# Patient Record
Sex: MALE | Race: WHITE | NOT HISPANIC OR LATINO | Employment: OTHER | URBAN - METROPOLITAN AREA
[De-identification: names, ages, dates, MRNs, and addresses within clinical notes are randomized per-mention and may not be internally consistent; named-entity substitution may affect disease eponyms.]

---

## 2017-07-07 ENCOUNTER — ANESTHESIA (OUTPATIENT)
Dept: SURGERY | Facility: CLINIC | Age: 82
DRG: 336 | End: 2017-07-07
Payer: MEDICARE

## 2017-07-07 ENCOUNTER — APPOINTMENT (OUTPATIENT)
Dept: CT IMAGING | Facility: CLINIC | Age: 82
DRG: 336 | End: 2017-07-07
Attending: EMERGENCY MEDICINE
Payer: MEDICARE

## 2017-07-07 ENCOUNTER — ANESTHESIA EVENT (OUTPATIENT)
Dept: SURGERY | Facility: CLINIC | Age: 82
DRG: 336 | End: 2017-07-07
Payer: MEDICARE

## 2017-07-07 ENCOUNTER — HOSPITAL ENCOUNTER (INPATIENT)
Facility: CLINIC | Age: 82
LOS: 4 days | Discharge: HOME | DRG: 336 | End: 2017-07-11
Attending: EMERGENCY MEDICINE | Admitting: SURGERY
Payer: MEDICARE

## 2017-07-07 ENCOUNTER — TELEPHONE (OUTPATIENT)
Dept: FAMILY MEDICINE | Facility: CLINIC | Age: 82
End: 2017-07-07

## 2017-07-07 DIAGNOSIS — K56.609 SBO (SMALL BOWEL OBSTRUCTION) (H): ICD-10-CM

## 2017-07-07 DIAGNOSIS — Z53.9 ERRONEOUS ENCOUNTER--DISREGARD: Primary | ICD-10-CM

## 2017-07-07 DIAGNOSIS — K45.8 INTERNAL HERNIA: ICD-10-CM

## 2017-07-07 DIAGNOSIS — R33.9 URINARY RETENTION: Primary | ICD-10-CM

## 2017-07-07 LAB
ALBUMIN SERPL-MCNC: 4.2 G/DL (ref 3.4–5)
ALBUMIN UR-MCNC: NEGATIVE MG/DL
ALP SERPL-CCNC: 111 U/L (ref 40–150)
ALT SERPL W P-5'-P-CCNC: 22 U/L (ref 0–70)
ANION GAP SERPL CALCULATED.3IONS-SCNC: 6 MMOL/L (ref 3–14)
APPEARANCE UR: CLEAR
AST SERPL W P-5'-P-CCNC: 20 U/L (ref 0–45)
BACTERIA #/AREA URNS HPF: ABNORMAL /HPF
BASOPHILS # BLD AUTO: 0 10E9/L (ref 0–0.2)
BASOPHILS NFR BLD AUTO: 0.3 %
BILIRUB SERPL-MCNC: 0.5 MG/DL (ref 0.2–1.3)
BILIRUB UR QL STRIP: NEGATIVE
BUN SERPL-MCNC: 16 MG/DL (ref 7–30)
CALCIUM SERPL-MCNC: 9.4 MG/DL (ref 8.5–10.1)
CHLORIDE SERPL-SCNC: 104 MMOL/L (ref 94–109)
CO2 SERPL-SCNC: 28 MMOL/L (ref 20–32)
COLOR UR AUTO: YELLOW
CREAT SERPL-MCNC: 0.73 MG/DL (ref 0.66–1.25)
CREAT SERPL-MCNC: 0.9 MG/DL (ref 0.66–1.25)
DIFFERENTIAL METHOD BLD: ABNORMAL
EOSINOPHIL # BLD AUTO: 0.1 10E9/L (ref 0–0.7)
EOSINOPHIL NFR BLD AUTO: 1.6 %
ERYTHROCYTE [DISTWIDTH] IN BLOOD BY AUTOMATED COUNT: 12.8 % (ref 10–15)
GFR SERPL CREATININE-BSD FRML MDRD: 81 ML/MIN/1.7M2
GFR SERPL CREATININE-BSD FRML MDRD: NORMAL ML/MIN/1.7M2
GLUCOSE BLDC GLUCOMTR-MCNC: 148 MG/DL (ref 70–99)
GLUCOSE BLDC GLUCOMTR-MCNC: 157 MG/DL (ref 70–99)
GLUCOSE BLDC GLUCOMTR-MCNC: 159 MG/DL (ref 70–99)
GLUCOSE SERPL-MCNC: 141 MG/DL (ref 70–99)
GLUCOSE UR STRIP-MCNC: NEGATIVE MG/DL
GRAM STN SPEC: NORMAL
HCT VFR BLD AUTO: 39 % (ref 40–53)
HGB BLD-MCNC: 13.4 G/DL (ref 13.3–17.7)
HGB UR QL STRIP: NEGATIVE
IMM GRANULOCYTES # BLD: 0 10E9/L (ref 0–0.4)
IMM GRANULOCYTES NFR BLD: 0.3 %
KETONES UR STRIP-MCNC: NEGATIVE MG/DL
LACTATE BLD-SCNC: 1.1 MMOL/L (ref 0.7–2.1)
LEUKOCYTE ESTERASE UR QL STRIP: NEGATIVE
LIPASE SERPL-CCNC: 77 U/L (ref 73–393)
LYMPHOCYTES # BLD AUTO: 1.2 10E9/L (ref 0.8–5.3)
LYMPHOCYTES NFR BLD AUTO: 17.7 %
Lab: NORMAL
MCH RBC QN AUTO: 31.3 PG (ref 26.5–33)
MCHC RBC AUTO-ENTMCNC: 34.4 G/DL (ref 31.5–36.5)
MCV RBC AUTO: 91 FL (ref 78–100)
MICRO REPORT STATUS: NORMAL
MONOCYTES # BLD AUTO: 0.4 10E9/L (ref 0–1.3)
MONOCYTES NFR BLD AUTO: 5.4 %
MUCOUS THREADS #/AREA URNS LPF: PRESENT /LPF
NEUTROPHILS # BLD AUTO: 5.1 10E9/L (ref 1.6–8.3)
NEUTROPHILS NFR BLD AUTO: 74.7 %
NITRATE UR QL: NEGATIVE
NRBC # BLD AUTO: 0 10*3/UL
NRBC BLD AUTO-RTO: 0 /100
PH UR STRIP: 6 PH (ref 5–7)
PLATELET # BLD AUTO: 134 10E9/L (ref 150–450)
PLATELET # BLD AUTO: 156 10E9/L (ref 150–450)
POTASSIUM SERPL-SCNC: 4.1 MMOL/L (ref 3.4–5.3)
PROT SERPL-MCNC: 7.7 G/DL (ref 6.8–8.8)
RBC # BLD AUTO: 4.28 10E12/L (ref 4.4–5.9)
RBC #/AREA URNS AUTO: 1 /HPF (ref 0–2)
SODIUM SERPL-SCNC: 138 MMOL/L (ref 133–144)
SP GR UR STRIP: 1.01 (ref 1–1.03)
SPECIMEN SOURCE: NORMAL
URN SPEC COLLECT METH UR: ABNORMAL
UROBILINOGEN UR STRIP-MCNC: 0 MG/DL (ref 0–2)
WBC # BLD AUTO: 6.9 10E9/L (ref 4–11)
WBC #/AREA URNS AUTO: 1 /HPF (ref 0–2)

## 2017-07-07 PROCEDURE — 99221 1ST HOSP IP/OBS SF/LOW 40: CPT | Mod: 57 | Performed by: SURGERY

## 2017-07-07 PROCEDURE — 40000306 ZZH STATISTIC PRE PROC ASSESS II: Performed by: SURGERY

## 2017-07-07 PROCEDURE — 87075 CULTR BACTERIA EXCEPT BLOOD: CPT | Performed by: SURGERY

## 2017-07-07 PROCEDURE — 99207 ZZC CDG-CODE CATEGORY CHANGED: CPT | Performed by: HOSPITALIST

## 2017-07-07 PROCEDURE — 25000128 H RX IP 250 OP 636: Performed by: ANESTHESIOLOGY

## 2017-07-07 PROCEDURE — 96375 TX/PRO/DX INJ NEW DRUG ADDON: CPT

## 2017-07-07 PROCEDURE — 96361 HYDRATE IV INFUSION ADD-ON: CPT

## 2017-07-07 PROCEDURE — 93010 ELECTROCARDIOGRAM REPORT: CPT | Performed by: INTERNAL MEDICINE

## 2017-07-07 PROCEDURE — 25000125 ZZHC RX 250: Performed by: NURSE ANESTHETIST, CERTIFIED REGISTERED

## 2017-07-07 PROCEDURE — 37000009 ZZH ANESTHESIA TECHNICAL FEE, EACH ADDTL 15 MIN: Performed by: SURGERY

## 2017-07-07 PROCEDURE — 00000146 ZZHCL STATISTIC GLUCOSE BY METER IP

## 2017-07-07 PROCEDURE — 25000128 H RX IP 250 OP 636: Performed by: NURSE ANESTHETIST, CERTIFIED REGISTERED

## 2017-07-07 PROCEDURE — 27210995 ZZH RX 272: Performed by: SURGERY

## 2017-07-07 PROCEDURE — 37000008 ZZH ANESTHESIA TECHNICAL FEE, 1ST 30 MIN: Performed by: SURGERY

## 2017-07-07 PROCEDURE — 87205 SMEAR GRAM STAIN: CPT | Performed by: SURGERY

## 2017-07-07 PROCEDURE — 25000128 H RX IP 250 OP 636: Performed by: EMERGENCY MEDICINE

## 2017-07-07 PROCEDURE — 85025 COMPLETE CBC W/AUTO DIFF WBC: CPT | Performed by: EMERGENCY MEDICINE

## 2017-07-07 PROCEDURE — 27210794 ZZH OR GENERAL SUPPLY STERILE: Performed by: SURGERY

## 2017-07-07 PROCEDURE — 81001 URINALYSIS AUTO W/SCOPE: CPT | Performed by: EMERGENCY MEDICINE

## 2017-07-07 PROCEDURE — 36000056 ZZH SURGERY LEVEL 3 1ST 30 MIN: Performed by: SURGERY

## 2017-07-07 PROCEDURE — 87070 CULTURE OTHR SPECIMN AEROBIC: CPT | Performed by: SURGERY

## 2017-07-07 PROCEDURE — 71000012 ZZH RECOVERY PHASE 1 LEVEL 1 FIRST HR: Performed by: SURGERY

## 2017-07-07 PROCEDURE — 71000013 ZZH RECOVERY PHASE 1 LEVEL 1 EA ADDTL HR: Performed by: SURGERY

## 2017-07-07 PROCEDURE — 99285 EMERGENCY DEPT VISIT HI MDM: CPT | Mod: 25

## 2017-07-07 PROCEDURE — 96376 TX/PRO/DX INJ SAME DRUG ADON: CPT

## 2017-07-07 PROCEDURE — 44180 LAP ENTEROLYSIS: CPT | Performed by: SURGERY

## 2017-07-07 PROCEDURE — 25000125 ZZHC RX 250: Performed by: SURGERY

## 2017-07-07 PROCEDURE — 0DN84ZZ RELEASE SMALL INTESTINE, PERCUTANEOUS ENDOSCOPIC APPROACH: ICD-10-PCS | Performed by: SURGERY

## 2017-07-07 PROCEDURE — 12000007 ZZH R&B INTERMEDIATE

## 2017-07-07 PROCEDURE — 80053 COMPREHEN METABOLIC PANEL: CPT | Performed by: EMERGENCY MEDICINE

## 2017-07-07 PROCEDURE — 82565 ASSAY OF CREATININE: CPT | Performed by: SURGERY

## 2017-07-07 PROCEDURE — 44180 LAP ENTEROLYSIS: CPT | Mod: AS | Performed by: PHYSICIAN ASSISTANT

## 2017-07-07 PROCEDURE — 83605 ASSAY OF LACTIC ACID: CPT | Performed by: EMERGENCY MEDICINE

## 2017-07-07 PROCEDURE — 25000128 H RX IP 250 OP 636: Performed by: SURGERY

## 2017-07-07 PROCEDURE — 96374 THER/PROPH/DIAG INJ IV PUSH: CPT

## 2017-07-07 PROCEDURE — 85049 AUTOMATED PLATELET COUNT: CPT | Performed by: SURGERY

## 2017-07-07 PROCEDURE — 83690 ASSAY OF LIPASE: CPT | Performed by: EMERGENCY MEDICINE

## 2017-07-07 PROCEDURE — 25000566 ZZH SEVOFLURANE, EA 15 MIN: Performed by: SURGERY

## 2017-07-07 PROCEDURE — 36000058 ZZH SURGERY LEVEL 3 EA 15 ADDTL MIN: Performed by: SURGERY

## 2017-07-07 PROCEDURE — 74177 CT ABD & PELVIS W/CONTRAST: CPT

## 2017-07-07 PROCEDURE — 99222 1ST HOSP IP/OBS MODERATE 55: CPT | Performed by: HOSPITALIST

## 2017-07-07 PROCEDURE — 36415 COLL VENOUS BLD VENIPUNCTURE: CPT | Performed by: SURGERY

## 2017-07-07 RX ORDER — NALOXONE HYDROCHLORIDE 0.4 MG/ML
.1-.4 INJECTION, SOLUTION INTRAMUSCULAR; INTRAVENOUS; SUBCUTANEOUS
Status: DISCONTINUED | OUTPATIENT
Start: 2017-07-07 | End: 2017-07-11 | Stop reason: HOSPADM

## 2017-07-07 RX ORDER — NICOTINE POLACRILEX 4 MG
15-30 LOZENGE BUCCAL
Status: DISCONTINUED | OUTPATIENT
Start: 2017-07-07 | End: 2017-07-07

## 2017-07-07 RX ORDER — FENTANYL CITRATE 50 UG/ML
25-50 INJECTION, SOLUTION INTRAMUSCULAR; INTRAVENOUS
Status: DISCONTINUED | OUTPATIENT
Start: 2017-07-07 | End: 2017-07-07 | Stop reason: HOSPADM

## 2017-07-07 RX ORDER — HYDROMORPHONE HYDROCHLORIDE 1 MG/ML
.3-.5 INJECTION, SOLUTION INTRAMUSCULAR; INTRAVENOUS; SUBCUTANEOUS
Status: DISCONTINUED | OUTPATIENT
Start: 2017-07-07 | End: 2017-07-11 | Stop reason: HOSPADM

## 2017-07-07 RX ORDER — ONDANSETRON 2 MG/ML
4 INJECTION INTRAMUSCULAR; INTRAVENOUS EVERY 6 HOURS PRN
Status: DISCONTINUED | OUTPATIENT
Start: 2017-07-07 | End: 2017-07-11 | Stop reason: HOSPADM

## 2017-07-07 RX ORDER — GLYCOPYRROLATE 0.2 MG/ML
INJECTION, SOLUTION INTRAMUSCULAR; INTRAVENOUS PRN
Status: DISCONTINUED | OUTPATIENT
Start: 2017-07-07 | End: 2017-07-07

## 2017-07-07 RX ORDER — LABETALOL HYDROCHLORIDE 5 MG/ML
10 INJECTION, SOLUTION INTRAVENOUS EVERY 5 MIN PRN
Status: DISCONTINUED | OUTPATIENT
Start: 2017-07-07 | End: 2017-07-07 | Stop reason: HOSPADM

## 2017-07-07 RX ORDER — MAGNESIUM HYDROXIDE 1200 MG/15ML
LIQUID ORAL PRN
Status: DISCONTINUED | OUTPATIENT
Start: 2017-07-07 | End: 2017-07-07 | Stop reason: HOSPADM

## 2017-07-07 RX ORDER — SODIUM CHLORIDE, SODIUM LACTATE, POTASSIUM CHLORIDE, CALCIUM CHLORIDE 600; 310; 30; 20 MG/100ML; MG/100ML; MG/100ML; MG/100ML
INJECTION, SOLUTION INTRAVENOUS CONTINUOUS
Status: DISCONTINUED | OUTPATIENT
Start: 2017-07-07 | End: 2017-07-07 | Stop reason: HOSPADM

## 2017-07-07 RX ORDER — GABAPENTIN 600 MG/1
600 TABLET ORAL 2 TIMES DAILY
Status: DISCONTINUED | OUTPATIENT
Start: 2017-07-07 | End: 2017-07-11 | Stop reason: HOSPADM

## 2017-07-07 RX ORDER — TAMSULOSIN HYDROCHLORIDE 0.4 MG/1
0.4 CAPSULE ORAL DAILY
Status: DISCONTINUED | OUTPATIENT
Start: 2017-07-07 | End: 2017-07-09

## 2017-07-07 RX ORDER — ONDANSETRON 4 MG/1
4 TABLET, ORALLY DISINTEGRATING ORAL EVERY 30 MIN PRN
Status: DISCONTINUED | OUTPATIENT
Start: 2017-07-07 | End: 2017-07-07 | Stop reason: HOSPADM

## 2017-07-07 RX ORDER — LIDOCAINE 40 MG/G
CREAM TOPICAL
Status: DISCONTINUED | OUTPATIENT
Start: 2017-07-07 | End: 2017-07-11 | Stop reason: HOSPADM

## 2017-07-07 RX ORDER — ONDANSETRON 4 MG/1
4 TABLET, ORALLY DISINTEGRATING ORAL EVERY 6 HOURS PRN
Status: DISCONTINUED | OUTPATIENT
Start: 2017-07-07 | End: 2017-07-11 | Stop reason: HOSPADM

## 2017-07-07 RX ORDER — ONDANSETRON 2 MG/ML
4 INJECTION INTRAMUSCULAR; INTRAVENOUS EVERY 30 MIN PRN
Status: DISCONTINUED | OUTPATIENT
Start: 2017-07-07 | End: 2017-07-07 | Stop reason: HOSPADM

## 2017-07-07 RX ORDER — LIDOCAINE HYDROCHLORIDE 10 MG/ML
INJECTION, SOLUTION INFILTRATION; PERINEURAL PRN
Status: DISCONTINUED | OUTPATIENT
Start: 2017-07-07 | End: 2017-07-07

## 2017-07-07 RX ORDER — SODIUM CHLORIDE, SODIUM LACTATE, POTASSIUM CHLORIDE, CALCIUM CHLORIDE 600; 310; 30; 20 MG/100ML; MG/100ML; MG/100ML; MG/100ML
INJECTION, SOLUTION INTRAVENOUS CONTINUOUS
Status: DISCONTINUED | OUTPATIENT
Start: 2017-07-07 | End: 2017-07-08

## 2017-07-07 RX ORDER — DEXAMETHASONE SODIUM PHOSPHATE 4 MG/ML
INJECTION, SOLUTION INTRA-ARTICULAR; INTRALESIONAL; INTRAMUSCULAR; INTRAVENOUS; SOFT TISSUE PRN
Status: DISCONTINUED | OUTPATIENT
Start: 2017-07-07 | End: 2017-07-07

## 2017-07-07 RX ORDER — HYDRALAZINE HYDROCHLORIDE 20 MG/ML
5 INJECTION INTRAMUSCULAR; INTRAVENOUS ONCE
Status: COMPLETED | OUTPATIENT
Start: 2017-07-07 | End: 2017-07-07

## 2017-07-07 RX ORDER — KETOROLAC TROMETHAMINE 15 MG/ML
15 INJECTION, SOLUTION INTRAMUSCULAR; INTRAVENOUS EVERY 6 HOURS PRN
Status: DISPENSED | OUTPATIENT
Start: 2017-07-07 | End: 2017-07-09

## 2017-07-07 RX ORDER — ATORVASTATIN CALCIUM 40 MG/1
40 TABLET, FILM COATED ORAL DAILY
Status: DISCONTINUED | OUTPATIENT
Start: 2017-07-07 | End: 2017-07-11 | Stop reason: HOSPADM

## 2017-07-07 RX ORDER — DEXTROSE MONOHYDRATE 25 G/50ML
25-50 INJECTION, SOLUTION INTRAVENOUS
Status: DISCONTINUED | OUTPATIENT
Start: 2017-07-07 | End: 2017-07-07

## 2017-07-07 RX ORDER — ACETAMINOPHEN 325 MG/1
325-650 TABLET ORAL EVERY EVENING
Status: ON HOLD | COMMUNITY
End: 2021-06-29

## 2017-07-07 RX ORDER — IOPAMIDOL 755 MG/ML
500 INJECTION, SOLUTION INTRAVASCULAR ONCE
Status: COMPLETED | OUTPATIENT
Start: 2017-07-07 | End: 2017-07-07

## 2017-07-07 RX ORDER — SODIUM CHLORIDE 9 MG/ML
1000 INJECTION, SOLUTION INTRAVENOUS CONTINUOUS
Status: DISCONTINUED | OUTPATIENT
Start: 2017-07-07 | End: 2017-07-07

## 2017-07-07 RX ORDER — HYDROMORPHONE HYDROCHLORIDE 1 MG/ML
.3-.5 INJECTION, SOLUTION INTRAMUSCULAR; INTRAVENOUS; SUBCUTANEOUS EVERY 5 MIN PRN
Status: DISCONTINUED | OUTPATIENT
Start: 2017-07-07 | End: 2017-07-07 | Stop reason: HOSPADM

## 2017-07-07 RX ORDER — NICOTINE POLACRILEX 4 MG
15-30 LOZENGE BUCCAL
Status: DISCONTINUED | OUTPATIENT
Start: 2017-07-07 | End: 2017-07-11 | Stop reason: HOSPADM

## 2017-07-07 RX ORDER — CARBOXYMETHYLCELLULOSE SODIUM 5 MG/ML
1 SOLUTION/ DROPS OPHTHALMIC DAILY PRN
Status: DISCONTINUED | OUTPATIENT
Start: 2017-07-07 | End: 2017-07-11 | Stop reason: HOSPADM

## 2017-07-07 RX ORDER — HYDRALAZINE HYDROCHLORIDE 20 MG/ML
10-20 INJECTION INTRAMUSCULAR; INTRAVENOUS EVERY 6 HOURS PRN
Status: DISCONTINUED | OUTPATIENT
Start: 2017-07-07 | End: 2017-07-08

## 2017-07-07 RX ORDER — DEXTROSE MONOHYDRATE 25 G/50ML
25-50 INJECTION, SOLUTION INTRAVENOUS
Status: DISCONTINUED | OUTPATIENT
Start: 2017-07-07 | End: 2017-07-11 | Stop reason: HOSPADM

## 2017-07-07 RX ORDER — ONDANSETRON 2 MG/ML
INJECTION INTRAMUSCULAR; INTRAVENOUS PRN
Status: DISCONTINUED | OUTPATIENT
Start: 2017-07-07 | End: 2017-07-07

## 2017-07-07 RX ORDER — NEOSTIGMINE METHYLSULFATE 1 MG/ML
VIAL (ML) INJECTION PRN
Status: DISCONTINUED | OUTPATIENT
Start: 2017-07-07 | End: 2017-07-07

## 2017-07-07 RX ORDER — LIDOCAINE 40 MG/G
CREAM TOPICAL
Status: DISCONTINUED | OUTPATIENT
Start: 2017-07-07 | End: 2017-07-07 | Stop reason: HOSPADM

## 2017-07-07 RX ORDER — PROCHLORPERAZINE MALEATE 5 MG
5 TABLET ORAL EVERY 6 HOURS PRN
Status: DISCONTINUED | OUTPATIENT
Start: 2017-07-07 | End: 2017-07-11 | Stop reason: HOSPADM

## 2017-07-07 RX ORDER — PROPOFOL 10 MG/ML
INJECTION, EMULSION INTRAVENOUS PRN
Status: DISCONTINUED | OUTPATIENT
Start: 2017-07-07 | End: 2017-07-07

## 2017-07-07 RX ORDER — FENTANYL CITRATE 50 UG/ML
INJECTION, SOLUTION INTRAMUSCULAR; INTRAVENOUS PRN
Status: DISCONTINUED | OUTPATIENT
Start: 2017-07-07 | End: 2017-07-07

## 2017-07-07 RX ORDER — BUPIVACAINE HYDROCHLORIDE AND EPINEPHRINE 5; 5 MG/ML; UG/ML
INJECTION, SOLUTION PERINEURAL PRN
Status: DISCONTINUED | OUTPATIENT
Start: 2017-07-07 | End: 2017-07-07 | Stop reason: HOSPADM

## 2017-07-07 RX ORDER — HYDROMORPHONE HYDROCHLORIDE 1 MG/ML
0.5 INJECTION, SOLUTION INTRAMUSCULAR; INTRAVENOUS; SUBCUTANEOUS
Status: COMPLETED | OUTPATIENT
Start: 2017-07-07 | End: 2017-07-07

## 2017-07-07 RX ADMIN — LABETALOL HYDROCHLORIDE 10 MG: 5 INJECTION, SOLUTION INTRAVENOUS at 11:41

## 2017-07-07 RX ADMIN — FENTANYL CITRATE 100 MCG: 50 INJECTION, SOLUTION INTRAMUSCULAR; INTRAVENOUS at 09:47

## 2017-07-07 RX ADMIN — DEXAMETHASONE SODIUM PHOSPHATE 4 MG: 4 INJECTION, SOLUTION INTRA-ARTICULAR; INTRALESIONAL; INTRAMUSCULAR; INTRAVENOUS; SOFT TISSUE at 09:51

## 2017-07-07 RX ADMIN — SODIUM CHLORIDE, POTASSIUM CHLORIDE, SODIUM LACTATE AND CALCIUM CHLORIDE: 600; 310; 30; 20 INJECTION, SOLUTION INTRAVENOUS at 09:32

## 2017-07-07 RX ADMIN — PROPOFOL 130 MG: 10 INJECTION, EMULSION INTRAVENOUS at 09:49

## 2017-07-07 RX ADMIN — ONDANSETRON 4 MG: 2 INJECTION INTRAMUSCULAR; INTRAVENOUS at 10:57

## 2017-07-07 RX ADMIN — FENTANYL CITRATE 50 MCG: 50 INJECTION, SOLUTION INTRAMUSCULAR; INTRAVENOUS at 10:08

## 2017-07-07 RX ADMIN — HYDROMORPHONE HYDROCHLORIDE 0.5 MG: 1 INJECTION, SOLUTION INTRAMUSCULAR; INTRAVENOUS; SUBCUTANEOUS at 06:18

## 2017-07-07 RX ADMIN — SODIUM CHLORIDE 1000 ML: 9 INJECTION, SOLUTION INTRAVENOUS at 05:05

## 2017-07-07 RX ADMIN — LIDOCAINE HYDROCHLORIDE 50 MG: 10 INJECTION, SOLUTION INFILTRATION; PERINEURAL at 09:49

## 2017-07-07 RX ADMIN — Medication 1.5 MG: at 11:05

## 2017-07-07 RX ADMIN — HYDROMORPHONE HYDROCHLORIDE 0.3 MG: 1 INJECTION, SOLUTION INTRAMUSCULAR; INTRAVENOUS; SUBCUTANEOUS at 20:14

## 2017-07-07 RX ADMIN — GLYCOPYRROLATE 0.2 MG: 0.2 INJECTION, SOLUTION INTRAMUSCULAR; INTRAVENOUS at 11:05

## 2017-07-07 RX ADMIN — FENTANYL CITRATE 50 MCG: 50 INJECTION INTRAMUSCULAR; INTRAVENOUS at 12:11

## 2017-07-07 RX ADMIN — HYDROMORPHONE HYDROCHLORIDE 0.5 MG: 1 INJECTION, SOLUTION INTRAMUSCULAR; INTRAVENOUS; SUBCUTANEOUS at 05:53

## 2017-07-07 RX ADMIN — IOPAMIDOL 91 ML: 755 INJECTION, SOLUTION INTRAVENOUS at 05:59

## 2017-07-07 RX ADMIN — HYDROMORPHONE HYDROCHLORIDE 1 MG: 1 INJECTION, SOLUTION INTRAMUSCULAR; INTRAVENOUS; SUBCUTANEOUS at 07:48

## 2017-07-07 RX ADMIN — FENTANYL CITRATE 100 MCG: 50 INJECTION, SOLUTION INTRAMUSCULAR; INTRAVENOUS at 10:16

## 2017-07-07 RX ADMIN — TAZOBACTAM SODIUM AND PIPERACILLIN SODIUM 3.38 G: 375; 3 INJECTION, SOLUTION INTRAVENOUS at 09:40

## 2017-07-07 RX ADMIN — ROCURONIUM BROMIDE 50 MG: 10 INJECTION INTRAVENOUS at 09:51

## 2017-07-07 RX ADMIN — HYDRALAZINE HYDROCHLORIDE 10 MG: 20 INJECTION INTRAMUSCULAR; INTRAVENOUS at 17:47

## 2017-07-07 RX ADMIN — LABETALOL HYDROCHLORIDE 10 MG: 5 INJECTION, SOLUTION INTRAVENOUS at 12:05

## 2017-07-07 RX ADMIN — HYDROMORPHONE HYDROCHLORIDE 0.5 MG: 1 INJECTION, SOLUTION INTRAMUSCULAR; INTRAVENOUS; SUBCUTANEOUS at 05:05

## 2017-07-07 RX ADMIN — LABETALOL HYDROCHLORIDE 10 MG: 5 INJECTION, SOLUTION INTRAVENOUS at 13:11

## 2017-07-07 RX ADMIN — SODIUM CHLORIDE 1000 ML: 9 INJECTION, SOLUTION INTRAVENOUS at 06:37

## 2017-07-07 RX ADMIN — SODIUM CHLORIDE, POTASSIUM CHLORIDE, SODIUM LACTATE AND CALCIUM CHLORIDE: 600; 310; 30; 20 INJECTION, SOLUTION INTRAVENOUS at 10:30

## 2017-07-07 RX ADMIN — HYDRALAZINE HYDROCHLORIDE 5 MG: 20 INJECTION INTRAMUSCULAR; INTRAVENOUS at 06:26

## 2017-07-07 RX ADMIN — SODIUM CHLORIDE 63 ML: 9 INJECTION, SOLUTION INTRAVENOUS at 05:59

## 2017-07-07 RX ADMIN — Medication 1.5 MG: at 10:57

## 2017-07-07 RX ADMIN — LABETALOL HYDROCHLORIDE 10 MG: 5 INJECTION, SOLUTION INTRAVENOUS at 11:32

## 2017-07-07 RX ADMIN — SODIUM CHLORIDE, POTASSIUM CHLORIDE, SODIUM LACTATE AND CALCIUM CHLORIDE: 600; 310; 30; 20 INJECTION, SOLUTION INTRAVENOUS at 14:26

## 2017-07-07 RX ADMIN — GLYCOPYRROLATE 0.2 MG: 0.2 INJECTION, SOLUTION INTRAMUSCULAR; INTRAVENOUS at 10:57

## 2017-07-07 ASSESSMENT — ENCOUNTER SYMPTOMS
FEVER: 0
ABDOMINAL PAIN: 1
DIARRHEA: 0
BLOOD IN STOOL: 0
CONSTIPATION: 0
APPETITE CHANGE: 0

## 2017-07-07 NOTE — ANESTHESIA POSTPROCEDURE EVALUATION
Patient: Cresencio Peguero    Procedure(s):  DIAGNOSTIC LAPAROSCOPY WITH LYSIS OF ADHESIONS  - Wound Class: I-Clean    Diagnosis:INCARCERATED HERNIA  Diagnosis Additional Information: Small bowel obstruction    Anesthesia Type:  General, ETT    Note:  Anesthesia Post Evaluation    Patient location during evaluation: PACU  Patient participation: Able to fully participate in evaluation  Level of consciousness: awake and alert  Pain management: adequate  Airway patency: patent  Cardiovascular status: acceptable  Respiratory status: acceptable  Hydration status: acceptable  PONV: none     Anesthetic complications: None          Last vitals:  Vitals:    07/07/17 1135 07/07/17 1200 07/07/17 1215   BP: 176/83 186/86 178/78   Pulse:      Resp: 14 8 15   Temp:      SpO2: 100% 100% 94%         Electronically Signed By: Elvin Flowers MD  July 7, 2017  12:37 PM

## 2017-07-07 NOTE — BRIEF OP NOTE
[facility] Brief Operative Note    Pre-operative diagnosis: INCARCERATED HERNIA   Post-operative diagnosis Small bowel obstruction     Procedure: Procedure(s):  DIAGNOSTIC LAPAROSCOPY WITH LYSIS OF ADHESIONS  - Wound Class: I-Clean   Surgeon(s): Surgeon(s) and Role:     * Sumeet Joiner MD - Primary     * Hector Zamarripa PA-C   Estimated blood loss: 5    Specimens:   ID Type Source Tests Collected by Time Destination   1 : abdominal fluid Fluid Abdomen ANAEROBIC BACTERIAL CULTURE, FLUID CULTURE AEROBIC BACTERIAL, GRAM STAIN Sumeet Joiner MD 7/7/2017 10:38 AM       Findings: Omental band crossing and compressing the mid small bowel against the posterior abdominal cavity. Turbid green-brown fluid in abdomen; sampled for analysis. No gross evidence of injury or perforation of colon, anterior stomach, gallbladder. Small intestine run retrograde from ileocecal valve to ligament of Treitz without overt ischemic changes or necrosis.

## 2017-07-07 NOTE — CONSULTS
[facility]    Hospitalist Consult       Date of Admission:  7/7/2017  Date of Service (when I saw the patient): 07/07/17    Assessment & Plan   Cresencio Peguero is a 82 year old male with history CAD/bypass grafting (20 yrs ago, has been stable), DM, chronic back pain, cervical laminectomy, neuropathy who presents with abdominal pain due to incarcerated hernia s/p laparoscopy and lysis of adhesions    1. Neuro- pain control per surgery  2. Cardiovascular- HTN/CAD. Was hypertensive after surgery. Now improved. No formal diagnosis of HTN and not on BP meds. Monitor for now as it has improved. Can start oral med if BP continues to be high  3. Pulmonary- no issues  4. GI- SBO due to adhesions. S/p laparoscopy and lysis of adhesions. Has NG tube currently. I personally spoke with Dr. Joiner. Likey NGT will stay in until tomorrow. NPO until tomorrow, then may start clears. Surgery will re-assess in am  5. ID- perioperative antibiotics per surgery (on zosyn)  6. Renal- IVF overnight while NPO  7. Heme/Onc- no issues  8. Endo- diabetes. Controlled with diet and metformin. Holding metformin x 48 hours due to CT scan. Cover with sliding scale for now. Re-start metformin when eating  9. Musculoskeletal- ambulate  10. Prophylaxis- per surgery (currently on lovenox)      Obdulio Saleh MD, Hospitalist  Pager: [phone]    Obdulio Saleh MD    Primary Care Physician   Parrish Padgett    Chief Complaint   Abdominal pain. SBO due to adhesions    History is obtained from the patient. Directly spoke with Dr. Joiner    History of Present Illness   Cresencio Peguero is a 82 year old male with history CAD/bypass grafting (20 yrs ago, has been stable), DM, chronic back pain, cervical laminectomy, neuropathy who presents with abdominal pain due to incarcerated hernia s/p laparoscopy and lysis of adhesions. Patient is post-op. Back in his room. Family is here. He denies any pain. Still groggy from surgery. Has some  sore throat. No chest pain, sob, abdo pain, n/v/d. No complaints at this time      Past Medical History    I have reviewed this patient's medical history and updated it with pertinent information if needed.   DM, CAD, back pain, neuropathy    Past Surgical History   I have reviewed this patient's surgical history and updated it with pertinent information if needed.  Past Surgical History:   Procedure Laterality Date     HERNIORRHAPHY INGUINAL Right 8/15/2016    Procedure: HERNIORRHAPHY INGUINAL;  Surgeon: Wu Adam MD;  Location:  OR       Prior to Admission Medications   Prior to Admission Medications   Prescriptions Last Dose Informant Patient Reported? Taking?   ATORVASTATIN CALCIUM PO 7/6/2017 at a.m Self Yes Yes   Sig: Take 40 mg by mouth daily   GABAPENTIN PO 7/6/2017 at p m Self Yes Yes   Sig: Take 600 mg by mouth 2 times daily    METFORMIN HCL PO 7/6/2017 at a.m Self Yes Yes   Sig: Take 850 mg by mouth daily (with breakfast)   acetaminophen (TYLENOL) 325 MG tablet 7/6/2017 at a.m Self Yes Yes   Sig: Take 650 mg by mouth every 6 hours as needed for mild pain   carboxymethylcellulose (CELLUVISC/REFRESH LIQUIGEL) 1 % ophthalmic solution unknown Self Yes Yes   Sig: Place 1 drop into both eyes daily as needed Alternates with Refresh Tears.    carboxymethylcellulose (REFRESH PLUS) 0.5 % SOLN unknown Self Yes Yes   Sig: Place 1 drop into both eyes daily as needed Alternates with Refresh Liquigel.    polyethylene glycol (MIRALAX/GLYCOLAX) packet 7/6/2017 at a.m Self Yes Yes   Sig: Take 17 g by mouth daily   tamsulosin (FLOMAX) 0.4 MG 24 hr capsule 7/6/2017 at a.m Self Yes Yes   Sig: Take 0.4 mg by mouth daily      Facility-Administered Medications: None     Allergies   Allergies   Allergen Reactions     Percodan [Oxycodone-Aspirin] Rash       Social History   I have reviewed this patient's social history and updated it with pertinent information if needed. Cresencio Peguero  reports that he has never  smoked. He does not have any smokeless tobacco history on file.    Family History   I have reviewed this patient's family history and updated it with pertinent information if needed.   History reviewed. No pertinent family history.    Review of Systems   The 10 point Review of Systems is negative other than noted in the HPI or here.     Physical Exam   Temp: 96.7  F (35.9  C) Temp src: Axillary BP: 148/50 Pulse: 57 Heart Rate: 60 Resp: 14 SpO2: 96 % O2 Device: None (Room air) Oxygen Delivery: 10 LPM  Vital Signs with Ranges  Temp:  [96.7  F (35.9  C)-97.6  F (36.4  C)] 96.7  F (35.9  C)  Pulse:  [55-60] 57  Heart Rate:  [53-77] 60  Resp:  [7-40] 14  BP: (137-226)/() 148/50  FiO2 (%):  [100 %] 100 %  SpO2:  [94 %-100 %] 96 %  180 lbs 0 oz    Exam:  GEN:  Alert, oriented x 3, appears comfortable, NAD.  HEENT:  Normocephalic/atraumatic, no scleral icterus, no nasal discharge, mouth moist.  CV:  Regular rate and rhythm, no murmur or JVD.  S1 + S2 noted, no S3 or S4.  LUNGS:  Clear to auscultation bilaterally without rales/rhonchi/wheezing/retractions.  Symmetric chest rise on inhalation noted.  ABD:  Decreased BS. Surgical incision sites clean and dry. No tenderness.  EXT:  No edema or cyanosis.  Hands/feet warm to touch with good signs of peripheral perfusion.  No joint synovitis noted.  SKIN:  Dry to touch, no exanthems noted in the visualized areas.  NEURO:  Symmetric muscle strength, sensation to touch grossly intact.  No new focal deficits appreciated.    Data   Data reviewed today:  I personally reviewed all imaging and EKGs   Results for orders placed or performed during the hospital encounter of 07/07/17   CT Abdomen Pelvis w Contrast    Narrative    CT ABDOMEN PELVIS W CONTRAST  7/7/2017 6:06 AM     HISTORY: Left lower quadrant pain.    TECHNIQUE: Volumetric acquisition through abdomen and pelvis with IV  contrast. 91 mL Isovue-370. Radiation dose for this scan was reduced  using automated exposure  control, adjustment of the mA and/or kV  according to patient size, or iterative reconstruction technique.    COMPARISON: 8/14/2016.    FINDINGS: The liver, gallbladder, spleen, pancreas, adrenal glands and  kidneys demonstrate no worrisome findings. Small left renal cyst.  Atheromatous changes of the abdominal aorta without aneurysm.    There are a few borderline prominent fluid-filled small bowel loops in  the midabdomen. To the left of this there is a cluster of small bowel  loops which are nondilated but appear slightly thick-walled with  moderate adjacent edema and stranding in the mesentery. The  configuration of the loops suggests an internal hernia without  significant associated obstruction at this point.    Scattered colon diverticula without diverticulitis. No free air or  pneumatosis. Small amount of free fluid in the pelvis. Normal  appendix.      Impression    IMPRESSION:  1. Cluster of abnormal small bowel loops in the left abdomen  demonstrate ill-defined wall thickening with associated mesenteric  edema and stranding.  2. Findings suggest internal hernia without significant associated  bowel obstruction at this point. However, a few adjacent small bowel  loops are fluid-filled and mildly distended.  3. Recommend surgical consultation.  4. Small amount of free pelvic fluid.    THEE CORTEZ MD         Recent Labs  Lab 07/07/17  0456   WBC 6.9   HGB 13.4   HCT 39.0*   MCV 91             Lab Results   Component Value Date     07/07/2017     08/14/2016     05/11/2006    Lab Results   Component Value Date    CHLORIDE 104 07/07/2017    CHLORIDE 104 08/14/2016    CHLORIDE 106 05/11/2006    Lab Results   Component Value Date    BUN 16 07/07/2017    BUN 22 08/14/2016    BUN 13 05/11/2006      Lab Results   Component Value Date    POTASSIUM 4.1 07/07/2017    POTASSIUM 4.3 08/14/2016    POTASSIUM 4.9 05/11/2006    Lab Results   Component Value Date    CO2 28 07/07/2017    CO2 30  08/14/2016    CO2 29 05/11/2006    Lab Results   Component Value Date    CR 0.90 07/07/2017    CR 0.87 08/14/2016    CR 1.00 05/11/2006          Recent Results (from the past 24 hour(s))   CT Abdomen Pelvis w Contrast    Narrative    CT ABDOMEN PELVIS W CONTRAST  7/7/2017 6:06 AM     HISTORY: Left lower quadrant pain.    TECHNIQUE: Volumetric acquisition through abdomen and pelvis with IV  contrast. 91 mL Isovue-370. Radiation dose for this scan was reduced  using automated exposure control, adjustment of the mA and/or kV  according to patient size, or iterative reconstruction technique.    COMPARISON: 8/14/2016.    FINDINGS: The liver, gallbladder, spleen, pancreas, adrenal glands and  kidneys demonstrate no worrisome findings. Small left renal cyst.  Atheromatous changes of the abdominal aorta without aneurysm.    There are a few borderline prominent fluid-filled small bowel loops in  the midabdomen. To the left of this there is a cluster of small bowel  loops which are nondilated but appear slightly thick-walled with  moderate adjacent edema and stranding in the mesentery. The  configuration of the loops suggests an internal hernia without  significant associated obstruction at this point.    Scattered colon diverticula without diverticulitis. No free air or  pneumatosis. Small amount of free fluid in the pelvis. Normal  appendix.      Impression    IMPRESSION:  1. Cluster of abnormal small bowel loops in the left abdomen  demonstrate ill-defined wall thickening with associated mesenteric  edema and stranding.  2. Findings suggest internal hernia without significant associated  bowel obstruction at this point. However, a few adjacent small bowel  loops are fluid-filled and mildly distended.  3. Recommend surgical consultation.  4. Small amount of free pelvic fluid.    THEE CORTEZ MD

## 2017-07-07 NOTE — IP AVS SNAPSHOT
[facility]    [address]    [facility] [address]    Phone:  [phone]    Fax:  [phone]                                       After Visit Summary   7/7/2017    Cresencio Peguero    MRN: [id number]           After Visit Summary Signature Page     I have received my discharge instructions, and my questions have been answered. I have discussed any challenges I see with this plan with the nurse or doctor.    ..........................................................................................................................................  Patient/Patient Representative Signature      ..........................................................................................................................................  Patient Representative Print Name and Relationship to Patient    ..................................................               ................................................  Date                                            Time    ..........................................................................................................................................  Reviewed by Signature/Title    ...................................................              ..............................................  Date                                                            Time

## 2017-07-07 NOTE — ANESTHESIA PREPROCEDURE EVALUATION
Anesthesia Evaluation     . Pt has had prior anesthetic. Type: General    No history of anesthetic complications          ROS/MED HX    ENT/Pulmonary:  - neg pulmonary ROS     Neurologic:  - neg neurologic ROS     Cardiovascular:     (+) --CAD, -CABG-. : . . . :. .       METS/Exercise Tolerance:     Hematologic:  - neg hematologic  ROS       Musculoskeletal:  - neg musculoskeletal ROS       GI/Hepatic:  - neg GI/hepatic ROS       Renal/Genitourinary:  - ROS Renal section negative       Endo:     (+) type II DM .      Psychiatric:  - neg psychiatric ROS       Infectious Disease:  - neg infectious disease ROS       Malignancy:      - no malignancy   Other:    - neg other ROS                 Physical Exam  Normal systems: cardiovascular and pulmonary    Airway   Mallampati: II  TM distance: >3 FB  Neck ROM: full    Dental   (+) upper dentures and partials    Cardiovascular       Pulmonary                     Anesthesia Plan      History & Physical Review  History and physical reviewed and following examination; no interval change.    ASA Status:  3 .    NPO Status:  > 8 hours    Plan for General and ETT with Intravenous and Propofol induction. Maintenance will be Balanced.    PONV prophylaxis:  Ondansetron (or other 5HT-3) and Dexamethasone or Solumedrol       Postoperative Care  Postoperative pain management:  IV analgesics and Oral pain medications.      Consents  Anesthetic plan, risks, benefits and alternatives discussed with:  Patient or representative and Patient..                          .

## 2017-07-07 NOTE — ANESTHESIA CARE TRANSFER NOTE
Patient: Cresencio Peguero    Procedure(s):  DIAGNOSTIC LAPAROSCOPY WITH LYSIS OF ADHESIONS  - Wound Class: I-Clean    Diagnosis: INCARCERATED HERNIA  Diagnosis Additional Information: No value filed.    Anesthesia Type:   General, ETT     Note:  Airway :Face Mask  Patient transferred to:PACU  Comments: Spontaneous respsirations. O2 per mask.       Vitals: (Last set prior to Anesthesia Care Transfer)    CRNA VITALS  7/7/2017 1047 - 7/7/2017 1123      7/7/2017             Resp Rate (observed): (!)  7                Electronically Signed By: MAKAYLA Foreman CRNA  July 7, 2017  11:23 AM

## 2017-07-07 NOTE — H&P
[facility]     Hospital Consultation     Cresencio Peguero MRN# [id number]   YOB: 1935 Age: 82 year old     Primary care provider: Parrish Padgett    ASSESSMENT:   Cresencio Peguero is an 82 year old year old male with sudden abdominal pain and an abnormal CT scan.    CT consistent with internal hernia and bowel dilation.    RECOMMENDATIONS:   I discussed this with the patient and his wife in the emergency room. I have also seen the CT scan images. I agree with the radiologist that there is an abnormality there. It is possible that the radiologist is wrong but I explained to the patient that the downsides of missing obstructed strangulating bowel would be much more severe than a diagnostic laparoscopy. I recommended to him that he have an emergency diagnostic laparoscopy with possible laparotomy and bowel resection to evaluate and correct the situation if there is in fact an internal hernia. I explained the risks, benefits, and alternatives to them. They agree to proceed.    Sumeet Joiner MD  [phone] (m)  Click here to send text page.     This note was created using voice recognition software. Undetected word substitutions or other errors may have occurred.      HPI:    Cresencio Peguero is a 82 year old male with abdominal pain. The pain started at 11:00 last night. It was periumbilical. It was moderate. It did not radiate. He has not had pain like this before. He does have constipation. 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 This pain is distinct and different. Pushing made it worse. Pain medicine helps. He tried to have a bowel movement and this did not help. He came to the emergency room where the emergency room doctor obtained a CT scan which was called to my partner while he was on duty. This was described to me as showing a closed loop obstruction. The official report indicates there is findings consistent with internal hernia with early small bowel dilation as well as  thickening and edema in the mesentery.          PAST MEDICAL HISTORY:   History reviewed. No pertinent past medical history.     PAST SURGICAL HISTORY:     Past Surgical History:   Procedure Laterality Date     HERNIORRHAPHY INGUINAL Right 8/15/2016    Procedure: HERNIORRHAPHY INGUINAL;  Surgeon: Wu Adam MD;  Location:  OR       SOCIAL HISTORY:     Social History     Social History     Marital status:      Spouse name: N/A     Number of children: N/A     Years of education: N/A     Social History Main Topics     Smoking status: Never Smoker     Smokeless tobacco: None     Alcohol use None     Drug use: None     Sexual activity: Not Asked     Other Topics Concern     None     Social History Narrative        FAMILY HISTORY:   No family history on file.    MEDICATIONS:     Current Outpatient Prescriptions   Medication Sig Dispense Refill     acetaminophen (TYLENOL) 325 MG tablet Take 650 mg by mouth every 6 hours as needed for mild pain       polyethylene glycol (MIRALAX/GLYCOLAX) packet Take 17 g by mouth daily 30 packet 0     carboxymethylcellulose (REFRESH PLUS) 0.5 % SOLN Place 1 drop into both eyes daily as needed Alternates with Refresh Liquigel.        carboxymethylcellulose (CELLUVISC/REFRESH LIQUIGEL) 1 % ophthalmic solution Place 1 drop into both eyes daily as needed Alternates with Refresh Tears.        ATORVASTATIN CALCIUM PO Take 40 mg by mouth daily       GABAPENTIN PO Take 600 mg by mouth 2 times daily        tamsulosin (FLOMAX) 0.4 MG 24 hr capsule Take 0.4 mg by mouth daily       METFORMIN HCL PO Take 850 mg by mouth daily (with breakfast)          ALLERGIES:     Allergies   Allergen Reactions     Percodan [Oxycodone-Aspirin] Rash        REVIEW OF SYSTEMS:   10 point ROS neg other than the symptoms noted above in the HPI or here.      PHYSICAL EXAM:   /79  Pulse 57  Temp 97.6  F (36.4  C) (Temporal)  Resp 18  Wt 81.6 kg (180 lb)  SpO2 97%  BMI 25.1 kg/m2 Estimated  "body mass index is 25.1 kg/(m^2) as calculated from the following:    Height as of 8/14/16: 1.803 m (5' 11\").    Weight as of this encounter: 81.6 kg (180 lb).   Constitutional: No acute distress  Eyes: Sclerae anicteric, moist conjunctivae; no lid-lag; extraocular movements intact  ENT: Atraumatic; oropharynx clear with moist mucous membranes and no mucosal ulcerations; normal hard and soft palate  Neck: Supple neck without lymphadenopathy, no thyromegaly  Respiratory: Clear to auscultation bilaterally with normal respiratory effort  Cardiovascular: Regular rate and rhythm, no MRGs  GI: Soft, mild-moderate central abdominal tenderness, abdomen slightly distended; no masses or HSM  Lymph/Hematologic: No pretibial edema  Genitourinary: Deferred  Skin: Normal temperature, turgor and texture; no rash, ulcers or subcutaneous nodules  Musculoskeletal: Grossly symmetric and normal muscle bulk for age in all extremities; gait and station not examined; no clubbing or cyanosis  Neurologic: CN II-XII grossly intact without deficits; sensation intact to light touch over all extremities  Neuropsychiatric: Appropriate affect, alert and oriented to person, place and time     LABORATORY AND IMAGING:      Results for orders placed or performed during the hospital encounter of 07/07/17   CT Abdomen Pelvis w Contrast    Narrative    CT ABDOMEN PELVIS W CONTRAST  7/7/2017 6:06 AM     HISTORY: Left lower quadrant pain.    TECHNIQUE: Volumetric acquisition through abdomen and pelvis with IV  contrast. 91 mL Isovue-370. Radiation dose for this scan was reduced  using automated exposure control, adjustment of the mA and/or kV  according to patient size, or iterative reconstruction technique.    COMPARISON: 8/14/2016.    FINDINGS: The liver, gallbladder, spleen, pancreas, adrenal glands and  kidneys demonstrate no worrisome findings. Small left renal cyst.  Atheromatous changes of the abdominal aorta without aneurysm.    There are a few " borderline prominent fluid-filled small bowel loops in  the midabdomen. To the left of this there is a cluster of small bowel  loops which are nondilated but appear slightly thick-walled with  moderate adjacent edema and stranding in the mesentery. The  configuration of the loops suggests an internal hernia without  significant associated obstruction at this point.    Scattered colon diverticula without diverticulitis. No free air or  pneumatosis. Small amount of free fluid in the pelvis. Normal  appendix.      Impression    IMPRESSION:  1. Cluster of abnormal small bowel loops in the left abdomen  demonstrate ill-defined wall thickening with associated mesenteric  edema and stranding.  2. Findings suggest internal hernia without significant associated  bowel obstruction at this point. However, a few adjacent small bowel  loops are fluid-filled and mildly distended.  3. Recommend surgical consultation.  4. Small amount of free pelvic fluid.    THEE CORTEZ MD   CBC with platelets differential   Result Value Ref Range    WBC 6.9 4.0 - 11.0 10e9/L    RBC Count 4.28 (L) 4.4 - 5.9 10e12/L    Hemoglobin 13.4 13.3 - 17.7 g/dL    Hematocrit 39.0 (L) 40.0 - 53.0 %    MCV 91 78 - 100 fl    MCH 31.3 26.5 - 33.0 pg    MCHC 34.4 31.5 - 36.5 g/dL    RDW 12.8 10.0 - 15.0 %    Platelet Count 156 150 - 450 10e9/L    Diff Method Automated Method     % Neutrophils 74.7 %    % Lymphocytes 17.7 %    % Monocytes 5.4 %    % Eosinophils 1.6 %    % Basophils 0.3 %    % Immature Granulocytes 0.3 %    Nucleated RBCs 0 0 /100    Absolute Neutrophil 5.1 1.6 - 8.3 10e9/L    Absolute Lymphocytes 1.2 0.8 - 5.3 10e9/L    Absolute Monocytes 0.4 0.0 - 1.3 10e9/L    Absolute Eosinophils 0.1 0.0 - 0.7 10e9/L    Absolute Basophils 0.0 0.0 - 0.2 10e9/L    Abs Immature Granulocytes 0.0 0 - 0.4 10e9/L    Absolute Nucleated RBC 0.0    Comprehensive metabolic panel   Result Value Ref Range    Sodium 138 133 - 144 mmol/L    Potassium 4.1 3.4 - 5.3  mmol/L    Chloride 104 94 - 109 mmol/L    Carbon Dioxide 28 20 - 32 mmol/L    Anion Gap 6 3 - 14 mmol/L    Glucose 141 (H) 70 - 99 mg/dL    Urea Nitrogen 16 7 - 30 mg/dL    Creatinine 0.90 0.66 - 1.25 mg/dL    GFR Estimate 81 >60 mL/min/1.7m2    GFR Estimate If Black >90   GFR Calc   >60 mL/min/1.7m2    Calcium 9.4 8.5 - 10.1 mg/dL    Bilirubin Total 0.5 0.2 - 1.3 mg/dL    Albumin 4.2 3.4 - 5.0 g/dL    Protein Total 7.7 6.8 - 8.8 g/dL    Alkaline Phosphatase 111 40 - 150 U/L    ALT 22 0 - 70 U/L    AST 20 0 - 45 U/L   Lipase   Result Value Ref Range    Lipase 77 73 - 393 U/L   Lactic acid whole blood   Result Value Ref Range    Lactic Acid 1.1 0.7 - 2.1 mmol/L   UA reflex to Microscopic and Culture   Result Value Ref Range    Color Urine Yellow     Appearance Urine Clear     Glucose Urine Negative NEG mg/dL    Bilirubin Urine Negative NEG    Ketones Urine Negative NEG mg/dL    Specific Gravity Urine 1.015 1.003 - 1.035    Blood Urine Negative NEG    pH Urine 6.0 5.0 - 7.0 pH    Protein Albumin Urine Negative NEG mg/dL    Urobilinogen mg/dL 0.0 0.0 - 2.0 mg/dL    Nitrite Urine Negative NEG    Leukocyte Esterase Urine Negative NEG    Source Midstream Urine     RBC Urine 1 0 - 2 /HPF    WBC Urine 1 0 - 2 /HPF    Bacteria Urine Few (A) NEG /HPF    Mucous Urine Present (A) NEG /LPF     I personally viewed and interpreted the CT scan. Central abdominal small bowel dilation and thickening with mesenteric edema.    30 minutes were spent in this encounter, over 50% in counseling and coordination of care.

## 2017-07-07 NOTE — PHARMACY-ADMISSION MEDICATION HISTORY
Admission medication history interview status for this patient is complete. See [facility] admission navigator for allergy information, prior to admission medications and immunization status.     Medication history interview source(s):Patient and Family  Medication history resources (including written lists, pill bottles, clinic record):Epic list/Careeverywhere records.  Primary pharmacy:[facility]    Changes made to PTA medication list:  Added: tylenol prn  Deleted: benadryl po, ibuprofen and tramadol per patient info  Changed: gabapentin  To 6000 mg bid per patgient info    Actions taken by pharmacist (provider contacted, etc):As above     Additional medication history information:Care everywhere record    Medication reconciliation/reorder completed by provider prior to medication history? No    For patients on insulin therapy: No, but takes metformin   Lantus/levemir/NPH/Mix 70/30 dose: _____ in AM/PM or twice daily   Sliding scale Novolog Y/N   If Yes, do you have a baseline novolog pre-meal dose: ______units with meals   Patients eat three meals a day: Y/N   Any Barriers to therapy: cost of medications/comfortable with giving injections (if applicable)/ comfortable and confident with current diabetes regimen     Prior to Admission medications    Medication Sig Last Dose Taking? Auth Provider   acetaminophen (TYLENOL) 325 MG tablet Take 650 mg by mouth every 6 hours as needed for mild pain 7/6/2017 at a.m Yes Unknown, Entered By History   polyethylene glycol (MIRALAX/GLYCOLAX) packet Take 17 g by mouth daily 7/6/2017 at a.m Yes Clayton Miller MD   carboxymethylcellulose (REFRESH PLUS) 0.5 % SOLN Place 1 drop into both eyes daily as needed Alternates with Refresh Liquigel.  unknown Yes Unknown, Entered By History   carboxymethylcellulose (CELLUVISC/REFRESH LIQUIGEL) 1 % ophthalmic solution Place 1 drop into both eyes daily as needed Alternates with Refresh Tears.  unknown Yes Unknown, Entered By History    ATORVASTATIN CALCIUM PO Take 40 mg by mouth daily 7/6/2017 at a.m Yes Unknown, Entered By History   GABAPENTIN PO Take 600 mg by mouth 2 times daily  7/6/2017 at p m Yes Unknown, Entered By History   tamsulosin (FLOMAX) 0.4 MG 24 hr capsule Take 0.4 mg by mouth daily 7/6/2017 at a.m Yes Unknown, Entered By History   METFORMIN HCL PO Take 850 mg by mouth daily (with breakfast) 7/6/2017 at a.m Yes Unknown, Entered By History

## 2017-07-07 NOTE — ED NOTES
[facility]  ED Nurse Handoff Report    Cresencio Peguero is a 82 year old male   ED Chief complaint: Abdominal Pain  . ED Diagnosis:   Final diagnoses:   Internal hernia   SBO (small bowel obstruction) (H)     Allergies:   Allergies   Allergen Reactions     Percodan [Oxycodone-Aspirin] Rash       Code Status: Full Code  Activity level - Baseline/Home:  Independent. Activity Level - Current:   Independent . Lift room needed: No. Bariatric: No   Needed: No   Isolation: No. Infection: Not Applicable.     Vital Signs:   Vitals:    07/07/17 0545 07/07/17 0617 07/07/17 0628 07/07/17 0645   BP: 199/76 (!) 211/88 190/79 191/87   Pulse:       Resp:       Temp:       TempSrc:       SpO2: 95% 95% 95% 95%   Weight:           Cardiac Rhythm:  ,      Pain level: 0-10 Pain Scale: 7  Patient confused: No. Patient Falls Risk: No.   Elimination Status: Has voided   Patient Report - Initial Complaint: Abdominal pain. Focused Assessment: Pt here for left sided and mid lower abdominal pain started yesterday night. Pt stated pt took a suppository related to history of constipation, was able to have a normal bowel movement after suppository, but with no relief of abdominal pain. Pt also has history of abdominal hernia repair as well. Pt denies any other symptoms at this time.   Tests Performed: Labs and CT scan. Abnormal Results: CT scan shows several bowel loops suggestive of possible bowel obstruction, see CT results.   Treatments provided: Dilaudid for pain, NS bolus, Hydralazine for elevated blood pressure  Family Comments: Spouse at bedside  OBS brochure/video discussed/provided to patient:  NA  ED Medications:   Medications   0.9% sodium chloride BOLUS (1,000 mLs Intravenous New Bag 7/7/17 1370)     Followed by   0.9% sodium chloride infusion (1,000 mLs Intravenous New Bag 7/7/17 4039)   HYDROmorphone (DILAUDID) injection 1 mg (not administered)   HYDROmorphone (PF) (DILAUDID) injection 0.5 mg (0.5 mg  Intravenous Given 7/7/17 0618)   0.9% sodium chloride BOLUS (0 mLs Intravenous Stopped 7/7/17 0601)   iopamidol (ISOVUE-370) solution 500 mL (91 mLs Intravenous Given 7/7/17 0559)   hydrALAZINE (APRESOLINE) injection 5 mg (5 mg Intravenous Given 7/7/17 0626)     Drips infusing:  No         ED Nurse Name/Phone Number: Eduardo Pinzon,   7:08 AM

## 2017-07-07 NOTE — ED PROVIDER NOTES
"  History     Chief Complaint:  Abdominal Pain    HPI   Cresencio Peguero is a 82 year old male, with a history of right inguinal hernia surgery, who presents to the emergency department for evaluation of abdominal pain. The patient reports that the left lower quadrant abdominal pain began last night prior to going to bed. He reports that the pain is intermittent and feels \"jolting\". He denies experiencing any vomiting, bowel issues, fever, dysuria, hematuria or urinary urgency any illness within the last week or appetite change.  Tried a suppository tonight and had a normal bowel movement.  To note, the wife reports he has had an increase intake of TUMs later due to some slight stomach indigestion.     Allergies:  Percodan     Medications:    Miralax/Glycolax  Ibuprofen  Diphenhydramine  Refresh Plus  Celluvisc/Refresh Liquigel  Atorvastatin  Gabapentin  Flomax  Metaformin  Tramadol    Past Medical History:    The patient denies any relevant past medical history.     Past Surgical History:    Herniorrhaphy inguinal    Family History:    The patient denies any relevant family medical history.     Social History:  The patient was accompanied to the ED by wife.  Smoking Status: No  Smokeless Tobacco: No  Alcohol Use: Yes   Marital Status:   [2]     Review of Systems   Constitutional: Negative for appetite change and fever.   Gastrointestinal: Positive for abdominal pain. Negative for blood in stool, constipation and diarrhea.   All other systems reviewed and are negative.    Physical Exam   Vitals:  Patient Vitals for the past 24 hrs:   BP Temp Temp src Pulse Heart Rate Resp SpO2 Weight   07/07/17 0628 190/79 - - - - - 95 % -   07/07/17 0617 (!) 211/88 - - - - - 95 % -   07/07/17 0545 199/76 - - - - - 95 % -   07/07/17 0530 193/73 - - - - - 96 % -   07/07/17 0515 200/78 - - - - - 96 % -   07/07/17 0509 (!) 203/94 - - - 53 18 96 % -   07/07/17 0441 195/80 - - - - - - 81.6 kg (180 lb)   07/07/17 0440 - 97.6  F " (36.4  C) Temporal 57 57 16 98 % -      Physical Exam  VITAL SIGNS: /79  Pulse 57  Temp 97.6  F (36.4  C) (Temporal)  Resp 18  Wt 81.6 kg (180 lb)  SpO2 95%  BMI 25.1 kg/m2   Constitutional:  Well developed, Well nourished, Mildly uncomfortable appearing  HENT:  Bilateral external ears normal, Mucous membranes dry, Nose normal. Neck- Normal range of motion, Supple  Respiratory:  Normal breath sounds, No respiratory distress, No wheezing,  Cardiovascular:  Normal heart rate, Normal rhythm, No murmurs,    GI:  Bowel sounds normal, Soft, Nondistended, no rebound or guarding. Moderate left lower and upper quadrant tenderness to palpation.  Nontender regarding Well healed right inguinal incision.  Musculoskeletal:  Intact distal pulses, No edema, grossly unremarkable range of motion   Integument:  Warm, Dry   Neurologic:  Alert, attentive and appropriately oriented  Psychiatric:  Mood and affect normal.      Emergency Department Course     Imaging:  Radiology findings were communicated with the patient and family who voiced understanding of the findings.  CT Abdomen Pelvis w/ Contrast:  IMPRESSION:  1. Cluster of abnormal small bowel loops in the left abdomen  demonstrate ill-defined wall thickening with associated mesenteric  edema and stranding.  2. Findings suggest internal hernia without significant associated  bowel obstruction at this point. However, a few adjacent small bowel  loops are fluid-filled and mildly distended.  3. Recommend surgical consultation.  4. Small amount of free pelvic fluid.  Reading per radiology.     Laboratory:  Laboratory findings were communicated with the patient and family who voiced understanding of the findings.  CBC: AWNL (WBC 6.9, HGB 13.4, )   CMP: Glucose 141 (H) o/w WNL (Creatinine 0.90)   Lipase: 77  Lactic Acid Whole Blood: 1.1    UA Reflex to Microscopic and Culture: Bacteria Few (A), Mucous Urine Present (A) o/w WNL    Interventions:  0505 0.9% NaCl Bolus  1000 mL IV  0505 Dilaudid 0.5 mg IV  0553 Dilaudid 0.5 mg IV  0559 Isovue-370 91 mL IV  0559 0.9% NaCl Bolus 63 mL IV  0618 Dilaudid 0.5 mg IV  0626 Apresoline 5 mg IV    Emergency Department Course:  Nursing notes and vitals reviewed.  I performed an exam of the patient as documented above.     6:57 AM: I spoke with Dr. Suggs of the General Surgery service regarding patient's presentation, findings, and plan of care.  He is trying to find a partner to do this surgery and asked that we keep the patient here in the meantime.    I rechecked the patient.  He is having ongoing abdominal pain.  I noted that he's had elevated blood pressure, and he notes that this likely pain related, as his blood pressures are usually in the 120s.I discussed the treatment plan with the patient. They expressed understanding of this plan and consented to admission.    I personally reviewed the laboratory results with the Patient and spouse and answered all related questions prior to admission.    Impression & Plan      Medical Decision Making:  This patient presented to the Emergency Department with Abdominal Pain.  Vitals were concerning for hypertension, but this appears pain related.  Physical exam was concerning for left upper quadrant tenderness.  The differential diagnosis of abdominal pain is protean and includes: Appendicitis, Bowel Obstruction, Ulcer, Ischemia, Cholecystitis, Diverticulitis, Pancreatitis, UTI, Pyelonephritis, Enteritis/Colitis, AAA amongst many other etiologies .The laboratory testing has returned normal.  Imaging is noted to have revealed an internal hernia with possible associated small bowel obstruction. Because of this finding, surgery was contacted and they appear to be preparing to take the patient to the OR.  Pt will be admitted for further cares.  is assuming care.      Diagnosis:    ICD-10-CM    1. Internal hernia K45.8    2. SBO (small bowel obstruction) (H) K56.69         Disposition:    Admission.    Scribe Disclosure:  I, Kathie Meme, am serving as a scribe at 4:54 AM on 7/7/2017 to document services personally performed by Ameena Manzano MD, based on my observations and the provider's statements to me. 7/7/2017   [facility] EMERGENCY DEPARTMENT       Ameena Manzano MD  07/07/17 0809

## 2017-07-07 NOTE — IP AVS SNAPSHOT
MRN:[id number]                      After Visit Summary   7/7/2017    Cresencio Peguero    MRN: [id number]           Thank you!     Thank you for choosing [facility] for your care. Our goal is always to provide you with excellent care. Hearing back from our patients is one way we can continue to improve our services. Please take a few minutes to complete the written survey that you may receive in the mail after you visit. If you would like to speak to someone directly about your visit please contact Patient Relations at [phone]. Thank you!          Patient Information     Date Of Birth          1935        Designated Caregiver       Most Recent Value    Caregiver    Will someone help with your care after discharge? yes    Name of designated caregiver hermelinda    Phone number of caregiver [phone]    Caregiver address same as pt.      About your hospital stay     You were admitted on:  July 7, 2017 You last received care in the:  [facility]    You were discharged on:  July 11, 2017        Reason for your hospital stay       Bowel obstruction                  Who to Call     For medical emergencies, please call 911.  For non-urgent questions about your medical care, please call your primary care provider or clinic, [phone]  For questions related to your surgery, please call your surgery clinic        Attending Provider     Provider Specialty    Ameena Manzano MD Emergency Medicine    [place], Sumeet Greenberg MD Surgery       Primary Care Provider Office Phone # Fax #    Parrish Padgett [phone]      Follow-up Appointments     Follow-up and recommended labs and tests        Follow-up with surgery as they recommended elsewhere on these orders.  Also you should follow-up with urology in the near future regarding prostate/urinary retention.  (RN to please provide urology clinic numbers to the patient, he would prefer to f/u in [place]  area).  Follow-up with primary provider in the next 1-2 weeks given some occasional elevated blood pressures during your stay.                  Further instructions from your care team       HOME CARE FOLLOWING ABDOMINAL SURGERY  DEANNA Armendariz E. Gavin, N. Guttormson, D. Maurer, JAMA Monzon     Special instructions for Cresencio Peguero:  --RETURN APPOINTMENT:  Schedule a follow-up visit with surgeon 2-3 weeks after discharge from the hospital.  Office Phone:  [phone]     INCISIONAL CARE:  Replace the bandage over your incisions until all drainage stops, or if more comfortable to have in place.  If Dermabond (a type of skin glue) is present, leave in place until it wears/flakes off.     BATHING:  Showers and baths are okay.  You may wash your hair at any time.  Gently pat your incisions dry after bathing.  Do not apply creams or ointments near or on the incision sites as this may cause the skin glue to wear off prematurely.    ACTIVITY:  Light Activity -- you may immediately be up and about as tolerated.  Walking program is encouraged.  Driving -- you may drive when comfortable.  Light Work -- resume when comfortable off pain medications.    Strenuous Work/Activity -- limit lifting to 20 pounds for 2 weeks.  Then, progressively increase with time.  Active Sports (GOLF, etc.) -- cautiously resume after 4 weeks.    DISCOMFORT:  Use over-the-counter pain medications as needed.  Expect gradual improvement.    DIET:  Soft diet for 1-2 weeks, then OK to resume regular diet.  Drink plenty of fluids.       CONTACT US IF THE FOLLOWING DEVELOPS:   1. A fever that is above 101     2. If there is a large amount of drainage, bleeding, or swelling.   3. Severe pain that is not relieved by your prescription.   4. Drainage that is thick, cloudy, yellow, green or white.   5. Any other questions not answered by  Frequently Asked Questions  sheet.      FREQUENTLY ASKED QUESTIONS:    Q:  How should my  incision look?    A:  Normally your incision will appear slightly swollen with light redness directly along the incision itself as it heals.  It may feel like a bump or ridge as the healing/scarring happens, and over time (3-4 months) this bump or ridge feeling should slowly go away.  In general, clear or pink watery drainage can be normal at first as your incision heals, but should decrease over time.    Q:  How do I know if my incision is infected?  A:  Look at your incision for signs of infection, like redness around the incision spreading to surrounding skin, or drainage of cloudy or foul-smelling drainage.  If you feel warm, check your temperature to see if you are running a fever.    **If any of these things occur, please notify the nurse at our office.  We may need you to come into the office for an incision check.      Q:  How do I take care of my incision?  A:  If you have a dressing in place - Starting the day after surgery, replace the dressing 1-2 times a day until there is no further drainage from the incision.  At that time, a dressing is no longer needed.  Try to minimize tape on the skin if irritation is occurring at the tape sites.  If you have significant irritation from tape on the skin, please call the office to discuss other method of dressing your incision.    Small pieces of tape called  steri-strips  may be present directly overlying your incision; these may be removed 10 days after surgery unless otherwise specified by your surgeon.  If these tapes start to loosen at the ends, you may trim them back until they fall off or are removed.    A:  If you had  Dermabond  tissue glue used as a dressing (this causes your incision to look shiny with a clear covering over it) - This type of dressing wears off with time and does not require more dressings over the top unless it is draining around the glue as it wears off.  Do not apply ointments or lotions over the incisions until the glue has  completely worn off.    Q:  There is a piece of tape or a sticky  lead  still on my skin.  Can I remove this?  A:  Sometimes the sticky  leads  used for monitoring during surgery or for evaluation in the emergency department are not all removed while you are in the hospital.  These sometimes have a tab or metal dot on them.  You can easily remove these on your own, like taking off a band-aid.  If there is a gel substance under the  lead , simply wipe/clean it off with a washcloth or paper towel.      Q:  What can I do to minimize constipation (very hard stools, or lack of stools)?  A:  Stay well hydrated.  Increase your dietary fiber intake or take a fiber supplement -with plenty of water.  Walk around frequently.  You may consider an over-the-counter stool-softener.  Your Pharmacist can assist you with choosing one that is stocked at your pharmacy.  Constipation is also one of the most common side effects of pain medication.  If you are using pain medication, be pro-active and try to PREVENT problems with constipation by taking the steps above BEFORE constipation becomes a problem.    Q:  What do I do if I need more pain medications?  A:  Call the office to receive refills.  Be aware that certain pain meds cannot be called into a pharmacy and actually require a paper prescription.  A change may be made in your pain med as you progress thru your recovery period or if you have side effects to certain meds.    --Pain meds are NOT refilled after 5pm on weekdays, and NOT AT ALL on the weekends, so please look ahead to prevent problems.      Q:  Why am I having a hard time sleeping now that I am at home?  A:  Many medications you receive while you are in the hospital can impact your sleep for a number of days after your surgery/hospitalization.  Decreased level of activity and naps during the day may also make sleeping at night difficult.  Try to minimize day-time naps, and get up frequently during the day to walk around  your home during your recovery time.  Sleep aides may be of some help, but are not recommended for long-term use.      Q:  I am having some back discomfort.  What should I do?  A:  This may be related to certain positioning that was required for your surgery, extended periods of time in bed, or other changes in your overall activity level.  You may try ice, heat, acetaminophen, or ibuprofen to treat this temporarily.  Note that many pain medications have acetaminophen in them and would state this on the prescription bottle.  Be sure not to exceed the maximum of 4000mg per day of acetaminophen.     **If the pain you are having does not resolve, is severe, or is a flare of back pain you have had on other occasions prior to surgery, please contact your primary physician for further recommendations or for an appointment to be examined at their office.    Q:  Why am I having headaches?  A:  Headaches can be caused by many things:  caffeine withdrawal, use of pain meds, dehydration, high blood pressure, lack of sleep, over-activity/exhaustion, flare-up of usual migraine headaches.  If you feel this is related to muscle tension (a band-like feeling around the head, or a pressure at the low-back of the head) you may try ice or heat to this area.  You may need to drink more fluids (try electrolyte drink like Gatorade), rest, or take your usual migraine medications.   **If your headaches do not resolve, worsen, are accompanied by other symptoms, or if your blood pressure is high, please call your primary physician for recommendation and/or examination.    Q:  I am unable to urinate.  What do I do?  A:  A small percentage of people can have difficulty urinating initially after surgery.  This includes being able to urinate only a very small amount at a time and feeling discomfort or pressure in the very low abdomen.  This is called  urinary retention , and is actually an urgent situation.  Proceed to your nearest Emergency  "department for evaluation (not an Urgent Care Center).  Sometimes the bladder does not work correctly after certain medications you receive during surgery, or related to certain procedures.  You may need to have a catheter placed until your bladder recovers.  When planning to go to an Emergency department, it may help to call the ER to let them know you are coming in for this problem after a surgery.  This may help you get in quicker to be evaluated.  **If you have symptoms of a urinary tract infection, please contact your primary physician for the proper evaluation and treatment.          If you have other questions, please call the office Monday thru Friday between 8am and 5pm to discuss with the nurse or physician assistant.  [phone]    There is a surgeon ON CALL on weekday evenings and over the weekend in case of urgent need only, and may be contacted at the same number.    If you are having an emergency, call 911 or proceed to your nearest emergency department.        Pending Results     Date and Time Order Name Status Description    7/7/2017 1038 Fluid Culture Aerobic Bacterial Preliminary     7/7/2017 1038 Anaerobic bacterial culture Preliminary             Statement of Approval     Ordered          07/11/17 1051  I have reviewed and agree with all the recommendations and orders detailed in this document.  EFFECTIVE NOW     Approved and electronically signed by:  Himanshu Nava DO           07/11/17 1039  I have reviewed and agree with all the recommendations and orders detailed in this document.  EFFECTIVE NOW     Approved and electronically signed by:  Christelle Bear PA-C             Admission Information     Date & Time Provider Department Dept. Phone    7/7/2017 Sumeet Joiner MD [facility] [phone]      Your Vitals Were     Blood Pressure Pulse Temperature Respirations Height Weight    139/51 67 98.2  F (36.8  C) (Oral) 18 1.803 m (5' 11\") 81.6 kg (180 lb) " "   Pulse Oximetry BMI (Body Mass Index)                96% 25.1 kg/m2          [employer] Information     [employer] lets you send messages to your doctor, view your test results, renew your prescriptions, schedule appointments and more. To sign up, go to [email or web address] . Click on \"Log in\" on the left side of the screen, which will take you to the Welcome page. Then click on \"Sign up Now\" on the right side of the page.     You will be asked to enter the access code listed below, as well as some personal information. Please follow the directions to create your username and password.     Your access code is: [email or web address]  Expires: 10/5/2017  5:37 AM     Your access code will  in 90 days. If you need help or a new code, please call your [facility] clinic or [phone].        Care EveryWhere ID     This is your Care EveryWhere ID. This could be used by other organizations to access your [facility] medical records  [id number]        Equal Access to Services     ANDREW DIAS : Hadii erik amaroo Soeduardo, waaxda luqadaha, qaybta kaalmada adethalia, duong waldrop . So [facility] [phone].    ATENCIÓN: Si habla español, tiene a jrodan disposición servicios gratuitos de asistencia lingüística. Llame al [phone].    We comply with applicable federal civil rights laws and [place] laws. We do not discriminate on the basis of race, color, national origin, age, disability sex, sexual orientation or gender identity.               Review of your medicines      START taking        Dose / Directions    sennosides 8.6 MG tablet   Commonly known as:  SENOKOT   Used for:  Internal hernia        Dose:  1 tablet   Take 1 tablet by mouth 2 times daily May decrease dosing if loose stools.   Quantity:  120 each   Refills:  0         CONTINUE these medicines which may have CHANGED, or have new prescriptions. If we are uncertain of the size of tablets/capsules you have at home, strength may be listed as " something that might have changed.        Dose / Directions    * polyethylene glycol Packet   Commonly known as:  MIRALAX/GLYCOLAX   This may have changed:  Another medication with the same name was added. Make sure you understand how and when to take each.   Used for:  Constipation, unspecified constipation type        Dose:  17 g   Take 17 g by mouth daily   Quantity:  30 packet   Refills:  0       * polyethylene glycol powder   Commonly known as:  MIRALAX/GLYCOLAX   This may have changed:  You were already taking a medication with the same name, and this prescription was added. Make sure you understand how and when to take each.   Used for:  Internal hernia        Dose:  17 g   Take 17 g by mouth daily as needed for constipation (constipation)   Quantity:  119 g   Refills:  0       tamsulosin 0.4 MG capsule   Commonly known as:  FLOMAX   This may have changed:  how much to take   Used for:  Urinary retention        Dose:  0.8 mg   Take 2 capsules (0.8 mg) by mouth daily   Quantity:  60 capsule   Refills:  0       * Notice:  This list has 2 medication(s) that are the same as other medications prescribed for you. Read the directions carefully, and ask your doctor or other care provider to review them with you.      CONTINUE these medicines which have NOT CHANGED        Dose / Directions    ATORVASTATIN CALCIUM PO        Dose:  40 mg   Take 40 mg by mouth daily   Refills:  0       * carboxymethylcellulose 0.5 % Soln ophthalmic solution   Commonly known as:  REFRESH PLUS        Dose:  1 drop   Place 1 drop into both eyes daily as needed Alternates with Refresh Liquigel.   Refills:  0       * carboxymethylcellulose 1 % ophthalmic solution   Commonly known as:  CELLUVISC/REFRESH LIQUIGEL        Dose:  1 drop   Place 1 drop into both eyes daily as needed Alternates with Refresh Tears.   Refills:  0       GABAPENTIN PO        Dose:  600 mg   Take 600 mg by mouth 2 times daily   Refills:  0       METFORMIN HCL PO         Dose:  850 mg   Take 850 mg by mouth daily (with breakfast)   Refills:  0       TYLENOL 325 MG tablet   Generic drug:  acetaminophen        Dose:  650 mg   Take 650 mg by mouth every 6 hours as needed for mild pain   Refills:  0       * Notice:  This list has 2 medication(s) that are the same as other medications prescribed for you. Read the directions carefully, and ask your doctor or other care provider to review them with you.         Where to get your medicines      These medications were sent to [place], [place] - [address] [facility]  [address] [facility] [address]     Phone:  [phone]     polyethylene glycol powder    sennosides 8.6 MG tablet    tamsulosin 0.4 MG capsule                Protect others around you: Learn how to safely use, store and throw away your medicines at www.disposemymeds.org.             Medication List: This is a list of all your medications and when to take them. Check marks below indicate your daily home schedule. Keep this list as a reference.      Medications           Morning Afternoon Evening Bedtime As Needed    ATORVASTATIN CALCIUM PO   Take 40 mg by mouth daily   Last time this was given:  40 mg on 7/10/2017  8:15 PM                                   * carboxymethylcellulose 0.5 % Soln ophthalmic solution   Commonly known as:  REFRESH PLUS   Place 1 drop into both eyes daily as needed Alternates with Refresh Liquigel.                                   * carboxymethylcellulose 1 % ophthalmic solution   Commonly known as:  CELLUVISC/REFRESH LIQUIGEL   Place 1 drop into both eyes daily as needed Alternates with Refresh Tears.                                   GABAPENTIN PO   Take 600 mg by mouth 2 times daily   Last time this was given:  600 mg on 7/11/2017  9:13 AM                                      METFORMIN HCL PO   Take 850 mg by mouth daily (with breakfast)   Last time this was given:  None given while in hospital                                    * polyethylene glycol Packet   Commonly known as:  MIRALAX/GLYCOLAX   Take 17 g by mouth daily                                * polyethylene glycol powder   Commonly known as:  MIRALAX/GLYCOLAX   Take 17 g by mouth daily as needed for constipation (constipation)   Next Dose Due:  Start any time if needed                                sennosides 8.6 MG tablet   Commonly known as:  SENOKOT   Take 1 tablet by mouth 2 times daily May decrease dosing if loose stools.   Last time this was given:  11:30 am on 7-11-17                                      tamsulosin 0.4 MG capsule   Commonly known as:  FLOMAX   Take 2 capsules (0.8 mg) by mouth daily   Last time this was given:  0.8 mg on 7/10/2017 10:45 PM                                   TYLENOL 325 MG tablet   Take 650 mg by mouth every 6 hours as needed for mild pain   Generic drug:  acetaminophen   Last time this was given:  None given while in hospital                                   * Notice:  This list has 4 medication(s) that are the same as other medications prescribed for you. Read the directions carefully, and ask your doctor or other care provider to review them with you.              More Information        Small Bowel Obstruction     Small bowel obstruction can lead to tissue damage and even tissue death.   A small bowel obstruction occurs when part or all of the small intestine (bowel) is blocked. As a result, digestive contents can t move through the bowel properly and out of the body. Treatment is needed right away to remove the blockage. This can ease painful symptoms. It can also prevent serious problems, such as tissue death or bursting (rupture) of the small bowel. Without treatment, a small bowel obstruction can be fatal.  Causes of small bowel obstruction  A small bowel obstruction can be caused by:    Scar tissue (adhesions). These may form after belly (abdominal) surgery or an infection.    Hernia. A hernia is when an organ  pushes through a weak spot or tear in the abdomen wall. Part of the small bowel can push out and be seen as a bulge under the belly. Hernias can also occur internally.    Certain health problems. These include when part of the bowel slides inside another part (intussusception). Other causes include irritable bowel disease such as Crohn s disease, and inflammation and sores in the intestine (ulcerative colitis).    Abnormal tissue growths (tumors). These can form on the inside or outside of the small bowel. They are usually due to cancer.  Symptoms of small bowel obstruction  Common symptoms include:    Belly cramping and pain    Belly swelling and bloating    Upset stomach (nausea) and vomiting    Can't  pass gas    Can't pass stool (constipation)    Diarrhea  Diagnosing small bowel obstruction  Your provider will ask about your symptoms and health history. You ll also have a physical exam. Tests may also be done to confirm the problem. These can include:    Imaging tests. These provide pictures of the small bowel. Common tests include X-rays and a CT scan.    Blood tests. These check for infection and other problems, such as excess fluid loss (dehydration).    Upper GI (gastrointestinal) series with a small bowel follow-through. This test takes X-rays of the upper digestive tract from the mouth through the small bowel. An X-ray dye (contrast fluid) is used. The dye coats the inside of your upper digestive tract so it will show up clearly on X-rays.  Treating small bowel obstruction  Treatment takes place in a hospital. As part of your care, the following may be done:    No food or drink is given by mouth. This allows your bowels to rest.    An IV (intravenous) line is placed in a vein in your arm or hand. The IV line is used to give fluids. It may also be used to give medicines. These may be needed to ease pain, nausea, and other symptoms. They may also be needed to treat or prevent infections.    A soft, thin,  flexible tube (nasogastric tube) is inserted through your nose and into your stomach. The tube is used to remove extra gas and fluid in your stomach and bowels. This helps to ease symptoms such as pain and swelling.    In severe cases, surgery is done. This may be needed if the small bowel is almost or totally blocked, or there is a hole in the bowel (bowel perforation). During surgery, the blockage is removed. Parts of the bowel may also be removed if there is tissue death. Other repair may be done as well, depending on what caused the blockage. Your healthcare provider will give you more information about surgery, if needed.    You ll be watched closely in the hospital until your symptoms improve. Your provider will tell you when you can go home.  Long-term concerns   After treatment, most people recover with no lasting effects. If a long part of the bowel is removed, there is a greater chance for lifelong digestive problems. Bowel movements may become irregular. Work with your provider to learn the best ways to manage any symptoms you may have, and to protect your health.  When to call your healthcare provider  Call your provider right away if you have any of the following:    Severe pain (Call 911)    Belly swelling or cramping that won t go away    Can t pass stool or gas    Nausea or vomiting (especially if the vomit looks or smells like stool)   Date Last Reviewed: 7/1/2016 2000-2017 [employer]. [address] [place] [address], [place] [address]. All rights reserved. This information is not intended as a substitute for professional medical care. Always follow your healthcare professional's instructions.                After Bowel Surgery: Recovering in the Hospital and at Home    You may be in the hospital overnight or longer. Once you are out of the hospital, recovery may take up to several months. It depends on the type of surgery you had.  Right after surgery  After surgery, you ll be taken to the  recovery room. Here your blood pressure, pulse, and breathing will be checked. You ll also be given pain medicine as needed. When you re ready, you ll be moved to a regular hospital room.  Your recovery in the hospital    Soon after surgery you ll be urged to get up and take short walks. This helps you heal faster. Gentle movement can help your digestive function. Walking also helps your heart and lungs, and can keep clots from forming in your legs.    You may be able to have some liquids in the first day or two. If it seems unlikely that your bowel will recover quickly, you may get nutrition through an IV (intravenous) tube. You may first be given a tube that passes through your nose to your stomach (a nasogastric tube). This keeps your stomach empty. This can help your digestive tract heal.    If you have a colostomy or ileostomy, you may also meet with an ostomy nurse. He or she will teach you how to care for yourself as you heal.    You will be shown how to do breathing exercises using a special device (incentive spirometer). This can prevent complications such as pneumonia.    You may have a tube (catheter) in your bladder to drain urine. This may be in place for the first day after surgery. In some cases it may be in place longer.  Getting back to normal at home  Depending on your surgery, even mild activity can make you tired in the first few weeks or months. After a few months, you may be feeling back to normal.    Stay active. But avoid hard exercise and heavy lifting in the first few months.    You can walk, climb stairs, shower, and bathe soon after surgery. But don t drive until your provider says you can.    Follow all special diet instructions you are given.    Take care of your cut (incision) and any drains, as directed by your provider.  When to call your provider  Call your healthcare provider right away if you have any of the following:    Fever of 100.4 F (38 C)    Upset stomach (nausea) or  vomiting    Increased belly pain    Constipation, diarrhea, or bloating    Increased redness, swelling, drainage, or pain near the incision    Trouble controlling bowel movements     Bloody stool or black, tarry stool    Vomiting blood    Not able to urinate   Date Last Reviewed: 7/1/2016 2000-2017 [employer]. [address] [place] [id number]. All rights reserved. This information is not intended as a substitute for professional medical care. Always follow your healthcare professional's instructions.

## 2017-07-08 LAB
GLUCOSE BLDC GLUCOMTR-MCNC: 101 MG/DL (ref 70–99)
GLUCOSE BLDC GLUCOMTR-MCNC: 111 MG/DL (ref 70–99)
GLUCOSE BLDC GLUCOMTR-MCNC: 135 MG/DL (ref 70–99)
GLUCOSE BLDC GLUCOMTR-MCNC: 140 MG/DL (ref 70–99)
GLUCOSE BLDC GLUCOMTR-MCNC: 144 MG/DL (ref 70–99)
GLUCOSE BLDC GLUCOMTR-MCNC: 76 MG/DL (ref 70–99)
GLUCOSE BLDC GLUCOMTR-MCNC: 84 MG/DL (ref 70–99)
INTERPRETATION ECG - MUSE: NORMAL

## 2017-07-08 PROCEDURE — 99207 ZZC CDG-MDM COMPONENT: MEETS LOW - DOWN CODED: CPT | Performed by: INTERNAL MEDICINE

## 2017-07-08 PROCEDURE — 12000000 ZZH R&B MED SURG/OB

## 2017-07-08 PROCEDURE — 25000128 H RX IP 250 OP 636: Performed by: SURGERY

## 2017-07-08 PROCEDURE — 99232 SBSQ HOSP IP/OBS MODERATE 35: CPT | Performed by: INTERNAL MEDICINE

## 2017-07-08 PROCEDURE — 25000128 H RX IP 250 OP 636: Performed by: INTERNAL MEDICINE

## 2017-07-08 PROCEDURE — S5010 5% DEXTROSE AND 0.45% SALINE: HCPCS | Performed by: INTERNAL MEDICINE

## 2017-07-08 PROCEDURE — 00000146 ZZHCL STATISTIC GLUCOSE BY METER IP

## 2017-07-08 PROCEDURE — 25800025 ZZH RX 258: Performed by: INTERNAL MEDICINE

## 2017-07-08 RX ORDER — HYDRALAZINE HYDROCHLORIDE 20 MG/ML
5 INJECTION INTRAMUSCULAR; INTRAVENOUS EVERY 4 HOURS PRN
Status: DISCONTINUED | OUTPATIENT
Start: 2017-07-08 | End: 2017-07-11 | Stop reason: HOSPADM

## 2017-07-08 RX ADMIN — HYDROMORPHONE HYDROCHLORIDE 0.5 MG: 1 INJECTION, SOLUTION INTRAMUSCULAR; INTRAVENOUS; SUBCUTANEOUS at 02:46

## 2017-07-08 RX ADMIN — ENOXAPARIN SODIUM 40 MG: 40 INJECTION SUBCUTANEOUS at 08:52

## 2017-07-08 RX ADMIN — HYDRALAZINE HYDROCHLORIDE 5 MG: 20 INJECTION INTRAMUSCULAR; INTRAVENOUS at 16:19

## 2017-07-08 RX ADMIN — KETOROLAC TROMETHAMINE 15 MG: 15 INJECTION, SOLUTION INTRAMUSCULAR; INTRAVENOUS at 20:09

## 2017-07-08 RX ADMIN — SODIUM CHLORIDE, POTASSIUM CHLORIDE, SODIUM LACTATE AND CALCIUM CHLORIDE: 600; 310; 30; 20 INJECTION, SOLUTION INTRAVENOUS at 00:00

## 2017-07-08 RX ADMIN — HYDRALAZINE HYDROCHLORIDE 5 MG: 20 INJECTION INTRAMUSCULAR; INTRAVENOUS at 23:51

## 2017-07-08 RX ADMIN — HYDROMORPHONE HYDROCHLORIDE 0.5 MG: 1 INJECTION, SOLUTION INTRAMUSCULAR; INTRAVENOUS; SUBCUTANEOUS at 22:58

## 2017-07-08 RX ADMIN — SODIUM CHLORIDE, POTASSIUM CHLORIDE, SODIUM LACTATE AND CALCIUM CHLORIDE: 600; 310; 30; 20 INJECTION, SOLUTION INTRAVENOUS at 09:55

## 2017-07-08 RX ADMIN — HYDRALAZINE HYDROCHLORIDE 5 MG: 20 INJECTION INTRAMUSCULAR; INTRAVENOUS at 20:09

## 2017-07-08 RX ADMIN — HYDROMORPHONE HYDROCHLORIDE 0.5 MG: 1 INJECTION, SOLUTION INTRAMUSCULAR; INTRAVENOUS; SUBCUTANEOUS at 12:22

## 2017-07-08 RX ADMIN — SODIUM CHLORIDE, POTASSIUM CHLORIDE, SODIUM LACTATE AND CALCIUM CHLORIDE: 600; 310; 30; 20 INJECTION, SOLUTION INTRAVENOUS at 19:30

## 2017-07-08 RX ADMIN — HYDRALAZINE HYDROCHLORIDE 10 MG: 20 INJECTION INTRAMUSCULAR; INTRAVENOUS at 12:02

## 2017-07-08 RX ADMIN — DEXTROSE AND SODIUM CHLORIDE: 5; 450 INJECTION, SOLUTION INTRAVENOUS at 21:31

## 2017-07-08 ASSESSMENT — ACTIVITIES OF DAILY LIVING (ADL)
TOILETING: 0-->INDEPENDENT
AMBULATION: 0-->INDEPENDENT
FALL_HISTORY_WITHIN_LAST_SIX_MONTHS: NO
BATHING: 0-->INDEPENDENT
SWALLOWING: 0-->SWALLOWS FOODS/LIQUIDS WITHOUT DIFFICULTY
DRESS: 0-->INDEPENDENT
RETIRED_COMMUNICATION: 0-->UNDERSTANDS/COMMUNICATES WITHOUT DIFFICULTY
RETIRED_EATING: 0-->INDEPENDENT
TRANSFERRING: 0-->INDEPENDENT
COGNITION: 0 - NO COGNITION ISSUES REPORTED

## 2017-07-08 NOTE — PLAN OF CARE
Problem: Goal Outcome Summary  Goal: Goal Outcome Summary  Noc RN- Pt has minimal pain, medicated x1 with iv dilaudid, discomfort to rt nare from ng tube, tube retaped to inner nare, some relief, draining pink to brown drainage. Pt slow to void, voiding adequately at end of shift. Pt stood at bedside, VSS, blood sugars stable

## 2017-07-08 NOTE — PLAN OF CARE
Problem: Goal Outcome Summary  Goal: Goal Outcome Summary  Outcome: Improving  Pt A&O. VS: Pt had a BP of 183/60 this afternoon; given hydralazine and was brought down to 168/56. Still continuing to monitor BP's. Pain controlled with IV Dilaudid. Dressing CDI. Up w/ SBA and gait belt. Voiding adequately at bedside w/ urinal; absent bowel sounds. Pt was experiencing some discomfort with NG tube this shift; pulled this morning. No N/V this shift. Still NPO except ice chips. Continues on Lovenox. Continue with plan of care. Will continue to monitor.

## 2017-07-08 NOTE — PROGRESS NOTES
Feels Ok except for NG discomfort.    Afebrile.  Minimal NG output.    Incisions OK.  Few bowel sounds.    Await return of bowel function.  Will remove NG.  Patient aware it may need to be replaced.    Michael Suggs MD  [facility]

## 2017-07-08 NOTE — PROGRESS NOTES
[facility]  Hospitalist Progress Note    Name: Cresencio Peguero    MRN: [id number]  Provider:  Johnnie Cotto MD, [facility]    Date of Service: 07/08/2017     Reason for Stay (Diagnosis): Small bowel obstruction secondary to internal hernia/adhesion         Summary of hospital stay & Assessment/Plan:   Summary of Stay: Cresencio Peguero is a 82 year old male who was admitted on 7/7/2017  82 year old male with history CAD/bypass grafting (20 yrs ago, has been stable), DM, chronic back pain, cervical laminectomy, neuropathy who presents with abdominal pain due to incarcerated hernia s/p laparoscopy and lysis of adhesions    Problem List:   1. Small bowel obstruction secondary to adhesion  2. Hypertension/coronary artery disease  3. Diet-controlled diabetes mellitus also metformin, continue sliding scale    NG removed by surgery, monitor for increased nausea or distention  Waiting for bowel function  Hydralazine when necessary for elevated blood pressure      DVT Prophylaxis: Enoxaparin (Lovenox) SQ  Code Status:  Full Code    Disposition Plan   Expected discharge in 2-3 days to prior living arrangement once ileus resolves, patient has a bowel movement and tolerates oral intake.     Entered: Johnnie Cotto 07/08/2017, 2:41 PM               Interval History:         Doing much better, NG is out no nausea or abdominal pain,   Still no gas or bowel movement    Case discussed with patient's nurse               Physical Exam:   Physical Exam   Temp: 97.9  F (36.6  C) Temp src: Oral BP: 168/57 Pulse: 58 Heart Rate: 60 Resp: 20 SpO2: 100 % O2 Device: None (Room air)    Vitals:    07/07/17 0441   Weight: 81.6 kg (180 lb)     I/O last 3 completed shifts:  In: 3637 [I.V.:3557; NG/GT:80]  Out: 1395 [Urine:1275; Emesis/NG output:115; Blood:5]        GENERAL:  Comfortable.   PSYCH: pleasant, oriented, No acute distress.  EYES: PERRLA, Normal conjunctiva.  HEART:  Normal S1, S2 with no edema.  LUNGS:  Clear to  auscultation, normal Respiratory effort.  ABDOMEN:  Soft, no hepatosplenomegaly, normal bowel sounds.  SKIN:  Dry to touch, No rash.  Neuro: Non focal with normal motor power, sensation, CN's and Reflexes.    Medications     lactated ringers 100 mL/hr at 07/08/17 0955       lidocaine 2 %  10-20 mL Urethral Once     atorvastatin (LIPITOR) tablet 40 mg  40 mg Oral Daily     gabapentin (NEURONTIN) tablet 600 mg  600 mg Oral BID     tamsulosin  0.4 mg Oral Daily     sodium chloride (PF)  3 mL Intracatheter Q8H     enoxaparin  40 mg Subcutaneous Q24H     insulin aspart  1-6 Units Subcutaneous Q4H     Data     -Data reviewed today:  I personally reviewed  all new labs and imaging results over the last 24 hours.      Recent Labs  Lab 07/07/17  1610 07/07/17  0456   WBC  --  6.9   HGB  --  13.4   HCT  --  39.0*   MCV  --  91   * 156       Recent Labs  Lab 07/07/17  1610 07/07/17  0456   NA  --  138   POTASSIUM  --  4.1   CHLORIDE  --  104   CO2  --  28   ANIONGAP  --  6   GLC  --  141*   BUN  --  16   CR 0.73 0.90   GFRESTIMATED >90Non  GFR Calc 81   GFRESTBLACK >90African American GFR Calc >90African American GFR Calc   ISIS  --  9.4       No results found for this or any previous visit (from the past 24 hour(s)).    This document was produced using voice recognition software

## 2017-07-08 NOTE — PLAN OF CARE
Problem: Goal Outcome Summary  Goal: Goal Outcome Summary  Outcome: Improving  A/O. Up to stand at bedside A1. Uses cane at home.  Incision open to air. Pt denies pain. IV infusing. Unable to void, straight cathed @ 1930, cath was painful per pt report. NG set to low int. Suction. Minimal pink tinged output from NG. NPO. Bowel sounds present but hypo. Denies nausea. Blood sugars monitored with ISS.

## 2017-07-09 LAB
CREAT SERPL-MCNC: 0.8 MG/DL (ref 0.66–1.25)
GFR SERPL CREATININE-BSD FRML MDRD: NORMAL ML/MIN/1.7M2
GLUCOSE BLDC GLUCOMTR-MCNC: 104 MG/DL (ref 70–99)
GLUCOSE BLDC GLUCOMTR-MCNC: 110 MG/DL (ref 70–99)
GLUCOSE BLDC GLUCOMTR-MCNC: 129 MG/DL (ref 70–99)
GLUCOSE BLDC GLUCOMTR-MCNC: 139 MG/DL (ref 70–99)
GLUCOSE BLDC GLUCOMTR-MCNC: 153 MG/DL (ref 70–99)
HGB BLD-MCNC: 11 G/DL (ref 13.3–17.7)
POTASSIUM SERPL-SCNC: 3.4 MMOL/L (ref 3.4–5.3)

## 2017-07-09 PROCEDURE — S5010 5% DEXTROSE AND 0.45% SALINE: HCPCS | Performed by: INTERNAL MEDICINE

## 2017-07-09 PROCEDURE — A9270 NON-COVERED ITEM OR SERVICE: HCPCS | Mod: GY | Performed by: SURGERY

## 2017-07-09 PROCEDURE — 25000132 ZZH RX MED GY IP 250 OP 250 PS 637: Mod: GY | Performed by: SURGERY

## 2017-07-09 PROCEDURE — 12000000 ZZH R&B MED SURG/OB

## 2017-07-09 PROCEDURE — 84132 ASSAY OF SERUM POTASSIUM: CPT | Performed by: INTERNAL MEDICINE

## 2017-07-09 PROCEDURE — 27210995 ZZH RX 272: Performed by: INTERNAL MEDICINE

## 2017-07-09 PROCEDURE — 25000132 ZZH RX MED GY IP 250 OP 250 PS 637: Mod: GY | Performed by: INTERNAL MEDICINE

## 2017-07-09 PROCEDURE — 25800025 ZZH RX 258: Performed by: INTERNAL MEDICINE

## 2017-07-09 PROCEDURE — 99232 SBSQ HOSP IP/OBS MODERATE 35: CPT | Performed by: INTERNAL MEDICINE

## 2017-07-09 PROCEDURE — 25000128 H RX IP 250 OP 636: Performed by: INTERNAL MEDICINE

## 2017-07-09 PROCEDURE — 00000146 ZZHCL STATISTIC GLUCOSE BY METER IP

## 2017-07-09 PROCEDURE — 36415 COLL VENOUS BLD VENIPUNCTURE: CPT | Performed by: INTERNAL MEDICINE

## 2017-07-09 PROCEDURE — 85018 HEMOGLOBIN: CPT | Performed by: INTERNAL MEDICINE

## 2017-07-09 PROCEDURE — 25000128 H RX IP 250 OP 636: Performed by: SURGERY

## 2017-07-09 PROCEDURE — 82565 ASSAY OF CREATININE: CPT | Performed by: INTERNAL MEDICINE

## 2017-07-09 RX ORDER — TAMSULOSIN HYDROCHLORIDE 0.4 MG/1
0.4 CAPSULE ORAL AT BEDTIME
Status: COMPLETED | OUTPATIENT
Start: 2017-07-09 | End: 2017-07-09

## 2017-07-09 RX ORDER — POTASSIUM CL/LIDO/0.9 % NACL 10MEQ/0.1L
10 INTRAVENOUS SOLUTION, PIGGYBACK (ML) INTRAVENOUS
Status: DISCONTINUED | OUTPATIENT
Start: 2017-07-09 | End: 2017-07-11 | Stop reason: HOSPADM

## 2017-07-09 RX ORDER — POTASSIUM CHLORIDE 7.45 MG/ML
10 INJECTION INTRAVENOUS
Status: DISCONTINUED | OUTPATIENT
Start: 2017-07-09 | End: 2017-07-11 | Stop reason: HOSPADM

## 2017-07-09 RX ORDER — SODIUM CHLORIDE 450 MG/100ML
INJECTION, SOLUTION INTRAVENOUS CONTINUOUS
Status: DISCONTINUED | OUTPATIENT
Start: 2017-07-09 | End: 2017-07-11

## 2017-07-09 RX ORDER — POTASSIUM CHLORIDE 1500 MG/1
20-40 TABLET, EXTENDED RELEASE ORAL
Status: DISCONTINUED | OUTPATIENT
Start: 2017-07-09 | End: 2017-07-11 | Stop reason: HOSPADM

## 2017-07-09 RX ORDER — POTASSIUM CHLORIDE 29.8 MG/ML
20 INJECTION INTRAVENOUS
Status: DISCONTINUED | OUTPATIENT
Start: 2017-07-09 | End: 2017-07-11 | Stop reason: HOSPADM

## 2017-07-09 RX ORDER — TAMSULOSIN HYDROCHLORIDE 0.4 MG/1
0.8 CAPSULE ORAL AT BEDTIME
Status: DISCONTINUED | OUTPATIENT
Start: 2017-07-10 | End: 2017-07-11 | Stop reason: HOSPADM

## 2017-07-09 RX ORDER — POTASSIUM CHLORIDE 1.5 G/1.58G
20-40 POWDER, FOR SOLUTION ORAL
Status: DISCONTINUED | OUTPATIENT
Start: 2017-07-09 | End: 2017-07-11 | Stop reason: HOSPADM

## 2017-07-09 RX ADMIN — ENOXAPARIN SODIUM 40 MG: 40 INJECTION SUBCUTANEOUS at 08:57

## 2017-07-09 RX ADMIN — SODIUM CHLORIDE: 4.5 INJECTION, SOLUTION INTRAVENOUS at 22:08

## 2017-07-09 RX ADMIN — HYDRALAZINE HYDROCHLORIDE 5 MG: 20 INJECTION INTRAMUSCULAR; INTRAVENOUS at 14:53

## 2017-07-09 RX ADMIN — DEXTROSE AND SODIUM CHLORIDE: 5; 450 INJECTION, SOLUTION INTRAVENOUS at 16:44

## 2017-07-09 RX ADMIN — ATORVASTATIN CALCIUM 40 MG: 40 TABLET, FILM COATED ORAL at 08:57

## 2017-07-09 RX ADMIN — HYDRALAZINE HYDROCHLORIDE 5 MG: 20 INJECTION INTRAMUSCULAR; INTRAVENOUS at 22:09

## 2017-07-09 RX ADMIN — GABAPENTIN 600 MG: 600 TABLET, FILM COATED ORAL at 08:57

## 2017-07-09 RX ADMIN — TAMSULOSIN HYDROCHLORIDE 0.4 MG: 0.4 CAPSULE ORAL at 22:09

## 2017-07-09 RX ADMIN — GABAPENTIN 600 MG: 600 TABLET, FILM COATED ORAL at 19:58

## 2017-07-09 RX ADMIN — SODIUM PHOSPHATE 1 ENEMA: 7; 19 ENEMA RECTAL at 13:00

## 2017-07-09 RX ADMIN — HYDRALAZINE HYDROCHLORIDE 5 MG: 20 INJECTION INTRAMUSCULAR; INTRAVENOUS at 09:46

## 2017-07-09 RX ADMIN — TAMSULOSIN HYDROCHLORIDE 0.4 MG: 0.4 CAPSULE ORAL at 08:57

## 2017-07-09 ASSESSMENT — PAIN DESCRIPTION - DESCRIPTORS
DESCRIPTORS: DULL;ACHING
DESCRIPTORS: ACHING

## 2017-07-09 NOTE — PLAN OF CARE
Problem: Goal Outcome Summary  Goal: Goal Outcome Summary  Noc RN- Pt appears to sleep between cares. Denies nausea, declines ice chips. Minimal abdominal pain. B/P elevated > 160, hydralazine given x1. Blood sugars wnl.

## 2017-07-09 NOTE — PLAN OF CARE
Problem: Goal Outcome Summary  Goal: Goal Outcome Summary  Outcome: Improving  Pt A&O. VS: Experiencing high blood pressures this shift, Hydralazine 5 mg given, still continuing to monitor. Pt denies pain this shift. Dressing's CDI. Up with SBA and cane. Voiding adequately; had BM this afternoon after receiving fleet enema. Changed to clear liquid diet; tolerating well. Continue with plan of care. Will continue to monitor.

## 2017-07-09 NOTE — PLAN OF CARE
Problem: Goal Outcome Summary  Goal: Goal Outcome Summary  Outcome: Improving  A/O. Up to bathroom and [place] A1 and, belt and walker. Generalized weakness. Voiding. Incisions CDI open to air. Denies nausea and abdominal pain. Bowels sounds hypo pt states some flatus. C/O headache, used toradol. Hydralazine for HTN. Blood sugar  trending down IV fluids changed to Dextrose .     Web page admitting:   Pt NPO, blood sugars trending down in 70s now. Should we add Dextrose to primary fluids?      Web page Dr. Eid    PRN hydralazine dose unable to maintaine BPs below 160s. Changes? call with questions

## 2017-07-09 NOTE — PROGRESS NOTES
Feels he needs to have BM.  Passed a small amount of flatus.  Hungry.    Afebrile.    Incisions OK.  Active bowel sounds.    Will start clears.  Give fleets enema and prn tap water enema.    Michael Suggs MD  [facility]

## 2017-07-09 NOTE — PROGRESS NOTES
[facility]    Hospitalist Progress Note    Date of Service (when I saw the patient): 07/09/2017    Assessment & Plan   Cresencio Peguero is a 82 year old male who was admitted on 7/7/2017. He had a small bowel obstruction due to adhesions, previous herniorrhaphy    Summary:SBO, s/p surgery, doing well  High blood pressure readings here, no history of hypertension  Bladder outlet obstruction despite Tamsulosin 0.4mg, will increase to 0.8mg and give at HS. It is likely he will need the services of an Urologist in the next few months. Renal function fortunately normal  TIIDM, well controlled  CAD by history, S/P CABGing for chest pain, no further chest pain  Chronic low back pain and neuropathy  Hypokalemia, will replete.      What I did today:I reviewed the chart, did a history and exam, reviewed his labs. Ordered potassium supplementation and a BMP for the AM. Increased the Tamsulosin for 0.4 to 0.8mg qhs    -    -      DVT Prophylaxis: Enoxaparin (Lovenox) SQ  Code Status: Full Code    Disposition: Expected discharge in 1-2 days once ok with surgery.    Modesto King MD    Interval History   Mr. Peguero is feeling ok, less abdominal pains.  Breathing ok, no head or chest pain, no nausea or vomiting. Drinking broth.  Doesn't void well, slow stream, has tried medicine in the past and not helpful.  History of diabetes for years, well controlled with metformin and Glipizide, A1C 6 last time    -Data reviewed today: I reviewed all new labs and imaging results over the last 24 hours. I personally reviewed no images or EKG's today.    Physical Exam   Temp: 97.6  F (36.4  C) Temp src: Oral BP: 187/58 Pulse: 55 Heart Rate: 60 Resp: 16 SpO2: 96 % O2 Device: None (Room air)    Vitals:    07/07/17 0441   Weight: 81.6 kg (180 lb)     Vital Signs with Ranges  Temp:  [97.6  F (36.4  C)-99.6  F (37.6  C)] 97.6  F (36.4  C)  Pulse:  [55-65] 55  Heart Rate:  [60-66] 60  Resp:  [14-18] 16  BP: (146-187)/(47-67)  187/58  SpO2:  [93 %-98 %] 96 %  I/O last 3 completed shifts:  In: 2364 [I.V.:2364]  Out: 3160 [Urine:3160]    Constitutional: Alert, cooperative  Respiratory: Lungs clear to  A&P  Cardiovascular: Regular rhythm, normal S1 and S2, no S3, soft S4 and 1/6 midsystolic murmur at the LLSB.  No edema  GI: bowel sounds present, minimally tender  Skin/Integumen: no rashes  Other:      Medications     dextrose 5% and 0.45% NaCl 100 mL/hr at 07/09/17 0854       sodium phosphate  1 enema Rectal Once     lidocaine 2 %  10-20 mL Urethral Once     atorvastatin (LIPITOR) tablet 40 mg  40 mg Oral Daily     gabapentin (NEURONTIN) tablet 600 mg  600 mg Oral BID     tamsulosin  0.4 mg Oral Daily     sodium chloride (PF)  3 mL Intracatheter Q8H     enoxaparin  40 mg Subcutaneous Q24H     insulin aspart  1-6 Units Subcutaneous Q4H       Data     Recent Labs  Lab 07/09/17  0633 07/07/17  1610 07/07/17  0456   WBC  --   --  6.9   HGB 11.0*  --  13.4   MCV  --   --  91   PLT  --  134* 156   NA  --   --  138   POTASSIUM 3.4  --  4.1   CHLORIDE  --   --  104   CO2  --   --  28   BUN  --   --  16   CR 0.80 0.73 0.90   ANIONGAP  --   --  6   ISIS  --   --  9.4   GLC  --   --  141*   ALBUMIN  --   --  4.2   PROTTOTAL  --   --  7.7   BILITOTAL  --   --  0.5   ALKPHOS  --   --  111   ALT  --   --  22   AST  --   --  20   LIPASE  --   --  77       Imaging:  No results found for this or any previous visit (from the past 24 hour(s)).

## 2017-07-10 LAB
ANION GAP SERPL CALCULATED.3IONS-SCNC: 6 MMOL/L (ref 3–14)
BUN SERPL-MCNC: 9 MG/DL (ref 7–30)
CALCIUM SERPL-MCNC: 8.2 MG/DL (ref 8.5–10.1)
CHLORIDE SERPL-SCNC: 113 MMOL/L (ref 94–109)
CO2 SERPL-SCNC: 23 MMOL/L (ref 20–32)
CREAT SERPL-MCNC: 0.75 MG/DL (ref 0.66–1.25)
GFR SERPL CREATININE-BSD FRML MDRD: ABNORMAL ML/MIN/1.7M2
GLUCOSE BLDC GLUCOMTR-MCNC: 115 MG/DL (ref 70–99)
GLUCOSE BLDC GLUCOMTR-MCNC: 118 MG/DL (ref 70–99)
GLUCOSE BLDC GLUCOMTR-MCNC: 152 MG/DL (ref 70–99)
GLUCOSE BLDC GLUCOMTR-MCNC: 92 MG/DL (ref 70–99)
GLUCOSE SERPL-MCNC: 100 MG/DL (ref 70–99)
PLATELET # BLD AUTO: 120 10E9/L (ref 150–450)
POTASSIUM SERPL-SCNC: 3.2 MMOL/L (ref 3.4–5.3)
POTASSIUM SERPL-SCNC: 4.3 MMOL/L (ref 3.4–5.3)
SODIUM SERPL-SCNC: 142 MMOL/L (ref 133–144)

## 2017-07-10 PROCEDURE — 25000128 H RX IP 250 OP 636: Performed by: INTERNAL MEDICINE

## 2017-07-10 PROCEDURE — A9270 NON-COVERED ITEM OR SERVICE: HCPCS | Mod: GY | Performed by: INTERNAL MEDICINE

## 2017-07-10 PROCEDURE — 80048 BASIC METABOLIC PNL TOTAL CA: CPT | Performed by: SURGERY

## 2017-07-10 PROCEDURE — 25000132 ZZH RX MED GY IP 250 OP 250 PS 637: Mod: GY | Performed by: INTERNAL MEDICINE

## 2017-07-10 PROCEDURE — 00000146 ZZHCL STATISTIC GLUCOSE BY METER IP

## 2017-07-10 PROCEDURE — 36415 COLL VENOUS BLD VENIPUNCTURE: CPT | Performed by: SURGERY

## 2017-07-10 PROCEDURE — 84132 ASSAY OF SERUM POTASSIUM: CPT | Performed by: INTERNAL MEDICINE

## 2017-07-10 PROCEDURE — 12000000 ZZH R&B MED SURG/OB

## 2017-07-10 PROCEDURE — 25000128 H RX IP 250 OP 636: Performed by: SURGERY

## 2017-07-10 PROCEDURE — A9270 NON-COVERED ITEM OR SERVICE: HCPCS | Mod: GY | Performed by: SURGERY

## 2017-07-10 PROCEDURE — 99232 SBSQ HOSP IP/OBS MODERATE 35: CPT | Performed by: INTERNAL MEDICINE

## 2017-07-10 PROCEDURE — 25000132 ZZH RX MED GY IP 250 OP 250 PS 637: Mod: GY | Performed by: SURGERY

## 2017-07-10 PROCEDURE — 36415 COLL VENOUS BLD VENIPUNCTURE: CPT | Performed by: INTERNAL MEDICINE

## 2017-07-10 PROCEDURE — 85049 AUTOMATED PLATELET COUNT: CPT | Performed by: SURGERY

## 2017-07-10 RX ADMIN — POTASSIUM CHLORIDE 20 MEQ: 1.5 POWDER, FOR SOLUTION ORAL at 11:18

## 2017-07-10 RX ADMIN — TAMSULOSIN HYDROCHLORIDE 0.8 MG: 0.4 CAPSULE ORAL at 22:45

## 2017-07-10 RX ADMIN — ENOXAPARIN SODIUM 40 MG: 40 INJECTION SUBCUTANEOUS at 08:55

## 2017-07-10 RX ADMIN — GABAPENTIN 600 MG: 600 TABLET, FILM COATED ORAL at 20:15

## 2017-07-10 RX ADMIN — ATORVASTATIN CALCIUM 40 MG: 40 TABLET, FILM COATED ORAL at 20:15

## 2017-07-10 RX ADMIN — GABAPENTIN 600 MG: 600 TABLET, FILM COATED ORAL at 08:55

## 2017-07-10 RX ADMIN — HYDRALAZINE HYDROCHLORIDE 20 MG: 20 INJECTION INTRAMUSCULAR; INTRAVENOUS at 04:03

## 2017-07-10 RX ADMIN — POTASSIUM CHLORIDE 40 MEQ: 1.5 POWDER, FOR SOLUTION ORAL at 09:05

## 2017-07-10 RX ADMIN — HYDRALAZINE HYDROCHLORIDE 5 MG: 20 INJECTION INTRAMUSCULAR; INTRAVENOUS at 09:06

## 2017-07-10 NOTE — PROGRESS NOTES
[facility]    Hospitalist Progress Note    Date of Service (when I saw the patient): 07/10/2017    Assessment & Plan   Cresencio Peguero is a 82 year old male who was admitted on 7/7/2017. Admitted with a SBO, had surgery    Summary: Small bowel Obstruction, better post surgery  Elevated blood pressures, no diagnosis of hypertension, may need outpatient follow up.  Bladder outlet obstruction due to BPH, will likely need urology attention in a month.  Tamsulosin dose increased from 0.4 to 0.8mg  CAD by history, stable  Chronic low back pain and neuropathy  TIIDM, diet controlled here      What I did today: History and exam  -    -    -      DVT Prophylaxis: Enoxaparin (Lovenox) SQ  Code Status: Full Code    Disposition: Expected discharge in 1 days once ok with the surgical team.    Modesto King MD    Interval History   Mr. Peguero is feeling well. He had a headache, but that went away as he is able to eat more. No nausea, vomiting. Passing flatus and has moved his bowels.  No chest pain or dyspnea, or light headedness.  Voiding is the same    -Data reviewed today: I reviewed all new labs and imaging results over the last 24 hours. I personally reviewed no images or EKG's today.    Physical Exam   Temp: 97.7  F (36.5  C) Temp src: Oral BP: 124/56 Pulse: 61 Heart Rate: 69 Resp: 16 SpO2: 96 % O2 Device: None (Room air)    Vitals:    07/07/17 0441   Weight: 81.6 kg (180 lb)     Vital Signs with Ranges  Temp:  [97.7  F (36.5  C)-99.7  F (37.6  C)] 97.7  F (36.5  C)  Pulse:  [61-75] 61  Heart Rate:  [61-76] 69  Resp:  [16-18] 16  BP: (124-210)/(49-72) 124/56  SpO2:  [95 %-96 %] 96 %  I/O last 3 completed shifts:  In: 3819 [P.O.:1680; I.V.:2139]  Out: 2150 [Urine:2150]    Constitutional: Alert, cooperative, witty  Respiratory: Lungs are clear to A&P  Cardiovascular: regular rhythm,  Normal S1 and  S2, no  S3 or murmurs, no edema  GI: bowel sounds present, not tender  Skin/Integumen: no rashes  Other:       Medications     NaCl 75 mL/hr at 07/09/17 2208       tamsulosin  0.8 mg Oral At Bedtime     lidocaine 2 %  10-20 mL Urethral Once     atorvastatin (LIPITOR) tablet 40 mg  40 mg Oral Daily     gabapentin (NEURONTIN) tablet 600 mg  600 mg Oral BID     sodium chloride (PF)  3 mL Intracatheter Q8H     enoxaparin  40 mg Subcutaneous Q24H     insulin aspart  1-6 Units Subcutaneous Q4H       Data     Recent Labs  Lab 07/10/17  0557 07/09/17  0633 07/07/17  1610 07/07/17  0456   WBC  --   --   --  6.9   HGB  --  11.0*  --  13.4   MCV  --   --   --  91   *  --  134* 156     --   --  138   POTASSIUM 3.2* 3.4  --  4.1   CHLORIDE 113*  --   --  104   CO2 23  --   --  28   BUN 9  --   --  16   CR 0.75 0.80 0.73 0.90   ANIONGAP 6  --   --  6   ISIS 8.2*  --   --  9.4   *  --   --  141*   ALBUMIN  --   --   --  4.2   PROTTOTAL  --   --   --  7.7   BILITOTAL  --   --   --  0.5   ALKPHOS  --   --   --  111   ALT  --   --   --  22   AST  --   --   --  20   LIPASE  --   --   --  77       Imaging:  No results found for this or any previous visit (from the past 24 hour(s)).

## 2017-07-10 NOTE — PLAN OF CARE
Problem: Bowel Obstruction (Adult)  Goal: Signs and Symptoms of Listed Potential Problems Will be Absent or Manageable (Bowel Obstruction)  Signs and symptoms of listed potential problems will be absent or manageable by discharge/transition of care (reference Bowel Obstruction (Adult) CPG).   Outcome: Improving  PM SHIFT  Pt alert and oriented.     Pt tolerating diet and days had stopped pts IVFS. So i paged Dr. King at [phone] with this text can we get pts changed to blood sugar checks with meals and stop every 4 hours since diet advanced now. also can pt have IVFS dc since tolerating fluids. No call back or order change. Asked pharmacy to change blood sugar checks to meal times.blood sugar checks still every 4 hours. Pt saline locked all shift. Pt drinking lots of fluids and voiding with out problems.

## 2017-07-10 NOTE — PLAN OF CARE
Problem: Goal Outcome Summary  Goal: Goal Outcome Summary  Outcome: Improving  Areas of concern: BP's in 160's systolic requiring IV Apresoline.      Areas of improvement: BP improved. Tolerating full liquids. Up in [place] walking well.      Plan: Advanced diet as tolerated. Asked MD to clarify blood sugar monitoring. Anticipate home tomorrow.

## 2017-07-10 NOTE — PLAN OF CARE
Problem: Goal Outcome Summary  Goal: Goal Outcome Summary  Noc RN- Pt denies abdominal pain and nausea. Abdomen rounded with hypoactive bowel sounds, B/P still elevated & hydralazine given x1. Blood sugars 118 & 92. Tolerating clear liquids

## 2017-07-10 NOTE — PROGRESS NOTES
"[facility]   General Surgery Progress Note          Assessment and Plan:   Assessment:   POD#3 s/p diagnostic laparoscopy, lysis of adhesions for incarcerated hernia  Ileus as expected - resolved      Plan:   Full liquids this morning, possible diet advancement tonight  Increase activity as tolerated  VTE PPX: Lovenox, PCDs  Disposition: Plan for likely DC tomorrow morning if continues to do well         Interval History:   Resting in bed, doing well. States he had two BMs yesterday (first one with an enema). He denies pain and hasn't needed any pain medication. He is hungry. Tolerating clears just fine. Voiding independently.         Physical Exam:   Blood pressure 168/54, pulse 61, temperature 97.7  F (36.5  C), temperature source Oral, resp. rate 16, height 1.803 m (5' 11\"), weight 81.6 kg (180 lb), SpO2 96 %.    I/O last 3 completed shifts:  In: 3819 [P.O.:1680; I.V.:2139]  Out: 2150 [Urine:2150]    Abdomen: soft, ND, NT, +BS  Inc(s) - clean, dry, intact with dermabond glue, no erythema            Data:     Recent Labs   Lab Test  07/09/17   0633  07/07/17   0456  08/14/16   1640   HGB  11.0*  13.4  13.1*   WBC   --   6.9  5.6          Hecotr Zamarripa PA-C    Pt seen and examined, agree with above.  "

## 2017-07-10 NOTE — PLAN OF CARE
Problem: Goal Outcome Summary  Goal: Goal Outcome Summary  Outcome: Improving  A/O. Up SBA w/ belt and walker to bathroom. Pt is unsteady at times! Stay close. Voiding. BM this shift. Denies pain. Incisions intact. Tolerating clear liquids w/o issue. IV fluid running. Monitoring blood glucoses. HTN.     2200: web page admitting    D5 in fluids and blood sugars are trending up. Tolerating clears, voiding. Can we saline lock or change fluids? (See mar for changes)

## 2017-07-11 VITALS
HEART RATE: 67 BPM | BODY MASS INDEX: 25.2 KG/M2 | RESPIRATION RATE: 18 BRPM | DIASTOLIC BLOOD PRESSURE: 51 MMHG | SYSTOLIC BLOOD PRESSURE: 139 MMHG | WEIGHT: 180 LBS | OXYGEN SATURATION: 96 % | HEIGHT: 71 IN | TEMPERATURE: 98.2 F

## 2017-07-11 LAB
GLUCOSE BLDC GLUCOMTR-MCNC: 104 MG/DL (ref 70–99)
GLUCOSE BLDC GLUCOMTR-MCNC: 143 MG/DL (ref 70–99)
POTASSIUM SERPL-SCNC: 3.9 MMOL/L (ref 3.4–5.3)

## 2017-07-11 PROCEDURE — 00000146 ZZHCL STATISTIC GLUCOSE BY METER IP

## 2017-07-11 PROCEDURE — 25000132 ZZH RX MED GY IP 250 OP 250 PS 637: Mod: GY | Performed by: SURGERY

## 2017-07-11 PROCEDURE — 99239 HOSP IP/OBS DSCHRG MGMT >30: CPT | Performed by: INTERNAL MEDICINE

## 2017-07-11 PROCEDURE — 36415 COLL VENOUS BLD VENIPUNCTURE: CPT | Performed by: SURGERY

## 2017-07-11 PROCEDURE — 84132 ASSAY OF SERUM POTASSIUM: CPT | Performed by: SURGERY

## 2017-07-11 PROCEDURE — 25000128 H RX IP 250 OP 636: Performed by: SURGERY

## 2017-07-11 PROCEDURE — A9270 NON-COVERED ITEM OR SERVICE: HCPCS | Mod: GY | Performed by: SURGERY

## 2017-07-11 RX ORDER — SENNOSIDES 8.6 MG
1 TABLET ORAL 2 TIMES DAILY
Status: DISCONTINUED | OUTPATIENT
Start: 2017-07-11 | End: 2017-07-11 | Stop reason: HOSPADM

## 2017-07-11 RX ORDER — POLYETHYLENE GLYCOL 3350 17 G/17G
17 POWDER, FOR SOLUTION ORAL DAILY PRN
Status: DISCONTINUED | OUTPATIENT
Start: 2017-07-11 | End: 2017-07-11 | Stop reason: HOSPADM

## 2017-07-11 RX ORDER — SENNOSIDES 8.6 MG
1 TABLET ORAL 2 TIMES DAILY
Qty: 120 EACH | Refills: 0 | Status: SHIPPED | OUTPATIENT
Start: 2017-07-11 | End: 2020-08-11

## 2017-07-11 RX ORDER — POLYETHYLENE GLYCOL 3350 17 G/17G
17 POWDER, FOR SOLUTION ORAL DAILY PRN
Qty: 119 G | Refills: 0 | Status: ON HOLD | OUTPATIENT
Start: 2017-07-11 | End: 2021-06-30

## 2017-07-11 RX ORDER — TAMSULOSIN HYDROCHLORIDE 0.4 MG/1
0.8 CAPSULE ORAL DAILY
Qty: 60 CAPSULE | Refills: 0 | Status: SHIPPED | OUTPATIENT
Start: 2017-07-11 | End: 2020-08-11

## 2017-07-11 RX ADMIN — GABAPENTIN 600 MG: 600 TABLET, FILM COATED ORAL at 09:13

## 2017-07-11 RX ADMIN — ENOXAPARIN SODIUM 40 MG: 40 INJECTION SUBCUTANEOUS at 09:13

## 2017-07-11 NOTE — DISCHARGE INSTRUCTIONS
HOME CARE FOLLOWING ABDOMINAL SURGERY  DEANNA Armendariz E. Gavin, N. Guttormson, D. Maurer, JAMA Monzon     Special instructions for Cresencio Peguero:  --RETURN APPOINTMENT:  Schedule a follow-up visit with surgeon 2-3 weeks after discharge from the hospital.  Office Phone:  [phone]     INCISIONAL CARE:  Replace the bandage over your incisions until all drainage stops, or if more comfortable to have in place.  If Dermabond (a type of skin glue) is present, leave in place until it wears/flakes off.     BATHING:  Showers and baths are okay.  You may wash your hair at any time.  Gently pat your incisions dry after bathing.  Do not apply creams or ointments near or on the incision sites as this may cause the skin glue to wear off prematurely.    ACTIVITY:  Light Activity -- you may immediately be up and about as tolerated.  Walking program is encouraged.  Driving -- you may drive when comfortable.  Light Work -- resume when comfortable off pain medications.    Strenuous Work/Activity -- limit lifting to 20 pounds for 2 weeks.  Then, progressively increase with time.  Active Sports (GOLF, etc.) -- cautiously resume after 4 weeks.    DISCOMFORT:  Use over-the-counter pain medications as needed.  Expect gradual improvement.    DIET:  Soft diet for 1-2 weeks, then OK to resume regular diet.  Drink plenty of fluids.       CONTACT US IF THE FOLLOWING DEVELOPS:   1. A fever that is above 101     2. If there is a large amount of drainage, bleeding, or swelling.   3. Severe pain that is not relieved by your prescription.   4. Drainage that is thick, cloudy, yellow, green or white.   5. Any other questions not answered by  Frequently Asked Questions  sheet.      FREQUENTLY ASKED QUESTIONS:    Q:  How should my incision look?    A:  Normally your incision will appear slightly swollen with light redness directly along the incision itself as it heals.  It may feel like a bump or ridge as the  healing/scarring happens, and over time (3-4 months) this bump or ridge feeling should slowly go away.  In general, clear or pink watery drainage can be normal at first as your incision heals, but should decrease over time.    Q:  How do I know if my incision is infected?  A:  Look at your incision for signs of infection, like redness around the incision spreading to surrounding skin, or drainage of cloudy or foul-smelling drainage.  If you feel warm, check your temperature to see if you are running a fever.    **If any of these things occur, please notify the nurse at our office.  We may need you to come into the office for an incision check.      Q:  How do I take care of my incision?  A:  If you have a dressing in place - Starting the day after surgery, replace the dressing 1-2 times a day until there is no further drainage from the incision.  At that time, a dressing is no longer needed.  Try to minimize tape on the skin if irritation is occurring at the tape sites.  If you have significant irritation from tape on the skin, please call the office to discuss other method of dressing your incision.    Small pieces of tape called  steri-strips  may be present directly overlying your incision; these may be removed 10 days after surgery unless otherwise specified by your surgeon.  If these tapes start to loosen at the ends, you may trim them back until they fall off or are removed.    A:  If you had  Dermabond  tissue glue used as a dressing (this causes your incision to look shiny with a clear covering over it) - This type of dressing wears off with time and does not require more dressings over the top unless it is draining around the glue as it wears off.  Do not apply ointments or lotions over the incisions until the glue has completely worn off.    Q:  There is a piece of tape or a sticky  lead  still on my skin.  Can I remove this?  A:  Sometimes the sticky  leads  used for monitoring during surgery or for  evaluation in the emergency department are not all removed while you are in the hospital.  These sometimes have a tab or metal dot on them.  You can easily remove these on your own, like taking off a band-aid.  If there is a gel substance under the  lead , simply wipe/clean it off with a washcloth or paper towel.      Q:  What can I do to minimize constipation (very hard stools, or lack of stools)?  A:  Stay well hydrated.  Increase your dietary fiber intake or take a fiber supplement -with plenty of water.  Walk around frequently.  You may consider an over-the-counter stool-softener.  Your Pharmacist can assist you with choosing one that is stocked at your pharmacy.  Constipation is also one of the most common side effects of pain medication.  If you are using pain medication, be pro-active and try to PREVENT problems with constipation by taking the steps above BEFORE constipation becomes a problem.    Q:  What do I do if I need more pain medications?  A:  Call the office to receive refills.  Be aware that certain pain meds cannot be called into a pharmacy and actually require a paper prescription.  A change may be made in your pain med as you progress thru your recovery period or if you have side effects to certain meds.    --Pain meds are NOT refilled after 5pm on weekdays, and NOT AT ALL on the weekends, so please look ahead to prevent problems.      Q:  Why am I having a hard time sleeping now that I am at home?  A:  Many medications you receive while you are in the hospital can impact your sleep for a number of days after your surgery/hospitalization.  Decreased level of activity and naps during the day may also make sleeping at night difficult.  Try to minimize day-time naps, and get up frequently during the day to walk around your home during your recovery time.  Sleep aides may be of some help, but are not recommended for long-term use.      Q:  I am having some back discomfort.  What should I do?  A:  This  may be related to certain positioning that was required for your surgery, extended periods of time in bed, or other changes in your overall activity level.  You may try ice, heat, acetaminophen, or ibuprofen to treat this temporarily.  Note that many pain medications have acetaminophen in them and would state this on the prescription bottle.  Be sure not to exceed the maximum of 4000mg per day of acetaminophen.     **If the pain you are having does not resolve, is severe, or is a flare of back pain you have had on other occasions prior to surgery, please contact your primary physician for further recommendations or for an appointment to be examined at their office.    Q:  Why am I having headaches?  A:  Headaches can be caused by many things:  caffeine withdrawal, use of pain meds, dehydration, high blood pressure, lack of sleep, over-activity/exhaustion, flare-up of usual migraine headaches.  If you feel this is related to muscle tension (a band-like feeling around the head, or a pressure at the low-back of the head) you may try ice or heat to this area.  You may need to drink more fluids (try electrolyte drink like Gatorade), rest, or take your usual migraine medications.   **If your headaches do not resolve, worsen, are accompanied by other symptoms, or if your blood pressure is high, please call your primary physician for recommendation and/or examination.    Q:  I am unable to urinate.  What do I do?  A:  A small percentage of people can have difficulty urinating initially after surgery.  This includes being able to urinate only a very small amount at a time and feeling discomfort or pressure in the very low abdomen.  This is called  urinary retention , and is actually an urgent situation.  Proceed to your nearest Emergency department for evaluation (not an Urgent Care Center).  Sometimes the bladder does not work correctly after certain medications you receive during surgery, or related to certain procedures.   You may need to have a catheter placed until your bladder recovers.  When planning to go to an Emergency department, it may help to call the ER to let them know you are coming in for this problem after a surgery.  This may help you get in quicker to be evaluated.  **If you have symptoms of a urinary tract infection, please contact your primary physician for the proper evaluation and treatment.          If you have other questions, please call the office Monday thru Friday between 8am and 5pm to discuss with the nurse or physician assistant.  #[phone]    There is a surgeon ON CALL on weekday evenings and over the weekend in case of urgent need only, and may be contacted at the same number.    If you are having an emergency, call 911 or proceed to your nearest emergency department.

## 2017-07-11 NOTE — PLAN OF CARE
Problem: Bowel Obstruction (Adult)  Goal: Signs and Symptoms of Listed Potential Problems Will be Absent or Manageable (Bowel Obstruction)  Signs and symptoms of listed potential problems will be absent or manageable by discharge/transition of care (reference Bowel Obstruction (Adult) CPG).   Outcome: Improving  PM SHIFT  Pt alert and oriented. Pt sat up in chair a lot this shift and ambulated in the [place] several times with SBA and his cane. Pt steady. LS clear. BS positive. bm on days shift. Tolerating diet. No pain. Plan is home tomorrow.

## 2017-07-11 NOTE — DISCHARGE SUMMARY
[facility]    Discharge Summary  Hospitalist    Date of Admission:  7/7/2017  Date of Discharge:  7/11/2017 12:38 PM  Provider:  Himanshu Nava DO, FABIO    Discharge Diagnoses   1.  Small Bowel Obstruction, incarcerated hernia s/p surgery  2.  Urinary retention, improved with Flomax  3.  Elevated blood pressure, trending back toward normal by discharge  4.  Mild acute blood loss anemia associated with surgery    Other medical issues:  Past Medical History:   Diagnosis Date     Diabetes (H)        History of Present Illness   Cresencio Peguero is an 82 year old male who presented with GI complaints.  Please see the admission history and physical for full details.    Hospital Course   Cresencio Peguero was admitted on 7/7/2017.  The following problems were addressed during his hospitalization:    Mr. Peguero was found to have a small bowel obstruction.  There was concern of an incarcerated hernia.  He was kept NPO and seen by general surgery.  He had a diagnostic laparoscopy with lysis of adhesions.  He had gradual return of bowel function and his diet was slowly advanced.  He tolerated his diet advance and felt well by the day of discharge.  During his stay he had problems with urinary retention.  He has a history of some BPH though hadn't had a lot of problems recently.  Flomax was started then titrated up by my colleague before I assumed care.  He was voiding well by discharge and post void on the AM of discharge was < 100 cc.  I left him on flomax as it appeared to be helping and asked him to follow-up with a urologist of his choice in the near future.  Incidentally during his stay he had some occasional high blood pressures.  On the day of discharge as he improved from his surgery it was trending down.  I held off adding additional blood pressure medications as it was trending down and baseline he didn't have definite hypertension.  I asked him to see his PCP in the near future for a blood pressure  "recheck.  His diabetes was reasonable and metformin was resumed.  He was not having any major pain and not requiring narcotics by discharge.      Significant Results and Procedures   Diagnostic lap with lysis of adhesions    Pending Results     Unresulted Labs Ordered in the Past 30 Days of this Admission     Date and Time Order Name Status Description    7/7/2017 1038 Fluid Culture Aerobic Bacterial Preliminary     7/7/2017 1038 Anaerobic bacterial culture Preliminary           Code Status   Full Code       Primary Care Physician   Parrish Padgett    Blood pressure 139/51, pulse 67, temperature 98.2  F (36.8  C), temperature source Oral, resp. rate 18, height 1.803 m (5' 11\"), weight 81.6 kg (180 lb), SpO2 96 %.    Alert, oriented, heart regular, lungs clear, active bowel sounds and soft without guarding/rigidity.    Discharge Disposition   Discharged to home    Consultations This Hospital Stay   HOSPITALIST IP CONSULT    Time Spent on this Encounter   I, Himanshu Nava, personally saw the patient today and spent greater than 30 minutes discharging this patient.    Discharge Orders     Reason for your hospital stay   Bowel obstruction     Follow-up and recommended labs and tests    Follow-up with surgery as they recommended elsewhere on these orders.  Also you should follow-up with urology in the near future regarding prostate/urinary retention.  (RN to please provide urology clinic numbers to the patient, he would prefer to f/u in [facility]).  Follow-up with primary provider in the next 1-2 weeks given some occasional elevated blood pressures during your stay.     Full Code       Discharge Medications   Current Discharge Medication List      START taking these medications    Details   sennosides (SENOKOT) 8.6 MG tablet Take 1 tablet by mouth 2 times daily May decrease dosing if loose stools.  Qty: 120 each, Refills: 0    Associated Diagnoses: Internal hernia; SBO (small bowel obstruction) (H)    "   polyethylene glycol (MIRALAX/GLYCOLAX) powder Take 17 g by mouth daily as needed for constipation (constipation)  Qty: 119 g, Refills: 0    Associated Diagnoses: Internal hernia; SBO (small bowel obstruction) (H)         CONTINUE these medications which have CHANGED    Details   tamsulosin (FLOMAX) 0.4 MG capsule Take 2 capsules (0.8 mg) by mouth daily  Qty: 60 capsule, Refills: 0    Associated Diagnoses: Urinary retention         CONTINUE these medications which have NOT CHANGED    Details   acetaminophen (TYLENOL) 325 MG tablet Take 650 mg by mouth every 6 hours as needed for mild pain      polyethylene glycol (MIRALAX/GLYCOLAX) packet Take 17 g by mouth daily  Qty: 30 packet, Refills: 0    Associated Diagnoses: Constipation, unspecified constipation type      carboxymethylcellulose (REFRESH PLUS) 0.5 % SOLN Place 1 drop into both eyes daily as needed Alternates with Refresh Liquigel.       carboxymethylcellulose (CELLUVISC/REFRESH LIQUIGEL) 1 % ophthalmic solution Place 1 drop into both eyes daily as needed Alternates with Refresh Tears.       ATORVASTATIN CALCIUM PO Take 40 mg by mouth daily      GABAPENTIN PO Take 600 mg by mouth 2 times daily       METFORMIN HCL PO Take 850 mg by mouth daily (with breakfast)           Allergies   Allergies   Allergen Reactions     Percodan [Oxycodone-Aspirin] Rash     Data     Recent Labs  Lab 07/10/17  0557 07/09/17  0633 07/07/17  1610 07/07/17  0456   WBC  --   --   --  6.9   HGB  --  11.0*  --  13.4   HCT  --   --   --  39.0*   MCV  --   --   --  91   *  --  134* 156       Recent Labs  Lab 07/07/17  1038   CULT Culture negative monitoring continues  Culture negative monitoring continues       Recent Labs  Lab 07/11/17  0555 07/10/17  1437 07/10/17  0557 07/09/17  0633 07/07/17  1610 07/07/17  0456   NA  --   --  142  --   --  138   POTASSIUM 3.9 4.3 3.2* 3.4  --  4.1   CHLORIDE  --   --  113*  --   --  104   CO2  --   --  23  --   --  28   ANIONGAP  --   --  6   --   --  6   GLC  --   --  100*  --   --  141*   BUN  --   --  9  --   --  16   CR  --   --  0.75 0.80 0.73 0.90   GFRESTIMATED  --   --  >90Non  GFR Calc >90Non  GFR Calc >90Non  GFR Calc 81   GFRESTBLACK  --   --  >90African American GFR Calc >90African American GFR Calc >90African American GFR Calc >90African American GFR Calc   ISIS  --   --  8.2*  --   --  9.4   PROTTOTAL  --   --   --   --   --  7.7   ALBUMIN  --   --   --   --   --  4.2   BILITOTAL  --   --   --   --   --  0.5   ALKPHOS  --   --   --   --   --  111   AST  --   --   --   --   --  20   ALT  --   --   --   --   --  22     Results for orders placed or performed during the hospital encounter of 07/07/17   CT Abdomen Pelvis w Contrast    Narrative    CT ABDOMEN PELVIS W CONTRAST  7/7/2017 6:06 AM     HISTORY: Left lower quadrant pain.    TECHNIQUE: Volumetric acquisition through abdomen and pelvis with IV  contrast. 91 mL Isovue-370. Radiation dose for this scan was reduced  using automated exposure control, adjustment of the mA and/or kV  according to patient size, or iterative reconstruction technique.    COMPARISON: 8/14/2016.    FINDINGS: The liver, gallbladder, spleen, pancreas, adrenal glands and  kidneys demonstrate no worrisome findings. Small left renal cyst.  Atheromatous changes of the abdominal aorta without aneurysm.    There are a few borderline prominent fluid-filled small bowel loops in  the midabdomen. To the left of this there is a cluster of small bowel  loops which are nondilated but appear slightly thick-walled with  moderate adjacent edema and stranding in the mesentery. The  configuration of the loops suggests an internal hernia without  significant associated obstruction at this point.    Scattered colon diverticula without diverticulitis. No free air or  pneumatosis. Small amount of free fluid in the pelvis. Normal  appendix.      Impression    IMPRESSION:  1. Cluster of  abnormal small bowel loops in the left abdomen  demonstrate ill-defined wall thickening with associated mesenteric  edema and stranding.  2. Findings suggest internal hernia without significant associated  bowel obstruction at this point. However, a few adjacent small bowel  loops are fluid-filled and mildly distended.  3. Recommend surgical consultation.  4. Small amount of free pelvic fluid.    THEE CORTEZ MD

## 2017-07-11 NOTE — PROGRESS NOTES
"[facility]   General Surgery Progress Note          Assessment and Plan:   Assessment:   -POD#4 s/p Diagnostic laparoscopy, lysis of adhesions for internal hernia mechanism; no bowel resection needed  -Ileus as expected - resolved      Plan:   -Diet: soft diet for 1-2 weeks, then increase as tolerated  -PCP and urology f/u per Hospitalist  -OK to DC today. DC Rx: no narcotics needed.  OTC bowel program reviewed with patient.    -RTC 2-3 weeks for postop appt with surgeon.  Discharge instructions were reviewed with the patient in detail.  All his questions/concerns were addressed.  He is aware that a printed copy of instructions will be given to him upon discharge.         Interval History:   Resting in bed, doing well.  No BM in about 24 hours; notes he was on daily miralax pre-op will occasional Milk of Mag as needed due to chronic constipation.  I think continued use of miralax is OK but also reviewed that he may have a new \"baseline\" of bowel function after surgery compared to pre-op; if he had been having any chronic issues related to partial bowel obstruction or issues related to adhesions.  Not needing pain meds.  Voiding well.  +flatus.  Eating well without bloating or nausea.         Physical Exam:   Blood pressure 139/51, pulse 67, temperature 98.2  F (36.8  C), temperature source Oral, resp. rate 18, height 1.803 m (5' 11\"), weight 81.6 kg (180 lb), SpO2 96 %.    I/O last 3 completed shifts:  In: 500 [P.O.:500]  Out: 450 [Urine:450]    Abdomen: soft, ND, NT, +BS  Laparoscopic incs - clean, dry, intact with dermabond glue, no erythema          Christelle Bear, CECILIO    "

## 2017-07-11 NOTE — PLAN OF CARE
Problem: Goal Outcome Summary  Goal: Goal Outcome Summary  Outcome: Improving  Orientation: Alert and oriented x4.   VSS. 96% on RA. Afebrile.   LS: clear and equal bilaterally   GI: Passing gas. No BM. Denies N/V.   : Adequate urine output. Clear yellow.   Skin: Incisions to abdomen. CODY. C/D/I. Mild ecchymosis surrounding area.   Activity: SBA w/ walker. Up to bathroom x1. Pt slept comfortably throughout shift.   Pain: 0/10. Denies pain throughout shift. No interventions utilized.   Plan: Continue with current cares. D/C home today.

## 2017-07-12 LAB
BACTERIA SPEC CULT: NO GROWTH
Lab: NORMAL
MICRO REPORT STATUS: NORMAL
SPECIMEN SOURCE: NORMAL

## 2017-07-14 LAB
BACTERIA SPEC CULT: NORMAL
Lab: NORMAL
MICRO REPORT STATUS: NORMAL
SPECIMEN SOURCE: NORMAL

## 2017-07-25 ENCOUNTER — OFFICE VISIT (OUTPATIENT)
Dept: SURGERY | Facility: CLINIC | Age: 82
End: 2017-07-25
Payer: COMMERCIAL

## 2017-07-25 VITALS — HEIGHT: 71 IN | BODY MASS INDEX: 25.2 KG/M2 | WEIGHT: 180 LBS

## 2017-07-25 DIAGNOSIS — Z09 SURGICAL FOLLOWUP VISIT: Primary | ICD-10-CM

## 2017-07-25 PROCEDURE — 99024 POSTOP FOLLOW-UP VISIT: CPT | Performed by: SURGERY

## 2017-07-25 NOTE — MR AVS SNAPSHOT
"              After Visit Summary   2017    Cresencio Peguero    MRN: [id number]           Patient Information     Date Of Birth          1935        Visit Information        Provider Department      2017 4:15 PM Sumeet Joiner MD [facility] General Surgery      Today's Diagnoses     Surgical followup visit    -  1       Follow-ups after your visit        Who to contact     If you have questions or need follow up information about today's clinic visit or your schedule please contact [facility] directly at [phone].  Normal or non-critical lab and imaging results will be communicated to you by [employer], letter or phone within 4 business days after the clinic has received the results. If you do not hear from us within 7 days, please contact the clinic through [employer] or phone. If you have a critical or abnormal lab result, we will notify you by phone as soon as possible.  Submit refill requests through "[employer] or call your pharmacy and they will forward the refill request to us. Please allow 3 business days for your refill to be completed.          Additional Information About Your Visit        MyChart Information     "[employer] lets you send messages to your doctor, view your test results, renew your prescriptions, schedule appointments and more. To sign up, go to [email or web address] . Click on \"Log in\" on the left side of the screen, which will take you to the Welcome page. Then click on \"Sign up Now\" on the right side of the page.     You will be asked to enter the access code listed below, as well as some personal information. Please follow the directions to create your username and password.     Your access code is: [email or web address]  Expires: 10/5/2017  5:37 AM     Your access code will  in 90 days. If you need help or a new code, please call your [place] clinic or [phone].        Care EveryWhere ID     This " "is your Care EveryWhere ID. This could be used by other organizations to access your [facility] medical records  [id number]        Your Vitals Were     Height BMI (Body Mass Index)                5' 11\" (1.803 m) 25.1 kg/m2           Blood Pressure from Last 3 Encounters:   07/11/17 139/51   08/16/16 118/51   10/07/12 150/74    Weight from Last 3 Encounters:   07/25/17 180 lb (81.6 kg)   07/07/17 180 lb (81.6 kg)   08/14/16 172 lb 4.8 oz (78.2 kg)              Today, you had the following     No orders found for display       Primary Care Provider Office Phone # Fax #    Parrish Padgett [phone]       [facility] [address] [place] [address]        Equal Access to Services     [facility]: Hadii aad junior hadasho Soomaali, waaxda luqadaha, qaybta kaalmada adeegyada, duong jimenezin hayrachel waldrop . So [facility] [phone].    ATENCIÓN: Si habla español, tiene a jordan disposición servicios gratuitos de asistencia lingüística. Llame al [phone].    We comply with applicable federal civil rights laws and [place] laws. We do not discriminate on the basis of race, color, national origin, age, disability sex, sexual orientation or gender identity.            Thank you!     Thank you for choosing [facility]  for your care. Our goal is always to provide you with excellent care. Hearing back from our patients is one way we can continue to improve our services. Please take a few minutes to complete the written survey that you may receive in the mail after your visit with us. Thank you!             Your Updated Medication List - Protect others around you: Learn how to safely use, store and throw away your medicines at www.disposemymeds.org.          This list is accurate as of: 7/25/17 11:59 PM.  Always use your most recent med list.                   Brand Name Dispense Instructions for use Diagnosis    ATORVASTATIN CALCIUM PO      Take 40 mg by mouth " daily        * carboxymethylcellulose 0.5 % Soln ophthalmic solution    REFRESH PLUS     Place 1 drop into both eyes daily as needed Alternates with Refresh Liquigel.        * carboxymethylcellulose 1 % ophthalmic solution    CELLUVISC/REFRESH LIQUIGEL     Place 1 drop into both eyes daily as needed Alternates with Refresh Tears.        GABAPENTIN PO      Take 600 mg by mouth 2 times daily        METFORMIN HCL PO      Take 850 mg by mouth daily (with breakfast)        * polyethylene glycol Packet    MIRALAX/GLYCOLAX    30 packet    Take 17 g by mouth daily    Constipation, unspecified constipation type       * polyethylene glycol powder    MIRALAX/GLYCOLAX    119 g    Take 17 g by mouth daily as needed for constipation (constipation)    Internal hernia, SBO (small bowel obstruction) (H)       sennosides 8.6 MG tablet    SENOKOT    120 each    Take 1 tablet by mouth 2 times daily May decrease dosing if loose stools.    Internal hernia, SBO (small bowel obstruction) (H)       tamsulosin 0.4 MG capsule    FLOMAX    60 capsule    Take 2 capsules (0.8 mg) by mouth daily    Urinary retention       TYLENOL 325 MG tablet   Generic drug:  acetaminophen      Take 650 mg by mouth every 6 hours as needed for mild pain        * Notice:  This list has 4 medication(s) that are the same as other medications prescribed for you. Read the directions carefully, and ask your doctor or other care provider to review them with you.

## 2017-07-25 NOTE — LETTER
2017    RE:  Cresencio Peguero-:  [birth year]    Cresencio Peguero is following up after laparoscopic lysis of adhesions for small bowel obstruction. He reports that his bowels are moving well. He denies any nausea, vomiting, or bloating. He denies any problems with the incisions. On exam, the incisions are healing well. There is no evidence of infection or hernia. We again discussed the lifting restrictions after surgery. He may follow-up as needed.     Sumeet Joiner MD  [phone] (c)  [phone] (p)      This note was created using voice recognition software. Undetected word substitutions or other errors may have occurred.

## 2017-08-01 NOTE — PROGRESS NOTES
Cresencio Peguero is following up after laparoscopic lysis of adhesions for small bowel obstruction. He reports that his bowels are moving well. He denies any nausea, vomiting, or bloating. He denies any problems with the incisions. On exam, the incisions are healing well. There is no evidence of infection or hernia. We again discussed the lifting restrictions after surgery. He may follow-up as needed.    Sumeet Joiner MD  [phone] (c)  [phone] (p)     This note was created using voice recognition software. Undetected word substitutions or other errors may have occurred.

## 2017-08-01 NOTE — OP NOTE
-   DATE OF SERVICE: 7/7/2017     SURGEON  JIMMY KATZ MD     ASSISTANT   Hector Zamarripa PA-C and A physician assistant was medically necessary for his/her expertise in retraction/exposure, suctioning, and suturing.    PREPROCEDURE DIAGNOSIS   Small bowel obstruction.    POSTPROCEDURE DIAGNOSIS   Same.    PROCEDURE   Diagnostic laparoscopy.  Laparoscopic lysis of adhesions.     ESTIMATED BLOOD LOSS   5.     COMPLICATIONS   None.     SPECIMENS   Abdominal fluid.    FINDINGS  Omental band crossing and compressing the mid small bowel against the posterior abdominal cavity. Turbid green-brown fluid in abdomen; sampled for analysis. No gross evidence of injury or perforation of colon, anterior stomach, gallbladder. Small intestine run retrograde from ileocecal valve to ligament of Treitz without overt ischemic changes or necrosis.    INDICATIONS AND DESCRIPTION OF THE PROCEDURE   This is a 82-year-old male who presented with a bowel obstruction and a CT showing internal hernia. Emergency surgery was recommended after a full discussion of the risks, benefits, and alternatives. The patient elected to proceed and was taken to the operating room where he is placed in the supine position. Gen. anesthesia was induced. The patient's operative site was prepped and draped in the usual sterile fashion. Appropriate perioperative antibiotics were administered. A supraumbilical Dorantes port was placed after anesthetizing the skin and the fascia and placing 0 Vicryl stay sutures at each end of the incision. We then placed 2 5 mm ports in the left abdomen. The abdomen was explored laparoscopic repair. There was turbid green-brown fluid in the abdomen which was sampled for analysis. We began by inspecting the abdominal cavity. The anterior stomach and the gallbladder appeared healthy and not perforated. The right colon appeared healthy without any evidence of perforation or injury. We then ran the bowel in a retrograde fashion  from the terminal ileum and encountered a thick omental band which was crossing compressing the mid small bowel against the posterior abdominal cavity. We lysed this sharply with cautery. This area was the transition point and proximal this we then encountered dilated bowel. This was run to the ligament of Treitz and had no injury or ischemic changes or necrosis. We examined the remainder of the colon and it also appeared healthy without perforation or injury. We then copiously lavaged the abdomen until returns were clear followed by desufflation with removal of all ports. The fascia of the Dorantes port site was repaired using figure-of-eight sutures of 0 Vicryl. The skin was closed using 4-0 Vicryl subcuticular sutures. Dressings were applied. This terminated the procedure. The patient appeared to tolerate this well without complications. All sponge and instrument counts were correct times two at the end of the procedure.    JIMMY KATZ MD    This note was created using voice recognition software. Undetected word substitutions or other errors may have occurred.

## 2017-09-21 ENCOUNTER — THERAPY VISIT (OUTPATIENT)
Dept: PHYSICAL THERAPY | Facility: CLINIC | Age: 82
End: 2017-09-21
Payer: MEDICARE

## 2017-09-21 DIAGNOSIS — M48.062 SPINAL STENOSIS OF LUMBAR REGION WITH NEUROGENIC CLAUDICATION: Primary | ICD-10-CM

## 2017-09-21 PROCEDURE — 97110 THERAPEUTIC EXERCISES: CPT | Mod: GP | Performed by: PHYSICAL THERAPIST

## 2017-09-21 PROCEDURE — 97112 NEUROMUSCULAR REEDUCATION: CPT | Mod: GP | Performed by: PHYSICAL THERAPIST

## 2017-09-21 PROCEDURE — 97161 PT EVAL LOW COMPLEX 20 MIN: CPT | Mod: GP | Performed by: PHYSICAL THERAPIST

## 2017-09-21 PROCEDURE — G8981 BODY POS CURRENT STATUS: HCPCS | Mod: GP | Performed by: PHYSICAL THERAPIST

## 2017-09-21 PROCEDURE — G8982 BODY POS GOAL STATUS: HCPCS | Mod: GP | Performed by: PHYSICAL THERAPIST

## 2017-09-21 NOTE — LETTER
DEPARTMENT OF HEALTH AND HUMAN SERVICES  [facility] MEDICA[facility]    PLAN/UPDATED PLAN OF PROGRESS FOR OUTPATIENT REHABILITATION    PATIENTS NAME:  Cresencio Peguero   : 1935    PROVIDER NUMBER:    [id number]    [id number]     PROVIDER NAME: [facility] [place]    MEDICAL RECORD NUMBER: [id number]     START OF CARE DATE:  SOC Date: 17   TYPE:  PT    PRIMARY/TREATMENT DIAGNOSIS: (Pertinent Medical Diagnosis)  Spinal stenosis of lumbar region with neurogenic claudication    VISITS FROM START OF CARE:  Rxs Used: 1     [place] [facility] Initial Evaluation    Subjective:  Patient is a 82 year old male presenting with rehab back hpi.   Cresencio Peguero is a 82 year old male with a lumbar (bilateral low back and posterior leg pain) condition.  This is a chronic condition which the patient was seen for at [facility] on 2017. Patient has chief complaint of bilateral low back and lower leg pain that started over 40 years ago after a barn fell on him and symptoms have progressively worsened over the years. He now has cramps in posterior legs and severe neuropathy especially at night. He comes to PT today with referral from [facility] to do physical therapy as a prerequisite to qualifying for a sacral neurostimulator. He has had physical therapy off and on in the past years, with little benefit and many times increased back pain from exercises.He has intermittent anterior left hip pain at night. 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 He had a left hip injection 3 months ago and lumbar injection one year ago, with no relief. He was taken off Lipitor and put on an antidepressant medication last week. He likes to golf, hunt and fish and belongs to [facility] [employer]. All of these activities are very limited due to back and leg pain.    Patient reports pain:  Lumbar spine left and lumbar spine  right.  Radiates to:  Thigh left, thigh right, lower leg right, lower leg left, foot right and foot left.  Pain is described as aching, cramping and shooting and is constant and reported as 4/10 and 6/10.  Associated symptoms:  Loss of motion/stiffness and loss of strength.   Symptoms are exacerbated by sitting, standing, lifting, certain positions and stress and relieved by analgesics.  Since onset symptoms are gradually worsening.    Previous treatment includes physical therapy and surgery.  There was no improvement following previous treatment.  General health as reported by patient is fair.                Patient is a 82 year old male presenting with rehab left ankle/foot hpi.   Pertinent medical history includes:  Diabetes and smoking.    Other surgeries include:  Orthopedic surgery and heart surgery (Bi Pass, Back, Neck, Shoulder).  Current medications:  Pain medication and anti-depressants.  Current occupation is retired.      Red flags:  Numbness in perianal region, pain at rest/night and significant weakness      PATIENTS NAME:  Cresencio Peguero   : 1935      Objective:  Flexibility/Screens:   Lower Extremity:  Decreased left lower extremity flexibility:Adductors; Hip Flexors; Quadriceps; Hamstrings and Gastroc  Decreased right lower extremity flexibility:  Adductors; Hip Flexors; Quadriceps; Hamstrings and Gastroc      Lumbar/SI Evaluation  ROM:    AROM Lumbar:   Flexion:            Mid calf  Ext:                    10%   Side Bend:        Left:  40%    Right:  40%  Rotation:           Left:  50%    Right:  50%  Side Glide:        Left:     Right:     Lumbar Myotomes:    T12-L3 (Hip Flex):  Left: 5-    Right: 5  L2-4 (Quads):  Left:  4    Right:  5  L4 (Ankle DF):  Left:  2    Right:  5  S1 (Toe Raise):  Left: 2+    Right: 5    Neural Tension/Mobility:  Neural tension wnl lumbar: left sciatic nerve tightness.    Left side:SLR or SLR w/DF  negative.   Right side:   SLR w/DF or SLR  negative.      Lumbar Palpation:  not assessed    Lumbar Provocation:    Left negative with:  PROM hip  Right negative with:  PROM hip      Assessment/Plan:    Patient is a 82 year old male with lumbar complaints.    Patient has the following significant findings with corresponding treatment plan.                Diagnosis 1:  Lumbar stenosis  Pain -  education and home program  Decreased ROM/flexibility - manual therapy, therapeutic exercise and home program  Decreased strength - therapeutic exercise, therapeutic activities and home program  Decreased function - therapeutic activities and home program                    PATIENTS NAME:  Cresencio Peguero   : 1935        Therapy Evaluation Codes:   1) History comprised of:   Personal factors that impact the plan of care:      None.    Comorbidity factors that impact the plan of care are:      None.     Medications impacting care: None.  2) Examination of Body Systems comprised of:   Body structures and functions that impact the plan of care:      Lumbar spine.   Activity limitations that impact the plan of care are:      Sitting, Standing and Walking.  3) Clinical presentation characteristics are:   Stable/Uncomplicated.  4) Decision-Making    Low complexity using standardized patient assessment instrument and/or measureable assessment of functional outcome.  Cumulative Therapy Evaluation is: Low complexity.    Previous and current functional limitations:  (See Goal Flow Sheet for this information)    Short term and Long term goals: (See Goal Flow Sheet for this information)     Communication ability:  Patient appears to be able to clearly communicate and understand verbal and written communication and follow directions correctly.  Treatment Explanation - The following has been discussed with the patient:   RX ordered/plan of care  Anticipated outcomes  Possible risks and side effects  This patient would benefit from PT intervention to resume normal activities.   Rehab  "potential is questionable.    Frequency:  2 X week, once daily  Duration:  for 4 weeks  Discharge Plan:  Achieve all LTG.  Independent in home treatment program.  Reach maximal therapeutic benefit.                            PATIENTS NAME:  Cresencio Peguero   : 1935      Caregiver Signature/Credentials _____________________________ Date ________       Treating Provider: Génesis Cordova,PT   I have reviewed and certified the need for these services and plan of treatment while under my care.        PHYSICIAN'S SIGNATURE:   _________________________________________  Date___________   Kaylan Sanchez    Certification period:  Beginning of Cert date period: 17 to  End of Cert period date: 17     Functional Level Progress Report: Please see attached \"Goal Flow sheet for Functional level.\"    ____X____ Continue Services or       ________ DC Services                Service dates: From  SOC Date: 17 date to present                         "

## 2017-09-21 NOTE — PROGRESS NOTES
[facility] [facility] Initial Evaluation    Subjective:    Patient is a 82 year old male presenting with rehab back hpi.   Cresencio Peguero is a 82 year old male with a lumbar (bilateral low back and posterior leg pain) condition.      This is a chronic condition  Patient has chief complaint of bilateral low back and lower leg pain that started over 40 years ago after a barn fell on him and symptoms have progressively worsened over the years. He now has cramps in posterior legs and severe neuropathy especially at night. He comes to PT today with referral from [facility] to do physical therapy as a prerequisite to qualifying for a sacral neurostimulator. He has had physical therapy off and on in the past years, with little benefit and many times increased back pain from exercises.He has intermittent anterior left hip pain at night. 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 He had a left hip injection 3 months ago and lumbar injection one year ago, with no relief. He was taken off Lipitor and put on an antidepressant medication last week. He likes to golf, hunt and fish and belongs to [employer]. All of these activities are very limited due to back and leg pain.    Patient reports pain:  Lumbar spine left and lumbar spine right.  Radiates to:  Thigh left, thigh right, lower leg right, lower leg left, foot right and foot left.  Pain is described as aching, cramping and shooting and is constant and reported as 4/10 and 6/10.  Associated symptoms:  Loss of motion/stiffness and loss of strength.   Symptoms are exacerbated by sitting, standing, lifting, certain positions and stress and relieved by analgesics.  Since onset symptoms are gradually worsening.    Previous treatment includes physical therapy and surgery.  There was no improvement following previous treatment.  General health as reported by patient is fair.                                               Objective:          Flexibility/Screens:       Lower Extremity:  Decreased left lower extremity flexibility:Adductors; Hip Flexors; Quadriceps; Hamstrings and Gastroc    Decreased right lower extremity flexibility:  Adductors; Hip Flexors; Quadriceps; Hamstrings and Gastroc               Lumbar/SI Evaluation  ROM:    AROM Lumbar:   Flexion:            Mid calf  Ext:                    10%   Side Bend:        Left:  40%    Right:  40%  Rotation:           Left:  50%    Right:  50%  Side Glide:        Left:     Right:           Lumbar Myotomes:    T12-L3 (Hip Flex):  Left: 5-    Right: 5  L2-4 (Quads):  Left:  4    Right:  5  L4 (Ankle DF):  Left:  2    Right:  5    S1 (Toe Raise):  Left: 2+    Right: 5        Neural Tension/Mobility:  Neural tension wnl lumbar: left sciatic nerve tightness.      Left side:SLR or SLR w/DF  negative.     Right side:   SLR w/DF or SLR  negative.   Lumbar Palpation:  not assessed        Lumbar Provocation:      Left negative with:  PROM hip    Right negative with:  PROM hip                                                 General     ROS    Assessment/Plan:      Patient is a 82 year old male with lumbar complaints.    Patient has the following significant findings with corresponding treatment plan.                Diagnosis 1:  Lumbar stenosis  Pain -  education and home program  Decreased ROM/flexibility - manual therapy, therapeutic exercise and home program  Decreased strength - therapeutic exercise, therapeutic activities and home program  Decreased function - therapeutic activities and home program    Therapy Evaluation Codes:   1) History comprised of:   Personal factors that impact the plan of care:      None.    Comorbidity factors that impact the plan of care are:      None.     Medications impacting care: None.  2) Examination of Body Systems comprised of:   Body structures and functions that impact the plan of care:      Lumbar spine.   Activity limitations that impact the  plan of care are:      Sitting, Standing and Walking.  3) Clinical presentation characteristics are:   Stable/Uncomplicated.  4) Decision-Making    Low complexity using standardized patient assessment instrument and/or measureable assessment of functional outcome.  Cumulative Therapy Evaluation is: Low complexity.    Previous and current functional limitations:  (See Goal Flow Sheet for this information)    Short term and Long term goals: (See Goal Flow Sheet for this information)     Communication ability:  Patient appears to be able to clearly communicate and understand verbal and written communication and follow directions correctly.  Treatment Explanation - The following has been discussed with the patient:   RX ordered/plan of care  Anticipated outcomes  Possible risks and side effects  This patient would benefit from PT intervention to resume normal activities.   Rehab potential is questionable.    Frequency:  2 X week, once daily  Duration:  for 4 weeks  Discharge Plan:  Achieve all LTG.  Independent in home treatment program.  Reach maximal therapeutic benefit.    Please refer to the daily flowsheet for treatment today, total treatment time and time spent performing 1:1 timed codes.

## 2017-12-13 PROBLEM — M48.062 SPINAL STENOSIS OF LUMBAR REGION WITH NEUROGENIC CLAUDICATION: Status: RESOLVED | Noted: 2017-09-21 | Resolved: 2017-12-13

## 2020-07-21 DIAGNOSIS — Z11.59 ENCOUNTER FOR SCREENING FOR OTHER VIRAL DISEASES: Primary | ICD-10-CM

## 2020-08-10 ENCOUNTER — HOSPITAL ENCOUNTER (OUTPATIENT)
Dept: LAB | Facility: CLINIC | Age: 85
Discharge: HOME | DRG: 483 | End: 2020-08-10
Attending: ORTHOPAEDIC SURGERY | Admitting: ORTHOPAEDIC SURGERY
Payer: MEDICARE

## 2020-08-10 DIAGNOSIS — Z11.59 ENCOUNTER FOR SCREENING FOR OTHER VIRAL DISEASES: ICD-10-CM

## 2020-08-10 PROCEDURE — U0003 INFECTIOUS AGENT DETECTION BY NUCLEIC ACID (DNA OR RNA); SEVERE ACUTE RESPIRATORY SYNDROME CORONAVIRUS 2 (SARS-COV-2) (CORONAVIRUS DISEASE [COVID-19]), AMPLIFIED PROBE TECHNIQUE, MAKING USE OF HIGH THROUGHPUT TECHNOLOGIES AS DESCRIBED BY CMS-2020-01-R: HCPCS | Performed by: ORTHOPAEDIC SURGERY

## 2020-08-11 LAB
SARS-COV-2 RNA SPEC QL NAA+PROBE: NOT DETECTED
SPECIMEN SOURCE: NORMAL

## 2020-08-11 RX ORDER — IBUPROFEN 200 MG
200-400 TABLET ORAL DAILY PRN
Status: ON HOLD | COMMUNITY
End: 2020-08-13

## 2020-08-11 RX ORDER — SERTRALINE HYDROCHLORIDE 100 MG/1
100 TABLET, FILM COATED ORAL EVERY MORNING
COMMUNITY

## 2020-08-11 NOTE — PROGRESS NOTES
PTA medications updated by Medication Scribe prior to surgery via phone call with patient      -LAST DOSES ENTERED BY NURSE-    Comments:    Medication history sources: Patient, Surescripts, H&P and Patient's home med list  Medication history source reliability: Good  Adherence assessment: N/A Not Observed    Significant changes made to the medication list:  None      Additional medication history information:   None        Prior to Admission medications    Medication Sig Last Dose Taking? Auth Provider   acetaminophen (TYLENOL) 325 MG tablet Take 325-650 mg by mouth every evening   at PM Yes Unknown, Entered By History   atorvastatin (LIPITOR) 10 MG tablet Take 10 mg by mouth every morning   at AM Yes Unknown, Entered By History   gabapentin (NEURONTIN) 100 MG capsule Take 100 mg by mouth 3 times daily   Yes Unknown, Entered By History   ibuprofen (ADVIL/MOTRIN) 200 MG tablet Take 200-400 mg by mouth daily as needed for mild pain more than 1 week AGO at PRN Yes Reported, Patient   metFORMIN (GLUCOPHAGE) 850 MG tablet Take 850 mg by mouth daily (with breakfast)   at AM Yes Unknown, Entered By History   polyethylene glycol (MIRALAX/GLYCOLAX) powder Take 17 g by mouth daily as needed for constipation (constipation)  at PRN Yes Christelle Bear PA-C   sertraline (ZOLOFT) 100 MG tablet Take 100 mg by mouth every morning  at AM Yes Reported, Patient

## 2020-08-12 ENCOUNTER — ANESTHESIA (OUTPATIENT)
Dept: SURGERY | Facility: CLINIC | Age: 85
DRG: 483 | End: 2020-08-12
Payer: MEDICARE

## 2020-08-12 ENCOUNTER — APPOINTMENT (OUTPATIENT)
Dept: GENERAL RADIOLOGY | Facility: CLINIC | Age: 85
DRG: 483 | End: 2020-08-12
Attending: ORTHOPAEDIC SURGERY
Payer: MEDICARE

## 2020-08-12 ENCOUNTER — HOSPITAL ENCOUNTER (INPATIENT)
Facility: CLINIC | Age: 85
LOS: 2 days | Discharge: HOME | DRG: 483 | End: 2020-08-14
Attending: ORTHOPAEDIC SURGERY | Admitting: ORTHOPAEDIC SURGERY
Payer: MEDICARE

## 2020-08-12 ENCOUNTER — ANESTHESIA EVENT (OUTPATIENT)
Dept: SURGERY | Facility: CLINIC | Age: 85
DRG: 483 | End: 2020-08-12
Payer: MEDICARE

## 2020-08-12 DIAGNOSIS — M75.101 ROTATOR CUFF TEAR ARTHROPATHY, RIGHT: Primary | ICD-10-CM

## 2020-08-12 DIAGNOSIS — M12.811 ROTATOR CUFF TEAR ARTHROPATHY, RIGHT: Primary | ICD-10-CM

## 2020-08-12 LAB
GLUCOSE BLDC GLUCOMTR-MCNC: 139 MG/DL (ref 70–99)
GLUCOSE BLDC GLUCOMTR-MCNC: 144 MG/DL (ref 70–99)
GLUCOSE BLDC GLUCOMTR-MCNC: 164 MG/DL (ref 70–99)
GLUCOSE SERPL-MCNC: 114 MG/DL (ref 70–99)
POTASSIUM SERPL-SCNC: 4.1 MMOL/L (ref 3.4–5.3)

## 2020-08-12 PROCEDURE — 25800030 ZZH RX IP 258 OP 636: Performed by: ANESTHESIOLOGY

## 2020-08-12 PROCEDURE — 40000170 ZZH STATISTIC PRE-PROCEDURE ASSESSMENT II: Performed by: ORTHOPAEDIC SURGERY

## 2020-08-12 PROCEDURE — 37000009 ZZH ANESTHESIA TECHNICAL FEE, EACH ADDTL 15 MIN: Performed by: ORTHOPAEDIC SURGERY

## 2020-08-12 PROCEDURE — 40000985 XR SHOULDER RT PORT G/E 2 VW: Mod: RT

## 2020-08-12 PROCEDURE — 71000013 ZZH RECOVERY PHASE 1 LEVEL 1 EA ADDTL HR: Performed by: ORTHOPAEDIC SURGERY

## 2020-08-12 PROCEDURE — 84132 ASSAY OF SERUM POTASSIUM: CPT | Performed by: ANESTHESIOLOGY

## 2020-08-12 PROCEDURE — 25800025 ZZH RX 258: Performed by: ORTHOPAEDIC SURGERY

## 2020-08-12 PROCEDURE — 25000125 ZZHC RX 250: Performed by: NURSE ANESTHETIST, CERTIFIED REGISTERED

## 2020-08-12 PROCEDURE — 25000125 ZZHC RX 250: Performed by: ORTHOPAEDIC SURGERY

## 2020-08-12 PROCEDURE — 37000008 ZZH ANESTHESIA TECHNICAL FEE, 1ST 30 MIN: Performed by: ORTHOPAEDIC SURGERY

## 2020-08-12 PROCEDURE — 25000566 ZZH SEVOFLURANE, EA 15 MIN: Performed by: ORTHOPAEDIC SURGERY

## 2020-08-12 PROCEDURE — 0RRJ00Z REPLACEMENT OF RIGHT SHOULDER JOINT WITH REVERSE BALL AND SOCKET SYNTHETIC SUBSTITUTE, OPEN APPROACH: ICD-10-PCS | Performed by: ORTHOPAEDIC SURGERY

## 2020-08-12 PROCEDURE — 99222 1ST HOSP IP/OBS MODERATE 55: CPT | Performed by: PHYSICIAN ASSISTANT

## 2020-08-12 PROCEDURE — 00000146 ZZHCL STATISTIC GLUCOSE BY METER IP

## 2020-08-12 PROCEDURE — 25000132 ZZH RX MED GY IP 250 OP 250 PS 637: Mod: GY | Performed by: ORTHOPAEDIC SURGERY

## 2020-08-12 PROCEDURE — 25000131 ZZH RX MED GY IP 250 OP 636 PS 637: Mod: GY | Performed by: PHYSICIAN ASSISTANT

## 2020-08-12 PROCEDURE — 82947 ASSAY GLUCOSE BLOOD QUANT: CPT | Performed by: ANESTHESIOLOGY

## 2020-08-12 PROCEDURE — C1776 JOINT DEVICE (IMPLANTABLE): HCPCS | Performed by: ORTHOPAEDIC SURGERY

## 2020-08-12 PROCEDURE — 27110028 ZZH OR GENERAL SUPPLY NON-STERILE: Performed by: ORTHOPAEDIC SURGERY

## 2020-08-12 PROCEDURE — C1713 ANCHOR/SCREW BN/BN,TIS/BN: HCPCS | Performed by: ORTHOPAEDIC SURGERY

## 2020-08-12 PROCEDURE — 12000000 ZZH R&B MED SURG/OB

## 2020-08-12 PROCEDURE — 71000012 ZZH RECOVERY PHASE 1 LEVEL 1 FIRST HR: Performed by: ORTHOPAEDIC SURGERY

## 2020-08-12 PROCEDURE — 99207 ZZC CONSULT E&M CHANGED TO INITIAL LEVEL: CPT | Performed by: PHYSICIAN ASSISTANT

## 2020-08-12 PROCEDURE — 27210794 ZZH OR GENERAL SUPPLY STERILE: Performed by: ORTHOPAEDIC SURGERY

## 2020-08-12 PROCEDURE — 25000128 H RX IP 250 OP 636: Performed by: ANESTHESIOLOGY

## 2020-08-12 PROCEDURE — 36000063 ZZH SURGERY LEVEL 4 EA 15 ADDTL MIN: Performed by: ORTHOPAEDIC SURGERY

## 2020-08-12 PROCEDURE — 25000128 H RX IP 250 OP 636: Performed by: ORTHOPAEDIC SURGERY

## 2020-08-12 PROCEDURE — 36000093 ZZH SURGERY LEVEL 4 1ST 30 MIN: Performed by: ORTHOPAEDIC SURGERY

## 2020-08-12 PROCEDURE — 25800030 ZZH RX IP 258 OP 636: Performed by: NURSE ANESTHETIST, CERTIFIED REGISTERED

## 2020-08-12 PROCEDURE — L3670 SO ACRO/CLAV CAN WEB PRE OTS: HCPCS

## 2020-08-12 PROCEDURE — 25000128 H RX IP 250 OP 636: Performed by: NURSE ANESTHETIST, CERTIFIED REGISTERED

## 2020-08-12 PROCEDURE — 36415 COLL VENOUS BLD VENIPUNCTURE: CPT | Performed by: ANESTHESIOLOGY

## 2020-08-12 PROCEDURE — 25000125 ZZHC RX 250: Performed by: ANESTHESIOLOGY

## 2020-08-12 PROCEDURE — 25800030 ZZH RX IP 258 OP 636: Performed by: ORTHOPAEDIC SURGERY

## 2020-08-12 PROCEDURE — 25000128 H RX IP 250 OP 636: Performed by: PHYSICIAN ASSISTANT

## 2020-08-12 DEVICE — IMPLANTABLE DEVICE
Type: IMPLANTABLE DEVICE | Site: SHOULDER | Status: FUNCTIONAL
Brand: AEQUALIS REVERSED

## 2020-08-12 DEVICE — IMPLANTABLE DEVICE
Type: IMPLANTABLE DEVICE | Site: SHOULDER | Status: FUNCTIONAL
Brand: FLEX SHOULDER SYSTEM

## 2020-08-12 DEVICE — IMP SCR FIXATION 4.5X35MM AQLS VDV235: Type: IMPLANTABLE DEVICE | Site: SHOULDER | Status: FUNCTIONAL

## 2020-08-12 DEVICE — IMP SCR LOCKING 4.5X20MM AQLS DWD020: Type: IMPLANTABLE DEVICE | Site: SHOULDER | Status: FUNCTIONAL

## 2020-08-12 DEVICE — IMP SCR LOCKING 4.5X35MM AQLS DWD035: Type: IMPLANTABLE DEVICE | Site: SHOULDER | Status: FUNCTIONAL

## 2020-08-12 DEVICE — IMPLANTABLE DEVICE
Type: IMPLANTABLE DEVICE | Site: SHOULDER | Status: FUNCTIONAL
Brand: AEQUALIS™ ASCEND™ FLEX

## 2020-08-12 DEVICE — IMPLANTABLE DEVICE
Type: IMPLANTABLE DEVICE | Site: SHOULDER | Status: FUNCTIONAL
Brand: AEQUALIS REVERSED II

## 2020-08-12 RX ORDER — LIDOCAINE HYDROCHLORIDE 20 MG/ML
INJECTION, SOLUTION INFILTRATION; PERINEURAL PRN
Status: DISCONTINUED | OUTPATIENT
Start: 2020-08-12 | End: 2020-08-12

## 2020-08-12 RX ORDER — HYDROMORPHONE HYDROCHLORIDE 1 MG/ML
.3-.5 INJECTION, SOLUTION INTRAMUSCULAR; INTRAVENOUS; SUBCUTANEOUS
Status: DISCONTINUED | OUTPATIENT
Start: 2020-08-12 | End: 2020-08-14 | Stop reason: HOSPADM

## 2020-08-12 RX ORDER — HYDROXYZINE HYDROCHLORIDE 10 MG/1
10 TABLET, FILM COATED ORAL EVERY 6 HOURS PRN
Status: DISCONTINUED | OUTPATIENT
Start: 2020-08-12 | End: 2020-08-14 | Stop reason: HOSPADM

## 2020-08-12 RX ORDER — MAGNESIUM HYDROXIDE 1200 MG/15ML
LIQUID ORAL PRN
Status: DISCONTINUED | OUTPATIENT
Start: 2020-08-12 | End: 2020-08-12 | Stop reason: HOSPADM

## 2020-08-12 RX ORDER — GLYCOPYRROLATE 0.2 MG/ML
INJECTION, SOLUTION INTRAMUSCULAR; INTRAVENOUS PRN
Status: DISCONTINUED | OUTPATIENT
Start: 2020-08-12 | End: 2020-08-12

## 2020-08-12 RX ORDER — ONDANSETRON 2 MG/ML
4 INJECTION INTRAMUSCULAR; INTRAVENOUS EVERY 30 MIN PRN
Status: DISCONTINUED | OUTPATIENT
Start: 2020-08-12 | End: 2020-08-12 | Stop reason: HOSPADM

## 2020-08-12 RX ORDER — AMOXICILLIN 250 MG
2 CAPSULE ORAL 2 TIMES DAILY
Status: DISCONTINUED | OUTPATIENT
Start: 2020-08-12 | End: 2020-08-14 | Stop reason: HOSPADM

## 2020-08-12 RX ORDER — SODIUM CHLORIDE, SODIUM LACTATE, POTASSIUM CHLORIDE, CALCIUM CHLORIDE 600; 310; 30; 20 MG/100ML; MG/100ML; MG/100ML; MG/100ML
INJECTION, SOLUTION INTRAVENOUS CONTINUOUS
Status: DISCONTINUED | OUTPATIENT
Start: 2020-08-12 | End: 2020-08-14 | Stop reason: HOSPADM

## 2020-08-12 RX ORDER — FENTANYL CITRATE 50 UG/ML
25-50 INJECTION, SOLUTION INTRAMUSCULAR; INTRAVENOUS
Status: DISCONTINUED | OUTPATIENT
Start: 2020-08-12 | End: 2020-08-12 | Stop reason: HOSPADM

## 2020-08-12 RX ORDER — ACETAMINOPHEN 325 MG/1
975 TABLET ORAL EVERY 8 HOURS
Status: DISCONTINUED | OUTPATIENT
Start: 2020-08-12 | End: 2020-08-14 | Stop reason: HOSPADM

## 2020-08-12 RX ORDER — NALOXONE HYDROCHLORIDE 0.4 MG/ML
.1-.4 INJECTION, SOLUTION INTRAMUSCULAR; INTRAVENOUS; SUBCUTANEOUS
Status: DISCONTINUED | OUTPATIENT
Start: 2020-08-12 | End: 2020-08-14 | Stop reason: HOSPADM

## 2020-08-12 RX ORDER — ONDANSETRON 2 MG/ML
INJECTION INTRAMUSCULAR; INTRAVENOUS PRN
Status: DISCONTINUED | OUTPATIENT
Start: 2020-08-12 | End: 2020-08-12

## 2020-08-12 RX ORDER — SERTRALINE HYDROCHLORIDE 100 MG/1
100 TABLET, FILM COATED ORAL EVERY MORNING
Status: DISCONTINUED | OUTPATIENT
Start: 2020-08-13 | End: 2020-08-14 | Stop reason: HOSPADM

## 2020-08-12 RX ORDER — SODIUM CHLORIDE, SODIUM LACTATE, POTASSIUM CHLORIDE, CALCIUM CHLORIDE 600; 310; 30; 20 MG/100ML; MG/100ML; MG/100ML; MG/100ML
INJECTION, SOLUTION INTRAVENOUS CONTINUOUS
Status: DISCONTINUED | OUTPATIENT
Start: 2020-08-12 | End: 2020-08-12 | Stop reason: HOSPADM

## 2020-08-12 RX ORDER — ACETAMINOPHEN 325 MG/1
325-650 TABLET ORAL EVERY EVENING
Status: DISCONTINUED | OUTPATIENT
Start: 2020-08-15 | End: 2020-08-14 | Stop reason: HOSPADM

## 2020-08-12 RX ORDER — HYDROMORPHONE HYDROCHLORIDE 1 MG/ML
.3-.5 INJECTION, SOLUTION INTRAMUSCULAR; INTRAVENOUS; SUBCUTANEOUS EVERY 5 MIN PRN
Status: DISCONTINUED | OUTPATIENT
Start: 2020-08-12 | End: 2020-08-12 | Stop reason: HOSPADM

## 2020-08-12 RX ORDER — FENTANYL CITRATE 50 UG/ML
INJECTION, SOLUTION INTRAMUSCULAR; INTRAVENOUS PRN
Status: DISCONTINUED | OUTPATIENT
Start: 2020-08-12 | End: 2020-08-12

## 2020-08-12 RX ORDER — CEFAZOLIN SODIUM 2 G/100ML
2 INJECTION, SOLUTION INTRAVENOUS
Status: COMPLETED | OUTPATIENT
Start: 2020-08-12 | End: 2020-08-12

## 2020-08-12 RX ORDER — AMOXICILLIN 250 MG
1 CAPSULE ORAL 2 TIMES DAILY
Status: DISCONTINUED | OUTPATIENT
Start: 2020-08-12 | End: 2020-08-14 | Stop reason: HOSPADM

## 2020-08-12 RX ORDER — GABAPENTIN 100 MG/1
100 CAPSULE ORAL 3 TIMES DAILY
Status: DISCONTINUED | OUTPATIENT
Start: 2020-08-12 | End: 2020-08-14 | Stop reason: HOSPADM

## 2020-08-12 RX ORDER — DEXTROSE MONOHYDRATE 25 G/50ML
25-50 INJECTION, SOLUTION INTRAVENOUS
Status: DISCONTINUED | OUTPATIENT
Start: 2020-08-12 | End: 2020-08-14 | Stop reason: HOSPADM

## 2020-08-12 RX ORDER — HYDROMORPHONE HYDROCHLORIDE 2 MG/1
2 TABLET ORAL EVERY 4 HOURS PRN
Status: DISCONTINUED | OUTPATIENT
Start: 2020-08-12 | End: 2020-08-14 | Stop reason: HOSPADM

## 2020-08-12 RX ORDER — PROPOFOL 10 MG/ML
INJECTION, EMULSION INTRAVENOUS PRN
Status: DISCONTINUED | OUTPATIENT
Start: 2020-08-12 | End: 2020-08-12

## 2020-08-12 RX ORDER — CEFAZOLIN SODIUM 1 G/3ML
1 INJECTION, POWDER, FOR SOLUTION INTRAMUSCULAR; INTRAVENOUS SEE ADMIN INSTRUCTIONS
Status: DISCONTINUED | OUTPATIENT
Start: 2020-08-12 | End: 2020-08-12 | Stop reason: HOSPADM

## 2020-08-12 RX ORDER — POLYETHYLENE GLYCOL 3350 17 G/17G
17 POWDER, FOR SOLUTION ORAL DAILY PRN
Status: DISCONTINUED | OUTPATIENT
Start: 2020-08-12 | End: 2020-08-14 | Stop reason: HOSPADM

## 2020-08-12 RX ORDER — EPHEDRINE SULFATE 50 MG/ML
INJECTION, SOLUTION INTRAMUSCULAR; INTRAVENOUS; SUBCUTANEOUS PRN
Status: DISCONTINUED | OUTPATIENT
Start: 2020-08-12 | End: 2020-08-12

## 2020-08-12 RX ORDER — ONDANSETRON 4 MG/1
4 TABLET, ORALLY DISINTEGRATING ORAL EVERY 6 HOURS PRN
Status: DISCONTINUED | OUTPATIENT
Start: 2020-08-12 | End: 2020-08-14 | Stop reason: HOSPADM

## 2020-08-12 RX ORDER — CEFAZOLIN SODIUM 2 G/100ML
2 INJECTION, SOLUTION INTRAVENOUS EVERY 8 HOURS
Status: COMPLETED | OUTPATIENT
Start: 2020-08-12 | End: 2020-08-13

## 2020-08-12 RX ORDER — ONDANSETRON 4 MG/1
4 TABLET, ORALLY DISINTEGRATING ORAL EVERY 30 MIN PRN
Status: DISCONTINUED | OUTPATIENT
Start: 2020-08-12 | End: 2020-08-12 | Stop reason: HOSPADM

## 2020-08-12 RX ORDER — ATORVASTATIN CALCIUM 10 MG/1
10 TABLET, FILM COATED ORAL EVERY MORNING
Status: DISCONTINUED | OUTPATIENT
Start: 2020-08-13 | End: 2020-08-14 | Stop reason: HOSPADM

## 2020-08-12 RX ORDER — ONDANSETRON 2 MG/ML
4 INJECTION INTRAMUSCULAR; INTRAVENOUS EVERY 6 HOURS PRN
Status: DISCONTINUED | OUTPATIENT
Start: 2020-08-12 | End: 2020-08-14 | Stop reason: HOSPADM

## 2020-08-12 RX ORDER — NALOXONE HYDROCHLORIDE 0.4 MG/ML
.1-.4 INJECTION, SOLUTION INTRAMUSCULAR; INTRAVENOUS; SUBCUTANEOUS
Status: CANCELLED | OUTPATIENT
Start: 2020-08-12 | End: 2020-08-13

## 2020-08-12 RX ORDER — LIDOCAINE 40 MG/G
CREAM TOPICAL
Status: DISCONTINUED | OUTPATIENT
Start: 2020-08-12 | End: 2020-08-14 | Stop reason: HOSPADM

## 2020-08-12 RX ORDER — NICOTINE POLACRILEX 4 MG
15-30 LOZENGE BUCCAL
Status: DISCONTINUED | OUTPATIENT
Start: 2020-08-12 | End: 2020-08-14 | Stop reason: HOSPADM

## 2020-08-12 RX ORDER — ACETAMINOPHEN 325 MG/1
650 TABLET ORAL EVERY 4 HOURS PRN
Status: DISCONTINUED | OUTPATIENT
Start: 2020-08-15 | End: 2020-08-14 | Stop reason: HOSPADM

## 2020-08-12 RX ADMIN — GABAPENTIN 100 MG: 100 CAPSULE ORAL at 21:29

## 2020-08-12 RX ADMIN — FENTANYL CITRATE 100 MCG: 50 INJECTION, SOLUTION INTRAMUSCULAR; INTRAVENOUS at 12:46

## 2020-08-12 RX ADMIN — FENTANYL CITRATE 50 MCG: 50 INJECTION, SOLUTION INTRAMUSCULAR; INTRAVENOUS at 15:47

## 2020-08-12 RX ADMIN — Medication 10 MG: at 13:28

## 2020-08-12 RX ADMIN — HYDROMORPHONE HYDROCHLORIDE 0.5 MG: 1 INJECTION, SOLUTION INTRAMUSCULAR; INTRAVENOUS; SUBCUTANEOUS at 13:10

## 2020-08-12 RX ADMIN — SODIUM CHLORIDE, POTASSIUM CHLORIDE, SODIUM LACTATE AND CALCIUM CHLORIDE 75 ML/HR: 600; 310; 30; 20 INJECTION, SOLUTION INTRAVENOUS at 18:42

## 2020-08-12 RX ADMIN — FENTANYL CITRATE 50 MCG: 50 INJECTION, SOLUTION INTRAMUSCULAR; INTRAVENOUS at 15:13

## 2020-08-12 RX ADMIN — ROCURONIUM BROMIDE 50 MG: 10 INJECTION INTRAVENOUS at 12:46

## 2020-08-12 RX ADMIN — SODIUM CHLORIDE, POTASSIUM CHLORIDE, SODIUM LACTATE AND CALCIUM CHLORIDE: 600; 310; 30; 20 INJECTION, SOLUTION INTRAVENOUS at 11:50

## 2020-08-12 RX ADMIN — ACETAMINOPHEN 975 MG: 325 TABLET ORAL at 21:28

## 2020-08-12 RX ADMIN — LIDOCAINE HYDROCHLORIDE 3 ML: 10 INJECTION, SOLUTION EPIDURAL; INFILTRATION; INTRACAUDAL; PERINEURAL at 11:49

## 2020-08-12 RX ADMIN — GLYCOPYRROLATE 0.2 MG: 0.2 INJECTION, SOLUTION INTRAMUSCULAR; INTRAVENOUS at 13:30

## 2020-08-12 RX ADMIN — SODIUM CHLORIDE, POTASSIUM CHLORIDE, SODIUM LACTATE AND CALCIUM CHLORIDE: 600; 310; 30; 20 INJECTION, SOLUTION INTRAVENOUS at 13:31

## 2020-08-12 RX ADMIN — GLYCOPYRROLATE 0.2 MG: 0.2 INJECTION, SOLUTION INTRAMUSCULAR; INTRAVENOUS at 13:25

## 2020-08-12 RX ADMIN — PROPOFOL 110 MG: 10 INJECTION, EMULSION INTRAVENOUS at 12:46

## 2020-08-12 RX ADMIN — INSULIN ASPART 1 UNITS: 100 INJECTION, SOLUTION INTRAVENOUS; SUBCUTANEOUS at 19:29

## 2020-08-12 RX ADMIN — HYDROMORPHONE HYDROCHLORIDE 0.5 MG: 1 INJECTION, SOLUTION INTRAMUSCULAR; INTRAVENOUS; SUBCUTANEOUS at 23:24

## 2020-08-12 RX ADMIN — LIDOCAINE HYDROCHLORIDE 100 MG: 20 INJECTION, SOLUTION INFILTRATION; PERINEURAL at 12:46

## 2020-08-12 RX ADMIN — DOCUSATE SODIUM 50 MG AND SENNOSIDES 8.6 MG 1 TABLET: 8.6; 5 TABLET, FILM COATED ORAL at 21:29

## 2020-08-12 RX ADMIN — CEFAZOLIN SODIUM 2 G: 2 INJECTION, SOLUTION INTRAVENOUS at 21:28

## 2020-08-12 RX ADMIN — HYDROMORPHONE HYDROCHLORIDE 0.5 MG: 1 INJECTION, SOLUTION INTRAMUSCULAR; INTRAVENOUS; SUBCUTANEOUS at 14:55

## 2020-08-12 RX ADMIN — DEXMEDETOMIDINE HYDROCHLORIDE 12 MCG: 100 INJECTION, SOLUTION INTRAVENOUS at 14:53

## 2020-08-12 RX ADMIN — ONDANSETRON 4 MG: 2 INJECTION INTRAMUSCULAR; INTRAVENOUS at 14:34

## 2020-08-12 RX ADMIN — SODIUM CHLORIDE, POTASSIUM CHLORIDE, SODIUM LACTATE AND CALCIUM CHLORIDE: 600; 310; 30; 20 INJECTION, SOLUTION INTRAVENOUS at 12:41

## 2020-08-12 RX ADMIN — CEFAZOLIN SODIUM 2 G: 2 INJECTION, SOLUTION INTRAVENOUS at 13:00

## 2020-08-12 RX ADMIN — ROCURONIUM BROMIDE 10 MG: 10 INJECTION INTRAVENOUS at 13:44

## 2020-08-12 RX ADMIN — HYDROMORPHONE HYDROCHLORIDE 2 MG: 2 TABLET ORAL at 21:48

## 2020-08-12 RX ADMIN — HYDROMORPHONE HYDROCHLORIDE 0.5 MG: 1 INJECTION, SOLUTION INTRAMUSCULAR; INTRAVENOUS; SUBCUTANEOUS at 18:43

## 2020-08-12 ASSESSMENT — ACTIVITIES OF DAILY LIVING (ADL)
RETIRED_COMMUNICATION: 0-->UNDERSTANDS/COMMUNICATES WITHOUT DIFFICULTY
ADLS_ACUITY_SCORE: 19
FALL_HISTORY_WITHIN_LAST_SIX_MONTHS: YES
TOILETING: 1-->ASSISTIVE EQUIPMENT
WHICH_OF_THE_ABOVE_FUNCTIONAL_RISKS_HAD_A_RECENT_ONSET_OR_CHANGE?: AMBULATION;TRANSFERRING
COGNITION: 0 - NO COGNITION ISSUES REPORTED
BATHING: 1-->ASSISTIVE EQUIPMENT
DRESS: 1-->ASSISTIVE EQUIPMENT
NUMBER_OF_TIMES_PATIENT_HAS_FALLEN_WITHIN_LAST_SIX_MONTHS: 1
RETIRED_EATING: 1-->ASSISTIVE EQUIPMENT
SWALLOWING: 0-->SWALLOWS FOODS/LIQUIDS WITHOUT DIFFICULTY
AMBULATION: 1-->ASSISTIVE EQUIPMENT
TRANSFERRING: 1-->ASSISTIVE EQUIPMENT

## 2020-08-12 ASSESSMENT — MIFFLIN-ST. JEOR: SCORE: 1566.25

## 2020-08-12 ASSESSMENT — LIFESTYLE VARIABLES: TOBACCO_USE: 1

## 2020-08-12 NOTE — OP NOTE
PREOPERATIVE DIAGNOSIS:  Right rotator cuff tear arthropathy, advanced, s/p open rotator cuff repair in remote past.     POSTOPERATIVE DIAGNOSIS:  Right rotator cuff tear arthropathy, advanced, s/p open rotator cuff repair in remote past.     PROCEDURE:   1.  Right reverse total shoulder arthroplasty  2.  Right long head biceps tenodesis     SURGEON:  Adarsh Mello MD      ASSISTANT: Raul Franco PA-C      ANESTHESIA:  General endotracheal.      ESTIMATED BLOOD LOSS:  150 mL.      FLUID REPLACEMENT:  1500 mL crystalloid.      COMPLICATIONS:  No immediate complications.      INDICATIONS:  Please see preoperative clinical notes.     EXAMINATION OF RIGHT SHOULDER UNDER ANESTHESIA FINDINGS:  Passive range of motion 125/70/60/60/40.      COMPONENTS UTILIZED:   1.  Tornier Ascend Flex humeral component, size 6B, secured in anatomic retroversion with cementless fixation.   2.  A +0 Standard tray, with low offset with maximal offset directed posterior and inferior.  3.  A 9 mm all polyethylene humeral insert.   4.  Standard 29 mm baseplate.   5.  A 42 mm standard glenosphere component.      SCREW CONFIGURATION:   1.  Superior locking screw, 20 mm.   2.  Inferior locking screw, 35 mm.   3.  Anterior compression screw, 35 mm, directed slightly posterior and superior with excellent purchase.  4.  Posterior compression screw, 35 mm, directed medial with excellent purchase.     DESCRIPTION OF PROCEDURE:  The patient was identified in the preoperative holding area and taken to room #31 of the main OR.  Monitors were placed per the Anesthesia Service.  After smooth IV induction, general anesthesia was introduced and his endotracheal tube secured.  He was given 2 gm Ancef IV.  He was then repositioned in the modified beach chair position with the head and neck secured in neutral position and all peripheral extremities thoroughly padded with foam.  The right upper extremity was then widely prepped and draped in the usual  sterile fashion.  SCOUT hose and pneumo boots were in place and operational during the procedure.      The surgical team took timeout to confirm the correct patient, correct site marking, correct equipment and implants, correct images were available, and that he had been administered IV antibiotic therapy.      An oblique skin incision was centered over the anterior right shoulder and utilizing his prior incision proximally, skin flaps elevated, cephalic vein identified, retracted laterally with the deltoid, the pectoralis and conjoined tendons swept medially in successive layers without excessive tension on the musculocutaneous nerve.  Subscapularis was then tenotomized en bloc with the anterior capsule and elevated medially, while protecting the medial neurovascular bundle and the axillary nerve.      A chronic posterosuperior rotator cuff tear was noted.  The shoulder was then gently dislocated, marginal osteophytes removed with osteotomes and rongeurs.  There was diffuse grade III and IV chondromalacia on the humeral head, and diffuse grade IV changes on the glenoid.  The long head biceps was tenotomized and the intra-articular portion excised.     Anatomic humeral neck cut was performed.  All of the prior suture material was removed.  There was no reactivity around them.  Reaming and broaching was undertaken up to a size 6B trial, which had excellent fit with a standard tray.  Humeral trials were then removed and the glenoid circumferentially exposed.  A complete labrectomy/capsulectomy was performed to allow full visualization and exposure.  A central starting point for reaming was undertaken to flatten the glenoid to a cortical cancellous interface to accept the baseplate.  Peripheral bone was removed with a high speed bur, as well as thorough irrigation and removal of all bony debris.  An opening drill then utilized, the baseplate impacted with excellent press-fit, backed up with superior and inferior locking  screws, as well as anterior and posterior compression screws, obtaining excellent fixation of the construct.  The trunnion was pulse-lavaged and suctioned dry, and the actual glenosphere implant then impacted and found to be very stable and was accepted and backed up with a central locking screw.  Excellent fixation noted.      We then repeated trialing on the humeral side, and a 9 mm insert initially had appropriate soft tissue tension without rocking or instability was accepted.  The humeral trial was removed.  Copious lavage was then repeated.  After thorough lavage, we then impacted the actual implant to appropriate press-fit with excellent security.  The trunnion was pulse-lavaged and suctioned dry.  The actual tray was then impacted and found to be very stable, and the polyethylene insert then impacted appropriately and found to be very stable.  The shoulder was then reduced with excellent soft tissue tension without rocking or instability and was accepted throughout range of motion.  Copious lavage was then repeated.  Sharps and sponge counts were correct.  The arm was placed in a 30/30 position.  The anterior capsule and subscapularis were repaired back to the lateral soft tissues with several figure-of-eight #2 Ethibond sutures, incorporating the long head biceps for soft tissue tenodesis.  The proximal portion of the subscapularis was oversewn to the leading edge of the coracoacromial ligament to completely cover the implant.  The axillary nerve was palpated and found to be intact.  Copious lavage was then repeated.  Sharps and sponge counts were correct.  The deltopectoral interval was closed with #2 Ethibond suture over a medium Hemovac drain, brought out through clean anterolateral skin.  The deltoid and pectoralis musculature were infiltrated with a total of 30 mL 0.5% ropivacaine plain and 30 mg toradol.  The skin was closed in layers with 3-0 Monocryl and 3-0 Prolene running subcuticular.   Steri-Strips and a bulky sterile dressing were applied.  He was placed in an EZ wrap device and UltraSling in neutral position, was then reversed, extubated and taken back to the recovery room in stable condition.  There were no immediate complications and he appeared to tolerate the procedure well.      A skilled first assistant was necessary for this procedure for assistance with patient positioning, prepping, draping, surgical visualization, implantation of prosthesis, wound closure, dressing and sling application.      PQRI MEASURES:   1.  The patient was given 2 grams of Ancef IV within 1 hour prior to skin incision.  IV antibiotic therapy was discontinued 24 hours postoperatively.   2.  Deep venous thrombosis prophylaxis with Xarelto x 7 days due to his aspirin intolerance.  3.  UltraSling x 6 weeks.   4.  Standard phase 1 reverse total shoulder arthroplasty protocol for physical therapy.   5.  He should have a true AP and outlet radiograph of the right shoulder and return to office in 10-14 days for suture removal.

## 2020-08-12 NOTE — CONSULTS
[facility]  Consult Note - Hospitalist Service     Date of Admission:  8/12/2020  Consult Requested by: Dr. Mello  Reason for Consult: medical management    Assessment & Plan   Cresencio Peguero is a 85 year old male with a past medical history of CAD s/p CABG, hyperlipidemia, diabetes mellitus type II, and right rotator cuff tear arthropathy with remote hx repair who underwent previously scheduled right reverse total shoulder arthroplasty on 8/12/2020. Hospitalist service was consulted for medical management.    Right rotator cuff arthropathy s/p reverse total arthroplasty on 8/12/2020  Surgery was performed using general anesthesia, . No immediate complications.  - Postoperative management per primary service  - Pt has been started on rivaroxaban postop for DVT prophylaxis    Diabetes Mellitus Type II, non-insulin dependent  Hgb A1c 5.9% 7/22/20. PTA regimen consists of metformin 850mg daily.   - Resume PTA metformin  - Medium ssi  - Accuchecks per protocol    CAD s/p CABGx2 (2003)  Hyperlipidemia  Chronic, stable. Not on ASA due to intolerance.  - Continue PTA atorvastatin  - Would recommend re-trial of ASA or starting alternative antiplatelet agent in ambulatory setting    Depression  - PTA Zoloft    Tobacco abuse, ongoing  Smokes 2-3 pipes per day.    The patient's care was discussed with the Attending Physician, Dr. Berumen, Bedside Nurse and Patient.    Jimy Chamberlain PA-C  [facility]    ______________________________________________________________________    History of Present Illness   Cresencio Peguero is a 85 year old male with a past medical history of CAD s/p CABG, hyperlipidemia, diabetes mellitus type II, and right rotator cuff tear arthropathy with remote hx repair who underwent previously scheduled right reverse total shoulder arthroplasty on 8/12/2020. Surgery was performed using general anesthesia, . No immeidate postoperative complications.  Hospitalist service was consulted for medical management.    Patient is presently evaluated in hospital room. Reports pain is tolerable presently. Denies numbness or tingling to hand. No cp, sob. No acute concerns.    Review of Systems   The 10 point Review of Systems is negative other than noted in the HPI or here. No cp, sob, no numbness/tingling to right hand.    Past Medical History    I have reviewed this patient's medical history and updated it with pertinent information if needed.   Past Medical History:   Diagnosis Date     Anxiety      Arthritis      CAD (coronary artery disease)     HX of stent,  CABG x2     Diabetes (H)      Hearing loss      Hyperlipidemia      Hypertension      Neuropathy      Sensory ataxia      Stented coronary artery        Past Surgical History   I have reviewed this patient's surgical history and updated it with pertinent information if needed.  Past Surgical History:   Procedure Laterality Date     BACK SURGERY      cervical laminectomy     BACK SURGERY      lumbar laminectomy     CARDIAC SURGERY      Stent,  CABGx2     EYE SURGERY      cataract     EYE SURGERY      corneal surgery     HERNIORRHAPHY INGUINAL Right 8/15/2016    Procedure: HERNIORRHAPHY INGUINAL;  Surgeon: Wu Adam MD;  Location: [facility] OR     LAPAROSCOPIC LYSIS ADHESIONS N/A 7/7/2017    Procedure: LAPAROSCOPIC LYSIS ADHESIONS;;  Surgeon: Sumeet Joiner MD;  Location:  OR     LAPAROSCOPY DIAGNOSTIC (GENERAL) N/A 7/7/2017    Procedure: LAPAROSCOPY DIAGNOSTIC (GENERAL);  DIAGNOSTIC LAPAROSCOPY WITH LYSIS OF ADHESIONS ;  Surgeon: Sumeet Joiner MD;  Location:  OR     ORTHOPEDIC SURGERY      rotator cuff repair,shoulder arthroplasty     TONSILLECTOMY         Social History   I have reviewed this patient's social history and updated it with pertinent information if needed.  Social History     Tobacco Use     Smoking status: Current Every Day Smoker     Types: Pipe   Substance Use Topics      Alcohol use: Yes     Alcohol/week: 0.0 standard drinks     Comment: 7 glasses wine per week     Drug use: None       Family History   Father with ETOH/drug abuse, CA (unknown primary), Mother with heart disease, hypertension.     Medications   Medications Prior to Admission   Medication Sig Dispense Refill Last Dose     acetaminophen (TYLENOL) 325 MG tablet Take 325-650 mg by mouth every evening    8/10/2020 at PM     atorvastatin (LIPITOR) 10 MG tablet Take 10 mg by mouth every morning    8/12/2020 at 0900     gabapentin (NEURONTIN) 100 MG capsule Take 100 mg by mouth 3 times daily    8/12/2020 at 0900     ibuprofen (ADVIL/MOTRIN) 200 MG tablet Take 200-400 mg by mouth daily as needed for mild pain   more than 1 week AGO at PRN     metFORMIN (GLUCOPHAGE) 850 MG tablet Take 850 mg by mouth daily (with breakfast)    8/10/2020 at AM     polyethylene glycol (MIRALAX/GLYCOLAX) powder Take 17 g by mouth daily as needed for constipation (constipation) 119 g 0  at PRN     sertraline (ZOLOFT) 100 MG tablet Take 100 mg by mouth every morning   8/12/2020 at 0900       Allergies   Allergies   Allergen Reactions     Percodan [Oxycodone-Aspirin] Rash       Physical Exam   Vital Signs: Temp: 98.1  F (36.7  C) Temp src: Oral BP: 120/56 Pulse: 64 Heart Rate: 63 Resp: 22 SpO2: 94 % O2 Device: None (Room air) Oxygen Delivery: 2 LPM  Weight: 188 lbs 4.8 oz    GEN: well-developed, well-nourished, appears comfortable  HEENT: NCAT, EOM intact bilaterally, sclera clear, conjunctiva normal, nose & mouth patent, mucous membranes moist  CHEST: lungs CTA bilaterally, no increased work of breathing, no wheeze, crackles, rhonchi  HEART: RRR, S1 & S2, no murmur  ABD: soft, nontender, nondistended, no guarding or rigidity, +BS in all 4 quadrants  MSK: right shoulder in immobilizer, spontaneous ROM of hand; AROM of LUE and BLE  SKIN: warm & dry without rash, no pedal edema    Data   Results for orders placed or performed during the hospital  encounter of 08/12/20 (from the past 24 hour(s))   Glucose   Result Value Ref Range    Glucose 114 (H) 70 - 99 mg/dL   Potassium   Result Value Ref Range    Potassium 4.1 3.4 - 5.3 mmol/L   Glucose by meter   Result Value Ref Range    Glucose 139 (H) 70 - 99 mg/dL   XR Shoulder Right Port G/E 2 Views    Narrative    XR SHOULDER RT PORT G/E 2 VW 8/12/2020 4:27 PM     HISTORY: EVAL ALIGNMENT    COMPARISON: None.      Impression    IMPRESSION: Reversed total shoulder arthroplasty. Postoperative drain  is present. Components are well seated. Thoracic stimulator device.     DWAYNE HENNING MD

## 2020-08-12 NOTE — OR NURSING
PNDS Criteria met.  Dr Bonilla here to assess Mr. Peguero; order received to transfer to [facility] for additional recovery.  Post-op X-ray completed.  Hand-off report given to RN.  To 5 15-2 per cart with all belongings.  Will transport with Capnography monitoring.  Wife, Lamar notified of transfer at [phone].

## 2020-08-12 NOTE — ANESTHESIA POSTPROCEDURE EVALUATION
Patient: Cresencio Peguero    Procedure(s):  RIGHT REVERSE TOTAL SHOULDER ARTHROPLASTY    Diagnosis:Arthritis of right shoulder region [M19.011]  Diagnosis Additional Information: No value filed.    Anesthesia Type:  General    Note:  Anesthesia Post Evaluation    Patient location during evaluation: PACU  Patient participation: Able to fully participate in evaluation  Level of consciousness: awake and alert  Pain management: adequate  Airway patency: patent  Cardiovascular status: acceptable  Respiratory status: acceptable  Hydration status: acceptable  PONV: none     Anesthetic complications: None          Last vitals:  Vitals:    08/12/20 1119   BP: (!) 179/82   Pulse: 62   Resp: 14   Temp: 36.8  C (98.2  F)   SpO2: 99%         Electronically Signed By: Jose Naik MD  August 12, 2020  3:13 PM

## 2020-08-12 NOTE — PROGRESS NOTES
S. We received an order for a right shoulder Ultrasling III at the main OR.    O/goal. To reduce motion and help with post-op support    A. At this time, I have provided the main OR with a size large Pratik Duke Ultrasling III.    P. Staffing have been instructed to contact our facility with any future questions and/or concerns.     Hipolito AMBRIZ

## 2020-08-12 NOTE — ANESTHESIA CARE TRANSFER NOTE
Patient: Cresencio Peguero    Procedure(s):  RIGHT REVERSE TOTAL SHOULDER ARTHROPLASTY    Diagnosis: Arthritis of right shoulder region [M19.011]  Diagnosis Additional Information: No value filed.    Anesthesia Type:   General     Note:  Airway :Face Mask  Patient transferred to:PACU  Comments: Neuromuscular blockade reversed after TOF 4/4, spontaneous respirations, adequate tidal volumes, followed commands to voice.  Oxygen via facemask at 4 liters per minute to PACU. After extubation, patient remained in OR for 14 minutes until transport to PACU due to standard hospital COVID-19 precautions, Oxygen tubing connected to wall O2 in PACU, SpO2, NiBP, and EKG monitors and alarms on and functioning, Anival Hugger warmer connected to patient gown, report on patient's clinical status given to PACU RN.   Handoff Report: Identifed the Patient, Identified the Reponsible Provider, Reviewed the pertinent medical history, Discussed the surgical course, Reviewed Intra-OP anesthesia mangement and issues during anesthesia, Set expectations for post-procedure period and Allowed opportunity for questions and acknowledgement of understanding      Vitals: (Last set prior to Anesthesia Care Transfer)    CRNA VITALS  8/12/2020 1422 - 8/12/2020 1522      8/12/2020             Pulse:  75    SpO2:  98 %    Resp Rate (observed):  10                Electronically Signed By: MAKAYLA Hough CRNA  August 12, 2020  3:37 PM

## 2020-08-12 NOTE — PLAN OF CARE
VSS on RA, UE bilat CMS intact, LE bilat baseline neuropathy, ice chips/sips of water, bowel sounds returning, due to void.

## 2020-08-13 ENCOUNTER — APPOINTMENT (OUTPATIENT)
Dept: PHYSICAL THERAPY | Facility: CLINIC | Age: 85
DRG: 483 | End: 2020-08-13
Attending: ORTHOPAEDIC SURGERY
Payer: MEDICARE

## 2020-08-13 ENCOUNTER — APPOINTMENT (OUTPATIENT)
Dept: OCCUPATIONAL THERAPY | Facility: CLINIC | Age: 85
DRG: 483 | End: 2020-08-13
Attending: ORTHOPAEDIC SURGERY
Payer: MEDICARE

## 2020-08-13 LAB
CREAT SERPL-MCNC: 1.07 MG/DL (ref 0.66–1.25)
GFR SERPL CREATININE-BSD FRML MDRD: 62 ML/MIN/{1.73_M2}
GLUCOSE BLDC GLUCOMTR-MCNC: 121 MG/DL (ref 70–99)
GLUCOSE BLDC GLUCOMTR-MCNC: 125 MG/DL (ref 70–99)
GLUCOSE BLDC GLUCOMTR-MCNC: 133 MG/DL (ref 70–99)
GLUCOSE BLDC GLUCOMTR-MCNC: 149 MG/DL (ref 70–99)
GLUCOSE BLDC GLUCOMTR-MCNC: 152 MG/DL (ref 70–99)
GLUCOSE SERPL-MCNC: 139 MG/DL (ref 70–99)
HGB BLD-MCNC: 10.3 G/DL (ref 13.3–17.7)

## 2020-08-13 PROCEDURE — 82947 ASSAY GLUCOSE BLOOD QUANT: CPT | Performed by: ORTHOPAEDIC SURGERY

## 2020-08-13 PROCEDURE — 97535 SELF CARE MNGMENT TRAINING: CPT | Mod: GO | Performed by: OCCUPATIONAL THERAPIST

## 2020-08-13 PROCEDURE — 99232 SBSQ HOSP IP/OBS MODERATE 35: CPT | Performed by: INTERNAL MEDICINE

## 2020-08-13 PROCEDURE — 36415 COLL VENOUS BLD VENIPUNCTURE: CPT | Performed by: ORTHOPAEDIC SURGERY

## 2020-08-13 PROCEDURE — 97165 OT EVAL LOW COMPLEX 30 MIN: CPT | Mod: GO | Performed by: OCCUPATIONAL THERAPIST

## 2020-08-13 PROCEDURE — 25000132 ZZH RX MED GY IP 250 OP 250 PS 637: Mod: GY | Performed by: PHYSICIAN ASSISTANT

## 2020-08-13 PROCEDURE — 82565 ASSAY OF CREATININE: CPT | Performed by: ORTHOPAEDIC SURGERY

## 2020-08-13 PROCEDURE — 97530 THERAPEUTIC ACTIVITIES: CPT | Mod: GO | Performed by: OCCUPATIONAL THERAPIST

## 2020-08-13 PROCEDURE — 25000132 ZZH RX MED GY IP 250 OP 250 PS 637: Mod: GY | Performed by: ORTHOPAEDIC SURGERY

## 2020-08-13 PROCEDURE — 25000128 H RX IP 250 OP 636: Performed by: ORTHOPAEDIC SURGERY

## 2020-08-13 PROCEDURE — 97530 THERAPEUTIC ACTIVITIES: CPT | Mod: GP

## 2020-08-13 PROCEDURE — 97161 PT EVAL LOW COMPLEX 20 MIN: CPT | Mod: GP

## 2020-08-13 PROCEDURE — 00000146 ZZHCL STATISTIC GLUCOSE BY METER IP

## 2020-08-13 PROCEDURE — 25800030 ZZH RX IP 258 OP 636: Performed by: INTERNAL MEDICINE

## 2020-08-13 PROCEDURE — 85018 HEMOGLOBIN: CPT | Performed by: ORTHOPAEDIC SURGERY

## 2020-08-13 PROCEDURE — 12000000 ZZH R&B MED SURG/OB

## 2020-08-13 PROCEDURE — 97116 GAIT TRAINING THERAPY: CPT | Mod: GP

## 2020-08-13 RX ORDER — HYDROMORPHONE HYDROCHLORIDE 2 MG/1
2-4 TABLET ORAL EVERY 4 HOURS PRN
Qty: 40 TABLET | Refills: 0 | Status: SHIPPED | OUTPATIENT
Start: 2020-08-13 | End: 2020-08-14

## 2020-08-13 RX ORDER — TRAMADOL HYDROCHLORIDE 50 MG/1
50-100 TABLET ORAL EVERY 6 HOURS PRN
Status: DISCONTINUED | OUTPATIENT
Start: 2020-08-13 | End: 2020-08-14 | Stop reason: HOSPADM

## 2020-08-13 RX ORDER — HYDROXYZINE HYDROCHLORIDE 10 MG/1
10-20 TABLET, FILM COATED ORAL EVERY 6 HOURS PRN
Qty: 30 TABLET | Refills: 0 | Status: ON HOLD | OUTPATIENT
Start: 2020-08-13 | End: 2021-06-29

## 2020-08-13 RX ORDER — AMOXICILLIN 250 MG
1 CAPSULE ORAL 2 TIMES DAILY
Qty: 30 TABLET | Refills: 0 | Status: ON HOLD | OUTPATIENT
Start: 2020-08-13 | End: 2021-06-29

## 2020-08-13 RX ADMIN — GABAPENTIN 100 MG: 100 CAPSULE ORAL at 21:22

## 2020-08-13 RX ADMIN — DOCUSATE SODIUM 50 MG AND SENNOSIDES 8.6 MG 1 TABLET: 8.6; 5 TABLET, FILM COATED ORAL at 21:22

## 2020-08-13 RX ADMIN — HYDROMORPHONE HYDROCHLORIDE 2 MG: 2 TABLET ORAL at 13:15

## 2020-08-13 RX ADMIN — ATORVASTATIN CALCIUM 10 MG: 10 TABLET, FILM COATED ORAL at 09:27

## 2020-08-13 RX ADMIN — INSULIN ASPART 1 UNITS: 100 INJECTION, SOLUTION INTRAVENOUS; SUBCUTANEOUS at 17:32

## 2020-08-13 RX ADMIN — ACETAMINOPHEN 975 MG: 325 TABLET ORAL at 14:43

## 2020-08-13 RX ADMIN — DOCUSATE SODIUM 50 MG AND SENNOSIDES 8.6 MG 1 TABLET: 8.6; 5 TABLET, FILM COATED ORAL at 09:25

## 2020-08-13 RX ADMIN — SERTRALINE HYDROCHLORIDE 100 MG: 100 TABLET ORAL at 09:25

## 2020-08-13 RX ADMIN — ACETAMINOPHEN 975 MG: 325 TABLET ORAL at 21:22

## 2020-08-13 RX ADMIN — RIVAROXABAN 10 MG: 10 TABLET, FILM COATED ORAL at 09:25

## 2020-08-13 RX ADMIN — CEFAZOLIN SODIUM 2 G: 2 INJECTION, SOLUTION INTRAVENOUS at 05:42

## 2020-08-13 RX ADMIN — HYDROMORPHONE HYDROCHLORIDE 2 MG: 2 TABLET ORAL at 04:30

## 2020-08-13 RX ADMIN — METFORMIN HYDROCHLORIDE 850 MG: 850 TABLET, FILM COATED ORAL at 09:24

## 2020-08-13 RX ADMIN — GABAPENTIN 100 MG: 100 CAPSULE ORAL at 09:24

## 2020-08-13 RX ADMIN — SODIUM CHLORIDE 500 ML: 9 INJECTION, SOLUTION INTRAVENOUS at 06:44

## 2020-08-13 RX ADMIN — ACETAMINOPHEN 975 MG: 325 TABLET ORAL at 05:42

## 2020-08-13 RX ADMIN — GABAPENTIN 100 MG: 100 CAPSULE ORAL at 17:32

## 2020-08-13 RX ADMIN — TRAMADOL HYDROCHLORIDE 50 MG: 50 TABLET, FILM COATED ORAL at 17:37

## 2020-08-13 RX ADMIN — HYDROXYZINE HYDROCHLORIDE 10 MG: 10 TABLET, FILM COATED ORAL at 05:42

## 2020-08-13 ASSESSMENT — ACTIVITIES OF DAILY LIVING (ADL)
ADLS_ACUITY_SCORE: 18
ADLS_ACUITY_SCORE: 21
ADLS_ACUITY_SCORE: 21
ADLS_ACUITY_SCORE: 19
ADLS_ACUITY_SCORE: 18
ADLS_ACUITY_SCORE: 21

## 2020-08-13 NOTE — PLAN OF CARE
Patient plan: Home when medically stable   Current status:     PT eval complete, treatment indicated. Edu PT role, POC. Pt forgetful during session, OT following as well. Pt maintained NWB to RUE during session. Supine > sit with SBA. STS x 3 with no assistive device and CGA progressing to SBA. Pt continues to demonstrate fatigue this PM. Pt ambulated 150' with SEC in LUE, cues for proper use as pt tends to drag cane. Unsteady with gait and turns. Stairs up/down 6 steps with unilateral HR, close CGA, educated on step to pattern. No overt LOB.       Anticipated status at discharge:     IND bed mobility  Mod I with SEC community distances, IND household distances    SBA stairs        Patient states this was addressed.

## 2020-08-13 NOTE — PLAN OF CARE
Pt A&Ox4, VSS on RA ex. hypertension. Pt stood at the bedside this shift, up w/1 and gait belt. DTV, bladder scanned for 143 mL at 0400. CMS intact except for baseline neuropathy in BLE, R shoulder dressing CDI. Hemovac removed at 0600, 400 mL serosanguinous drainage. Pain managed with PRN oral Dilaudid x2 and PRN IV Dilaudid x1.  at 2200 and 133 at 0200. Will continue to follow plan of care.

## 2020-08-13 NOTE — PROGRESS NOTES
08/13/20 1511   Quick Adds   Type of Visit Initial PT Evaluation   Living Environment   Lives With spouse   Living Arrangements house   Transportation Anticipated car, drives self;family or friend will provide   Living Environment Comment Lives in multi level home    Self-Care   Usual Activity Tolerance moderate   Current Activity Tolerance fair   Activity/Exercise/Self-Care Comment Patient is IND with household distances, mod I with SEC community    Functional Level Prior   Ambulation 1-->assistive equipment   Transferring 1-->assistive equipment   Toileting 1-->assistive equipment   Fall history within last six months yes   Number of times patient has fallen within last six months 1   Prior Functional Level Comment IND household SEC community distancers   General Information   Onset of Illness/Injury or Date of Surgery - Date 08/12/20   Referring Physician Adarsh Mello MD    Pertinent History of Current Problem (include personal factors and/or comorbidities that impact the POC) Cresencio Peguero is a 85 year old male who was admitted on 8/12/2020.  Hospitalist service was consulted for medical management   Precautions/Limitations fall precautions   Weight-Bearing Status - RUE nonweight-bearing   Cognitive Status Examination   Cognitive Comment Forgetful, see OT notes. Meds have made pt tired today    Posture    Posture Forward head position   Range of Motion (ROM)   ROM Comment BLE WFL    Strength   Strength Comments BLE > 3/5 strength based on functional mobility    Bed Mobility   Bed Mobility Comments Supine > sit with SBA   Transfer Skills   Transfer Comments STS with no AD CGA    Gait   Gait Comments Pt ambulates with SEC and CGA, tends to drag cane, unsteady    Balance   Balance Comments Fair - poor dynamic balance    General Therapy Interventions   Planned Therapy Interventions balance training;bed mobility training;gait training;strengthening;transfer training   Clinical Impression   Criteria for  "Skilled Therapeutic Intervention yes, treatment indicated   PT Diagnosis impaired high level balance and activity tolerance, impaired functional strength    Influenced by the following impairments medical condition, balance, activity tolerance   Functional limitations due to impairments see above   Clinical Presentation Stable/Uncomplicated   Clinical Presentation Rationale clinical judgement   Clinical Decision Making (Complexity) Low complexity   Therapy Frequency Daily   Predicted Duration of Therapy Intervention (days/wks) 3 days   Anticipated Discharge Disposition Home with Assist   Risk & Benefits of therapy have been explained Yes   Patient, Family & other staff in agreement with plan of care Yes   [facility] AM-PAC  \"6 Clicks\" V.2 Basic Mobility Inpatient Short Form   1. Turning from your back to your side while in a flat bed without using bedrails? 4 - None   2. Moving from lying on your back to sitting on the side of a flat bed without using bedrails? 4 - None   3. Moving to and from a bed to a chair (including a wheelchair)? 4 - None   4. Standing up from a chair using your arms (e.g., wheelchair, or bedside chair)? 3 - A Little   5. To walk in hospital room? 3 - A Little   6. Climbing 3-5 steps with a railing? 3 - A Little   Basic Mobility Raw Score (Score out of 24.Lower scores equate to lower levels of function) 21   Total Evaluation Time   Total Evaluation Time (Minutes) 15     "

## 2020-08-13 NOTE — PLAN OF CARE
Pt. A&o, forgetful at times, vss, up with one assist, voiding small amount per urinal, pt. Only voided 125 cc and scanned for 230 ml at 1600,  HUONG Keith notified, PA stated to not straight cat. Pt. Unless pt. Is uncomfortable, pt. Stated he has no discomfort,  dressing CDI, taking tramadol for pain, Plan: discharge to home tomorrow. Will continue to monitor.

## 2020-08-13 NOTE — PROGRESS NOTES
[facility]    Hospitalist Progress Note    Assessment & Plan   Cresencio Peguero is a 85 year old male who was admitted on 8/12/2020.  Hospitalist service was consulted for medical management.    Right rotator cuff arthropathy s/p reverse total arthroplasty on 8/12/2020  Surgery was performed using general anesthesia, . No immediate complications.  - Postoperative management per primary service  - Pt has been started on rivaroxaban postop for DVT prophylaxis     Diabetes Mellitus Type II, non-insulin dependent  Hgb A1c 5.9% 7/22/20. PTA regimen consists of metformin 850mg daily.   - Continue metformin and sliding scale, blood sugar seems to be controlled.     CAD s/p CABGx2 (2003)  Hyperlipidemia  Chronic, stable. Not on ASA due to intolerance.  - Continue PTA atorvastatin  - Would recommend re-trial of ASA or starting alternative antiplatelet agent in ambulatory setting     Depression  - PTA Zoloft     Tobacco abuse, ongoing  Smokes 2-3 pipes per day.  Will need further education regarding tobacco use disorder    DVT Prophylaxis: Xarelto  Code Status: Full Code     Disposition: Expected discharge as per orthopedic surgery.    Jennifer Alcocer MD  Text Page   (7am to 6pm)    Interval History   When I visited with patient he was sleeping, he wakes up and mentions that he is not feeling well, he does not feel that he would be safe to go home today, and he wants to go home later today if possible.  Denies any nausea, his right shoulder.    -Data reviewed today: I reviewed all new labs and imaging results over the last 24 hours.   Physical Exam   Temp: 98.7  F (37.1  C) Temp src: Oral BP: (!) 149/68 Pulse: 64 Heart Rate: 98 Resp: 16 SpO2: 94 % O2 Device: None (Room air) Oxygen Delivery: 2 LPM  Vitals:    08/12/20 1119   Weight: 85.4 kg (188 lb 4.8 oz)     Vital Signs with Ranges  Temp:  [97.1  F (36.2  C)-98.7  F (37.1  C)] 98.7  F (37.1  C)  Pulse:  [62-74] 64  Heart Rate:  [62-98] 98  Resp:   [8-27] 16  BP: (104-162)/(48-85) 149/68  SpO2:  [91 %-99 %] 94 %  I/O last 3 completed shifts:  In: 1800 [I.V.:1800]  Out: 405 [Drains:405]    Constitutional: Awake, alert, cooperative, no apparent distress  Respiratory: Clear to auscultation bilaterally, no crackles or wheezing  Cardiovascular: Regular rate and rhythm, normal S1 and S2, and no murmur noted  GI: Normal bowel sounds, soft, non-distended, non-tender  Skin/Integumen: Right shoulder status post surgery, on sling  Neuro : moving all 4 extremities, no focal deficit noted     Medications     lactated ringers 75 mL/hr (08/12/20 1842)       [START ON 8/15/2020] acetaminophen  325-650 mg Oral QPM     acetaminophen  975 mg Oral Q8H     atorvastatin  10 mg Oral QAM     gabapentin  100 mg Oral TID     insulin aspart  1-7 Units Subcutaneous TID AC     insulin aspart  1-5 Units Subcutaneous At Bedtime     metFORMIN  850 mg Oral Daily with breakfast     rivaroxaban ANTICOAGULANT  10 mg Oral Daily     Dr. Mello Joint Cocktail   INTRA-ARTICULAR Once     senna-docusate  1 tablet Oral BID    Or     senna-docusate  2 tablet Oral BID     sertraline  100 mg Oral QAM     sodium chloride (PF)  3 mL Intracatheter Q8H       Data   Recent Labs   Lab 08/13/20  0634 08/12/20  1053   HGB 10.3*  --    POTASSIUM  --  4.1   CR 1.07  --    * 114*     Recent Labs   Lab 08/13/20  1242 08/13/20  0758 08/13/20  0634 08/13/20  0203 08/12/20  2208 08/12/20  1912  08/12/20  1053   GLC  --   --  139*  --   --   --   --  114*   * 121*  --  133* 164* 144*   < >  --     < > = values in this interval not displayed.       Imaging:   Recent Results (from the past 24 hour(s))   XR Shoulder Right Port G/E 2 Views    Narrative    XR SHOULDER RT PORT G/E 2 VW 8/12/2020 4:27 PM     HISTORY: EVAL ALIGNMENT    COMPARISON: None.      Impression    IMPRESSION: Reversed total shoulder arthroplasty. Postoperative drain  is present. Components are well seated. Thoracic stimulator device.      DWAYNE HENNING MD

## 2020-08-13 NOTE — PROGRESS NOTES
Pt was groggy this am and not given anything for pain till afternoon and given 2mg diladid and became confused with it. Voided 75 ml. PA stated he should not be cathed until he was hurting and he stated he was fine. Pt recommended he stay.

## 2020-08-13 NOTE — PROGRESS NOTES
ORTHO  VITALS STABLE  RADIOGRAPHS GOOD ALIGNMENT  IV ABX  PLAN XARELTO X 7 DAYS DUE TO ASA ALLERGY  DISCHARGE PLANNING

## 2020-08-13 NOTE — PROGRESS NOTES
08/13/20 0826   Quick Adds   Type of Visit Initial Occupational Therapy Evaluation   Living Environment   Lives With spouse   Living Arrangements house   Transportation Anticipated car, drives self;family or friend will provide   Living Environment Comment Lives in multi-level home; has both walk-in shower and tub   Self-Care   Usual Activity Tolerance moderate   Current Activity Tolerance fair   Equipment Currently Used at Home cane, straight   Activity/Exercise/Self-Care Comment Patient independent in ADLs/mobility with SEC, independent in driving.   Functional Level   Ambulation 1-->assistive equipment   Transferring 1-->assistive equipment   Toileting 1-->assistive equipment   Bathing 0-->independent   Dressing 0-->independent   Eating 0-->independent   Communication 0-->understands/communicates without difficulty   Swallowing 0-->swallows foods/liquids without difficulty   Cognition 0 - no cognition issues reported   Fall history within last six months yes   Number of times patient has fallen within last six months 1   Prior Functional Level Comment Patient independent in ADLs/mobility with SEC, independent in driving.   General Information   Onset of Illness/Injury or Date of Surgery - Date 08/12/20   Referring Physician Dr. Mello   Patient/Family Goals Statement return home to baseline   Additional Occupational Profile Info/Pertinent History of Current Problem POD #1 Cresencio Peguero is a 85 year old male with a past medical history of CAD s/p CABG, hyperlipidemia, diabetes mellitus type II, and right rotator cuff tear arthropathy with remote hx repair who underwent previously scheduled right reverse total shoulder arthroplasty on 8/12/2020   Precautions/Limitations other (see comments)  (reverse TSA protocol)   Weight-Bearing Status - RUE nonweight-bearing   General Observations [facility]   Cognitive Status Examination   Orientation person;place   Level of Consciousness lethargic/somnolent   Sensory  Examination   Sensory Comments No N/T reported   Pain Assessment   Patient Currently in Pain Yes, see Vital Sign flowsheet   Range of Motion (ROM)   ROM Comment No ROM of R shoulder due to surgery   Mobility   Bed Mobility Bed mobility skill: Sit to supine;Bed mobility skill: Supine to sit   Bed Mobility Skill: Sit to Supine   Level of [place]: Sit/Supine stand-by assist   Bed Mobility Skill: Supine to Sit   Level of [place]: Supine/Sit minimum assist (75% patients effort)   Transfer Skill: Bed to Chair/Chair to Bed   Level of [place]: Bed to Chair unable to perform   Transfer Skill: Sit to Stand   Level of [place]: Sit/Stand unable to perform   Transfer Skill: Toilet Transfer   Level of [place]: Toilet unable to perform   Upper Body Dressing   Level of [place]: Dress Upper Body maximum assist (25% patients effort)   Lower Body Dressing   Level of [place]: Dress Lower Body maximum assist (25% patients effort)   Toileting   Level of [place]: Toilet maximum assist (25% patients effort)   Grooming   Level of [place]: Grooming minimum assist (75% patients effort)   Eating/Self Feeding   Level of [place]: Eating stand-by assist   Activities of Daily Living Analysis   Impairments Contributing to Impaired Activities of Daily Living cognition impaired;ROM decreased;strength decreased   General Therapy Interventions   Planned Therapy Interventions ADL retraining   Clinical Impression   Criteria for Skilled Therapeutic Interventions Met yes, treatment indicated   OT Diagnosis impaired self-cares/mobility   Influenced by the following impairments cognition; decreased ROM   Assessment of Occupational Performance 1-3 Performance Deficits   Identified Performance Deficits dressing, bathing, toileting   Clinical Decision Making (Complexity) Low complexity   Therapy Frequency 2x/day   Predicted Duration of Therapy Intervention (days/wks) 1-2 days   Anticipated Discharge Disposition  "Home with Assist   Risks and Benefits of Treatment have been explained. Yes   Patient, Family & other staff in agreement with plan of care Yes   [facility] TM \"6 Clicks\"   2016, Trustees of [facility], under license to [employer].  All rights reserved.   6 Clicks Short Forms Daily Activity Inpatient Short Form   [facility]  \"6 Clicks\" Daily Activity Inpatient Short Form   1. Putting on and taking off regular lower body clothing? 2 - A Lot   2. Bathing (including washing, rinsing, drying)? 2 - A Lot   3. Toileting, which includes using toilet, bedpan or urinal? 2 - A Lot   4. Putting on and taking off regular upper body clothing? 2 - A Lot   5. Taking care of personal grooming such as brushing teeth? 3 - A Little   6. Eating meals? 3 - A Little   Daily Activity Raw Score (Score out of 24.Lower scores equate to lower levels of function) 14   Total Evaluation Time   Total Evaluation Time (Minutes) 8     "

## 2020-08-13 NOTE — PROVIDER NOTIFICATION
Brief update    Paged re: low urine output    500 ml NS bolus, reassess urine output    Guero Beruemn MD  6:11 AM

## 2020-08-13 NOTE — PLAN OF CARE
Patient plan: Home with assist.  At baseline lives with spouse in multi-level home, independent in ADLs/mobility with SEC.  Drives at baseline.  Patient reports did have fall leading to R shoulder injury.  Current status: Patient feeling very overmedicated; difficulty following directions and unable to state correct year.  Patient needs Min A for bed mobility, did not feel safe to attempt standing.  Max A to don/doff shoulder sling.  Unable to follow directions for R UE HEP- PROM provided for supination/pronation, elbow flex/ext and wrist flex/ext.  Anticipated status at discharge: At this time; would need significant assist with all ADLs, caregiver education for HEP and shoulder precautions.  Patient would need assist for any mobility.      PM Session:  More alert, unsteady with mobility with SEC.  CGA/Min A for ambulation to/from bathroom.  Overall needing Mod-max A for dressing tasks and sling management.

## 2020-08-13 NOTE — PROGRESS NOTES
POD #2  S: S/P right reverse total shoulder arthroplasty. Mild-moderate pain, controlled with dilaudid and vistaril. Tolerating medications well. Hemovac removed. No nausea/vomiting/diarrhea. No fever/chills reported. Patient has not been able to void. Compliant with sling.     O: B/P: 142/63, T: 98.7, P: 62, R: 16     Alert, seated. NAD.  Right UE: Dressing clean/dry/intact. Deltoid/elbow & wrist motor intact. Distally motor and sensory intact.   B/L LE: Oliverio's negative B/L.      X-ray: Postop xrays of right shoulder from 8/12/2020 demonstrate good position of the reverse total shoulder implants with no loosening, lysis or interval complications evident.       S/P Right Reverse Total Shoulder Arthroplasty    Plan: Continue PT/OT per standard reverse TSA protocol.  Wear sling. Change dressing. Keep new dressing clean/dry/intact. Sponge bathe only. Continue pain mgmt with dilaudid. Plan discharge home today if patient is able to void. DVT prophylaxis with Xarelto X 7 days due to ASA allergy. Follow up with Dr. Mello in clinic in 10-14 days.

## 2020-08-14 ENCOUNTER — APPOINTMENT (OUTPATIENT)
Dept: PHYSICAL THERAPY | Facility: CLINIC | Age: 85
DRG: 483 | End: 2020-08-14
Attending: ORTHOPAEDIC SURGERY
Payer: MEDICARE

## 2020-08-14 ENCOUNTER — APPOINTMENT (OUTPATIENT)
Dept: OCCUPATIONAL THERAPY | Facility: CLINIC | Age: 85
DRG: 483 | End: 2020-08-14
Attending: ORTHOPAEDIC SURGERY
Payer: MEDICARE

## 2020-08-14 VITALS
HEART RATE: 68 BPM | OXYGEN SATURATION: 94 % | HEIGHT: 71 IN | BODY MASS INDEX: 26.36 KG/M2 | TEMPERATURE: 98.5 F | RESPIRATION RATE: 16 BRPM | WEIGHT: 188.3 LBS | SYSTOLIC BLOOD PRESSURE: 148 MMHG | DIASTOLIC BLOOD PRESSURE: 60 MMHG

## 2020-08-14 LAB
ANION GAP SERPL CALCULATED.3IONS-SCNC: 6 MMOL/L (ref 3–14)
BUN SERPL-MCNC: 17 MG/DL (ref 7–30)
CALCIUM SERPL-MCNC: 8 MG/DL (ref 8.5–10.1)
CHLORIDE SERPL-SCNC: 103 MMOL/L (ref 94–109)
CO2 SERPL-SCNC: 25 MMOL/L (ref 20–32)
CREAT SERPL-MCNC: 0.86 MG/DL (ref 0.66–1.25)
ERYTHROCYTE [DISTWIDTH] IN BLOOD BY AUTOMATED COUNT: 13.1 % (ref 10–15)
GFR SERPL CREATININE-BSD FRML MDRD: 79 ML/MIN/{1.73_M2}
GLUCOSE BLDC GLUCOMTR-MCNC: 140 MG/DL (ref 70–99)
GLUCOSE SERPL-MCNC: 129 MG/DL (ref 70–99)
HCT VFR BLD AUTO: 29.7 % (ref 40–53)
HGB BLD-MCNC: 10.2 G/DL (ref 13.3–17.7)
MCH RBC QN AUTO: 31.4 PG (ref 26.5–33)
MCHC RBC AUTO-ENTMCNC: 34.3 G/DL (ref 31.5–36.5)
MCV RBC AUTO: 91 FL (ref 78–100)
PLATELET # BLD AUTO: 112 10E9/L (ref 150–450)
POTASSIUM SERPL-SCNC: 3.9 MMOL/L (ref 3.4–5.3)
RBC # BLD AUTO: 3.25 10E12/L (ref 4.4–5.9)
SODIUM SERPL-SCNC: 134 MMOL/L (ref 133–144)
WBC # BLD AUTO: 5.4 10E9/L (ref 4–11)

## 2020-08-14 PROCEDURE — 85027 COMPLETE CBC AUTOMATED: CPT | Performed by: INTERNAL MEDICINE

## 2020-08-14 PROCEDURE — 97535 SELF CARE MNGMENT TRAINING: CPT | Mod: GO | Performed by: OCCUPATIONAL THERAPIST

## 2020-08-14 PROCEDURE — 25000132 ZZH RX MED GY IP 250 OP 250 PS 637: Mod: GY | Performed by: PHYSICIAN ASSISTANT

## 2020-08-14 PROCEDURE — 25000132 ZZH RX MED GY IP 250 OP 250 PS 637: Mod: GY | Performed by: ORTHOPAEDIC SURGERY

## 2020-08-14 PROCEDURE — 97116 GAIT TRAINING THERAPY: CPT | Mod: GP | Performed by: PHYSICAL THERAPY ASSISTANT

## 2020-08-14 PROCEDURE — 00000146 ZZHCL STATISTIC GLUCOSE BY METER IP

## 2020-08-14 PROCEDURE — 85018 HEMOGLOBIN: CPT | Performed by: INTERNAL MEDICINE

## 2020-08-14 PROCEDURE — 97110 THERAPEUTIC EXERCISES: CPT | Mod: GO | Performed by: OCCUPATIONAL THERAPIST

## 2020-08-14 PROCEDURE — 36415 COLL VENOUS BLD VENIPUNCTURE: CPT | Performed by: INTERNAL MEDICINE

## 2020-08-14 PROCEDURE — 80048 BASIC METABOLIC PNL TOTAL CA: CPT | Performed by: INTERNAL MEDICINE

## 2020-08-14 RX ORDER — TRAMADOL HYDROCHLORIDE 50 MG/1
50 TABLET ORAL EVERY 6 HOURS PRN
Qty: 30 TABLET | Refills: 0 | Status: ON HOLD | OUTPATIENT
Start: 2020-08-14 | End: 2021-06-29

## 2020-08-14 RX ADMIN — DOCUSATE SODIUM 50 MG AND SENNOSIDES 8.6 MG 1 TABLET: 8.6; 5 TABLET, FILM COATED ORAL at 08:16

## 2020-08-14 RX ADMIN — TRAMADOL HYDROCHLORIDE 50 MG: 50 TABLET, FILM COATED ORAL at 08:16

## 2020-08-14 RX ADMIN — ACETAMINOPHEN 975 MG: 325 TABLET ORAL at 06:35

## 2020-08-14 RX ADMIN — ATORVASTATIN CALCIUM 10 MG: 10 TABLET, FILM COATED ORAL at 08:16

## 2020-08-14 RX ADMIN — RIVAROXABAN 10 MG: 10 TABLET, FILM COATED ORAL at 08:16

## 2020-08-14 RX ADMIN — METFORMIN HYDROCHLORIDE 850 MG: 850 TABLET, FILM COATED ORAL at 08:16

## 2020-08-14 RX ADMIN — SERTRALINE HYDROCHLORIDE 100 MG: 100 TABLET ORAL at 08:16

## 2020-08-14 RX ADMIN — GABAPENTIN 100 MG: 100 CAPSULE ORAL at 08:16

## 2020-08-14 ASSESSMENT — ACTIVITIES OF DAILY LIVING (ADL)
ADLS_ACUITY_SCORE: 18
ADLS_ACUITY_SCORE: 19

## 2020-08-14 NOTE — PLAN OF CARE
Patient plan: home today with wife  Current status: pt more alert today. [place] doff/don sling and UB/LB dressing.  Reviewed HEP with handout, pt completed with VC. SBA sit > supine, reviewed use of pillows for supporting RUE. NWB maintained throughout session.  Anticipated status at discharge: [place] dressing/sling, CGA toilet transfer, assist for home chores

## 2020-08-14 NOTE — DISCHARGE SUMMARY
Admit Date:     2020   Discharge Date:           ADMITTING DIAGNOSIS:  Right rotator cuff tear arthropathy.      PROCEDURE PERFORMED:  Right reverse total shoulder arthroplasty.      HOSPITAL COURSE:  Mr. Guthrie was admitted to the inpatient [facility] on [facility] following an uneventful surgery.  His postoperative course was relatively smooth.  On postoperative day #1, he was tolerating a general diet and oral pain medication.  He underwent standard physical therapy for deltoid isometrics, elbow and wrist range of motion.  He was discharged to home in stable condition on postoperative day #2.      DISCHARGE MEDICATIONS:  Tramadol, Vistaril, Senokot, Xarelto 10 mg daily x 7 days for DVT prophylaxis.      DISCHARGE INSTRUCTIONS:  He is to keep the bandage clean and dry and sponge bathe.  UltraSling x 6 weeks for protection.  Standard phase 1 reverse total shoulder arthroplasty protocol for physical therapy.  I will see him back in the office in 10-14 days for suture removal, at which time he should have a true AP and outlet radiograph of the right shoulder.         ADARSH CASTILLO MD             D: 2020   T: 2020   MT: OZIEL      Name:     MARIAA GUTHRIE   MRN:      [id number]        Account:        [id number]   :      1935           Admit Date:     2020                                  Discharge Date:       Document: [id number]       cc: Adarsh Padgett MD

## 2020-08-14 NOTE — PLAN OF CARE
Patient plan: Home when medically stable   Current status: Pt performed bed mobility independently.  Sit to/from stand transfers with supervision only.  Performed gait training x 260 ft total using a cane and close SBA.  Mild unsteadiness noted.  No significant LOB.  Pt performed 3 stairs x 1 and 4 stairs x 1 using 1 rail with SBA.    Anticipated status at discharge:   IND bed mobility; Yolie with SEC community distances; IND household distances; SBA stairs       Pt is discharging home today.  PT goals partially met.

## 2020-08-14 NOTE — PLAN OF CARE
Pt A&Ox4, VSS on RA ex. hypertension. Pt up w/1, GB, and cane, voiding in BR. Pt voiding adequately this shift. R shoulder dressing CDI, CMS intact. RUE in sling. Pain managed without any PRN pain medications.  at 2200 and 140 at 0200, no sliding scale insulin given. Pt potentially discharging home today. Will continue to follow plan of care.

## 2020-08-14 NOTE — PROGRESS NOTES
Pt A&O, VSS on RA. CMS intact. Sling in place, dressing changed to medipore. Tolerating diet, adequate output. Pain controlled with ultram. Ambulating in room SBA. Discharge education/instructions given, all questions answered at this time. Iv removed. Belongings and medications sent with pt, discharged @1200.

## 2020-08-26 ENCOUNTER — THERAPY VISIT (OUTPATIENT)
Dept: PHYSICAL THERAPY | Facility: CLINIC | Age: 85
End: 2020-08-26
Payer: MEDICARE

## 2020-08-26 DIAGNOSIS — M25.511 ACUTE PAIN OF RIGHT SHOULDER: Primary | ICD-10-CM

## 2020-08-26 DIAGNOSIS — Z96.611 S/P REVERSE TOTAL SHOULDER ARTHROPLASTY, RIGHT: ICD-10-CM

## 2020-08-26 PROCEDURE — 97110 THERAPEUTIC EXERCISES: CPT | Mod: GP | Performed by: PHYSICAL THERAPIST

## 2020-08-26 PROCEDURE — 97161 PT EVAL LOW COMPLEX 20 MIN: CPT | Mod: GP | Performed by: PHYSICAL THERAPIST

## 2020-08-26 NOTE — LETTER
DEPARTMENT OF HEALTH AND HUMAN SERVICES  [facility] FOR MEDICARE & MEDICAID SERVICES    PLAN/UPDATED PLAN OF PROGRESS FOR OUTPATIENT REHABILITATION@       PATIENTS NAME:  Cresencio Peguero   : 1935  PROVIDER NUMBER:    [id number]  HICN:   [id number]  PROVIDER NAME: [facility]  MEDICAL RECORD NUMBER: [id number]   START OF CARE DATE:  SOC Date: 20   TYPE:  PT  PRIMARY/TREATMENT DIAGNOSIS:    Acute pain of right shoulder  S/P reverse total shoulder arthroplasty, right  VISITS FROM START OF CARE:  Rxs Used: 1     [facility] Initial Evaluation  Subjective:  The history is provided by the patient. No  was used.   Patient Health History  Cresencio Peguero being seen for R shoulder, R-TSA.     Problem began: 2020 (2020 - Fall and injured R shoulder).   Problem occurred: from a fall   Pain is reported as 6/10 on pain scale.  General health as reported by patient is fair.  Health conditions: see EPIC.   Red flags:  None as reported by patient.   Surgeries include:  Orthopedic surgery and heart surgery.    Current medications: see EPIC.    Current occupation is Retired.       Therapist Generated HPI Evaluation         Type of problem:  Right shoulder.  This is a new condition.  Condition occurred with:  A fall.  Patient reports pain:  Anterior.  Associated symptoms:  Loss of motion/stiffness and loss of strength. Symptoms are exacerbated by certain positions and lying on extremity (pt's wife reports he may be using his arm too much and she reminds him to not use/move his arm.)  and relieved by ice, rest and bracing/immobilizing.  Objective:  Standing Alignment:    Shoulder/UE:  Rounded shoulders  Shoulder Evaluation:  ROM:  AROM:  not assessed (no ROM assessed, will assess at s/p week 7)  PROM:  not assessed  Strength:    Abduction:  Right: 4-/5      Pain:-/+    PATIENTS NAME:  Cresencio Peguero   : 1935    Assessment/Plan:   "  Patient is a 85 year old male with right side shoulder complaints.    Patient has the following significant findings with corresponding treatment plan.                Diagnosis 1:  R UE r-TSA  Pain -  hot/cold therapy, self management, education and home program  Decreased ROM/flexibility - manual therapy and therapeutic exercise  Decreased strength - therapeutic exercise and therapeutic activities  Inflammation - cold therapy  Decreased function - therapeutic activities  Impaired posture - neuro re-education  Previous and current functional limitations:  (See Goal Flow Sheet for this information)    Short term and Long term goals: (See Goal Flow Sheet for this information)   Communication ability:  Patient appears to be able to clearly communicate and understand verbal and written communication and follow directions correctly.  Treatment Explanation - The following has been discussed with the patient:   RX ordered/plan of care  Anticipated outcomes  Possible risks and side effects  This patient would benefit from PT intervention to resume normal activities.   Rehab potential is good.  Frequency:  1 X week, once daily  Duration:  for 12 weeks (pt will follow up in 3 weeks to start phase 2, and then start phase 3 with 1x/week Rx's)  Discharge Plan:  Achieve all LTG.  Independent in home treatment program.  Reach maximal therapeutic benefit.      Caregiver Signature/Credentials _____________________________ Date ________       Treating Provider: [facility]     I have reviewed and certified the need for these services and plan of treatment while under my care.      PHYSICIAN'S SIGNATURE:   _____________________________________  Date___________               Raul Franco PAC    Certification period:  Beginning of Cert date period: 08/26/20 to  End of Cert period date: 11/23/20     Functional Level Progress Report: Please see attached \"Goal Flow sheet for Functional level.\"    ____X____ Continue Services " or       ________ DC Services                Service dates: From  SOC Date: 08/26/20 date to present

## 2020-08-26 NOTE — PROGRESS NOTES
[facility] Initial Evaluation  Subjective:  The history is provided by the patient. No  was used.   Patient Health History  Cresencio Peguero being seen for R shoulder, R-TSA.     Problem began: 8/12/2020 (April 2020 - Fall and injured R shoulder).   Problem occurred: from a fall   Pain is reported as 6/10 on pain scale.  General health as reported by patient is fair.  Health conditions: see EPIC.   Red flags:  None as reported by patient.     Surgeries include:  Orthopedic surgery and heart surgery.    Current medications: see EPIC.    Current occupation is Retired.                     Therapist Generated HPI Evaluation         Type of problem:  Right shoulder.    This is a new condition.  Condition occurred with:  A fall.    Patient reports pain:  Anterior.        Associated symptoms:  Loss of motion/stiffness and loss of strength. Symptoms are exacerbated by certain positions and lying on extremity (pt's wife reports he may be using his arm too much and she reminds him to not use/move his arm.)  and relieved by ice, rest and bracing/immobilizing.                              Objective:  Standing Alignment:      Shoulder/UE:  Rounded shoulders                                       Shoulder Evaluation:  ROM:  AROM:  not assessed (no ROM assessed, will assess at s/p week 7)                            PROM:  not assessed                                Strength:        Abduction:  Right: 4-/5      Pain:-/+                                                         General     ROS    Assessment/Plan:    Patient is a 85 year old male with right side shoulder complaints.    Patient has the following significant findings with corresponding treatment plan.                Diagnosis 1:  R UE r-TSA  Pain -  hot/cold therapy, self management, education and home program  Decreased ROM/flexibility - manual therapy and therapeutic exercise  Decreased strength - therapeutic exercise and  therapeutic activities  Inflammation - cold therapy  Decreased function - therapeutic activities  Impaired posture - neuro re-education    Previous and current functional limitations:  (See Goal Flow Sheet for this information)    Short term and Long term goals: (See Goal Flow Sheet for this information)     Communication ability:  Patient appears to be able to clearly communicate and understand verbal and written communication and follow directions correctly.  Treatment Explanation - The following has been discussed with the patient:   RX ordered/plan of care  Anticipated outcomes  Possible risks and side effects  This patient would benefit from PT intervention to resume normal activities.   Rehab potential is good.    Frequency:  1 X week, once daily  Duration:  for 12 weeks (pt will follow up in 3 weeks to start phase 2, and then start phase 3 with 1x/week Rx's)  Discharge Plan:  Achieve all LTG.  Independent in home treatment program.  Reach maximal therapeutic benefit.    Please refer to the daily flowsheet for treatment today, total treatment time and time spent performing 1:1 timed codes.

## 2020-09-16 ENCOUNTER — THERAPY VISIT (OUTPATIENT)
Dept: PHYSICAL THERAPY | Facility: CLINIC | Age: 85
End: 2020-09-16
Payer: MEDICARE

## 2020-09-16 DIAGNOSIS — Z96.611 S/P REVERSE TOTAL SHOULDER ARTHROPLASTY, RIGHT: ICD-10-CM

## 2020-09-16 DIAGNOSIS — M25.511 ACUTE PAIN OF RIGHT SHOULDER: ICD-10-CM

## 2020-09-16 PROCEDURE — 97110 THERAPEUTIC EXERCISES: CPT | Mod: GP | Performed by: PHYSICAL THERAPIST

## 2020-09-16 NOTE — LETTER
[place] [facility]  [address]  [facility] [address]  [phone]    2020    Re: Cresencio Peguero   :   1935  MRN:  [id number]   REFERRING PHYSICIAN:   Raul Franco  [place] [facility]  Date of Initial Evaluation:  2020  Visits:  Rxs Used: 2  Reason for Referral:   Acute pain of right shoulder  S/P reverse total shoulder arthroplasty, right    PROGRESS  REPORT  Progress reporting period is from initial to 2020.       SUBJECTIVE  Subjective changes noted by patient:  Cresencio returns for his 2nd PT appointment today to progress with his phase 2 (Instruction with Codmans, and further isometric exercises). Cresencio reports his R hand has been hurting, especially his 2-3 fingers.  Patient's wife said he is using his R arm a little to feed himself keeping his elbow at his side and bringing his hand to his mouth.  Changes in function:  None  Adverse reaction to treatment or activity: None    OBJECTIVE  Changes noted in objective findings:  Pt with fair understanding for shoulder isometrics (stress importance not to perform internal rotation isometrics).  Codmans tolerated well.    PT will start with gentle AAROM/PROM exercise next week as patient starts Phase 3.        ASSESSMENT/PLAN  Updated problem list and treatment plan: Diagnosis 1:  S/p R  r-TSA Pain -  self management, education and home program  Decreased ROM/flexibility - manual therapy and therapeutic exercise  Decreased strength - therapeutic exercise and therapeutic activities  Inflammation - cold therapy  Decreased function - therapeutic activities  Impaired posture - neuro re-education  STG/LTGs have been met or progress has been made towards goals:  None  Assessment of Progress: The patient's condition has potential to improve.  Self Management Plans:  Patient has been instructed in a home treatment program.  I have re-evaluated this patient and find that the nature, scope,  duration and intensity of the therapy is appropriate for the medical condition of the patient.  Cresencio continues to require the following intervention to meet STG and LTG's:  PT          Re: Cresencio Peguero   :   1935    Recommendations:  This patient would benefit from continued therapy progressing with Phase 3 - starting with supine AAROM/PROM.  Frequency:  2 X week, once daily  Duration:  for 4 weeks          Thank you for your referral.    INQUIRIES  Therapist: Orlando Lynch PT   [place] [facility] [place]  [address]  [place] [address]  Phone: [phone]  Fax: [phone]

## 2020-09-16 NOTE — PROGRESS NOTES
Subjective:  HPI  Physical Exam                    Objective:  System    Physical Exam    General     ROS    Assessment/Plan:    PROGRESS  REPORT    Progress reporting period is from initial to 9/16/2020.       SUBJECTIVE  Subjective changes noted by patient:  Cresencio returns for his 2nd PT appointment today to progress with his phase 2 (Instruction with Codmans, and further isometric exercises). Cresencio reports his R hand has been hurting, especially his 2-3 fingers.  Patient's wife said he is using his R arm a little to feed himself keeping his elbow at his side and bringing his hand to his mouth.  Changes in function:  None  Adverse reaction to treatment or activity: None    OBJECTIVE  Changes noted in objective findings:  Pt with fair understanding for shoulder isometrics (stress importance not to perform internal rotation isometrics).  Codmans tolerated well.    PT will start with gentle AAROM/PROM exercise next week as patient starts Phase 3.        ASSESSMENT/PLAN  Updated problem list and treatment plan: Diagnosis 1:  S/p R  r-TSA Pain -  self management, education and home program  Decreased ROM/flexibility - manual therapy and therapeutic exercise  Decreased strength - therapeutic exercise and therapeutic activities  Inflammation - cold therapy  Decreased function - therapeutic activities  Impaired posture - neuro re-education  STG/LTGs have been met or progress has been made towards goals:  None  Assessment of Progress: The patient's condition has potential to improve.  Self Management Plans:  Patient has been instructed in a home treatment program.  I have re-evaluated this patient and find that the nature, scope, duration and intensity of the therapy is appropriate for the medical condition of the patient.  Cresencio continues to require the following intervention to meet STG and LTG's:  PT    Recommendations:  This patient would benefit from continued therapy progressing with Phase 3 - starting with supine  AAROM/PROM.  Frequency:  2 X week, once daily  Duration:  for 4 weeks    Please refer to the daily flowsheet for treatment today, total treatment time and time spent performing 1:1 timed codes.

## 2020-09-24 ENCOUNTER — THERAPY VISIT (OUTPATIENT)
Dept: PHYSICAL THERAPY | Facility: CLINIC | Age: 85
End: 2020-09-24
Payer: MEDICARE

## 2020-09-24 DIAGNOSIS — M25.511 ACUTE PAIN OF RIGHT SHOULDER: ICD-10-CM

## 2020-09-24 DIAGNOSIS — Z96.611 S/P REVERSE TOTAL SHOULDER ARTHROPLASTY, RIGHT: ICD-10-CM

## 2020-09-24 PROCEDURE — 97110 THERAPEUTIC EXERCISES: CPT | Mod: GP | Performed by: PHYSICAL THERAPIST

## 2020-09-29 ENCOUNTER — THERAPY VISIT (OUTPATIENT)
Dept: PHYSICAL THERAPY | Facility: CLINIC | Age: 85
End: 2020-09-29
Payer: MEDICARE

## 2020-09-29 DIAGNOSIS — Z96.611 S/P REVERSE TOTAL SHOULDER ARTHROPLASTY, RIGHT: ICD-10-CM

## 2020-09-29 DIAGNOSIS — M25.511 ACUTE PAIN OF RIGHT SHOULDER: ICD-10-CM

## 2020-09-29 PROCEDURE — 97110 THERAPEUTIC EXERCISES: CPT | Mod: GP | Performed by: PHYSICAL THERAPIST

## 2020-09-29 PROCEDURE — 97112 NEUROMUSCULAR REEDUCATION: CPT | Mod: GP | Performed by: PHYSICAL THERAPIST

## 2020-10-01 ENCOUNTER — THERAPY VISIT (OUTPATIENT)
Dept: PHYSICAL THERAPY | Facility: CLINIC | Age: 85
End: 2020-10-01
Payer: MEDICARE

## 2020-10-01 DIAGNOSIS — M25.511 ACUTE PAIN OF RIGHT SHOULDER: ICD-10-CM

## 2020-10-01 DIAGNOSIS — Z96.611 S/P REVERSE TOTAL SHOULDER ARTHROPLASTY, RIGHT: ICD-10-CM

## 2020-10-01 PROCEDURE — 97112 NEUROMUSCULAR REEDUCATION: CPT | Mod: GP | Performed by: PHYSICAL THERAPIST

## 2020-10-01 PROCEDURE — 97110 THERAPEUTIC EXERCISES: CPT | Mod: GP | Performed by: PHYSICAL THERAPIST

## 2021-01-11 PROBLEM — M25.511 ACUTE PAIN OF RIGHT SHOULDER: Status: RESOLVED | Noted: 2020-08-26 | Resolved: 2021-01-11

## 2021-01-11 PROBLEM — Z96.611 S/P REVERSE TOTAL SHOULDER ARTHROPLASTY, RIGHT: Status: RESOLVED | Noted: 2020-08-26 | Resolved: 2021-01-11

## 2021-01-11 NOTE — PROGRESS NOTES
Patient did not return for further treatment and no additional progress was noted.  Please refer to the progress note and goal flowsheet completed on 09/16/20 for discharge information.

## 2021-06-29 ENCOUNTER — APPOINTMENT (OUTPATIENT)
Dept: CT IMAGING | Facility: CLINIC | Age: 86
End: 2021-06-29
Attending: EMERGENCY MEDICINE
Payer: MEDICARE

## 2021-06-29 ENCOUNTER — HOSPITAL ENCOUNTER (OUTPATIENT)
Facility: CLINIC | Age: 86
Setting detail: OBSERVATION
Discharge: HOME | End: 2021-06-30
Attending: EMERGENCY MEDICINE | Admitting: INTERNAL MEDICINE
Payer: MEDICARE

## 2021-06-29 DIAGNOSIS — M54.50 ACUTE BILATERAL LOW BACK PAIN WITHOUT SCIATICA: ICD-10-CM

## 2021-06-29 DIAGNOSIS — K45.8 INTERNAL HERNIA: ICD-10-CM

## 2021-06-29 DIAGNOSIS — K56.609 SBO (SMALL BOWEL OBSTRUCTION) (H): ICD-10-CM

## 2021-06-29 DIAGNOSIS — R03.0 ELEVATED BLOOD PRESSURE READING WITHOUT DIAGNOSIS OF HYPERTENSION: ICD-10-CM

## 2021-06-29 DIAGNOSIS — K59.00 CONSTIPATION, UNSPECIFIED CONSTIPATION TYPE: Primary | ICD-10-CM

## 2021-06-29 LAB
ANION GAP SERPL CALCULATED.3IONS-SCNC: 2 MMOL/L (ref 3–14)
BASOPHILS # BLD AUTO: 0 10E9/L (ref 0–0.2)
BASOPHILS NFR BLD AUTO: 0.4 %
BUN SERPL-MCNC: 23 MG/DL (ref 7–30)
CALCIUM SERPL-MCNC: 9.2 MG/DL (ref 8.5–10.1)
CHLORIDE SERPL-SCNC: 106 MMOL/L (ref 94–109)
CO2 SERPL-SCNC: 28 MMOL/L (ref 20–32)
CREAT SERPL-MCNC: 0.96 MG/DL (ref 0.66–1.25)
DIFFERENTIAL METHOD BLD: ABNORMAL
EOSINOPHIL # BLD AUTO: 0.1 10E9/L (ref 0–0.7)
EOSINOPHIL NFR BLD AUTO: 1.8 %
ERYTHROCYTE [DISTWIDTH] IN BLOOD BY AUTOMATED COUNT: 12.5 % (ref 10–15)
GFR SERPL CREATININE-BSD FRML MDRD: 71 ML/MIN/{1.73_M2}
GLUCOSE BLDC GLUCOMTR-MCNC: 99 MG/DL (ref 70–99)
GLUCOSE SERPL-MCNC: 88 MG/DL (ref 70–99)
HBA1C MFR BLD: 6 % (ref 0–5.6)
HCT VFR BLD AUTO: 40.8 % (ref 40–53)
HGB BLD-MCNC: 13.8 G/DL (ref 13.3–17.7)
IMM GRANULOCYTES # BLD: 0 10E9/L (ref 0–0.4)
IMM GRANULOCYTES NFR BLD: 0.2 %
LABORATORY COMMENT REPORT: NORMAL
LYMPHOCYTES # BLD AUTO: 1.6 10E9/L (ref 0.8–5.3)
LYMPHOCYTES NFR BLD AUTO: 30.3 %
MCH RBC QN AUTO: 31.9 PG (ref 26.5–33)
MCHC RBC AUTO-ENTMCNC: 33.8 G/DL (ref 31.5–36.5)
MCV RBC AUTO: 94 FL (ref 78–100)
MONOCYTES # BLD AUTO: 0.4 10E9/L (ref 0–1.3)
MONOCYTES NFR BLD AUTO: 7.2 %
NEUTROPHILS # BLD AUTO: 3.3 10E9/L (ref 1.6–8.3)
NEUTROPHILS NFR BLD AUTO: 60.1 %
NRBC # BLD AUTO: 0 10*3/UL
NRBC BLD AUTO-RTO: 0 /100
PLATELET # BLD AUTO: 150 10E9/L (ref 150–450)
POTASSIUM SERPL-SCNC: 4.2 MMOL/L (ref 3.4–5.3)
RBC # BLD AUTO: 4.32 10E12/L (ref 4.4–5.9)
SARS-COV-2 RNA RESP QL NAA+PROBE: NEGATIVE
SODIUM SERPL-SCNC: 136 MMOL/L (ref 133–144)
SPECIMEN SOURCE: NORMAL
WBC # BLD AUTO: 5.4 10E9/L (ref 4–11)

## 2021-06-29 PROCEDURE — 99285 EMERGENCY DEPT VISIT HI MDM: CPT | Mod: 25

## 2021-06-29 PROCEDURE — G0378 HOSPITAL OBSERVATION PER HR: HCPCS

## 2021-06-29 PROCEDURE — 87635 SARS-COV-2 COVID-19 AMP PRB: CPT | Performed by: EMERGENCY MEDICINE

## 2021-06-29 PROCEDURE — 72131 CT LUMBAR SPINE W/O DYE: CPT | Mod: MG

## 2021-06-29 PROCEDURE — 36415 COLL VENOUS BLD VENIPUNCTURE: CPT | Performed by: EMERGENCY MEDICINE

## 2021-06-29 PROCEDURE — 250N000011 HC RX IP 250 OP 636: Performed by: EMERGENCY MEDICINE

## 2021-06-29 PROCEDURE — C9803 HOPD COVID-19 SPEC COLLECT: HCPCS

## 2021-06-29 PROCEDURE — 93005 ELECTROCARDIOGRAM TRACING: CPT

## 2021-06-29 PROCEDURE — 96376 TX/PRO/DX INJ SAME DRUG ADON: CPT

## 2021-06-29 PROCEDURE — 83036 HEMOGLOBIN GLYCOSYLATED A1C: CPT | Performed by: EMERGENCY MEDICINE

## 2021-06-29 PROCEDURE — 250N000013 HC RX MED GY IP 250 OP 250 PS 637: Performed by: INTERNAL MEDICINE

## 2021-06-29 PROCEDURE — 80048 BASIC METABOLIC PNL TOTAL CA: CPT | Performed by: EMERGENCY MEDICINE

## 2021-06-29 PROCEDURE — 999N001017 HC STATISTIC GLUCOSE BY METER IP

## 2021-06-29 PROCEDURE — 99220 PR INITIAL OBSERVATION CARE,LEVEL III: CPT | Performed by: INTERNAL MEDICINE

## 2021-06-29 PROCEDURE — 96374 THER/PROPH/DIAG INJ IV PUSH: CPT

## 2021-06-29 PROCEDURE — 85025 COMPLETE CBC W/AUTO DIFF WBC: CPT | Performed by: EMERGENCY MEDICINE

## 2021-06-29 PROCEDURE — 250N000013 HC RX MED GY IP 250 OP 250 PS 637: Performed by: EMERGENCY MEDICINE

## 2021-06-29 RX ORDER — KETOROLAC TROMETHAMINE 15 MG/ML
15 INJECTION, SOLUTION INTRAMUSCULAR; INTRAVENOUS EVERY 6 HOURS PRN
Status: DISCONTINUED | OUTPATIENT
Start: 2021-06-29 | End: 2021-06-30 | Stop reason: HOSPADM

## 2021-06-29 RX ORDER — ACETAMINOPHEN 650 MG/1
650 SUPPOSITORY RECTAL EVERY 4 HOURS PRN
Status: DISCONTINUED | OUTPATIENT
Start: 2021-06-29 | End: 2021-06-30 | Stop reason: HOSPADM

## 2021-06-29 RX ORDER — HYDROCODONE BITARTRATE AND ACETAMINOPHEN 5; 325 MG/1; MG/1
1 TABLET ORAL ONCE
Status: COMPLETED | OUTPATIENT
Start: 2021-06-29 | End: 2021-06-29

## 2021-06-29 RX ORDER — GABAPENTIN 300 MG/1
300 CAPSULE ORAL 2 TIMES DAILY
Status: ON HOLD | COMMUNITY
Start: 2016-04-22 | End: 2021-06-29

## 2021-06-29 RX ORDER — AMOXICILLIN 250 MG
1 CAPSULE ORAL 2 TIMES DAILY PRN
Status: ON HOLD | COMMUNITY
End: 2021-06-30

## 2021-06-29 RX ORDER — ACETAMINOPHEN 325 MG/1
650 TABLET ORAL EVERY 4 HOURS PRN
Status: DISCONTINUED | OUTPATIENT
Start: 2021-06-29 | End: 2021-06-30 | Stop reason: HOSPADM

## 2021-06-29 RX ORDER — AMOXICILLIN 250 MG
2 CAPSULE ORAL 2 TIMES DAILY PRN
Status: DISCONTINUED | OUTPATIENT
Start: 2021-06-29 | End: 2021-06-30 | Stop reason: HOSPADM

## 2021-06-29 RX ORDER — TRAMADOL HYDROCHLORIDE 50 MG/1
50 TABLET ORAL EVERY 6 HOURS PRN
Status: DISCONTINUED | OUTPATIENT
Start: 2021-06-29 | End: 2021-06-30 | Stop reason: HOSPADM

## 2021-06-29 RX ORDER — HYDRALAZINE HYDROCHLORIDE 20 MG/ML
10 INJECTION INTRAMUSCULAR; INTRAVENOUS EVERY 4 HOURS PRN
Status: DISCONTINUED | OUTPATIENT
Start: 2021-06-29 | End: 2021-06-30 | Stop reason: HOSPADM

## 2021-06-29 RX ORDER — ACETAMINOPHEN 500 MG
1000 TABLET ORAL 2 TIMES DAILY PRN
Status: ON HOLD | COMMUNITY
End: 2021-06-30

## 2021-06-29 RX ORDER — PREGABALIN 25 MG/1
25 CAPSULE ORAL 3 TIMES DAILY
Status: DISCONTINUED | OUTPATIENT
Start: 2021-06-29 | End: 2021-06-30 | Stop reason: HOSPADM

## 2021-06-29 RX ORDER — NICOTINE POLACRILEX 4 MG
15-30 LOZENGE BUCCAL
Status: DISCONTINUED | OUTPATIENT
Start: 2021-06-29 | End: 2021-06-30 | Stop reason: HOSPADM

## 2021-06-29 RX ORDER — AMOXICILLIN 250 MG
1 CAPSULE ORAL 2 TIMES DAILY PRN
Status: DISCONTINUED | OUTPATIENT
Start: 2021-06-29 | End: 2021-06-30 | Stop reason: HOSPADM

## 2021-06-29 RX ORDER — NALOXONE HYDROCHLORIDE 0.4 MG/ML
0.2 INJECTION, SOLUTION INTRAMUSCULAR; INTRAVENOUS; SUBCUTANEOUS
Status: DISCONTINUED | OUTPATIENT
Start: 2021-06-29 | End: 2021-06-30 | Stop reason: HOSPADM

## 2021-06-29 RX ORDER — GABAPENTIN 300 MG/1
600 CAPSULE ORAL AT BEDTIME
Status: ON HOLD | COMMUNITY
End: 2024-05-24

## 2021-06-29 RX ORDER — GABAPENTIN 100 MG/1
200 CAPSULE ORAL 3 TIMES DAILY PRN
Status: DISCONTINUED | OUTPATIENT
Start: 2021-06-29 | End: 2021-06-30 | Stop reason: HOSPADM

## 2021-06-29 RX ORDER — HYDROMORPHONE HYDROCHLORIDE 1 MG/ML
0.3 INJECTION, SOLUTION INTRAMUSCULAR; INTRAVENOUS; SUBCUTANEOUS
Status: DISCONTINUED | OUTPATIENT
Start: 2021-06-29 | End: 2021-06-30 | Stop reason: HOSPADM

## 2021-06-29 RX ORDER — ONDANSETRON 2 MG/ML
4 INJECTION INTRAMUSCULAR; INTRAVENOUS EVERY 6 HOURS PRN
Status: DISCONTINUED | OUTPATIENT
Start: 2021-06-29 | End: 2021-06-30 | Stop reason: HOSPADM

## 2021-06-29 RX ORDER — NALOXONE HYDROCHLORIDE 0.4 MG/ML
0.4 INJECTION, SOLUTION INTRAMUSCULAR; INTRAVENOUS; SUBCUTANEOUS
Status: DISCONTINUED | OUTPATIENT
Start: 2021-06-29 | End: 2021-06-30 | Stop reason: HOSPADM

## 2021-06-29 RX ORDER — HYDROMORPHONE HYDROCHLORIDE 1 MG/ML
0.5 INJECTION, SOLUTION INTRAMUSCULAR; INTRAVENOUS; SUBCUTANEOUS ONCE
Status: COMPLETED | OUTPATIENT
Start: 2021-06-29 | End: 2021-06-29

## 2021-06-29 RX ORDER — HYDROMORPHONE HYDROCHLORIDE 1 MG/ML
0.5 INJECTION, SOLUTION INTRAMUSCULAR; INTRAVENOUS; SUBCUTANEOUS
Status: DISCONTINUED | OUTPATIENT
Start: 2021-06-29 | End: 2021-06-29

## 2021-06-29 RX ORDER — DEXTROSE MONOHYDRATE 25 G/50ML
25-50 INJECTION, SOLUTION INTRAVENOUS
Status: DISCONTINUED | OUTPATIENT
Start: 2021-06-29 | End: 2021-06-30 | Stop reason: HOSPADM

## 2021-06-29 RX ORDER — AMLODIPINE BESYLATE 5 MG/1
5 TABLET ORAL ONCE
Status: COMPLETED | OUTPATIENT
Start: 2021-06-29 | End: 2021-06-29

## 2021-06-29 RX ORDER — ONDANSETRON 4 MG/1
4 TABLET, ORALLY DISINTEGRATING ORAL EVERY 6 HOURS PRN
Status: DISCONTINUED | OUTPATIENT
Start: 2021-06-29 | End: 2021-06-30 | Stop reason: HOSPADM

## 2021-06-29 RX ADMIN — HYDROMORPHONE HYDROCHLORIDE 0.5 MG: 1 INJECTION, SOLUTION INTRAMUSCULAR; INTRAVENOUS; SUBCUTANEOUS at 17:57

## 2021-06-29 RX ADMIN — PREGABALIN 25 MG: 25 CAPSULE ORAL at 21:56

## 2021-06-29 RX ADMIN — AMLODIPINE BESYLATE 5 MG: 5 TABLET ORAL at 18:41

## 2021-06-29 RX ADMIN — HYDROCODONE BITARTRATE AND ACETAMINOPHEN 1 TABLET: 5; 325 TABLET ORAL at 15:51

## 2021-06-29 RX ADMIN — TRAMADOL HYDROCHLORIDE 50 MG: 50 TABLET, FILM COATED ORAL at 21:56

## 2021-06-29 RX ADMIN — DOCUSATE SODIUM AND SENNOSIDES 2 TABLET: 8.6; 5 TABLET, FILM COATED ORAL at 22:06

## 2021-06-29 RX ADMIN — HYDROMORPHONE HYDROCHLORIDE 0.5 MG: 1 INJECTION, SOLUTION INTRAMUSCULAR; INTRAVENOUS; SUBCUTANEOUS at 18:37

## 2021-06-29 ASSESSMENT — ENCOUNTER SYMPTOMS
BACK PAIN: 1
FEVER: 0

## 2021-06-29 ASSESSMENT — MIFFLIN-ST. JEOR: SCORE: 1602.98

## 2021-06-29 NOTE — ED TRIAGE NOTES
Pt here with c/o lower back pain since yesterday after lifting boxes. No loss of bowel/bladder. CMS intact. ABC intact.

## 2021-06-29 NOTE — ED PROVIDER NOTES
History   Chief Complaint:  No chief complaint on file.       ONEIDA Peguero is a 86 year old male with history of diabetes who presents with lower back pain that started yesterday. The patient has been having constipation and shortly after had a gradually increasing in pain. He has had 3 back surgeries and the last one being 15-20 years ago. He also has a back stimulator that he says does not help with pain. The pain does not radiate anywhere. He has been taking tylenol for the pain but does not help. He was able to have a bowel movements today. He denies fever and urinary/stool incontinence. He has been moving to a different house so he has been lifting boxes. He is not on blood thinners and has no history of malignancy or IV drug use.      Review of Systems   Constitutional: Negative for fever.   Genitourinary: Negative.    Musculoskeletal: Positive for back pain.   All other systems reviewed and are negative.      Allergies:  Apirin  Hydrocodone  Lisinopril  Morphine    Medications:  Lipitor  Neurontin  Atarax  Glucophage  Miralax  Xarelto  Senna-docusate  Zoloft  Ultram    Past Medical History:    Anxiety  Arthritis  CAD  Diabetes  Hyperlipidemia  Hypertension  Neuropathy  Ataxia  Stent  Arthropathy  SBO  Inguinal hernia    Past Surgical History:    Back surgery  Cardiac surgery  Eye surgery  Herniorrhaphy inguinal  Laparoscopy  Orthopedic surgery  Arthroplasty  Tonsillectomy    Family History:    Arthritis  Cancer  Heart disease    Social History:  The patient presents with wife.    Physical Exam     Patient Vitals for the past 24 hrs:   BP Temp Temp src Pulse Resp SpO2 Weight   06/29/21 1841 (!) 226/110 -- -- -- -- -- --   06/29/21 1815 (!) 219/125 -- -- 59 -- 96 % --   06/29/21 1800 (!) 206/90 -- -- 59 -- 95 % --   06/29/21 1528 (!) 166/79 97.6  F (36.4  C) Temporal 66 18 99 % 86.2 kg (190 lb)       Physical Exam    HEENT:   Conjunctiva are normal and without erythema.    NECK:   Supple, no  meningismus.     CV:    Regular rate and rhythm     No murmurs, rubs or gallops.      2+ dorsalis pedis pulses bilateral.  PULM:   Clear to auscultation bilateral.      No respiratory distress.  No stridor.  ABD:   Soft, non-tender, non-distendend.      No rebound or guarding.  MSK:   Patient is non-tender over the lumbar spine.      Non tender at the lumbar paraspinal musculature.      No step-off to the bony spine or overlying lesions.   LYMPH:  No cervical lymphadenopathy.  NEURO:  Strength is 5/5 and sensation is intact to the lower extremities bilateral.    2+ patellar tendon reflexes bilateral.      No clonus and down-going Babinski bilateral.    Negative straight leg raise bilateral  SKIN:   Warm, dry and intact.    PYSCH:   Mood is good and affect is appropriate.      Emergency Department Course   ECG  ECG taken at 1845, ECG read at 1849  Sinus rhythm with 1st degree AV block  Minimal voltage criteria for LVH, may be normal variant  Septal infarct, age undetermined   Rate 60 bpm. GA interval 214 ms. QRS duration 108 ms. QT/QTc 428/428 ms. P-R-T axes 21 -14 11.     Imaging:  Lumbar spine CT w/o contrast:  1.  Postoperative changes of previous posterior decompression at L3   and L5.   2.  Stimulator device present within the dorsal spinal canal extends   off the cephalad aspect of the exam.   3.  No fracture.   4.  At L3-L4 suspect that moderate facet arthropathy and dorsal   ligamentous hypertrophy contributes to moderate central spinal canal   stenosis, although dilatation is somewhat limited by CT. Moderate   neural foraminal narrowing at this level.   5.  At L1-L2 there is mild spinal canal and foraminal narrowing.   6.  At L2-L3 there is mild spinal canal stenosis and moderate   foraminal narrowing.   7.  There is moderate to severe neural foraminal narrowing at L4-L5   and on the left at L5-S1.     As per radiology    Laboratory:  Asymptomatic Covid-19 Virus by PCR: negative    Emergency Department  Course:    Reviewed:  I reviewed nursing notes, vitals and past medical history    Assessments:   I obtained history and examined the patient as noted above.      I rechecked the patient and explained findings.     Consults:    I spoke with Dr. Sepulveda, Hospitalist, who accepted admission of the patient    Interventions:  1551 Norco 1 tablet PO    1757 Dilaudid 0.5 mg IV    1837 Dilaudid 0.5 mg IV    1841 Norvasc 5 mg PO    Disposition:  The patient was admitted to the hospital under the care of Dr. Sepulveda.       Impression & Plan     Medical Decision Makin-year-old male with history of chronic back pain presented to the ED with gradually worsening back pain.  No findings to suggest cauda equina syndrome, epidural abscess, epidural hematoma or discitis.  CT scan undertaken revealing no acute pathology but noted to have spinal stenosis.  Patient may have a degree of acute muscular strain in the face of spinal stenosis.  He was given oral and IV analgesics but continues to have significant pain and is unfit to be discharged home.  He will be transferred to an observation bed for ongoing pain control.    Initial blood pressure was mildly elevated but then significantly increased throughout ED course.  He has no history of hypertension.  Hypertension in part related to acute pain.  Patient given amlodipine.  Basic laboratory studies sent to rule out acute endorgan damage.  Patient safe for transfer to observation bed.      Covid-19  Cresencio Peguero was evaluated during a global COVID-19 pandemic, which necessitated consideration that the patient might be at risk for infection with the SARS-CoV-2 virus that causes COVID-19.   Applicable protocols for evaluation were followed during the patient's care.   COVID-19 was considered as part of the patient's evaluation. The plan for testing is:  a test was obtained during this visit.    Diagnosis:    ICD-10-CM    1. Acute bilateral low back pain without sciatica   M54.5 Asymptomatic SARS-CoV-2 COVID-19 Virus (Coronavirus) by PCR   2. Elevated blood pressure reading without diagnosis of hypertension  R03.0        Scribe Disclosure:  I, Naimasonal Cortes, am serving as a scribe at 3:39 PM on 6/29/2021 to document services personally performed by Bryce Alcantara MD based on my observations and the provider's statements to me.            Bryce Alcantara MD  06/29/21 214

## 2021-06-29 NOTE — ED NOTES
[facility]  ED Nurse Handoff Report    Cresencio Peguero is a 86 year old male   ED Chief complaint: No chief complaint on file.  . ED Diagnosis:   Final diagnoses:   None     Allergies:   Allergies   Allergen Reactions     Percodan [Oxycodone-Aspirin] Rash       Code Status: Full Code  Activity level - Baseline/Home:  Independent. Activity Level - Current:   Assist X 2. Lift room needed: No. Bariatric: No   Needed: No   Isolation: No. Infection: Not Applicable.     Vital Signs:   Vitals:    06/29/21 1528 06/29/21 1800 06/29/21 1815   BP: (!) 166/79 (!) 206/90 (!) 219/125   Pulse: 66 59 59   Resp: 18     Temp: 97.6  F (36.4  C)     TempSrc: Temporal     SpO2: 99% 95% 96%   Weight: 86.2 kg (190 lb)         Cardiac Rhythm:  ,      Pain level:    Patient confused: Yes. Patient Falls Risk: Yes.   Elimination Status: Has voided   Patient Report - Initial Complaint: back pain. Focused Assessment:   Pain requiring IV meds for pain control. Pt continues to have difficulty walking due to pain - would not be able to go home and complete ADLs. Pt BP is high - treating with oral meds.    Tests Performed:   Lumbar spine CT w/o contrast   Final Result   IMPRESSION:   1.  Postoperative changes of previous posterior decompression at L3   and L5.   2.  Stimulator device present within the dorsal spinal canal extends   off the cephalad aspect of the exam.   3.  No fracture.   4.  At L3-L4 suspect that moderate facet arthropathy and dorsal   ligamentous hypertrophy contributes to moderate central spinal canal   stenosis, although dilatation is somewhat limited by CT. Moderate   neural foraminal narrowing at this level.   5.  At L1-L2 there is mild spinal canal and foraminal narrowing.   6.  At L2-L3 there is mild spinal canal stenosis and moderate   foraminal narrowing.   7.  There is moderate to severe neural foraminal narrowing at L4-L5   and on the left at L5-S1.      ADAM BUNCH MD        Labs  Ordered and Resulted from Time of ED Arrival Up to the Time of Departure from the ED - No data to display    Treatments provided: pain meds  Family Comments: wife present  OBS brochure/video discussed/provided to patient:  Yes  ED Medications:   Medications   HYDROmorphone (PF) (DILAUDID) injection 0.5 mg (has no administration in time range)   HYDROcodone-acetaminophen (NORCO) 5-325 MG per tablet 1 tablet (1 tablet Oral Given 6/29/21 1551)   HYDROmorphone (PF) (DILAUDID) injection 0.5 mg (0.5 mg Intravenous Given 6/29/21 3597)     Drips infusing:  No  For the majority of the shift, the patient's behavior Green. Interventions performed were rounding.    Sepsis treatment initiated: No     Patient tested for COVID 19 prior to admission: YES    ED Nurse Name/Phone Number: Lawanda Norman RN,   6:33 PM    RECEIVING UNIT ED HANDOFF REVIEW    Above ED Nurse Handoff Report was reviewed: Yes  Reviewed by: Danielle Escobar RN on June 29, 2021 at 8:08 PM

## 2021-06-30 VITALS
OXYGEN SATURATION: 93 % | WEIGHT: 197.4 LBS | TEMPERATURE: 96.4 F | HEIGHT: 71 IN | SYSTOLIC BLOOD PRESSURE: 147 MMHG | RESPIRATION RATE: 16 BRPM | HEART RATE: 62 BPM | BODY MASS INDEX: 27.64 KG/M2 | DIASTOLIC BLOOD PRESSURE: 52 MMHG

## 2021-06-30 LAB
ANION GAP SERPL CALCULATED.3IONS-SCNC: 2 MMOL/L (ref 3–14)
BUN SERPL-MCNC: 20 MG/DL (ref 7–30)
CALCIUM SERPL-MCNC: 8.4 MG/DL (ref 8.5–10.1)
CHLORIDE SERPL-SCNC: 108 MMOL/L (ref 94–109)
CO2 SERPL-SCNC: 29 MMOL/L (ref 20–32)
CREAT SERPL-MCNC: 0.88 MG/DL (ref 0.66–1.25)
ERYTHROCYTE [DISTWIDTH] IN BLOOD BY AUTOMATED COUNT: 12.5 % (ref 10–15)
GFR SERPL CREATININE-BSD FRML MDRD: 78 ML/MIN/{1.73_M2}
GLUCOSE BLDC GLUCOMTR-MCNC: 92 MG/DL (ref 70–99)
GLUCOSE BLDC GLUCOMTR-MCNC: 94 MG/DL (ref 70–99)
GLUCOSE SERPL-MCNC: 86 MG/DL (ref 70–99)
HCT VFR BLD AUTO: 37 % (ref 40–53)
HGB BLD-MCNC: 12.6 G/DL (ref 13.3–17.7)
INTERPRETATION ECG - MUSE: NORMAL
MCH RBC QN AUTO: 31.2 PG (ref 26.5–33)
MCHC RBC AUTO-ENTMCNC: 34.1 G/DL (ref 31.5–36.5)
MCV RBC AUTO: 92 FL (ref 78–100)
PLATELET # BLD AUTO: 138 10E9/L (ref 150–450)
POTASSIUM SERPL-SCNC: 3.9 MMOL/L (ref 3.4–5.3)
RBC # BLD AUTO: 4.04 10E12/L (ref 4.4–5.9)
SODIUM SERPL-SCNC: 139 MMOL/L (ref 133–144)
WBC # BLD AUTO: 4.8 10E9/L (ref 4–11)

## 2021-06-30 PROCEDURE — 36415 COLL VENOUS BLD VENIPUNCTURE: CPT | Performed by: INTERNAL MEDICINE

## 2021-06-30 PROCEDURE — 80048 BASIC METABOLIC PNL TOTAL CA: CPT | Performed by: INTERNAL MEDICINE

## 2021-06-30 PROCEDURE — 250N000013 HC RX MED GY IP 250 OP 250 PS 637: Performed by: PHYSICIAN ASSISTANT

## 2021-06-30 PROCEDURE — G0463 HOSPITAL OUTPT CLINIC VISIT: HCPCS | Performed by: PHYSICIAN ASSISTANT

## 2021-06-30 PROCEDURE — 99217 PR OBSERVATION CARE DISCHARGE: CPT | Performed by: PHYSICIAN ASSISTANT

## 2021-06-30 PROCEDURE — 999N001017 HC STATISTIC GLUCOSE BY METER IP

## 2021-06-30 PROCEDURE — 250N000013 HC RX MED GY IP 250 OP 250 PS 637: Performed by: INTERNAL MEDICINE

## 2021-06-30 PROCEDURE — 85027 COMPLETE CBC AUTOMATED: CPT | Performed by: INTERNAL MEDICINE

## 2021-06-30 PROCEDURE — G0378 HOSPITAL OBSERVATION PER HR: HCPCS

## 2021-06-30 RX ORDER — POLYETHYLENE GLYCOL 3350 17 G/17G
17 POWDER, FOR SOLUTION ORAL DAILY
Qty: 119 G | Refills: 0 | Status: SHIPPED | OUTPATIENT
Start: 2021-06-30 | End: 2024-05-19

## 2021-06-30 RX ORDER — AMLODIPINE BESYLATE 2.5 MG/1
2.5 TABLET ORAL DAILY
Qty: 20 TABLET | Refills: 0 | Status: ON HOLD | OUTPATIENT
Start: 2021-07-01 | End: 2024-05-24

## 2021-06-30 RX ORDER — SERTRALINE HYDROCHLORIDE 100 MG/1
100 TABLET, FILM COATED ORAL EVERY MORNING
Status: DISCONTINUED | OUTPATIENT
Start: 2021-06-30 | End: 2021-06-30 | Stop reason: HOSPADM

## 2021-06-30 RX ORDER — AMOXICILLIN 250 MG
1 CAPSULE ORAL 2 TIMES DAILY
COMMUNITY
Start: 2021-06-30

## 2021-06-30 RX ORDER — TRAMADOL HYDROCHLORIDE 50 MG/1
50 TABLET ORAL EVERY 6 HOURS PRN
Qty: 20 TABLET | Refills: 0 | Status: SHIPPED | OUTPATIENT
Start: 2021-06-30 | End: 2024-05-19

## 2021-06-30 RX ORDER — GABAPENTIN 300 MG/1
300 CAPSULE ORAL 2 TIMES DAILY
Status: DISCONTINUED | OUTPATIENT
Start: 2021-06-30 | End: 2021-06-30 | Stop reason: HOSPADM

## 2021-06-30 RX ORDER — ATORVASTATIN CALCIUM 10 MG/1
10 TABLET, FILM COATED ORAL EVERY MORNING
Status: DISCONTINUED | OUTPATIENT
Start: 2021-06-30 | End: 2021-06-30 | Stop reason: HOSPADM

## 2021-06-30 RX ORDER — CYCLOBENZAPRINE HCL 5 MG
5 TABLET ORAL 3 TIMES DAILY PRN
Qty: 30 TABLET | Refills: 0 | Status: SHIPPED | OUTPATIENT
Start: 2021-06-30 | End: 2024-05-19

## 2021-06-30 RX ORDER — AMLODIPINE BESYLATE 2.5 MG/1
2.5 TABLET ORAL DAILY
Status: DISCONTINUED | OUTPATIENT
Start: 2021-06-30 | End: 2021-06-30 | Stop reason: HOSPADM

## 2021-06-30 RX ORDER — ACETAMINOPHEN 500 MG
1000 TABLET ORAL 3 TIMES DAILY
COMMUNITY
Start: 2021-06-30 | End: 2024-05-19

## 2021-06-30 RX ADMIN — METFORMIN HYDROCHLORIDE 850 MG: 850 TABLET, FILM COATED ORAL at 09:04

## 2021-06-30 RX ADMIN — AMLODIPINE BESYLATE 2.5 MG: 2.5 TABLET ORAL at 09:04

## 2021-06-30 RX ADMIN — SERTRALINE HYDROCHLORIDE 100 MG: 100 TABLET ORAL at 09:04

## 2021-06-30 RX ADMIN — ATORVASTATIN CALCIUM 10 MG: 10 TABLET, FILM COATED ORAL at 09:04

## 2021-06-30 RX ADMIN — PREGABALIN 25 MG: 25 CAPSULE ORAL at 09:04

## 2021-06-30 RX ADMIN — TRAMADOL HYDROCHLORIDE 50 MG: 50 TABLET, FILM COATED ORAL at 10:12

## 2021-06-30 RX ADMIN — DOCUSATE SODIUM AND SENNOSIDES 2 TABLET: 8.6; 5 TABLET, FILM COATED ORAL at 10:12

## 2021-06-30 RX ADMIN — GABAPENTIN 300 MG: 300 CAPSULE ORAL at 09:04

## 2021-06-30 ASSESSMENT — MIFFLIN-ST. JEOR: SCORE: 1602.53

## 2021-06-30 NOTE — PLAN OF CARE
PRIMARY DIAGNOSIS: Low back pain   OUTPATIENT/OBSERVATION GOALS TO BE MET BEFORE DISCHARGE:  1. Pain Status: Improved-controlled with oral pain medications.    2. Return to near baseline physical activity: No    3. Cleared for discharge by consultants (if involved): No    Discharge Planner Nurse   Safe discharge environment identified: Yes  Barriers to discharge: Yes       Entered by: Danielle Escobar 06/29/2021 11:02 PM     Please review provider order for any additional goals.   Nurse to notify provider when observation goals have been met and patient is ready for discharge.    Temp: 97.7  F (36.5  C) Temp src: Oral BP: (!) 172/74 Pulse: 61   Resp: 17 SpO2: 96 % O2 Device: None (Room air)       A&O. Up Ax1 with gait belt and cane. Rates lower back pain 4/10, scheduled lyrica and prn tramadol given. Pt requested 2 senna as well as he has trouble with constipation. Blood sugar checked pt 99, regular diet- fair appetite. Plan- pain management, PT/OT consults, pain consult, neuro consult, SW consult.

## 2021-06-30 NOTE — PLAN OF CARE
ROOM # 212-2    Living Situation (if not independent, order SW consult):  Facility name:  : [facility], pt lives with girlfriend at [facility] in [facility]    Activity level at baseline: Ind w/ cane  Activity level on admit: Ax1 w/ cane       Patient registered to observation; given Patient Bill of Rights; given the opportunity to ask questions about observation status and their plan of care.  Patient has been oriented to the observation room, bathroom and call light is in place.    Discussed discharge goals and expectations with patient/family.

## 2021-06-30 NOTE — PLAN OF CARE
PRIMARY DIAGNOSIS: ACUTE PAIN  OUTPATIENT/OBSERVATION GOALS TO BE MET BEFORE DISCHARGE:  1. Pain Status: Improved but still requiring IV narcotics.    2. Return to near baseline physical activity: No    3. Cleared for discharge by consultants (if involved): No    Vitals are Temp: 98  F (36.7  C) Temp src: Oral BP: (!) 163/62 Pulse: 61   Resp: 16 SpO2: 94 %.  Patient is Alert and Oriented x4. They are 1 Assist with Gait Belt and Cane. Pt is a Regular diet.  They are complaining of 5/10 pain in their lower back.  Declines need for intervention.  Patient is [place] locked. Social work, PT/OT, pain, and neuro have been consulted. Hydralazine PRN if SBP >180.       Discharge Planner Nurse   Safe discharge environment identified: Yes Probable return to [facility]  Barriers to discharge: Yes       Entered by: Noy Santacruz 06/30/2021 12:19 AM     Please review provider order for any additional goals.   Nurse to notify provider when observation goals have been met and patient is ready for discharge.

## 2021-06-30 NOTE — CONSULTS
Care Management Discharge Note    Discharge Date: 06/30/21(/[facility])     Discharge Disposition:  Return to [facility]     Discharge Services:  None    Discharge Transportation:  Family    Private pay costs discussed: Not applicable    Handoff Referral Completed: No    Additional Information:  SW consulted for discharge planning. SW spoke with RN Esmer at [facility], who reports that pt resides at [facility] without any services. Discharge orders to return home today. No PT or SW needs.     KWADWO Subramanian

## 2021-06-30 NOTE — PLAN OF CARE
PRIMARY DIAGNOSIS: ACUTE PAIN  OUTPATIENT/OBSERVATION GOALS TO BE MET BEFORE DISCHARGE:  1. Pain Status: C/o pain 5/10 but declines need for pain medication.    2. Return to near baseline physical activity: No    3. Cleared for discharge by consultants (if involved): No    Vitals are Temp: 96.6  F (35.9  C) Temp src: Oral BP: (!) 141/55 Pulse: 60   Resp: 16 SpO2: 93 %.  Patient is Alert and Oriented x4. They are 1 Assist with Gait Belt and Cane.  Pt is a Regular diet.  They are complaining of 5/10 pain in their back.  Declines need for intervention.  Patient is [place] locked. Social work, PT/OT, pain, and neuro have been consulted. Hydralazine PRN if SBP >180.      Discharge Planner Nurse   Safe discharge environment identified: Yes, Probable return to [facility]  Barriers to discharge: Yes       Entered by: Noy Santacruz 06/30/2021 4:04 AM     Please review provider order for any additional goals.   Nurse to notify provider when observation goals have been met and patient is ready for discharge.

## 2021-06-30 NOTE — CONSULTS
NEUROSURGERY CONSULT    Neurosurgery was asked by Dr. Sepulveda to consult for lumbar spine pain.      PLAN:  Mr. Peguero's most recent imaging exhibits moderate facet arthropathy and dorsal ligamentous hypertrophy contributing to moderate central spinal canal stenosis at the L3-4 level.  Mr. Peguero states that his pain has significantly improved over the past 24 hours and really is not interested in any type of surgical intervention or any type of intervention other than pain medications.    We feel that it would be in his best interest to proceed with a conservative approach by pain management set forth by the Medical team here at [facility]. We do suggest that he work with physical therapy while here as an inpatient.     Should he not obtain appropriate relief with medical management, a referral to Interventional Radiology may be appropriate for consideration of an epidural steroid injection, although he is on Xarelto and is diabetic.     It has been a pleasure working with Mr. Peguero. For now, he no longer requires additional Neurosurgical follow-up, but if concerns arise we would gladly assist with this. We did discuss signs of a worsening problem that he should seek being evaluated.     We will sign off at this point. Please page our on-call service for any questions or concerns.     ______________________________________________________________________    HPI:    Cresencio Peguero is a pleasant 86 year old male who presented to the [facility] Emergency Department yesterday for consultation on lumbar spine pain. Pertinent medical history consist of lumbar surgery performed at [facility].   He describes the symptoms as a constant, sharp, but improving pain that initiates in the midline low lumbar region but does not radiate distally in what-so-ever. This pain is not accompanied with paresthesia, numbness or weakness. The symptoms have been exacerbating for 48 hours approximately. Neither a traumatic  or provocative mechanism can be appreciated. There are no bowel or bladder changes. No other concerns are voiced.    Past Medical History:   Diagnosis Date     Anxiety      Arthritis      CAD (coronary artery disease)     HX of stent,  CABG x2     Diabetes (H)      Hearing loss      Hyperlipidemia      Hypertension      Neuropathy      Sensory ataxia      Stented coronary artery        Past Surgical History:   Procedure Laterality Date     BACK SURGERY      cervical laminectomy     BACK SURGERY      lumbar laminectomy     CARDIAC SURGERY      Stent,  CABGx2     EYE SURGERY      cataract     EYE SURGERY      corneal surgery     HERNIORRHAPHY INGUINAL Right 8/15/2016    Procedure: HERNIORRHAPHY INGUINAL;  Surgeon: Wu Adam MD;  Location: [facility] OR     LAPAROSCOPIC LYSIS ADHESIONS N/A 7/7/2017    Procedure: LAPAROSCOPIC LYSIS ADHESIONS;;  Surgeon: Sumeet Joiner MD;  Location: [facility] OR     LAPAROSCOPY DIAGNOSTIC (GENERAL) N/A 7/7/2017    Procedure: LAPAROSCOPY DIAGNOSTIC (GENERAL);  DIAGNOSTIC LAPAROSCOPY WITH LYSIS OF ADHESIONS ;  Surgeon: Sumeet Joiner MD;  Location: [facility] OR     ORTHOPEDIC SURGERY      rotator cuff repair,shoulder arthroplasty     REVERSE ARTHROPLASTY SHOULDER Right 8/12/2020    Procedure: RIGHT REVERSE TOTAL SHOULDER ARTHROPLASTY;  Surgeon: Adarsh Mello MD;  Location:  OR     TONSILLECTOMY         Allergies   Allergen Reactions     Percodan [Oxycodone-Aspirin] Rash       Social History     Tobacco Use     Smoking status: Current Every Day Smoker     Types: Pipe   Substance Use Topics     Alcohol use: Yes     Alcohol/week: 0.0 standard drinks     Comment: 7 glasses wine per week       No family history on file.    Scheduled Medications      amLODIPine  2.5 mg Oral Daily     atorvastatin  10 mg Oral QAM     gabapentin  300 mg Oral BID     insulin aspart  1-7 Units Subcutaneous TID AC     insulin aspart  1-5 Units Subcutaneous At Bedtime     metFORMIN  850 mg Oral  "Daily with breakfast     pregabalin  25 mg Oral TID     sertraline  100 mg Oral QAM       Home Medications  Lipitor  Neurontin  Atarax  Glucophage  Miralax  Xarelto  Senna-docusate  Zoloft  Ultram    PRN Medications    acetaminophen, acetaminophen, cyclobenzaprine, glucose **OR** dextrose **OR** glucagon, gabapentin, hydrALAZINE, HYDROmorphone, ketorolac, magnesium hydroxide, melatonin, naloxone **OR** naloxone **OR** naloxone **OR** naloxone, ondansetron **OR** ondansetron, senna-docusate **OR** senna-docusate, traMADol    ROS: 10 point ROS neg other than the symptoms noted above in the HPI.    Vitals:    BP (!) 147/52 (BP Location: Left arm)   Pulse 62   Temp 96.4  F (35.8  C) (Oral)   Resp 16   Ht 5' 11\" (1.803 m)   Wt 197 lb 6.4 oz (89.5 kg)   SpO2 93%   BMI 27.53 kg/m    Body mass index is 27.53 kg/m .  No intake/output data recorded.    Exam:    Patient appears comfortable, conversational, and in no apparent distress.   Head: Normocephalic, without obvious abnormality, atraumatic, no facial asymmetry.   Eyes: conjunctivae/corneas clear. PERRL, EOM's intact.   Throat: lips, mucosa, and tongue normal; teeth and gums normal.   Neck: supple, symmetrical, trachea midline, no adenopathy and thyroid: not enlarged, symmetric, no tenderness/mass/nodules.   Lungs: clear to auscultation bilaterally.   Heart: regular rate and rhythm.   Abdomen: soft, non-tender; bowel sounds normal; no masses, no organomegaly.   Pulses: 2+ and symmetric.   Skin: Skin color, texture, turgor normal. No rashes or lesions.     CN II-XII grossly intact, alert and appropriate with conversation and following commands.   Cervical spine is non tender to palpation. Appropriate range of motion of neck, not concerning for lhermitte's phenomenon.   Bilateral bicep 2/4 and tricep reflexes 1/4. Sensation intact throughout upper extremities.     UE muscle strength  Right  Left    Deltoid  5/5  5/5    Biceps  5/5  5/5    Triceps  5/5  5/5    Hand " intrinsics  5/5  5/5    Hand grasp  5/5  5/5    Pacheco signs  neg  neg      Lumbar spine is non tender to palpation   Intact sensation throughout lower extremities.   Bilateral patellar 2/4 and achilles reflex 1/4. Negative for pain with straight leg raise.     LE muscle strength  Right  Left    Iliopsoas (hip flexion)  5/5  5/5    Quad (knee extension)  5/5  5/5    Hamstring (knee flexion)  5/5  5/5    Gastrocnemius (PF)  5/5  5/5    Tibialis Ant. (DF)  5/5  5/5    EHL  5/5  5/5      Negative Babinski bilaterally. Negative for clonus.   Calves are soft and non-tender bilaterally.     ECG/Telemetry:  ECG taken at 1845, ECG read at 1849  Sinus rhythm with 1st degree AV block  Minimal voltage criteria for LVH, may be normal variant  Septal infarct, age undetermined   Rate 60 bpm. AZ interval 214 ms. QRS duration 108 ms. QT/QTc 428/428 ms. P-R-T axes 21 -14 11.     Imaging: CT LUMBAR SPINE W/O CONTRAST 6/29/2021 4:19 PM  Impression per radiology read - I have personally reviewed the images with the patient.    1.  Postoperative changes of previous posterior decompression at L3  and L5.  2.  Stimulator device present within the dorsal spinal canal extends  off the cephalad aspect of the exam.  3.  No fracture.  4.  At L3-L4 suspect that moderate facet arthropathy and dorsal  ligamentous hypertrophy contributes to moderate central spinal canal  stenosis, although dilatation is somewhat limited by CT. Moderate  neural foraminal narrowing at this level.  5.  At L1-L2 there is mild spinal canal and foraminal narrowing.  6.  At L2-L3 there is mild spinal canal stenosis and moderate  foraminal narrowing.  7.  There is moderate to severe neural foraminal narrowing at L4-L5  and on the left at L5-S1.    Available labs at time of consult:       Recent Labs   Lab 06/30/21  0530 06/29/21 1953   WBC 4.8 5.4   HGB 12.6* 13.8   HCT 37.0* 40.8   MCV 92 94   * 150     Recent Labs   Lab 06/30/21  0530 06/29/21 1953   WBC 4.8  5.4   HGB 12.6* 13.8   HCT 37.0* 40.8   MCV 92 94   * 150     No results for input(s): SED, CRP in the last 168 hours.  Recent Labs   Lab 06/30/21  0530 06/29/21 1953   HGB 12.6* 13.8     No results for input(s): INR in the last 168 hours.  No results for input(s): LIPASE in the last 168 hours.  Recent Labs   Lab 06/30/21  0530 06/29/21 1953   * 150     Recent Labs   Lab 06/30/21 0530 06/29/21 1953   WBC 4.8 5.4         Respectfully,    SAMMY Bishop, PA-C  [facility]  [facility]     Tel: [phone]      All imaging, physical findings, and the above plan have been reviewed with Dr. Virgen.

## 2021-06-30 NOTE — PHARMACY-ADMISSION MEDICATION HISTORY
Admission medication history interview status for this patient is complete. See [facility] admission navigator for allergy information, prior to admission medications and immunization status.     Medication history interview done, indicate source(s): Patient  Medication history resources (including written lists, pill bottles, clinic record):EPIC    Changes made to PTA medication list:  Added: none  Deleted: xarelto -  rx ( 7 days) on 20  Changed: Gabapentin, Senna to prn, Tylenol prn dose.    Medication reconciliation/reorder completed by provider prior to medication history?  N      Prior to Admission medications    Medication Sig Last Dose Taking? Auth Provider   acetaminophen (TYLENOL) 500 MG tablet Take 1,000 mg by mouth 2 times daily as needed for mild pain 2021 at Unknown time Yes Unknown, Entered By History   atorvastatin (LIPITOR) 10 MG tablet Take 10 mg by mouth every morning  2021 at Unknown time Yes Unknown, Entered By History   gabapentin (NEURONTIN) 300 MG capsule Take 300 mg by mouth 2 times daily 2021 at am Yes Unknown, Entered By History   metFORMIN (GLUCOPHAGE) 850 MG tablet Take 850 mg by mouth daily (with breakfast)  2021 at Unknown time Yes Unknown, Entered By History   polyethylene glycol (MIRALAX/GLYCOLAX) powder Take 17 g by mouth daily as needed for constipation (constipation) 2021 Yes Christelle Bear PA-C   senna-docusate (SENOKOT-S/PERICOLACE) 8.6-50 MG tablet Take 1 tablet by mouth 2 times daily as needed for constipation  Yes Unknown, Entered By History   sertraline (ZOLOFT) 100 MG tablet Take 100 mg by mouth every morning 2021 at Unknown time Yes Reported, Patient

## 2021-06-30 NOTE — PLAN OF CARE
Patient's After Visit Summary was reviewed with patient and/or spouse.   Patient verbalized understanding of After Visit Summary, recommended follow up and was given an opportunity to ask questions.   Discharge medications sent home with patient/family: YES   Discharged with spouse.    OBSERVATION patient END time: 2:48 PM

## 2021-06-30 NOTE — DISCHARGE SUMMARY
[facility]  Discharge Summary  Hospitalist    Date of Admission:  6/29/2021  Date of Discharge:  6/30/2021    Discharging Provider: Jojo West PAC    Discharge Diagnoses   1. Acute on chronic low back pain, atraumatic, improving  2. Constipation, severe  3. Hypertensive urgency, starting low-dose amlodipine  4. DM2, well controlled on oral medications    Discharge Disposition     Discharged to home    Condition at Discharge:  Stable    History of Present Illness   Cresencio Peguero is a 86 year old male with chronic low back pain s/p DCS 2016, ASCVD s/p CABG 2003, NIDDM2 with neuropathy, DLD, and OA who presented to [facility] ED on 6/29/2021 with acute on chronic low back pain and constipation.  LBM 3 days ago.  No loss of bladder or bowel control.  No abdominal pain, lightheadedness or dizziness, chest pain, shortness of breath.  In the ED, noted to have /110.  No formal diagnosis of hypertension.  Lives with chronic back pain for which he takes Tylenol.      Unable to do MRI 2/2 DCS.  CT Lumbar Spine performed and showed multilevel degenerative changes contributing to mild-to-moderate central stenosis and moderate-to-severe L4-5 foraminal stenosis.  Patient received hydromorphone in ED without improvement in symptoms.  Received 5 mg oral amlodipine with normalization of BP.  Brought in for evaluation of back pain.    Patient seen this morning where he reports he is feeling better than he did yesterday.  Blood pressures are better controlled, currently 147/52.  He now believes that his back pain is likely related to constipation.  He is prone to constipation and uses as needed MiraLAX and milk of magnesia, usually with good results.  After discussion, he is open to start of low-dose amlodipine for blood pressure control.  He was offered a chance to take bowel medications in house, but would prefer to try to have a BM at home.  Recommended scheduling MiraLAX and using his normal milk of  magnesia at home.  He can also trial senna if needed.  For severe symptoms, he written prescription for lactulose has been provided.      Additionally, patient believes his back pain will improve once he has a BM.  His wife requests several tablets of pain medication to go home with in case pain becomes severe.  Patient and wife are agreeable to following up with his primary care provider regarding blood pressures, pain, and constipation.     HUONG Harmon    Code Status   Full Code       Primary Care Physician   Parrish Padgett    Consultations This Hospital Stay   CARE MANAGEMENT / SOCIAL WORK IP CONSULT  PHYSICAL THERAPY ADULT IP CONSULT  OCCUPATIONAL THERAPY ADULT IP CONSULT  PAIN MANAGEMENT ADULT IP CONSULT  NEUROSURGERY IP CONSULT    Time Spent on this Encounter   I, HUONG Harmon, personally saw the patient today and spent greater than 30 minutes discharging this patient.    Allergies   Allergies   Allergen Reactions     Percodan [Oxycodone-Aspirin] Rash       Physical Exam   Temp: 96.4  F (35.8  C) Temp src: Oral BP: (!) 147/52 Pulse: 62   Resp: 16 SpO2: 93 % O2 Device: None (Room air)    Vitals:    06/29/21 1528 06/29/21 2030 06/30/21 0351   Weight: 86.2 kg (190 lb) 89.6 kg (197 lb 8 oz) 89.5 kg (197 lb 6.4 oz)     Vital Signs with Ranges  Temp:  [96.4  F (35.8  C)-98  F (36.7  C)] 96.4  F (35.8  C)  Pulse:  [59-66] 62  Resp:  [16-18] 16  BP: (141-226)/() 147/52  SpO2:  [93 %-99 %] 93 %  No intake/output data recorded.      GENERAL:  Pleasant, cooperative, alert. Well developed, well nourished.  Resting comfortably in bed.    HEENT: Normocephalic, atraumatic.  Extra occular mm intact.  Sclera clear. PERRL.  Mucous membranes moist.     PULMONOLOGY: Clear to auscultation bilaterally   CARDIAC: Regular   ABDOMEN: Soft, nondistended  MUSCULOSKELETAL:  Moving x 4 spontaneously with CMS intact x4.  Normal bulk and tone.   NEURO: Alert and oriented x3.  CN II-XII grossly intact and symmetric.   No focal neuro deficits.    Discharge Medications   Current Discharge Medication List      START taking these medications    Details   amLODIPine (NORVASC) 2.5 MG tablet Take 1 tablet (2.5 mg) by mouth daily  Qty: 20 tablet, Refills: 0    Associated Diagnoses: Elevated blood pressure reading without diagnosis of hypertension      cyclobenzaprine (FLEXERIL) 5 MG tablet Take 1 tablet (5 mg) by mouth 3 times daily as needed for muscle spasms  Qty: 30 tablet, Refills: 0    Associated Diagnoses: Acute bilateral low back pain without sciatica      lactulose (CEPHULAC) 20 GM packet Take 1 packet (20 g) by mouth 3 times daily as needed for constipation (is no results with Miralax and milk of magnesia)  Qty: 15 each, Refills: 1    Associated Diagnoses: Constipation, unspecified constipation type      traMADol (ULTRAM) 50 MG tablet Take 1 tablet (50 mg) by mouth every 6 hours as needed (severe pain) . May cause constipation.  Qty: 20 tablet, Refills: 0    Associated Diagnoses: Acute bilateral low back pain without sciatica         CONTINUE these medications which have CHANGED    Details   acetaminophen (TYLENOL) 500 MG tablet Take 2 tablets (1,000 mg) by mouth 3 times daily      polyethylene glycol (MIRALAX) 17 GM/Dose powder Take 17 g by mouth daily (constipation)  Qty: 119 g, Refills: 0    Associated Diagnoses: Internal hernia; SBO (small bowel obstruction) (H)      senna-docusate (SENOKOT-S/PERICOLACE) 8.6-50 MG tablet Take 1-2 tablets by mouth 2 times daily as needed for constipation         CONTINUE these medications which have NOT CHANGED    Details   atorvastatin (LIPITOR) 10 MG tablet Take 10 mg by mouth every morning       gabapentin (NEURONTIN) 300 MG capsule Take 300 mg by mouth 2 times daily      magnesium hydroxide (MILK OF MAGNESIA) 400 MG/5ML suspension Take 30 mLs by mouth daily as needed for constipation or heartburn      metFORMIN (GLUCOPHAGE) 850 MG tablet Take 850 mg by mouth daily (with breakfast)        sertraline (ZOLOFT) 100 MG tablet Take 100 mg by mouth every morning             Data   Most Recent 3 CBC's:  Recent Labs   Lab Test 06/30/21  0530 06/29/21 1953 08/14/20  0701   WBC 4.8 5.4 5.4   HGB 12.6* 13.8 10.2*   MCV 92 94 91   * 150 112*      Most Recent 3 BMP's:  Recent Labs   Lab Test 06/30/21  0530 06/29/21 1953 08/14/20  0701    136 134   POTASSIUM 3.9 4.2 3.9   CHLORIDE 108 106 103   CO2 29 28 25   BUN 20 23 17   CR 0.88 0.96 0.86   ANIONGAP 2* 2* 6   ISIS 8.4* 9.2 8.0*   GLC 86 88 129*     Most Recent TSH, T4 and A1c Labs:  Recent Labs   Lab Test 06/29/21 1953   A1C 6.0*     Results for orders placed or performed during the hospital encounter of 06/29/21   Lumbar spine CT w/o contrast    Narrative    CT LUMBAR SPINE W/O CONTRAST 6/29/2021 4:19 PM    INDICATION: atraumatic low back pain  TECHNIQUE: CT scan of the lumbar spine without contrast. Dose  reduction techniques were used.  COMPARISON: Abdomen pelvis CT 7/17/2017    FINDINGS:   Examination assumes 5 lumbar bodies. Normal vertebral body heights.  Low-grade retrolisthesis of L2 on L3. Prior L3 and L5 decompressive  laminectomies. Stimulator device is present within the dorsal spinal  canal, entering at the T12-L1 interlaminar level and extending off the  cephalad aspect of the acquired cross-sectional images. No fracture.  Unremarkable paraspinal soft tissues.    T12-L1: Preserved interspace. No herniation. Unremarkable facets. No  stenosis.    L1-L2: Mild interspace narrowing. Shallow disc bulge. Mild facet  arthropathy. Mild spinal canal and caudal neural foraminal narrowing.    L2-L3: Advanced degenerative interspace narrowing. Disc osteophyte  complex. Posterior decompression. Mild spinal canal stenosis. Moderate  foraminal narrowing.    L3-L4: Preserved interspace height. Minor annular bulging. Moderate  facet arthropathy. Suspect that dorsal ligamentous degeneration  contributes to moderate central spinal canal stenosis  although this is  not particularly well visualized on CT. Moderate neural foraminal  narrowing.    L4-L5: Advanced degenerative interspace narrowing. Shallow  superimposed disc osteophyte complex. No central stenosis. Moderate to  severe foraminal narrowing.    L5-S1: Mild posterior disc space narrowing. No significant disc  herniation. Mild-to-moderate facet arthropathy. Adequate central  canal. Moderate to severe left and moderate right foraminal narrowing.      Impression    IMPRESSION:  1.  Postoperative changes of previous posterior decompression at L3  and L5.  2.  Stimulator device present within the dorsal spinal canal extends  off the cephalad aspect of the exam.  3.  No fracture.  4.  At L3-L4 suspect that moderate facet arthropathy and dorsal  ligamentous hypertrophy contributes to moderate central spinal canal  stenosis, although dilatation is somewhat limited by CT. Moderate  neural foraminal narrowing at this level.  5.  At L1-L2 there is mild spinal canal and foraminal narrowing.  6.  At L2-L3 there is mild spinal canal stenosis and moderate  foraminal narrowing.  7.  There is moderate to severe neural foraminal narrowing at L4-L5  and on the left at L5-S1.    ADAM BUNCH MD       Discharge Orders      Reason for your hospital stay    You were brought in for back pain, weakness, and found to have constipation and very high blood pressure.  Back pain is now better controlled but you voiced concerns it may be caused by constipation.  Please take Miralax EVERY DAY.  You can even take it twice per day if you are noticing more frequent constipation.  Miralax will help prevent constipation but isn't always the best plan to treat it.  For acute episodes of constipation, please use Senna-S (senna-docusate) 1-2 tablets twice daily or Milk of Magnesia.  For back pain, please take Tylenol on a schedule, 1000 mg (2 tablets) three times per day.  You can take a low-dose muscle relaxer for spasms or try  Tramadol for severe pain.  Tramadol can be constipating so please use sparingly.  You have been started on a low dose blood pressure medication called amlodipine to help with elevated BP.  If your BP remains elevated, this dose may need to be increased.  Please follow closely with your Primary Care Provider for BP, constipation, and back pain.     Follow-up and recommended labs and tests     Follow-up with PMD in 2-3 days.     Monitor and record    blood pressure daily and report levels above 160 to your primary doctor.     When to contact your care team    Call your primary doctor if you have any of the following: temperature greater than 101, worsening shortness of breath, increased swelling, worsening pain, new or unrelenting diarrhea, dizziness/passing out, falls, bleeding that doesn't stop, uncontrolled pain, loss of taste, loss of smell, or any other new or concerning symptoms.  Call 911 or go to the Emergency Room if you need immediate assistance.

## 2021-06-30 NOTE — H&P
[facility]  History and Physical  Hospitalist - Ajay Sepulveda DO       Date of Admission:  6/29/2021    Chief Complaint   Low back pain    History is obtained from the patient, the emergency department physician, as well as the electronic medical record.    History of Present Illness   Cresencio Peguero is a 86 year old male with past medical history of chronic cervical and low back pain status post lumbar laminectomy in 2002 and cervical laminectomy in 1989, status post lumbar spinal stimulator in 2016, coronary artery disease status post CABG in 2003, diabetes mellitus type 2 with neuropathy, hyperlipidemia, osteoarthritis who presented on 6/29/2021 with chief complaint of low back pain.  The patient states that he deals with issues of constipation.  He had not had a bowel movement for 3 days.  Yesterday he had a large bowel movement and developed low back pain after that.  He describes it as sharp and does not radiate.  He denies any loss of bladder or bowel control.  He states nothing seems to make it better.  He denies any abdominal pain, lightheadedness or dizziness, chest pain, shortness of breath.  He denies ever being diagnosed with high blood pressure.  He does report that his wife and him are selling their house with closing date tomorrow.  They are moving to a senior living center just down the street.    In the emergency department the patient was noted to have a blood pressure of 226/110 at its peak.  The patient states that he checks his blood pressure every couple of days and it usually runs around 120/80.  Secondary to the patient having a spinal stimulator no MRI was performed.  The patient did have a CT lumbar spine that showed L3-L4 moderate facet arthropathy and dorsal ligamentous hypertrophy contributing to moderate central spinal canal stenosis, moderate neural foraminal narrowing at that level, L1-L2 mild spinal canal and foraminal narrowing, L2-L3 mild spinal canal  stenosis and moderate foraminal narrowing, moderate to severe neural foraminal narrowing at L4-L5 and on the left at L5-S1.  The patient was provided with hydromorphone which made the patient groggy but did not eliminate his back pain.  Pain management was consulted to see the patient as was neurosurgery.  Physical therapy was also asked to evaluate the patient.    ASSESSMENT/PLAN    1.  Acute on Chronic Low Back Pain  2.  Lumbar Spinal Stenosis  - Pain control - added Flexeril, gabapentin prn, lyrica, dilaudid - prefer trial of toradol  - Pain improved with hip flexion  - Consult Pain Management  - Consult Neurosurgery  - Consult PT/OT    3.  Hypertensive Urgency  - Possibly pain related  - Pt given amlodipine 5 mg in ED  - Add Hydralazine prn  - Monitor BP to see where it stabalizes out to once pain is more controlled    4.  Coronary Artery Disease s/p CABG in 2003  - Continue atorvastatin    5.  Type 2 Diabetes Mellitus with Peripheral Neuropathy  - Check A1C  - Resume PTA metformin  - Add insulin sliding scale    PLAN: Resume home medications as appropriate once confirmed by pharmacy.     DVT Prophylaxis: Pneumatic Compression Devices  Code Status: Full Code  Discharge Plan: Expected discharge: Tomorrow, recommended to prior living arrangement once adequate pain management/ tolerating PO medications.      Ajay Sepulveda DO    Primary Care Physician   Parrish Padgett    -----------------------------------------------------------------------------------------------------------------------------------------------------------------------------------------------------    Past Medical History    I have reviewed this patient's medical history and updated it with pertinent information if needed.   Past Medical History:   Diagnosis Date     Anxiety      Arthritis      CAD (coronary artery disease)     HX of stent,  CABG x2     Diabetes (H)      Hearing loss      Hyperlipidemia      Hypertension      Neuropathy       Sensory ataxia      Stented coronary artery        Past Surgical History   I have reviewed this patient's surgical history and updated it with pertinent information if needed.  Past Surgical History:   Procedure Laterality Date     BACK SURGERY      cervical laminectomy     BACK SURGERY      lumbar laminectomy     CARDIAC SURGERY      Stent,  CABGx2     EYE SURGERY      cataract     EYE SURGERY      corneal surgery     HERNIORRHAPHY INGUINAL Right 8/15/2016    Procedure: HERNIORRHAPHY INGUINAL;  Surgeon: Wu Adam MD;  Location: [facility] OR     LAPAROSCOPIC LYSIS ADHESIONS N/A 7/7/2017    Procedure: LAPAROSCOPIC LYSIS ADHESIONS;;  Surgeon: Sumeet Joiner MD;  Location: [facility] OR     LAPAROSCOPY DIAGNOSTIC (GENERAL) N/A 7/7/2017    Procedure: LAPAROSCOPY DIAGNOSTIC (GENERAL);  DIAGNOSTIC LAPAROSCOPY WITH LYSIS OF ADHESIONS ;  Surgeon: Sumeet Joiner MD;  Location: [facility] OR     ORTHOPEDIC SURGERY      rotator cuff repair,shoulder arthroplasty     REVERSE ARTHROPLASTY SHOULDER Right 8/12/2020    Procedure: RIGHT REVERSE TOTAL SHOULDER ARTHROPLASTY;  Surgeon: Adarsh Mello MD;  Location: [facility] OR     TONSILLECTOMY         Prior to Admission Medications   Prior to Admission Medications   Prescriptions Last Dose Informant Patient Reported? Taking?   acetaminophen (TYLENOL) 325 MG tablet  Self Yes No   Sig: Take 325-650 mg by mouth every evening    atorvastatin (LIPITOR) 10 MG tablet  Self Yes No   Sig: Take 10 mg by mouth every morning    gabapentin (NEURONTIN) 100 MG capsule  Self Yes No   Sig: Take 100 mg by mouth 3 times daily    hydrOXYzine (ATARAX) 10 MG tablet   No No   Sig: Take 1-2 tablets (10-20 mg) by mouth every 6 hours as needed for itching   metFORMIN (GLUCOPHAGE) 850 MG tablet  Self Yes No   Sig: Take 850 mg by mouth daily (with breakfast)    polyethylene glycol (MIRALAX/GLYCOLAX) powder  Self No No   Sig: Take 17 g by mouth daily as needed for constipation (constipation)    rivaroxaban ANTICOAGULANT (XARELTO) 10 MG TABS tablet   No No   Sig: Take 1 tablet (10 mg) by mouth daily for 7 days   senna-docusate (SENOKOT-S/PERICOLACE) 8.6-50 MG tablet   No No   Sig: Take 1 tablet by mouth 2 times daily   sertraline (ZOLOFT) 100 MG tablet  Self Yes No   Sig: Take 100 mg by mouth every morning   traMADol (ULTRAM) 50 MG tablet   No No   Sig: Take 1 tablet (50 mg) by mouth every 6 hours as needed for moderate pain      Facility-Administered Medications: None     Allergies   Allergies   Allergen Reactions     Percodan [Oxycodone-Aspirin] Rash       Social History   I have reviewed this patient's social history and updated it with pertinent information if needed. Cresencio Peguero  reports that he has been smoking pipe. He does not have any smokeless tobacco history on file. He reports current alcohol use.    Family History   I have reviewed this patient's family history and updated it with pertinent information if needed.   Noncontributory  -----------------------------------------------------------------------------------------------------------------------------------------------------------------------------------------------------    Review of Systems   The 10 point Review of Systems is negative other than noted in the HPI or here.     Physical Exam   Temp: 97.6  F (36.4  C) Temp src: Temporal BP: (!) 199/114 Pulse: 63   Resp: 18 SpO2: 98 % O2 Device: None (Room air)    Vital Signs with Ranges  Temp:  [97.6  F (36.4  C)] 97.6  F (36.4  C)  Pulse:  [59-66] 63  Resp:  [18] 18  BP: (166-226)/() 199/114  SpO2:  [95 %-99 %] 98 %  190 lbs 0 oz    Constitutional: Awake, alert, cooperative, no apparent distress.  Eyes: Conjunctiva and pupils examined and normal.  HEENT: Moist mucous membranes, normal dentition.  Respiratory: Clear to auscultation bilaterally, no crackles or wheezing.  Cardiovascular: Regular rate and rhythm, normal S1 and S2, and no murmur noted.  GI: Soft, non-distended,  non-tender, normal bowel sounds.  Lymph/Hematologic: No anterior cervical or supraclavicular adenopathy.  Skin: No rashes, no cyanosis, no edema.  Musculoskeletal: No joint swelling, erythema or tenderness.  Neurologic: Cranial nerves 2-12 intact, normal strength and sensation, pain improved with hip flexion.  Psychiatric: Alert, oriented to person, place and time, no obvious anxiety or depression.     Data     No lab results found in last 7 days.    Recent Results (from the past 24 hour(s))   Lumbar spine CT w/o contrast    Narrative    CT LUMBAR SPINE W/O CONTRAST 6/29/2021 4:19 PM    INDICATION: atraumatic low back pain  TECHNIQUE: CT scan of the lumbar spine without contrast. Dose  reduction techniques were used.  COMPARISON: Abdomen pelvis CT 7/17/2017    FINDINGS:   Examination assumes 5 lumbar bodies. Normal vertebral body heights.  Low-grade retrolisthesis of L2 on L3. Prior L3 and L5 decompressive  laminectomies. Stimulator device is present within the dorsal spinal  canal, entering at the T12-L1 interlaminar level and extending off the  cephalad aspect of the acquired cross-sectional images. No fracture.  Unremarkable paraspinal soft tissues.    T12-L1: Preserved interspace. No herniation. Unremarkable facets. No  stenosis.    L1-L2: Mild interspace narrowing. Shallow disc bulge. Mild facet  arthropathy. Mild spinal canal and caudal neural foraminal narrowing.    L2-L3: Advanced degenerative interspace narrowing. Disc osteophyte  complex. Posterior decompression. Mild spinal canal stenosis. Moderate  foraminal narrowing.    L3-L4: Preserved interspace height. Minor annular bulging. Moderate  facet arthropathy. Suspect that dorsal ligamentous degeneration  contributes to moderate central spinal canal stenosis although this is  not particularly well visualized on CT. Moderate neural foraminal  narrowing.    L4-L5: Advanced degenerative interspace narrowing. Shallow  superimposed disc osteophyte complex. No  central stenosis. Moderate to  severe foraminal narrowing.    L5-S1: Mild posterior disc space narrowing. No significant disc  herniation. Mild-to-moderate facet arthropathy. Adequate central  canal. Moderate to severe left and moderate right foraminal narrowing.      Impression    IMPRESSION:  1.  Postoperative changes of previous posterior decompression at L3  and L5.  2.  Stimulator device present within the dorsal spinal canal extends  off the cephalad aspect of the exam.  3.  No fracture.  4.  At L3-L4 suspect that moderate facet arthropathy and dorsal  ligamentous hypertrophy contributes to moderate central spinal canal  stenosis, although dilatation is somewhat limited by CT. Moderate  neural foraminal narrowing at this level.  5.  At L1-L2 there is mild spinal canal and foraminal narrowing.  6.  At L2-L3 there is mild spinal canal stenosis and moderate  foraminal narrowing.  7.  There is moderate to severe neural foraminal narrowing at L4-L5  and on the left at L5-S1.    ADAM BUNCH MD

## 2021-06-30 NOTE — PLAN OF CARE
PRIMARY DIAGNOSIS: ACUTE PAIN  OUTPATIENT/OBSERVATION GOALS TO BE MET BEFORE DISCHARGE:  1. Pain Status: Improved-controlled with oral pain medications.    2. Return to near baseline physical activity: Yes    3. Cleared for discharge by consultants (if involved): Yes    Discharge Planner Nurse   Safe discharge environment identified: Yes  Barriers to discharge: No       Entered by: Nicci Finley 06/30/2021   VSS stable and on room air. Patient rates low back pain at 6/10, tramadol given for pain. Patient was able to ambulate in the hallway, tolerated well.   Please review provider order for any additional goals. Nurse to notify provider when observation goals have been met and patient is ready for discharge.

## 2021-10-05 ENCOUNTER — HOSPITAL ENCOUNTER (EMERGENCY)
Facility: CLINIC | Age: 86
Discharge: HOME | End: 2021-10-05
Attending: EMERGENCY MEDICINE | Admitting: EMERGENCY MEDICINE
Payer: MEDICARE

## 2021-10-05 ENCOUNTER — APPOINTMENT (OUTPATIENT)
Dept: GENERAL RADIOLOGY | Facility: CLINIC | Age: 86
End: 2021-10-05
Attending: EMERGENCY MEDICINE
Payer: MEDICARE

## 2021-10-05 VITALS
HEART RATE: 80 BPM | OXYGEN SATURATION: 97 % | TEMPERATURE: 98.5 F | SYSTOLIC BLOOD PRESSURE: 170 MMHG | DIASTOLIC BLOOD PRESSURE: 84 MMHG | RESPIRATION RATE: 16 BRPM | HEIGHT: 71 IN | WEIGHT: 195 LBS | BODY MASS INDEX: 27.3 KG/M2

## 2021-10-05 DIAGNOSIS — K59.00 CONSTIPATION, UNSPECIFIED CONSTIPATION TYPE: ICD-10-CM

## 2021-10-05 DIAGNOSIS — K56.41 FECAL IMPACTION (H): ICD-10-CM

## 2021-10-05 DIAGNOSIS — K64.4 EXTERNAL HEMORRHOIDS: ICD-10-CM

## 2021-10-05 PROCEDURE — 250N000013 HC RX MED GY IP 250 OP 250 PS 637: Performed by: EMERGENCY MEDICINE

## 2021-10-05 PROCEDURE — 99283 EMERGENCY DEPT VISIT LOW MDM: CPT

## 2021-10-05 PROCEDURE — 74019 RADEX ABDOMEN 2 VIEWS: CPT

## 2021-10-05 RX ORDER — HYDROCORTISONE ACETATE 25 MG/1
25 SUPPOSITORY RECTAL 2 TIMES DAILY
Qty: 20 SUPPOSITORY | Refills: 0 | Status: SHIPPED | OUTPATIENT
Start: 2021-10-05 | End: 2021-10-15

## 2021-10-05 RX ORDER — HYDROCORTISONE 25 MG/G
CREAM TOPICAL
Qty: 30 G | Refills: 0 | Status: SHIPPED | OUTPATIENT
Start: 2021-10-05 | End: 2021-10-05

## 2021-10-05 RX ADMIN — DOCUSATE SODIUM 286 ML: 50 LIQUID ORAL at 15:12

## 2021-10-05 ASSESSMENT — ENCOUNTER SYMPTOMS
ABDOMINAL PAIN: 0
VOMITING: 0
CONSTIPATION: 1

## 2021-10-05 ASSESSMENT — MIFFLIN-ST. JEOR: SCORE: 1583.7

## 2021-10-05 NOTE — DISCHARGE INSTRUCTIONS
"What do you do next:   Continue your home medications unless we have specifically changed them  You should take your \"MiraLAX\" once daily for the next 2 weeks. Stay hydrated as well. I will also prescribe magnesium citrate for you to try at home.  Follow up as indicated below    When do you return: If you have complete inability to have a bowel movement, severe abdominal pain, abdominal distention, lightheadedness or fainting, uncontrollable fevers, or any other symptoms that concern you, please return to the ED for reevaluation.    Thank you for allowing us to care for you today.    "

## 2021-10-05 NOTE — ED TRIAGE NOTES
Patient is here for constipation.  He has not had a good BM for three days, states he has had only a drizzle of stool passed.  He has a history of constipation.  No fever, no nausea.  His pain level varies between 7/10 and 10/10.  ABCs intact.  Patient is alert and oriented x3.

## 2021-10-05 NOTE — ED PROVIDER NOTES
"  History     Chief Complaint:    Constipation and Abdominal Pain      HPI   Cresencio Peguero is a 86 year old male who presents with 3 days of constipation.  The patient states he has had minimal stool the last 3 days.  He notes he is struggled for \"20 years\" with constipation.  He does note a prior history of a hernia with some kind of omental problem but does not believe there had to be an omental resection nor has he had any bowel resections.  He denies any vomiting.    Review of Systems   Gastrointestinal: Positive for constipation. Negative for abdominal pain and vomiting.   All other systems reviewed and are negative.      Allergies:  Percodan [Oxycodone-Aspirin]      Medications:      benzocaine (AMERICAINE) 20 % rectal ointment  hydrocortisone (ANUSOL-HC) 25 MG suppository  magnesium citrate solution  acetaminophen (TYLENOL) 500 MG tablet  amLODIPine (NORVASC) 2.5 MG tablet  atorvastatin (LIPITOR) 10 MG tablet  cyclobenzaprine (FLEXERIL) 5 MG tablet  gabapentin (NEURONTIN) 300 MG capsule  lactulose (CEPHULAC) 20 GM packet  magnesium hydroxide (MILK OF MAGNESIA) 400 MG/5ML suspension  metFORMIN (GLUCOPHAGE) 850 MG tablet  polyethylene glycol (MIRALAX) 17 GM/Dose powder  senna-docusate (SENOKOT-S/PERICOLACE) 8.6-50 MG tablet  sertraline (ZOLOFT) 100 MG tablet  traMADol (ULTRAM) 50 MG tablet        Past Medical History:      Past Medical History:   Diagnosis Date     Anxiety      Arthritis      CAD (coronary artery disease)      Diabetes (H)      Hearing loss      Hyperlipidemia      Hypertension      Neuropathy      Sensory ataxia      Stented coronary artery        Past Surgical History:      Past Surgical History:   Procedure Laterality Date     BACK SURGERY      cervical laminectomy     BACK SURGERY      lumbar laminectomy     CARDIAC SURGERY      Stent,  CABGx2     EYE SURGERY      cataract     EYE SURGERY      corneal surgery     HERNIORRHAPHY INGUINAL Right 8/15/2016    Procedure: HERNIORRHAPHY " "INGUINAL;  Surgeon: Wu Adam MD;  Location: [facility] OR     LAPAROSCOPIC LYSIS ADHESIONS N/A 7/7/2017    Procedure: LAPAROSCOPIC LYSIS ADHESIONS;;  Surgeon: Sumeet Joiner MD;  Location: [facility] OR     LAPAROSCOPY DIAGNOSTIC (GENERAL) N/A 7/7/2017    Procedure: LAPAROSCOPY DIAGNOSTIC (GENERAL);  DIAGNOSTIC LAPAROSCOPY WITH LYSIS OF ADHESIONS ;  Surgeon: Sumeet Joiner MD;  Location: [facility] OR     ORTHOPEDIC SURGERY      rotator cuff repair,shoulder arthroplasty     REVERSE ARTHROPLASTY SHOULDER Right 8/12/2020    Procedure: RIGHT REVERSE TOTAL SHOULDER ARTHROPLASTY;  Surgeon: Adarsh Mello MD;  Location: [facility] OR     TONSILLECTOMY         Family History:    No family history on file.    Social History:  Presents the ED with his significant other    Physical Exam     Patient Vitals for the past 24 hrs:   BP Temp Temp src Pulse Resp SpO2 Height Weight   10/05/21 1515 -- -- -- 70 -- 95 % -- --   10/05/21 1510 (!) 190/83 -- -- -- -- -- -- --   10/05/21 1310 (!) 171/85 98.5  F (36.9  C) Oral 83 20 98 % 1.791 m (5' 10.5\") 88.5 kg (195 lb)       Physical Exam  Constitutional: Vital signs reviewed as above.   HENT:    Head: No external signs of trauma noted.   Eyes: Conjunctivae are normal. Pupils are equal, round, and reactive to light.   Cardiovascular:    Normal rate, regular rhythm and normal heart sounds.     Exam reveals no friction rub.     No murmur heard.  Pulmonary/Chest:    Effort normal and breath sounds normal.    No respiratory distress.    There are no wheezes.    There are no rales.   Gastrointestinal:    Soft.    Bowel sounds normal.    There is no distension.    There is no tenderness at the time of my exam.    There is no rebound or guarding.   Rectal    Normal tone   No gross blood   Brown colored stool noted   Small external hemorrhoids noted   No anal fissures noted  Musculoskeletal:    Normal range of motion.    Normal Tone  Neurological: Patient is alert and oriented to person, " place, and time.   Skin: Skin is warm and dry. Patient is not diaphoretic  Psychiatric: The patient appears calm      Emergency Department Course     Laboratory:  Labs Ordered and Resulted from Time of ED Arrival Up to the Time of Departure from the ED - No data to display    Procedures:      Emergency Department Course:    Reviewed:    I reviewed nursing notes, vitals and past history    Assessments/Consults:       ED Course as of Oct 05 1650   Tue Oct 05, 2021   1358 Dr. Rodríguez' evaluation      1504 Rechecked and updated.  We discussed options for constipation.  The patient initially asked about something oral though I told him that things like magnesium citrate would be best done at home.  We discussed things further, and the patient is amenable to trying an enema.  I will also prescribe some medications for his hemorrhoids.      1615 Rechecked and updated. He was unable to hold most of the enema in and nursing notes that he had liquid stool that came out after. After discussion with patient and significant other, I think that discharging with treatment for his hemorrhoids as well as for his constipation (with magnesium citrate) would be a reasonable next step.          Interventions:  Medications   pink lady enema (COMPOUNDED: docusate, magnesium citrate, mineral oil, sodium phosphate) (286 mLs Rectal Given 10/5/21 1512)       Disposition:  The patient was discharged to home.    Impression & Plan      CMS Diagnoses:         Medical Decision Making:  This 86-year-old male patient presents to the ED due to constipation. Please see the HPI and exam for specifics. The patient states he has struggled with this for many years. He has taken some over-the-counter tablet medication for the last couple of days but has not seen relief. He states he does not take the constipation medication daily. In the emergency department, I noted he had some external nonthrombosed hemorrhoids. There was brown stool in the rectum. X-ray  imaging did not reveal a gross obstruction though it gave the suggestion of an ileus (I think this is most likely due to his constipation as there seems to be a stool ball in the lower part of the intestine). We tried an enema though the patient was not able to hold this in very long. There was some soft brown liquid stool that came out afterwards. We had talked previously about doing oral magnesium citrate and I think that would be the next reasonable step for the patient to try at home. I will also prescribe some medications for his hemorrhoids as it may be part of the reason he has been constipated. I recommend that he follow with his primary care clinic in the outpatient setting and return with any new or worsening symptoms. Anticipatory guidance given to patient and significant other prior to discharge.    Critical Care time:  none    Covid-19  Cresencio Peguero was evaluated during a global COVID-19 pandemic, which necessitated consideration that the patient might be at risk for infection with the SARS-CoV-2 virus that causes COVID-19.   Applicable protocols for evaluation were followed during the patient's care.     Diagnosis:    ICD-10-CM    1. Constipation, unspecified constipation type  K59.00    2. External hemorrhoids  K64.4    3. Fecal impaction (H)  K56.41        Discharge Medications:  New Prescriptions    BENZOCAINE (AMERICAINE) 20 % RECTAL OINTMENT    Place rectally every 6 hours as needed for moderate pain    HYDROCORTISONE (ANUSOL-HC) 25 MG SUPPOSITORY    Place 1 suppository (25 mg) rectally 2 times daily for 10 days    MAGNESIUM CITRATE SOLUTION    Take 296 mLs by mouth once for 1 dose          Rishabh Rodríguez, DO  10/05/21 1624       Rishabh Rodríguez, DO  10/05/21 1657

## 2022-05-11 NOTE — ED NOTES
Gastrointestinal - Gastrointestinal Comment: PT HERE FOR CONSTIPATION, HASNT HAD A BM IN 3 DAYS. PT HAS HX OF CONSTIPATION.   
PT UNABLE TO HOLD CONTENTS OF ENEMA, STOOL AND ENEMA LEAKING OUT BEFORE ENTIRE ENEMA GIVEN. PT INSTRUCTED TO HOLD ENEMA CONTENTS  AS long as possible. Pt held some of enema contents for about 20 min. Pt then leaked stool on bed and on floor in front of commode. Pt bed linen changed and applied a chux to the floor and a new chux onto the bed. Brought pt in extra bed wipes.  
No

## 2022-12-19 ENCOUNTER — HOSPITAL ENCOUNTER (EMERGENCY)
Facility: CLINIC | Age: 87
Discharge: HOME | End: 2022-12-19
Attending: PHYSICIAN ASSISTANT | Admitting: PHYSICIAN ASSISTANT
Payer: MEDICARE

## 2022-12-19 VITALS
SYSTOLIC BLOOD PRESSURE: 139 MMHG | DIASTOLIC BLOOD PRESSURE: 68 MMHG | HEART RATE: 88 BPM | OXYGEN SATURATION: 97 % | TEMPERATURE: 99.6 F | RESPIRATION RATE: 18 BRPM

## 2022-12-19 DIAGNOSIS — U07.1 INFECTION DUE TO 2019 NOVEL CORONAVIRUS: ICD-10-CM

## 2022-12-19 LAB
FLUAV RNA SPEC QL NAA+PROBE: NEGATIVE
FLUBV RNA RESP QL NAA+PROBE: NEGATIVE
RSV RNA SPEC NAA+PROBE: NEGATIVE
SARS-COV-2 RNA RESP QL NAA+PROBE: POSITIVE

## 2022-12-19 PROCEDURE — 87637 SARSCOV2&INF A&B&RSV AMP PRB: CPT | Performed by: EMERGENCY MEDICINE

## 2022-12-19 PROCEDURE — 99283 EMERGENCY DEPT VISIT LOW MDM: CPT

## 2022-12-19 PROCEDURE — 87637 SARSCOV2&INF A&B&RSV AMP PRB: CPT | Performed by: PHYSICIAN ASSISTANT

## 2022-12-19 PROCEDURE — C9803 HOPD COVID-19 SPEC COLLECT: HCPCS

## 2022-12-19 RX ORDER — ACETAMINOPHEN 500 MG
500 TABLET ORAL EVERY 4 HOURS PRN
Status: DISCONTINUED | OUTPATIENT
Start: 2022-12-19 | End: 2022-12-19 | Stop reason: HOSPADM

## 2022-12-19 ASSESSMENT — ACTIVITIES OF DAILY LIVING (ADL): ADLS_ACUITY_SCORE: 35

## 2022-12-19 NOTE — ED TRIAGE NOTES
Patient c/o fever, body aches and lightheadedness that started a couple days ago. Brought in by EMS. ABCs  Intact.

## 2022-12-20 ASSESSMENT — ENCOUNTER SYMPTOMS
FEVER: 1
MYALGIAS: 1
COUGH: 1

## 2022-12-20 NOTE — ED PROVIDER NOTES
History     Chief Complaint:  Flu Symptoms       HPI   Cresencio Peguero is a 87 year old male with PMH of CAD s/p CABG, DMT2, HTN, tobacco abuse who presents with fever, cough, and body aches. Symptoms have persisted for three days. Patient had contact with Covid19 positive person at his living complex. Patient has not taken any medication for his symptoms. He has been commuting between hospitals to visit hospitalized wife. He has reduced oral intake but is drinking plenty of fluid and using bathroom regularly. He denies chest pain, shortness of breath, or difficulty breathing.  Patient is vaccinated x3 against COVID-19 and received his influenza vaccination this year. No recent falls.    ROS:  Review of Systems   Constitutional: Positive for fever.   Respiratory: Positive for cough.    Musculoskeletal: Positive for myalgias.   All other systems reviewed and are negative.    Allergies:  Percodan [Oxycodone-Aspirin]     Medications:     acetaminophen (TYLENOL) 500 MG tablet  amLODIPine (NORVASC) 2.5 MG tablet  atorvastatin (LIPITOR) 10 MG tablet  benzocaine (AMERICAINE) 20 % rectal ointment  cyclobenzaprine (FLEXERIL) 5 MG tablet  gabapentin (NEURONTIN) 300 MG capsule  lactulose (CEPHULAC) 20 GM packet  magnesium hydroxide (MILK OF MAGNESIA) 400 MG/5ML suspension  metFORMIN (GLUCOPHAGE) 850 MG tablet  polyethylene glycol (MIRALAX) 17 GM/Dose powder  senna-docusate (SENOKOT-S/PERICOLACE) 8.6-50 MG tablet  sertraline (ZOLOFT) 100 MG tablet  traMADol (ULTRAM) 50 MG tablet        Past Medical History:    Past Medical History:   Diagnosis Date     Anxiety      Arthritis      CAD (coronary artery disease)      Diabetes (H)      Hearing loss      Hyperlipidemia      Hypertension      Neuropathy      Sensory ataxia      Stented coronary artery        Past Surgical History:    Past Surgical History:   Procedure Laterality Date     BACK SURGERY      cervical laminectomy     BACK SURGERY      lumbar laminectomy     CARDIAC  SURGERY      Stent,  CABGx2     EYE SURGERY      cataract     EYE SURGERY      corneal surgery     HERNIORRHAPHY INGUINAL Right 8/15/2016    Procedure: HERNIORRHAPHY INGUINAL;  Surgeon: Wu Adam MD;  Location: [facility] OR     LAPAROSCOPIC LYSIS ADHESIONS N/A 7/7/2017    Procedure: LAPAROSCOPIC LYSIS ADHESIONS;;  Surgeon: Sumeet Joiner MD;  Location: [facility] OR     LAPAROSCOPY DIAGNOSTIC (GENERAL) N/A 7/7/2017    Procedure: LAPAROSCOPY DIAGNOSTIC (GENERAL);  DIAGNOSTIC LAPAROSCOPY WITH LYSIS OF ADHESIONS ;  Surgeon: Sumeet Joiner MD;  Location: [facility] OR     ORTHOPEDIC SURGERY      rotator cuff repair,shoulder arthroplasty     REVERSE ARTHROPLASTY SHOULDER Right 8/12/2020    Procedure: RIGHT REVERSE TOTAL SHOULDER ARTHROPLASTY;  Surgeon: Adarsh Mello MD;  Location: [facility] OR     TONSILLECTOMY          Family History:    family history is not on file.    Social History:   reports that he has been smoking pipe. He does not have any smokeless tobacco history on file. He reports current alcohol use.  PCP: Parrish Padgett     Physical Exam     Patient Vitals for the past 24 hrs:   BP Temp Temp src Pulse Resp SpO2   12/19/22 1431 139/68 -- -- -- -- --   12/19/22 1430 -- 99.6  F (37.6  C) Temporal 88 18 97 %        Physical Exam  Constitutional: Alert, attentive, GCS 15  HENT:    Nose: Nose normal.    Mouth/Throat: Oropharynx is clear, mucous membranes are moist   Eyes: EOM are normal.   CV: regular rate and rhythm; no murmurs, rubs or gallups  Chest: Effort normal and breath sounds normal.  No wheezing, rhonchi, or rales.  GI:  There is no tenderness. No distension. Normal bowel sounds  MSK: Normal range of motion.  No lower extremity edema.  Neurological: Alert, attentive.  Oriented x3.  5/5 strength in all 4 extremities.  Normal ROM of all 4 extremities.  Normal gait.  Skin: Skin is warm and dry.      Emergency Department Course   ECG:  ECG results from 06/29/21   EKG 12-lead, tracing only      "Value    Interpretation ECG Click View Image link to view waveform and result     Laboratory:  Labs Ordered and Resulted from Time of ED Arrival to Time of ED Departure   INFLUENZA A/B & SARS-COV2 PCR MULTIPLEX - Abnormal       Result Value    Influenza A PCR Negative      Influenza B PCR Negative      RSV PCR Negative      SARS CoV2 PCR Positive (*)         Emergency Department Course:    Reviewed:  I reviewed nursing notes, vitals, past medical history and MIIC    Assessments:  1745 I obtained history and examined the patient as noted above. Laboratory results explained.  1850 I rechecked the patient.    Interventions:  Medications - No data to display     Disposition:  The patient was discharged to home.     Impression & Plan    CMS Diagnoses: None    Medical Decision Making:  Patient is a well-appearing 87-year-old male who presents with history and examination consistent with an acute febrile illness.  His viral swab is positive for COVID-19.  He is in no acute respiratory distress.  He is afebrile, normotensive, O2 at 97% RA.  Physical examination reveals clear lung sounds and normal cardiac examination.  No lower extremity edema.  No evidence of volume overload. Patient walks without difficulty and has no dyspnea on exertion.  Plan and options discussed with patient.  Based on his age, history of HTN, CAD, and diabetic status, patient does qualify for antiviral medication such as Paxlovid.  Patient is adamant about not wanting to take more medication.  I offered to send the prescription but he again states \"I do not want to take more medication.\"  His physical examination is reassuring today and there is no evidence of tachypnea or hypoxia.  No imaging or laboratory testing is indicated today.  I discussed with him red flags to return to the emergency department including increased work of breathing, shortness of breath, chest pain, or dyspnea with exertion.  Patient is agreeable to this.  Quarantine was also " discussed.  Patient should continue to isolate himself at home for the full 5 days and that he may need to avoid seeing his hospitalized wife at this time.  Patient states he will let his building know that he has COVID-19 positive.  Supportive cares discussed including Tylenol for fevers and body aches and drinking fluids.  I also recommend he follow-up with his primary care provider next week. Patient expressed understanding of plan and was ready for discharge.    Diagnosis:    ICD-10-CM    1. Infection due to 2019 novel coronavirus  U07.1            Discharge Medications:  Discharge Medication List as of 12/19/2022  7:30 PM           12/19/2022   Milagro Dao PA-C Steinbrueck, Emily, PA-C  12/20/22 0020

## 2022-12-20 NOTE — DISCHARGE INSTRUCTIONS
You were seen in the Emergency Department for fever and body aches. Your Covid19 test is positive. Continue to take Tylenol every 4-6 hours as needed for symptoms. Quarantine at home for 5 full days. Let your primary care provider know you were seen in the ED and are Covid19 positive. For any new or worsening symptoms including shortness of breath, difficulty breathing, or chest pain, return to Emergency Department.  You did not want to start an antiviral medication. If you change your mind, contact your primary care provider.

## 2023-05-05 ENCOUNTER — HOSPITAL ENCOUNTER (EMERGENCY)
Facility: CLINIC | Age: 88
Discharge: HOME | End: 2023-05-06
Attending: EMERGENCY MEDICINE | Admitting: EMERGENCY MEDICINE
Payer: MEDICARE

## 2023-05-05 DIAGNOSIS — T18.128A ESOPHAGEAL OBSTRUCTION DUE TO FOOD IMPACTION: ICD-10-CM
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
## 2023-05-05 LAB — UPPER GI ENDOSCOPY: NORMAL

## 2023-05-05 PROCEDURE — 999N000099 HC STATISTIC MODERATE SEDATION < 10 MIN: Performed by: INTERNAL MEDICINE

## 2023-05-05 PROCEDURE — 250N000009 HC RX 250: Performed by: INTERNAL MEDICINE

## 2023-05-05 PROCEDURE — 43235 EGD DIAGNOSTIC BRUSH WASH: CPT

## 2023-05-05 PROCEDURE — 99285 EMERGENCY DEPT VISIT HI MDM: CPT | Mod: 25

## 2023-05-05 PROCEDURE — 250N000011 HC RX IP 250 OP 636: Performed by: INTERNAL MEDICINE

## 2023-05-05 PROCEDURE — 43235 EGD DIAGNOSTIC BRUSH WASH: CPT | Performed by: INTERNAL MEDICINE

## 2023-05-05 RX ORDER — NALOXONE HYDROCHLORIDE 0.4 MG/ML
0.4 INJECTION, SOLUTION INTRAMUSCULAR; INTRAVENOUS; SUBCUTANEOUS
Status: DISCONTINUED | OUTPATIENT
Start: 2023-05-05 | End: 2023-05-06 | Stop reason: HOSPADM

## 2023-05-05 RX ORDER — SIMETHICONE 40MG/0.6ML
133 SUSPENSION, DROPS(FINAL DOSAGE FORM)(ML) ORAL
Status: DISCONTINUED | OUTPATIENT
Start: 2023-05-05 | End: 2023-05-06 | Stop reason: HOSPADM

## 2023-05-05 RX ORDER — EPINEPHRINE 1 MG/ML
0.1 INJECTION, SOLUTION, CONCENTRATE INTRAVENOUS
Status: DISCONTINUED | OUTPATIENT
Start: 2023-05-05 | End: 2023-05-06 | Stop reason: HOSPADM

## 2023-05-05 RX ORDER — NALOXONE HYDROCHLORIDE 0.4 MG/ML
0.2 INJECTION, SOLUTION INTRAMUSCULAR; INTRAVENOUS; SUBCUTANEOUS
Status: DISCONTINUED | OUTPATIENT
Start: 2023-05-05 | End: 2023-05-06 | Stop reason: HOSPADM

## 2023-05-05 RX ORDER — DIPHENHYDRAMINE HYDROCHLORIDE 50 MG/ML
25-50 INJECTION INTRAMUSCULAR; INTRAVENOUS
Status: COMPLETED | OUTPATIENT
Start: 2023-05-05 | End: 2023-05-05

## 2023-05-05 RX ORDER — FENTANYL CITRATE 50 UG/ML
50-100 INJECTION, SOLUTION INTRAMUSCULAR; INTRAVENOUS EVERY 5 MIN PRN
Status: DISCONTINUED | OUTPATIENT
Start: 2023-05-05 | End: 2023-05-06 | Stop reason: HOSPADM

## 2023-05-05 RX ORDER — ATROPINE SULFATE 0.1 MG/ML
1 INJECTION INTRAVENOUS
Status: COMPLETED | OUTPATIENT
Start: 2023-05-05 | End: 2023-05-05

## 2023-05-05 RX ORDER — FLUMAZENIL 0.1 MG/ML
0.2 INJECTION, SOLUTION INTRAVENOUS
Status: DISCONTINUED | OUTPATIENT
Start: 2023-05-05 | End: 2023-05-06 | Stop reason: HOSPADM

## 2023-05-05 RX ADMIN — MIDAZOLAM 2 MG: 1 INJECTION INTRAMUSCULAR; INTRAVENOUS at 23:25

## 2023-05-05 RX ADMIN — FENTANYL CITRATE 50 MCG: 50 INJECTION, SOLUTION INTRAMUSCULAR; INTRAVENOUS at 23:25

## 2023-05-05 ASSESSMENT — ACTIVITIES OF DAILY LIVING (ADL)
ADLS_ACUITY_SCORE: 35
ADLS_ACUITY_SCORE: 35

## 2023-05-06 VITALS
DIASTOLIC BLOOD PRESSURE: 95 MMHG | SYSTOLIC BLOOD PRESSURE: 153 MMHG | TEMPERATURE: 96.2 F | RESPIRATION RATE: 18 BRPM | HEART RATE: 62 BPM | OXYGEN SATURATION: 96 %

## 2023-05-06 ASSESSMENT — ACTIVITIES OF DAILY LIVING (ADL): ADLS_ACUITY_SCORE: 35

## 2023-05-06 NOTE — OR NURSING
Pt hemodynamically monitored during procedure in ED.  Flyer nurse administered sedation per MD orders.

## 2023-05-06 NOTE — ED TRIAGE NOTES
Arrives from home with family.  was eating ham and thinks it is stuck in his throat.  has been stuck for a couple of hours. Family states he is suppose to have an expansion of his throat in a couple of weeks due to this happening frequently.

## 2023-05-06 NOTE — ED PROVIDER NOTES
History     Chief Complaint:  Airway Obstruction       HPI   Cresencio Peguero is a 88 year old male with a history of esophageal stricture that was eating split pea soup at 5pm and feels a piece of ham stuck.   Ez gas made vomit.  Able to pass scant fluid.  No drooling.      Independent Historian:    Spouse/partner    Review of External Notes:      ROS:  Review of Systems    Allergies:  Percodan [Oxycodone-Aspirin]     Medications:    acetaminophen (TYLENOL) 500 MG tablet  amLODIPine (NORVASC) 2.5 MG tablet  atorvastatin (LIPITOR) 10 MG tablet  benzocaine (AMERICAINE) 20 % rectal ointment  cyclobenzaprine (FLEXERIL) 5 MG tablet  gabapentin (NEURONTIN) 300 MG capsule  lactulose (CEPHULAC) 20 GM packet  magnesium hydroxide (MILK OF MAGNESIA) 400 MG/5ML suspension  metFORMIN (GLUCOPHAGE) 850 MG tablet  polyethylene glycol (MIRALAX) 17 GM/Dose powder  senna-docusate (SENOKOT-S/PERICOLACE) 8.6-50 MG tablet  sertraline (ZOLOFT) 100 MG tablet  traMADol (ULTRAM) 50 MG tablet        Past Medical History:    Past Medical History:   Diagnosis Date     Anxiety      Arthritis      CAD (coronary artery disease)      Diabetes (H)      Hearing loss      Hyperlipidemia      Hypertension      Neuropathy      Sensory ataxia      Stented coronary artery        Past Surgical History:    Past Surgical History:   Procedure Laterality Date     BACK SURGERY      cervical laminectomy     BACK SURGERY      lumbar laminectomy     CARDIAC SURGERY      Stent,  CABGx2     EYE SURGERY      cataract     EYE SURGERY      corneal surgery     HERNIORRHAPHY INGUINAL Right 8/15/2016    Procedure: HERNIORRHAPHY INGUINAL;  Surgeon: Wu Adam MD;  Location: [facility] OR     LAPAROSCOPIC LYSIS ADHESIONS N/A 7/7/2017    Procedure: LAPAROSCOPIC LYSIS ADHESIONS;;  Surgeon: Sumeet Joiner MD;  Location: [facility] OR     LAPAROSCOPY DIAGNOSTIC (GENERAL) N/A 7/7/2017    Procedure: LAPAROSCOPY DIAGNOSTIC (GENERAL);  DIAGNOSTIC LAPAROSCOPY WITH LYSIS  OF ADHESIONS ;  Surgeon: Sumeet Joiner MD;  Location: [facility] OR     ORTHOPEDIC SURGERY      rotator cuff repair,shoulder arthroplasty     REVERSE ARTHROPLASTY SHOULDER Right 8/12/2020    Procedure: RIGHT REVERSE TOTAL SHOULDER ARTHROPLASTY;  Surgeon: Adarsh Mello MD;  Location:  OR     TONSILLECTOMY          Family History:    family history is not on file.    Social History:   reports that he has been smoking pipe. He does not have any smokeless tobacco history on file. He reports current alcohol use.  PCP: Parrish Padgett     Physical Exam     Patient Vitals for the past 24 hrs:   BP Temp Temp src Pulse Resp SpO2   05/05/23 1908 (!) 174/85 (!) 96.2  F (35.7  C) Temporal 75 20 99 %        Physical Exam  General: Patient is well appearing. No distress.  Head: Atraumatic.  Neck: Normal range of motion. Neck supple.  No stridor  Cardiovascular: Normal rate, regular rhythm, normal heart sounds and intact distal pulses.   Pulmonary/Chest: Breath sounds normal. No respiratory distress.  Abdominal: Soft. Bowel sounds are normal. No distension. No tenderness. No rebound or guarding.   Musculoskeletal: Normal range of motion.  Skin: Warm and dry. No rash noted. Not diaphoretic.     Emergency Department Course         Laboratory:  Labs Ordered and Resulted from Time of ED Arrival to Time of ED Departure - No data to display     Procedures       Emergency Department Course & Assessments:             Interventions:  Medications - No data to display     Independent Interpretation (X-rays, CTs, rhythm strip):       Consultations/Discussion of Management or Tests:   GI       Social Determinants of Health affecting care:         Assessments:      Disposition:  The patient was discharged to home.     Impression & Plan      Medical Decision Making:  Pt has constriction known.  Likely small obstruction.  GI paged. No red flags.  They came and cleared the obstruction. Irina has planned follow for dilation.       Diagnosis:    ICD-10-CM    1. Esophageal obstruction due to food impaction  K22.2     T18.128A            Discharge Medications:  New Prescriptions    No medications on file            5/5/2023   Raul Hernandez MD Stevens, Andrew C, MD  05/06/23 0123

## 2024-01-24 ENCOUNTER — APPOINTMENT (OUTPATIENT)
Dept: CT IMAGING | Facility: CLINIC | Age: 89
End: 2024-01-24
Attending: EMERGENCY MEDICINE
Payer: MEDICARE

## 2024-01-24 ENCOUNTER — HOSPITAL ENCOUNTER (EMERGENCY)
Facility: CLINIC | Age: 89
Discharge: HOME | End: 2024-01-24
Attending: EMERGENCY MEDICINE | Admitting: EMERGENCY MEDICINE
Payer: MEDICARE

## 2024-01-24 VITALS
HEART RATE: 76 BPM | OXYGEN SATURATION: 99 % | SYSTOLIC BLOOD PRESSURE: 127 MMHG | RESPIRATION RATE: 18 BRPM | DIASTOLIC BLOOD PRESSURE: 91 MMHG | HEIGHT: 71 IN | WEIGHT: 193 LBS | BODY MASS INDEX: 27.02 KG/M2 | TEMPERATURE: 96.8 F

## 2024-01-24 DIAGNOSIS — G89.29 CHRONIC LOW BACK PAIN, UNSPECIFIED BACK PAIN LATERALITY, UNSPECIFIED WHETHER SCIATICA PRESENT: ICD-10-CM

## 2024-01-24 DIAGNOSIS — M54.50 CHRONIC LOW BACK PAIN, UNSPECIFIED BACK PAIN LATERALITY, UNSPECIFIED WHETHER SCIATICA PRESENT: ICD-10-CM

## 2024-01-24 DIAGNOSIS — R29.898 BILATERAL LEG WEAKNESS: ICD-10-CM

## 2024-01-24 LAB
ALBUMIN UR-MCNC: NEGATIVE MG/DL
ANION GAP SERPL CALCULATED.3IONS-SCNC: 12 MMOL/L (ref 7–15)
APPEARANCE UR: CLEAR
BACTERIA #/AREA URNS HPF: ABNORMAL /HPF
BASOPHILS # BLD AUTO: 0 10E3/UL (ref 0–0.2)
BASOPHILS NFR BLD AUTO: 0 %
BILIRUB UR QL STRIP: NEGATIVE
BUN SERPL-MCNC: 21.7 MG/DL (ref 8–23)
CALCIUM SERPL-MCNC: 9.3 MG/DL (ref 8.8–10.2)
CHLORIDE SERPL-SCNC: 98 MMOL/L (ref 98–107)
COLOR UR AUTO: ABNORMAL
CREAT SERPL-MCNC: 1.31 MG/DL (ref 0.67–1.17)
DEPRECATED HCO3 PLAS-SCNC: 25 MMOL/L (ref 22–29)
EGFRCR SERPLBLD CKD-EPI 2021: 53 ML/MIN/1.73M2
EOSINOPHIL # BLD AUTO: 0.2 10E3/UL (ref 0–0.7)
EOSINOPHIL NFR BLD AUTO: 4 %
ERYTHROCYTE [DISTWIDTH] IN BLOOD BY AUTOMATED COUNT: 13 % (ref 10–15)
GLUCOSE SERPL-MCNC: 289 MG/DL (ref 70–99)
GLUCOSE UR STRIP-MCNC: 300 MG/DL
HCT VFR BLD AUTO: 32.9 % (ref 40–53)
HGB BLD-MCNC: 11.1 G/DL (ref 13.3–17.7)
HGB UR QL STRIP: NEGATIVE
HOLD SPECIMEN: NORMAL
HOLD SPECIMEN: NORMAL
IMM GRANULOCYTES # BLD: 0 10E3/UL
IMM GRANULOCYTES NFR BLD: 0 %
KETONES UR STRIP-MCNC: NEGATIVE MG/DL
LEUKOCYTE ESTERASE UR QL STRIP: ABNORMAL
LYMPHOCYTES # BLD AUTO: 1.1 10E3/UL (ref 0.8–5.3)
LYMPHOCYTES NFR BLD AUTO: 22 %
MCH RBC QN AUTO: 30 PG (ref 26.5–33)
MCHC RBC AUTO-ENTMCNC: 33.7 G/DL (ref 31.5–36.5)
MCV RBC AUTO: 89 FL (ref 78–100)
MONOCYTES # BLD AUTO: 0.4 10E3/UL (ref 0–1.3)
MONOCYTES NFR BLD AUTO: 7 %
NEUTROPHILS # BLD AUTO: 3.3 10E3/UL (ref 1.6–8.3)
NEUTROPHILS NFR BLD AUTO: 67 %
NITRATE UR QL: NEGATIVE
NRBC # BLD AUTO: 0 10E3/UL
NRBC BLD AUTO-RTO: 0 /100
PH UR STRIP: 6 [PH] (ref 5–7)
PLATELET # BLD AUTO: 153 10E3/UL (ref 150–450)
POTASSIUM SERPL-SCNC: 4.2 MMOL/L (ref 3.4–5.3)
RBC # BLD AUTO: 3.7 10E6/UL (ref 4.4–5.9)
RBC URINE: 1 /HPF
SODIUM SERPL-SCNC: 135 MMOL/L (ref 135–145)
SP GR UR STRIP: 1.01 (ref 1–1.03)
SQUAMOUS EPITHELIAL: 1 /HPF
UROBILINOGEN UR STRIP-MCNC: NORMAL MG/DL
WBC # BLD AUTO: 4.9 10E3/UL (ref 4–11)
WBC URINE: 1 /HPF

## 2024-01-24 PROCEDURE — 99285 EMERGENCY DEPT VISIT HI MDM: CPT | Mod: 25

## 2024-01-24 PROCEDURE — 80048 BASIC METABOLIC PNL TOTAL CA: CPT | Performed by: EMERGENCY MEDICINE

## 2024-01-24 PROCEDURE — 85025 COMPLETE CBC W/AUTO DIFF WBC: CPT | Performed by: EMERGENCY MEDICINE

## 2024-01-24 PROCEDURE — 96361 HYDRATE IV INFUSION ADD-ON: CPT

## 2024-01-24 PROCEDURE — 81001 URINALYSIS AUTO W/SCOPE: CPT | Performed by: EMERGENCY MEDICINE

## 2024-01-24 PROCEDURE — 93005 ELECTROCARDIOGRAM TRACING: CPT

## 2024-01-24 PROCEDURE — 258N000003 HC RX IP 258 OP 636: Performed by: EMERGENCY MEDICINE

## 2024-01-24 PROCEDURE — 70450 CT HEAD/BRAIN W/O DYE: CPT | Mod: MA

## 2024-01-24 PROCEDURE — 72131 CT LUMBAR SPINE W/O DYE: CPT | Mod: MA

## 2024-01-24 PROCEDURE — 36415 COLL VENOUS BLD VENIPUNCTURE: CPT | Performed by: EMERGENCY MEDICINE

## 2024-01-24 PROCEDURE — 96360 HYDRATION IV INFUSION INIT: CPT

## 2024-01-24 PROCEDURE — 85048 AUTOMATED LEUKOCYTE COUNT: CPT | Performed by: EMERGENCY MEDICINE

## 2024-01-24 RX ADMIN — SODIUM CHLORIDE 1000 ML: 9 INJECTION, SOLUTION INTRAVENOUS at 18:35

## 2024-01-24 ASSESSMENT — ACTIVITIES OF DAILY LIVING (ADL)
ADLS_ACUITY_SCORE: 35
ADLS_ACUITY_SCORE: 35

## 2024-01-24 NOTE — ED TRIAGE NOTES
Pain pump insertion 3 weeks for back pain. Pt having increased weakness in legs - intermittent for months - worst today. No falls, a lot of close calls per wife. Wife reports pt sleeps a lot more than usual.

## 2024-01-25 LAB
ATRIAL RATE - MUSE: 71 BPM
DIASTOLIC BLOOD PRESSURE - MUSE: NORMAL MMHG
INTERPRETATION ECG - MUSE: NORMAL
P AXIS - MUSE: 13 DEGREES
PR INTERVAL - MUSE: 190 MS
QRS DURATION - MUSE: 108 MS
QT - MUSE: 416 MS
QTC - MUSE: 452 MS
R AXIS - MUSE: -13 DEGREES
SYSTOLIC BLOOD PRESSURE - MUSE: NORMAL MMHG
T AXIS - MUSE: 77 DEGREES
VENTRICULAR RATE- MUSE: 71 BPM

## 2024-01-25 NOTE — ED PROVIDER NOTES
"    History     Chief Complaint:  Generalized Weakness       HPI   Cresencio Peguero is a 88 year old male with a history of diabetes, CAD, sensory ataxia, chronic low back pain who presents in the ED today due to an episode of bilateral lower extremity weakness that made it difficult for him to ambulate. The patient disclosed that he had an intrathecal pain pump placed earlier this month.  He receives fentanyl through this.  He went to the pain clinic this morning and they decided not to change his fentanyl dose.  Patient also takes daily Norco for pain.  The patient's wife notes that he has had similar episodes of leg weakness and difficulty walking in the past and this is not a new episode for him.  He has been mildly confused recently as well.  The patient denies chest pain, abdominal pain, emesis, diarrhea, sore throat, runny nose, headache, slurred speech, blurred vision, and cough.     Independent Historian:    The patient's wife supplemented the history as noted above    Review of External Notes:  I reviewed the nurse triage encounter from earlier today when the patient's wife noted that patient had leg weakness and became real shaky.  They recommended that he be evaluated in the ER.    Medications:    Norvasc   Lipitor  Flexeril  Glucophage  Cephulac   Pericolace     Past Medical History:    Coronary artery disease   Arthritis   Hearing loss  Hyperlipidemia   Hypertension   Neuropathy  Sensory ataxia   Stent    Past Surgical History:    Back surgery   Esophagoscopy   Gastroscopy   Duodenoscopy    Cardiac surgery   Herniorrhaphy  Tonsillectomy   Laparosopic diagnostic   Reverse arthroplasty shoulder      Physical Exam   Patient Vitals for the past 24 hrs:   BP Temp Temp src Pulse Resp SpO2 Height Weight   01/24/24 1615 (!) 127/91 96.8  F (36  C) Temporal 76 18 99 % 1.803 m (5' 11\") 87.5 kg (193 lb)        Physical Exam  Vitals and nursing note reviewed.   Constitutional:       General: He is not in acute " distress.     Appearance: He is not ill-appearing or toxic-appearing.   HENT:      Head: Normocephalic and atraumatic.      Right Ear: External ear normal.      Left Ear: External ear normal.      Nose: Nose normal.   Eyes:      Extraocular Movements: Extraocular movements intact.      Conjunctiva/sclera: Conjunctivae normal.      Pupils: Pupils are equal, round, and reactive to light.   Cardiovascular:      Rate and Rhythm: Normal rate and regular rhythm.      Heart sounds: No murmur heard.  Pulmonary:      Effort: Pulmonary effort is normal. No respiratory distress.      Breath sounds: No wheezing, rhonchi or rales.   Abdominal:      General: Abdomen is flat. There is no distension.      Palpations: Abdomen is soft.      Tenderness: There is no abdominal tenderness. There is no guarding or rebound.   Musculoskeletal:         General: No swelling or deformity.      Cervical back: Normal range of motion and neck supple.   Skin:     General: Skin is warm and dry.      Findings: No rash.      Comments: Healing surgical incisions to the midline lumbar area and the right flank   Neurological:      Mental Status: He is alert and oriented to person, place, and time.      Cranial Nerves: No cranial nerve deficit.      Sensory: No sensory deficit.      Motor: No weakness.   Psychiatric:         Behavior: Behavior normal.           Emergency Department Course   ECG  ECG taken at 1632, ECG read at 1635  Sinus rhythm with occasional premature ventricular complexes  Cannot rule out septal infarct, age undetermined  Abnormal ECG   When compared with prior ECG, dated 6/29/21  Rate 71 bpm. PA interval 190 ms. QRS duration 108 ms. QT/QTc 416/452 ms. P-R-T axes 13 -13 77.    Imaging:  CT Head w/o Contrast   Final Result   IMPRESSION:   1.  No acute intracranial process.   2.  Age-related changes described above.   3.  Right lateral sphenoid sinus posterior mucosal thickening versus small air-fluid level. Please correlate clinically  regarding possible acute sinusitis.    4.  Right mastoid air cells moderate to severe patchy opacification.       CT Lumbar Spine w/o Contrast   Final Result    IMPRESSION:   1.  No acute fracture.   2.  Chronic changes described above.        Report per radiology    Laboratory:  Labs Ordered and Resulted from Time of ED Arrival to Time of ED Departure   BASIC METABOLIC PANEL - Abnormal       Result Value    Sodium 135      Potassium 4.2      Chloride 98      Carbon Dioxide (CO2) 25      Anion Gap 12      Urea Nitrogen 21.7      Creatinine 1.31 (*)     GFR Estimate 53 (*)     Calcium 9.3      Glucose 289 (*)    CBC WITH PLATELETS AND DIFFERENTIAL - Abnormal    WBC Count 4.9      RBC Count 3.70 (*)     Hemoglobin 11.1 (*)     Hematocrit 32.9 (*)     MCV 89      MCH 30.0      MCHC 33.7      RDW 13.0      Platelet Count 153      % Neutrophils 67      % Lymphocytes 22      % Monocytes 7      % Eosinophils 4      % Basophils 0      % Immature Granulocytes 0      NRBCs per 100 WBC 0      Absolute Neutrophils 3.3      Absolute Lymphocytes 1.1      Absolute Monocytes 0.4      Absolute Eosinophils 0.2      Absolute Basophils 0.0      Absolute Immature Granulocytes 0.0      Absolute NRBCs 0.0     ROUTINE UA WITH MICROSCOPIC REFLEX TO CULTURE - Abnormal    Color Urine Light Yellow      Appearance Urine Clear      Glucose Urine 300 (*)     Bilirubin Urine Negative      Ketones Urine Negative      Specific Gravity Urine 1.011      Blood Urine Negative      pH Urine 6.0      Protein Albumin Urine Negative      Urobilinogen Urine Normal      Nitrite Urine Negative      Leukocyte Esterase Urine Trace (*)     Bacteria Urine Few (*)     RBC Urine 1      WBC Urine 1      Squamous Epithelials Urine 1          Emergency Department Course & Assessments:    Interventions:  Medications   sodium chloride 0.9% BOLUS 1,000 mL (0 mLs Intravenous Stopped 1/24/24 2140)        Assessments:  1816 I obtained history and examined the patient as  noted above.  2118 At this point I feel that the patient is safe for discharge, and the patient agrees.    Independent Interpretation (X-rays, CTs, rhythm strip):  None    Consultations/Discussion of Management or Tests:  None    Social Determinants of Health affecting care:  None      Disposition:  The patient was discharged to home.     Impression & Plan    CMS Diagnoses:   and None    Medical Decision Makin-year-old male with an episode of leg weakness and shakiness earlier today.  Wife notes that he has had similar episodes ongoing for months and they did not just start recently.  I doubt that this is due to the intrathecal pain pump since his symptoms started well before the pump was placed.  It is unclear what caused his symptoms.  He does not seem to have any neurologic deficits in the ER and has no specific complaints currently.  Workup was pursued as noted above.  He has chronic stable anemia.  His renal function is slightly worse than previous so he may be dehydrated.  He is also hyperglycemic but no signs of DKA.  No signs of UTI.  No signs of any acute myocardial ischemia.  CT of the head shows no signs of a bleed or large stroke.  CT of the lumbar spine appears stable as well.  Patient remained stable in the emergency department.  Will refer to neurology for further outpatient evaluation and recommend close follow-up with primary care physician.  We discussed return precautions.    Diagnosis:    ICD-10-CM    1. Bilateral leg weakness  R29.898       2. Chronic low back pain, unspecified back pain laterality, unspecified whether sciatica present  M54.50     G89.29            Discharge Medications:  Discharge Medication List as of 2024  9:40 PM         Scribe Disclosure:    I, Dewayne Mitchell, am serving as a scribe at 6:21 PM on 2024 to document services personally performed by Ruben Shell MD based on my observations and the provider's statements to me.    2024   Ruben Shell,  Ruben Ahumada MD  01/25/24 4028

## 2024-03-29 ENCOUNTER — MEDICAL CORRESPONDENCE (OUTPATIENT)
Dept: HEALTH INFORMATION MANAGEMENT | Facility: CLINIC | Age: 89
End: 2024-03-29

## 2024-05-02 ENCOUNTER — LAB REQUISITION (OUTPATIENT)
Dept: LAB | Facility: CLINIC | Age: 89
End: 2024-05-02
Payer: MEDICARE

## 2024-05-02 DIAGNOSIS — I25.10 ATHEROSCLEROTIC HEART DISEASE OF NATIVE CORONARY ARTERY WITHOUT ANGINA PECTORIS: ICD-10-CM

## 2024-05-02 DIAGNOSIS — E11.9 TYPE 2 DIABETES MELLITUS WITHOUT COMPLICATIONS (H): ICD-10-CM

## 2024-05-02 DIAGNOSIS — E78.5 HYPERLIPIDEMIA, UNSPECIFIED: ICD-10-CM

## 2024-05-03 LAB
ERYTHROCYTE [DISTWIDTH] IN BLOOD BY AUTOMATED COUNT: 12.8 % (ref 10–15)
HBA1C MFR BLD: 10.9 %
HCT VFR BLD AUTO: 30.6 % (ref 40–53)
HGB BLD-MCNC: 10.3 G/DL (ref 13.3–17.7)
MCH RBC QN AUTO: 29.3 PG (ref 26.5–33)
MCHC RBC AUTO-ENTMCNC: 33.7 G/DL (ref 31.5–36.5)
MCV RBC AUTO: 87 FL (ref 78–100)
PLATELET # BLD AUTO: 133 10E3/UL (ref 150–450)
RBC # BLD AUTO: 3.52 10E6/UL (ref 4.4–5.9)
WBC # BLD AUTO: 5.4 10E3/UL (ref 4–11)

## 2024-05-03 PROCEDURE — 83036 HEMOGLOBIN GLYCOSYLATED A1C: CPT | Mod: ORL

## 2024-05-03 PROCEDURE — 80048 BASIC METABOLIC PNL TOTAL CA: CPT | Mod: ORL

## 2024-05-03 PROCEDURE — 36415 COLL VENOUS BLD VENIPUNCTURE: CPT | Mod: ORL

## 2024-05-03 PROCEDURE — 85027 COMPLETE CBC AUTOMATED: CPT | Mod: ORL

## 2024-05-03 PROCEDURE — P9604 ONE-WAY ALLOW PRORATED TRIP: HCPCS | Mod: ORL

## 2024-05-03 PROCEDURE — 80061 LIPID PANEL: CPT | Mod: ORL

## 2024-05-04 LAB
ANION GAP SERPL CALCULATED.3IONS-SCNC: 11 MMOL/L (ref 7–15)
BUN SERPL-MCNC: 32.6 MG/DL (ref 8–23)
CALCIUM SERPL-MCNC: 8.8 MG/DL (ref 8.8–10.2)
CHLORIDE SERPL-SCNC: 103 MMOL/L (ref 98–107)
CHOLEST SERPL-MCNC: 143 MG/DL
CREAT SERPL-MCNC: 1.02 MG/DL (ref 0.67–1.17)
DEPRECATED HCO3 PLAS-SCNC: 24 MMOL/L (ref 22–29)
EGFRCR SERPLBLD CKD-EPI 2021: 71 ML/MIN/1.73M2
FASTING STATUS PATIENT QL REPORTED: ABNORMAL
GLUCOSE SERPL-MCNC: 253 MG/DL (ref 70–99)
HDLC SERPL-MCNC: 29 MG/DL
LDLC SERPL CALC-MCNC: 82 MG/DL
NONHDLC SERPL-MCNC: 114 MG/DL
POTASSIUM SERPL-SCNC: 4.2 MMOL/L (ref 3.4–5.3)
SODIUM SERPL-SCNC: 138 MMOL/L (ref 135–145)
TRIGL SERPL-MCNC: 158 MG/DL

## 2024-05-05 ENCOUNTER — LAB REQUISITION (OUTPATIENT)
Dept: LAB | Facility: CLINIC | Age: 89
End: 2024-05-05
Payer: MEDICARE

## 2024-05-05 DIAGNOSIS — D64.9 ANEMIA, UNSPECIFIED: ICD-10-CM

## 2024-05-06 LAB
FERRITIN SERPL-MCNC: 36 NG/ML (ref 31–409)
FOLATE SERPL-MCNC: 13.9 NG/ML (ref 4.6–34.8)
IRON BINDING CAPACITY (ROCHE): 242 UG/DL (ref 240–430)
IRON SATN MFR SERPL: 14 % (ref 15–46)
IRON SERPL-MCNC: 35 UG/DL (ref 61–157)
VIT B12 SERPL-MCNC: 643 PG/ML (ref 232–1245)

## 2024-05-06 PROCEDURE — 36415 COLL VENOUS BLD VENIPUNCTURE: CPT | Mod: ORL

## 2024-05-06 PROCEDURE — 83550 IRON BINDING TEST: CPT | Mod: ORL

## 2024-05-06 PROCEDURE — P9603 ONE-WAY ALLOW PRORATED MILES: HCPCS | Mod: ORL

## 2024-05-06 PROCEDURE — 82728 ASSAY OF FERRITIN: CPT | Mod: ORL

## 2024-05-06 PROCEDURE — 82746 ASSAY OF FOLIC ACID SERUM: CPT | Mod: ORL

## 2024-05-06 PROCEDURE — 82607 VITAMIN B-12: CPT | Mod: ORL

## 2024-05-19 ENCOUNTER — APPOINTMENT (OUTPATIENT)
Dept: CT IMAGING | Facility: CLINIC | Age: 89
DRG: 092 | End: 2024-05-19
Attending: SOCIAL WORKER
Payer: MEDICARE

## 2024-05-19 ENCOUNTER — MEDICAL CORRESPONDENCE (OUTPATIENT)
Dept: HEALTH INFORMATION MANAGEMENT | Facility: CLINIC | Age: 89
End: 2024-05-19

## 2024-05-19 ENCOUNTER — APPOINTMENT (OUTPATIENT)
Dept: GENERAL RADIOLOGY | Facility: CLINIC | Age: 89
DRG: 092 | End: 2024-05-19
Attending: SOCIAL WORKER
Payer: MEDICARE

## 2024-05-19 ENCOUNTER — HOSPITAL ENCOUNTER (INPATIENT)
Facility: CLINIC | Age: 89
LOS: 3 days | Discharge: NURSING, REHAB OR LONG-TERM CARE | DRG: 092 | End: 2024-05-24
Attending: SOCIAL WORKER | Admitting: INTERNAL MEDICINE
Payer: MEDICARE

## 2024-05-19 DIAGNOSIS — E13.69 OTHER SPECIFIED DIABETES MELLITUS WITH OTHER SPECIFIED COMPLICATION, UNSPECIFIED WHETHER LONG TERM INSULIN USE (H): ICD-10-CM

## 2024-05-19 DIAGNOSIS — Z79.899 ON DEEP VEIN THROMBOSIS (DVT) PROPHYLAXIS: ICD-10-CM

## 2024-05-19 DIAGNOSIS — H04.123 DRY EYES: ICD-10-CM

## 2024-05-19 DIAGNOSIS — R53.1 GENERALIZED WEAKNESS: ICD-10-CM

## 2024-05-19 DIAGNOSIS — R26.2 AMBULATORY DYSFUNCTION: ICD-10-CM

## 2024-05-19 DIAGNOSIS — R03.0 ELEVATED BLOOD PRESSURE READING WITHOUT DIAGNOSIS OF HYPERTENSION: ICD-10-CM

## 2024-05-19 DIAGNOSIS — I95.1 ORTHOSTATIC HYPOTENSION: ICD-10-CM

## 2024-05-19 DIAGNOSIS — R73.9 HYPERGLYCEMIA: ICD-10-CM

## 2024-05-19 DIAGNOSIS — G62.9 NEUROPATHY: Primary | ICD-10-CM

## 2024-05-19 DIAGNOSIS — K59.00 CONSTIPATION, UNSPECIFIED CONSTIPATION TYPE: ICD-10-CM

## 2024-05-19 DIAGNOSIS — R52 PAIN: ICD-10-CM

## 2024-05-19 LAB
ALBUMIN SERPL BCG-MCNC: 3.8 G/DL (ref 3.5–5.2)
ALBUMIN UR-MCNC: NEGATIVE MG/DL
ALP SERPL-CCNC: 131 U/L (ref 40–150)
ALT SERPL W P-5'-P-CCNC: <5 U/L (ref 0–70)
ANION GAP SERPL CALCULATED.3IONS-SCNC: 10 MMOL/L (ref 7–15)
APPEARANCE UR: CLEAR
AST SERPL W P-5'-P-CCNC: 19 U/L (ref 0–45)
B-OH-BUTYR SERPL-SCNC: <0.18 MMOL/L
BASOPHILS # BLD AUTO: 0 10E3/UL (ref 0–0.2)
BASOPHILS NFR BLD AUTO: 0 %
BILIRUB DIRECT SERPL-MCNC: <0.2 MG/DL (ref 0–0.3)
BILIRUB SERPL-MCNC: 0.3 MG/DL
BILIRUB UR QL STRIP: NEGATIVE
BUN SERPL-MCNC: 33.8 MG/DL (ref 8–23)
CALCIUM SERPL-MCNC: 9.3 MG/DL (ref 8.8–10.2)
CHLORIDE SERPL-SCNC: 98 MMOL/L (ref 98–107)
COLOR UR AUTO: ABNORMAL
CREAT SERPL-MCNC: 0.91 MG/DL (ref 0.67–1.17)
DEPRECATED HCO3 PLAS-SCNC: 25 MMOL/L (ref 22–29)
EGFRCR SERPLBLD CKD-EPI 2021: 81 ML/MIN/1.73M2
EOSINOPHIL # BLD AUTO: 0.2 10E3/UL (ref 0–0.7)
EOSINOPHIL NFR BLD AUTO: 4 %
ERYTHROCYTE [DISTWIDTH] IN BLOOD BY AUTOMATED COUNT: 13.1 % (ref 10–15)
FLUAV RNA SPEC QL NAA+PROBE: NEGATIVE
FLUBV RNA RESP QL NAA+PROBE: NEGATIVE
GLUCOSE BLDC GLUCOMTR-MCNC: 406 MG/DL (ref 70–99)
GLUCOSE SERPL-MCNC: 401 MG/DL (ref 70–99)
GLUCOSE UR STRIP-MCNC: >=1000 MG/DL
HCT VFR BLD AUTO: 31.4 % (ref 40–53)
HGB BLD-MCNC: 10.5 G/DL (ref 13.3–17.7)
HGB UR QL STRIP: NEGATIVE
HOLD SPECIMEN: NORMAL
HOLD SPECIMEN: NORMAL
HYALINE CASTS: 1 /LPF
IMM GRANULOCYTES # BLD: 0 10E3/UL
IMM GRANULOCYTES NFR BLD: 0 %
KETONES UR STRIP-MCNC: NEGATIVE MG/DL
LEUKOCYTE ESTERASE UR QL STRIP: NEGATIVE
LYMPHOCYTES # BLD AUTO: 1.2 10E3/UL (ref 0.8–5.3)
LYMPHOCYTES NFR BLD AUTO: 17 %
MCH RBC QN AUTO: 28.5 PG (ref 26.5–33)
MCHC RBC AUTO-ENTMCNC: 33.4 G/DL (ref 31.5–36.5)
MCV RBC AUTO: 85 FL (ref 78–100)
MONOCYTES # BLD AUTO: 0.5 10E3/UL (ref 0–1.3)
MONOCYTES NFR BLD AUTO: 7 %
MUCOUS THREADS #/AREA URNS LPF: PRESENT /LPF
NEUTROPHILS # BLD AUTO: 5 10E3/UL (ref 1.6–8.3)
NEUTROPHILS NFR BLD AUTO: 72 %
NITRATE UR QL: NEGATIVE
NRBC # BLD AUTO: 0 10E3/UL
NRBC BLD AUTO-RTO: 0 /100
PH UR STRIP: 5.5 [PH] (ref 5–7)
PLATELET # BLD AUTO: 133 10E3/UL (ref 150–450)
POTASSIUM SERPL-SCNC: 4.3 MMOL/L (ref 3.4–5.3)
PROCALCITONIN SERPL IA-MCNC: 0.17 NG/ML
PROT SERPL-MCNC: 6.9 G/DL (ref 6.4–8.3)
RBC # BLD AUTO: 3.69 10E6/UL (ref 4.4–5.9)
RBC URINE: <1 /HPF
RSV RNA SPEC NAA+PROBE: NEGATIVE
SARS-COV-2 RNA RESP QL NAA+PROBE: NEGATIVE
SODIUM SERPL-SCNC: 133 MMOL/L (ref 135–145)
SP GR UR STRIP: 1.03 (ref 1–1.03)
SQUAMOUS EPITHELIAL: <1 /HPF
TROPONIN T SERPL HS-MCNC: 23 NG/L
UROBILINOGEN UR STRIP-MCNC: NORMAL MG/DL
WBC # BLD AUTO: 6.9 10E3/UL (ref 4–11)
WBC URINE: <1 /HPF

## 2024-05-19 PROCEDURE — 82248 BILIRUBIN DIRECT: CPT | Performed by: SOCIAL WORKER

## 2024-05-19 PROCEDURE — 80053 COMPREHEN METABOLIC PANEL: CPT | Performed by: SOCIAL WORKER

## 2024-05-19 PROCEDURE — 99222 1ST HOSP IP/OBS MODERATE 55: CPT | Mod: AI | Performed by: INTERNAL MEDICINE

## 2024-05-19 PROCEDURE — 85025 COMPLETE CBC W/AUTO DIFF WBC: CPT | Performed by: SOCIAL WORKER

## 2024-05-19 PROCEDURE — 99285 EMERGENCY DEPT VISIT HI MDM: CPT | Mod: 25

## 2024-05-19 PROCEDURE — 82010 KETONE BODYS QUAN: CPT | Performed by: SOCIAL WORKER

## 2024-05-19 PROCEDURE — 258N000003 HC RX IP 258 OP 636: Performed by: SOCIAL WORKER

## 2024-05-19 PROCEDURE — 81001 URINALYSIS AUTO W/SCOPE: CPT | Performed by: SOCIAL WORKER

## 2024-05-19 PROCEDURE — 84145 PROCALCITONIN (PCT): CPT | Performed by: SOCIAL WORKER

## 2024-05-19 PROCEDURE — 87637 SARSCOV2&INF A&B&RSV AMP PRB: CPT | Performed by: SOCIAL WORKER

## 2024-05-19 PROCEDURE — G0378 HOSPITAL OBSERVATION PER HR: HCPCS

## 2024-05-19 PROCEDURE — 93005 ELECTROCARDIOGRAM TRACING: CPT

## 2024-05-19 PROCEDURE — 70450 CT HEAD/BRAIN W/O DYE: CPT | Mod: MG

## 2024-05-19 PROCEDURE — 82962 GLUCOSE BLOOD TEST: CPT

## 2024-05-19 PROCEDURE — 80048 BASIC METABOLIC PNL TOTAL CA: CPT | Performed by: SOCIAL WORKER

## 2024-05-19 PROCEDURE — 96361 HYDRATE IV INFUSION ADD-ON: CPT

## 2024-05-19 PROCEDURE — 96360 HYDRATION IV INFUSION INIT: CPT

## 2024-05-19 PROCEDURE — 71046 X-RAY EXAM CHEST 2 VIEWS: CPT

## 2024-05-19 PROCEDURE — 84484 ASSAY OF TROPONIN QUANT: CPT | Performed by: SOCIAL WORKER

## 2024-05-19 PROCEDURE — 36415 COLL VENOUS BLD VENIPUNCTURE: CPT | Performed by: SOCIAL WORKER

## 2024-05-19 RX ORDER — GABAPENTIN 300 MG/1
300 CAPSULE ORAL EVERY MORNING
Status: DISCONTINUED | OUTPATIENT
Start: 2024-05-20 | End: 2024-05-22

## 2024-05-19 RX ORDER — GABAPENTIN 300 MG/1
300 CAPSULE ORAL EVERY MORNING
Status: ON HOLD | COMMUNITY
End: 2024-05-24

## 2024-05-19 RX ORDER — ACETAMINOPHEN 325 MG/1
650 TABLET ORAL EVERY 4 HOURS PRN
Status: DISCONTINUED | OUTPATIENT
Start: 2024-05-19 | End: 2024-05-24 | Stop reason: HOSPADM

## 2024-05-19 RX ORDER — CARBIDOPA AND LEVODOPA 25; 100 MG/1; MG/1
2 TABLET ORAL 3 TIMES DAILY
COMMUNITY

## 2024-05-19 RX ORDER — CLONAZEPAM 0.5 MG/1
0.25 TABLET ORAL DAILY
Status: ON HOLD | COMMUNITY
End: 2024-05-24

## 2024-05-19 RX ORDER — PANTOPRAZOLE SODIUM 40 MG/1
40 TABLET, DELAYED RELEASE ORAL AT BEDTIME
Status: DISCONTINUED | OUTPATIENT
Start: 2024-05-20 | End: 2024-05-24 | Stop reason: HOSPADM

## 2024-05-19 RX ORDER — GABAPENTIN 300 MG/1
600 CAPSULE ORAL AT BEDTIME
Status: DISCONTINUED | OUTPATIENT
Start: 2024-05-20 | End: 2024-05-22

## 2024-05-19 RX ORDER — ONDANSETRON 2 MG/ML
4 INJECTION INTRAMUSCULAR; INTRAVENOUS EVERY 6 HOURS PRN
Status: DISCONTINUED | OUTPATIENT
Start: 2024-05-19 | End: 2024-05-24 | Stop reason: HOSPADM

## 2024-05-19 RX ORDER — CLONAZEPAM 0.5 MG/1
0.5 TABLET ORAL AT BEDTIME
Status: DISCONTINUED | OUTPATIENT
Start: 2024-05-20 | End: 2024-05-21

## 2024-05-19 RX ORDER — ONDANSETRON 4 MG/1
4 TABLET, ORALLY DISINTEGRATING ORAL EVERY 6 HOURS PRN
Status: DISCONTINUED | OUTPATIENT
Start: 2024-05-19 | End: 2024-05-24 | Stop reason: HOSPADM

## 2024-05-19 RX ORDER — AMLODIPINE BESYLATE 2.5 MG/1
2.5 TABLET ORAL DAILY
Status: DISCONTINUED | OUTPATIENT
Start: 2024-05-20 | End: 2024-05-22

## 2024-05-19 RX ORDER — HYDROCODONE BITARTRATE AND ACETAMINOPHEN 5; 325 MG/1; MG/1
1 TABLET ORAL EVERY EVENING
Status: ON HOLD | COMMUNITY
End: 2024-05-24

## 2024-05-19 RX ORDER — AMOXICILLIN 250 MG
1 CAPSULE ORAL 2 TIMES DAILY PRN
Status: DISCONTINUED | OUTPATIENT
Start: 2024-05-19 | End: 2024-05-24 | Stop reason: HOSPADM

## 2024-05-19 RX ORDER — SODIUM CHLORIDE 9 MG/ML
INJECTION, SOLUTION INTRAVENOUS CONTINUOUS
Status: DISCONTINUED | OUTPATIENT
Start: 2024-05-20 | End: 2024-05-20

## 2024-05-19 RX ORDER — AMOXICILLIN 250 MG
1 CAPSULE ORAL 2 TIMES DAILY
Status: DISCONTINUED | OUTPATIENT
Start: 2024-05-20 | End: 2024-05-24 | Stop reason: HOSPADM

## 2024-05-19 RX ORDER — CLONAZEPAM 0.5 MG/1
0.5 TABLET ORAL AT BEDTIME
Status: ON HOLD | COMMUNITY
End: 2024-05-24

## 2024-05-19 RX ORDER — HYDROCODONE BITARTRATE AND ACETAMINOPHEN 5; 325 MG/1; MG/1
1 TABLET ORAL DAILY PRN
Status: ON HOLD | COMMUNITY
End: 2024-05-24

## 2024-05-19 RX ORDER — CLONAZEPAM 0.25 MG/1
0.25 TABLET, ORALLY DISINTEGRATING ORAL DAILY
Status: DISCONTINUED | OUTPATIENT
Start: 2024-05-20 | End: 2024-05-21

## 2024-05-19 RX ORDER — ACETAMINOPHEN 650 MG/1
650 SUPPOSITORY RECTAL EVERY 4 HOURS PRN
Status: DISCONTINUED | OUTPATIENT
Start: 2024-05-19 | End: 2024-05-24 | Stop reason: HOSPADM

## 2024-05-19 RX ORDER — HYDROCODONE BITARTRATE AND ACETAMINOPHEN 5; 325 MG/1; MG/1
1 TABLET ORAL EVERY EVENING
Status: DISCONTINUED | OUTPATIENT
Start: 2024-05-20 | End: 2024-05-21

## 2024-05-19 RX ORDER — SERTRALINE HYDROCHLORIDE 100 MG/1
100 TABLET, FILM COATED ORAL EVERY MORNING
Status: DISCONTINUED | OUTPATIENT
Start: 2024-05-20 | End: 2024-05-24 | Stop reason: HOSPADM

## 2024-05-19 RX ORDER — ENOXAPARIN SODIUM 100 MG/ML
40 INJECTION SUBCUTANEOUS EVERY 24 HOURS
Status: DISCONTINUED | OUTPATIENT
Start: 2024-05-20 | End: 2024-05-24 | Stop reason: HOSPADM

## 2024-05-19 RX ORDER — AMOXICILLIN 250 MG
2 CAPSULE ORAL 2 TIMES DAILY PRN
Status: DISCONTINUED | OUTPATIENT
Start: 2024-05-19 | End: 2024-05-24 | Stop reason: HOSPADM

## 2024-05-19 RX ADMIN — SODIUM CHLORIDE 500 ML: 9 INJECTION, SOLUTION INTRAVENOUS at 20:00

## 2024-05-19 RX ADMIN — SODIUM CHLORIDE 500 ML: 9 INJECTION, SOLUTION INTRAVENOUS at 21:46

## 2024-05-19 ASSESSMENT — COLUMBIA-SUICIDE SEVERITY RATING SCALE - C-SSRS
6. HAVE YOU EVER DONE ANYTHING, STARTED TO DO ANYTHING, OR PREPARED TO DO ANYTHING TO END YOUR LIFE?: NO
2. HAVE YOU ACTUALLY HAD ANY THOUGHTS OF KILLING YOURSELF IN THE PAST MONTH?: NO
1. IN THE PAST MONTH, HAVE YOU WISHED YOU WERE DEAD OR WISHED YOU COULD GO TO SLEEP AND NOT WAKE UP?: NO

## 2024-05-19 ASSESSMENT — ACTIVITIES OF DAILY LIVING (ADL)
ADLS_ACUITY_SCORE: 30
ADLS_ACUITY_SCORE: 38
ADLS_ACUITY_SCORE: 40

## 2024-05-19 NOTE — ED TRIAGE NOTES
Arrives via EMS with generalized weakness since yesterday. Pts BG in route was around 450, known type 2 diabetic but has been uncontrolled for several weeks per pts. Pt was having difficulty swallowing so he stopped his metformin. No medications seen to replace this on meds list that came with pt. Pt denies being around anyone sick. Lives in assisted living with wife. Hx of chronic pain, states pain has been getting worse but is more noticeable in the left hip that travels down the leg. Pt has a medication pump in place currently with medication being released 3 times a day, wife is unsure of medication. Recently diagnosed with parkinson's disease.       Triage Assessment (Adult)       Row Name 05/19/24 1807          Triage Assessment    Airway WDL WDL        Respiratory WDL    Respiratory WDL WDL        Cardiac WDL    Cardiac WDL X        Chest Pain Assessment    Associated Signs/Symptoms hypertension;dizziness

## 2024-05-19 NOTE — ED PROVIDER NOTES
Emergency Department Note      History of Present Illness     Chief Complaint  Generalized Weakness    HPI  Cresencio Peguero is a 89 year old male with a history of type 2 diabetes, hypertension, CAD and hyperlipidemia who presents to the ED via EMS with wife for evaluation of generalized weakness. The patient comes from assisted living and reports a couple hours ago having a syncopal episode while on the toilet during a bowel movement. Prior to this, states that he has been experiencing generalized weakness for the past couple of days. Reports a sore throat and shortness of breath. Patient also has had back pain and constipation however these are not new for him. Denies fever, nausea, vomit, cough, chest pain, or known sick contacts. Patient mentions another fall earlier today. He was able to walk earlier. Of note, mentions diabetic neuropathy and has a pain pump in his back.      Independent Historian  None    Review of External Notes  None  Past Medical History   Relevant Medical History and Problem List  Anxiety   Arthritis   CAD   Type 2 diabetes   Sensorineural hearing loss, bilateral   Hyperlipidemia  Hypertension  Peripheral neuropathy    Sensory ataxia   Stented coronary artery   Inguinal hernia   Small bowel obstruction   Coronary atherosclerosis  Spinal stenosis   Depression    Nuclear sclerotic cataract, left eye  Secondary corneal edema, right eye  Erectile dysfunction   Epiretinal membrane of left eye  Tobacco use disorder     Medications  Norvasc   Lipitor   Flexeril   Neurontin   Cephulac   Metformin   Senokot   Zoloft   Ultram   Colace   Acetaminophen  Prilosec   Flomax  [place]    Surgical History   Cervical laminectomy   Lumbar laminectomy   Stent, CABG x 2  EGD combined   Cataract removal with implant, right   Corneal surgery   Herniorrhaphy inguinal, bilateral   Laparoscopic lysis adhesions   Laparoscopy diagnostic general   Rotator cuff repair, shoulder arthroplasty   Reverse arthroplasty  "shoulder   Tonsillectomy   Coronary artery bypass   Eye surgery   Post lumbar spine surgery   Right shoulder replacement   Post lumbar spine surgery for decompression for spinal cord      Physical Exam   Patient Vitals for the past 24 hrs:   BP Temp Temp src Pulse Resp SpO2 Height Weight   05/20/24 1237 (!) 141/65 -- -- 63 16 97 % -- --   05/20/24 1216 -- -- -- 60 -- 96 % -- --   05/20/24 0833 -- 98.3  F (36.8  C) Oral 66 18 92 % -- --   05/20/24 0511 120/61 -- -- 63 18 95 % -- --   05/20/24 0000 (!) 150/66 -- -- 64 -- -- -- --   05/19/24 2251 (!) 188/80 97.6  F (36.4  C) Oral 63 20 97 % 1.803 m (5' 11\") 90.6 kg (199 lb 11.8 oz)   05/19/24 2223 -- -- -- 63 -- 99 % -- --   05/19/24 2222 -- -- -- 63 -- 97 % -- --   05/19/24 2221 -- -- -- 64 -- 98 % -- --   05/19/24 2220 -- -- -- 66 -- 93 % -- --   05/19/24 2219 (!) 177/90 -- -- 68 -- -- -- --   05/19/24 2151 -- -- -- 60 -- 97 % -- --   05/19/24 2150 -- -- -- 61 -- 98 % -- --   05/19/24 2149 -- -- -- 61 -- 97 % -- --   05/19/24 2148 -- -- -- 61 -- 97 % -- --   05/19/24 2147 -- -- -- 61 -- 97 % -- --   05/19/24 2146 -- -- -- 60 -- 98 % -- --   05/19/24 2145 -- -- -- 61 -- 98 % -- --   05/19/24 2144 (!) 186/87 -- -- 62 -- 98 % -- --   05/19/24 2142 -- -- -- 60 -- 98 % -- --   05/19/24 2141 -- -- -- 61 -- 97 % -- --   05/19/24 2140 -- -- -- 61 -- 96 % -- --   05/19/24 2139 -- -- -- 61 -- 97 % -- --   05/19/24 2138 -- -- -- 60 -- 97 % -- --   05/19/24 2137 -- -- -- 61 -- 97 % -- --   05/19/24 1804 (!) 141/66 98.1  F (36.7  C) Oral 67 18 98 % 1.803 m (5' 11\") 90.7 kg (200 lb)     Physical Exam  General: Overall stable and nontoxic appearing  HEENT: Conjunctivae clear, no scleral icterus, mucous membranes moist  Neuro: Alert, conversant; no facial droop, no slurred speech;  strength, flexion/extension of upper extremities equal; blt LE antigravity equally, plantar/dorsiflexion equal and intact bilaterally; non sensory deficits   CV: Regular rate, regular rhythm, radial " and DP pulses equal  Respiratory: No signs of respiratory distress, lungs clear to auscultation bilaterally   Abdomen: Soft, without rigidity or rebound throughout  MSK: No lower extremity swelling or tenderness   Diagnostics   Lab Results   Labs Ordered and Resulted from Time of ED Arrival to Time of ED Departure   BASIC METABOLIC PANEL - Abnormal       Result Value    Sodium 133 (*)     Potassium 4.3      Chloride 98      Carbon Dioxide (CO2) 25      Anion Gap 10      Urea Nitrogen 33.8 (*)     Creatinine 0.91      GFR Estimate 81      Calcium 9.3      Glucose 401 (*)    CBC WITH PLATELETS AND DIFFERENTIAL - Abnormal    WBC Count 6.9      RBC Count 3.69 (*)     Hemoglobin 10.5 (*)     Hematocrit 31.4 (*)     MCV 85      MCH 28.5      MCHC 33.4      RDW 13.1      Platelet Count 133 (*)     % Neutrophils 72      % Lymphocytes 17      % Monocytes 7      % Eosinophils 4      % Basophils 0      % Immature Granulocytes 0      NRBCs per 100 WBC 0      Absolute Neutrophils 5.0      Absolute Lymphocytes 1.2      Absolute Monocytes 0.5      Absolute Eosinophils 0.2      Absolute Basophils 0.0      Absolute Immature Granulocytes 0.0      Absolute NRBCs 0.0     GLUCOSE BY METER - Abnormal    GLUCOSE BY METER POCT 406 (*)    TROPONIN T, HIGH SENSITIVITY - Abnormal    Troponin T, High Sensitivity 23 (*)    ROUTINE UA WITH MICROSCOPIC REFLEX TO CULTURE - Abnormal    Color Urine Light Yellow      Appearance Urine Clear      Glucose Urine >=1000 (*)     Bilirubin Urine Negative      Ketones Urine Negative      Specific Gravity Urine 1.031      Blood Urine Negative      pH Urine 5.5      Protein Albumin Urine Negative      Urobilinogen Urine Normal      Nitrite Urine Negative      Leukocyte Esterase Urine Negative      Mucus Urine Present (*)     RBC Urine <1      WBC Urine <1      Squamous Epithelials Urine <1      Hyaline Casts Urine 1     INFLUENZA A/B, RSV, & SARS-COV2 PCR - Normal    Influenza A PCR Negative      Influenza B  PCR Negative      RSV PCR Negative      SARS CoV2 PCR Negative     KETONE BETA-HYDROXYBUTYRATE QUANTITATIVE, RAPID - Normal    Ketone (Beta-Hydroxybutyrate) Quantitative <0.18     HEPATIC FUNCTION PANEL - Normal    Protein Total 6.9      Albumin 3.8      Bilirubin Total 0.3      Alkaline Phosphatase 131      AST 19      ALT <5      Bilirubin Direct <0.20     PROCALCITONIN - Normal    Procalcitonin 0.17       Imaging  Echocardiogram Complete   Final Result      Chest XR,  PA & LAT   Final Result   IMPRESSION: Normal size of cardiac silhouette status post median sternotomy and CABG. Low lung volumes with linear bibasilar opacities, favor atelectasis, less likely consolidation/pneumonia. No significant pleural effusion or pneumothorax. No acute bony    abnormality. Right shoulder arthroplasty noted.      Head CT w/o contrast   Final Result   IMPRESSION:   1.  No CT evidence for acute intracranial process.   2.  Mild chronic microvascular ischemic changes as above.        EKG   ECG results from 05/19/24   EKG 12 lead     Value    Systolic Blood Pressure     Diastolic Blood Pressure     Ventricular Rate 67    Atrial Rate 67    IA Interval 200    QRS Duration 108        QTc 443    P Axis 18    R AXIS -6    T Axis 61    Interpretation ECG      Sinus rhythm  Left ventricular hypertrophy  Interpreted by Joanna Chavez MD            Independent Interpretation  CXR shows no focal consolidation, widened mediastinum, pleural effusion  CT head shows no hemorrhage  ED Course    Medications Administered  Medications   amLODIPine (NORVASC) tablet 2.5 mg ( Oral Automatically Held 5/23/24 0800)   clonazePAM (klonoPIN) tablet 0.5 mg ( Oral Automatically Held 5/23/24 2200)   clonazePAM (klonoPIN) ODT tab 0.25 mg ( Oral Automatically Held 5/23/24 0800)   gabapentin (NEURONTIN) capsule 600 mg (600 mg Oral $Given 5/20/24 0019)   gabapentin (NEURONTIN) capsule 300 mg (300 mg Oral $Given 5/20/24 0856)   HYDROcodone-acetaminophen  (NORCO) 5-325 MG per tablet 1 tablet ( Oral Automatically Held 5/23/24 2000)   magnesium hydroxide (MILK OF MAGNESIA) suspension 30 mL (30 mLs Oral $Given 5/20/24 0109)   pantoprazole (PROTONIX) EC tablet 40 mg (40 mg Oral $Given 5/20/24 0019)   sertraline (ZOLOFT) tablet 100 mg (100 mg Oral $Given 5/20/24 0856)   senna-docusate (SENOKOT-S/PERICOLACE) 8.6-50 MG per tablet 1 tablet (1 tablet Oral $Given 5/20/24 0856)   senna-docusate (SENOKOT-S/PERICOLACE) 8.6-50 MG per tablet 1 tablet (has no administration in time range)     Or   senna-docusate (SENOKOT-S/PERICOLACE) 8.6-50 MG per tablet 2 tablet (has no administration in time range)   ondansetron (ZOFRAN ODT) ODT tab 4 mg (has no administration in time range)     Or   ondansetron (ZOFRAN) injection 4 mg (has no administration in time range)   enoxaparin ANTICOAGULANT (LOVENOX) injection 40 mg (40 mg Subcutaneous $Given 5/20/24 0904)   acetaminophen (TYLENOL) tablet 650 mg (650 mg Oral $Given 5/20/24 0856)     Or   acetaminophen (TYLENOL) Suppository 650 mg ( Rectal See Alternative 5/20/24 0856)   glucose gel 15-30 g (has no administration in time range)     Or   dextrose 50 % injection 25-50 mL (has no administration in time range)     Or   glucagon injection 1 mg (has no administration in time range)   insulin aspart (NovoLOG) injection (RAPID ACTING) (2 Units Subcutaneous $Given 5/20/24 1035)   insulin aspart (NovoLOG) injection (RAPID ACTING) (has no administration in time range)   carbidopa-levodopa (SINEMET)  MG per tablet 2 tablet (2 tablets Oral $Given 5/20/24 0856)   HYDROcodone-acetaminophen (NORCO) 5-325 MG per tablet 1 tablet ( Oral Held by provider 5/20/24 1004)   naloxone (NARCAN) injection 0.2 mg (has no administration in time range)     Or   naloxone (NARCAN) injection 0.4 mg (has no administration in time range)     Or   naloxone (NARCAN) injection 0.2 mg (has no administration in time range)     Or   naloxone (NARCAN) injection 0.4 mg (has  no administration in time range)   lactated ringers infusion ( Intravenous $New Bag 5/20/24 1119)   sodium chloride 0.9% BOLUS 500 mL (0 mLs Intravenous Stopped 5/19/24 2143)   sodium chloride 0.9% BOLUS 500 mL (0 mLs Intravenous Stopped 5/19/24 2224)   perflutren diluted 1mL to 2mL with saline (OPTISON) diluted injection 3 mL (3 mLs Intravenous $Given 5/20/24 0818)   sodium chloride (PF) 0.9% PF flush 10 mL (10 mLs Intracatheter $Given 5/20/24 0818)     Discussion of Management  Admitting Hospitalist,     Social Determinants of Health adding to complexity of care  None    ED Course  ED Course as of 05/20/24 1302   Sun May 19, 2024   1849 I obtained history and examined the patient as noted above.    2132 I discussed the patient with the admitting hospitalist, Dr. Brice.     Medical Decision Making / Diagnosis   CMS Diagnoses: None    MIPS     None    MDM  Cresencio Peguero is a 89 year old male with a hx of intrathecal pain pump, CAD, T2DM, esophageal stricture, who presented to the emergency department with chief complaint of generalized weakness and syncopal episode while on the toilet today also found to be hyperglycemic.  Patient stable nontoxic on exam although per wife does seem to be slightly more confused than usual.  No focal neurologic deficit to suggest CVA.  CT head without any acute findings.  Patient was found to be hyperglycemic, however negative ketones, no anion gap, with history of type 2 diabetes do not think this is DKA. Given initial gentle fluids as I could not find an echocardiogram, does have hx of CABG. Lower suspicion for HHS as well. It appears that patient has not been able to swallow his metformin pills so currently his glucose is uncontrolled.  The description of the syncopal episode sounds to be vasovagal while he is having a bowel movement, less likely cardiac, doubt PE. Patient lives with wife independently and with his weakness, he would benefit from hospitalization. Discussed  with hospitalist Dr Brice, who kindly accepted him for admission.     Disposition  The patient was admitted to the hospital.    ICD-10 Codes:    ICD-10-CM    1. Generalized weakness  R53.1       2. Ambulatory dysfunction  R26.2       3. Hyperglycemia  R73.9            Discharge Medications  Current Discharge Medication List        Scribe Disclosure:  I, Debra Macias, am serving as a scribe at 7:04 PM on 5/19/2024 to document services personally performed by Joanna Gustafson MD based on my observations and the provider's statements to me.      Scribe Disclosure:  IALAN, am serving as a scribe  at 7:05 PM on 5/19/2024 to document services personally performed by Joanna Gustafson MD based on my observations and the provider's statements to me.      Joanna Gustafson MD  05/20/24 8388

## 2024-05-20 ENCOUNTER — APPOINTMENT (OUTPATIENT)
Dept: PHYSICAL THERAPY | Facility: CLINIC | Age: 89
DRG: 092 | End: 2024-05-20
Attending: INTERNAL MEDICINE
Payer: MEDICARE

## 2024-05-20 ENCOUNTER — APPOINTMENT (OUTPATIENT)
Dept: CARDIOLOGY | Facility: CLINIC | Age: 89
DRG: 092 | End: 2024-05-20
Attending: INTERNAL MEDICINE
Payer: MEDICARE

## 2024-05-20 ENCOUNTER — APPOINTMENT (OUTPATIENT)
Dept: SPEECH THERAPY | Facility: CLINIC | Age: 89
DRG: 092 | End: 2024-05-20
Attending: PHYSICIAN ASSISTANT
Payer: MEDICARE

## 2024-05-20 LAB
ANION GAP SERPL CALCULATED.3IONS-SCNC: 9 MMOL/L (ref 7–15)
ATRIAL RATE - MUSE: 67 BPM
BUN SERPL-MCNC: 26.8 MG/DL (ref 8–23)
CALCIUM SERPL-MCNC: 8.9 MG/DL (ref 8.8–10.2)
CHLORIDE SERPL-SCNC: 104 MMOL/L (ref 98–107)
CK SERPL-CCNC: 29 U/L (ref 39–308)
CREAT SERPL-MCNC: 0.94 MG/DL (ref 0.67–1.17)
DEPRECATED HCO3 PLAS-SCNC: 25 MMOL/L (ref 22–29)
DIASTOLIC BLOOD PRESSURE - MUSE: NORMAL MMHG
EGFRCR SERPLBLD CKD-EPI 2021: 78 ML/MIN/1.73M2
ERYTHROCYTE [DISTWIDTH] IN BLOOD BY AUTOMATED COUNT: 13 % (ref 10–15)
GLUCOSE BLDC GLUCOMTR-MCNC: 188 MG/DL (ref 70–99)
GLUCOSE BLDC GLUCOMTR-MCNC: 237 MG/DL (ref 70–99)
GLUCOSE BLDC GLUCOMTR-MCNC: 238 MG/DL (ref 70–99)
GLUCOSE BLDC GLUCOMTR-MCNC: 295 MG/DL (ref 70–99)
GLUCOSE SERPL-MCNC: 261 MG/DL (ref 70–99)
HCT VFR BLD AUTO: 32.5 % (ref 40–53)
HGB BLD-MCNC: 10.6 G/DL (ref 13.3–17.7)
INTERPRETATION ECG - MUSE: NORMAL
LVEF ECHO: NORMAL
MCH RBC QN AUTO: 28 PG (ref 26.5–33)
MCHC RBC AUTO-ENTMCNC: 32.6 G/DL (ref 31.5–36.5)
MCV RBC AUTO: 86 FL (ref 78–100)
P AXIS - MUSE: 18 DEGREES
PLATELET # BLD AUTO: 138 10E3/UL (ref 150–450)
POTASSIUM SERPL-SCNC: 3.9 MMOL/L (ref 3.4–5.3)
PR INTERVAL - MUSE: 200 MS
QRS DURATION - MUSE: 108 MS
QT - MUSE: 420 MS
QTC - MUSE: 443 MS
R AXIS - MUSE: -6 DEGREES
RBC # BLD AUTO: 3.78 10E6/UL (ref 4.4–5.9)
SODIUM SERPL-SCNC: 138 MMOL/L (ref 135–145)
SYSTOLIC BLOOD PRESSURE - MUSE: NORMAL MMHG
T AXIS - MUSE: 61 DEGREES
VENTRICULAR RATE- MUSE: 67 BPM
WBC # BLD AUTO: 5 10E3/UL (ref 4–11)

## 2024-05-20 PROCEDURE — 96361 HYDRATE IV INFUSION ADD-ON: CPT

## 2024-05-20 PROCEDURE — 250N000013 HC RX MED GY IP 250 OP 250 PS 637: Performed by: INTERNAL MEDICINE

## 2024-05-20 PROCEDURE — 92526 ORAL FUNCTION THERAPY: CPT | Mod: GN | Performed by: SPEECH-LANGUAGE PATHOLOGIST

## 2024-05-20 PROCEDURE — 82550 ASSAY OF CK (CPK): CPT | Performed by: INTERNAL MEDICINE

## 2024-05-20 PROCEDURE — C8929 TTE W OR WO FOL WCON,DOPPLER: HCPCS

## 2024-05-20 PROCEDURE — 250N000012 HC RX MED GY IP 250 OP 636 PS 637: Performed by: PHYSICIAN ASSISTANT

## 2024-05-20 PROCEDURE — 250N000011 HC RX IP 250 OP 636: Performed by: INTERNAL MEDICINE

## 2024-05-20 PROCEDURE — 250N000013 HC RX MED GY IP 250 OP 250 PS 637: Performed by: PHYSICIAN ASSISTANT

## 2024-05-20 PROCEDURE — 99418 PROLNG IP/OBS E/M EA 15 MIN: CPT | Performed by: PHYSICIAN ASSISTANT

## 2024-05-20 PROCEDURE — 97162 PT EVAL MOD COMPLEX 30 MIN: CPT | Mod: GP

## 2024-05-20 PROCEDURE — 36415 COLL VENOUS BLD VENIPUNCTURE: CPT | Performed by: PHYSICIAN ASSISTANT

## 2024-05-20 PROCEDURE — 255N000002 HC RX 255 OP 636: Performed by: INTERNAL MEDICINE

## 2024-05-20 PROCEDURE — 93306 TTE W/DOPPLER COMPLETE: CPT | Mod: 26 | Performed by: INTERNAL MEDICINE

## 2024-05-20 PROCEDURE — 999N000208 ECHOCARDIOGRAM COMPLETE

## 2024-05-20 PROCEDURE — 85027 COMPLETE CBC AUTOMATED: CPT | Performed by: INTERNAL MEDICINE

## 2024-05-20 PROCEDURE — 80048 BASIC METABOLIC PNL TOTAL CA: CPT | Performed by: INTERNAL MEDICINE

## 2024-05-20 PROCEDURE — G0378 HOSPITAL OBSERVATION PER HR: HCPCS

## 2024-05-20 PROCEDURE — 87040 BLOOD CULTURE FOR BACTERIA: CPT | Performed by: PHYSICIAN ASSISTANT

## 2024-05-20 PROCEDURE — 99233 SBSQ HOSP IP/OBS HIGH 50: CPT | Performed by: PHYSICIAN ASSISTANT

## 2024-05-20 PROCEDURE — 36415 COLL VENOUS BLD VENIPUNCTURE: CPT | Performed by: INTERNAL MEDICINE

## 2024-05-20 PROCEDURE — 96372 THER/PROPH/DIAG INJ SC/IM: CPT | Performed by: INTERNAL MEDICINE

## 2024-05-20 PROCEDURE — 82962 GLUCOSE BLOOD TEST: CPT

## 2024-05-20 PROCEDURE — 258N000003 HC RX IP 258 OP 636: Performed by: INTERNAL MEDICINE

## 2024-05-20 PROCEDURE — 999N000111 HC STATISTIC OT IP EVAL DEFER: Performed by: OCCUPATIONAL THERAPIST

## 2024-05-20 PROCEDURE — 92610 EVALUATE SWALLOWING FUNCTION: CPT | Mod: GN | Performed by: SPEECH-LANGUAGE PATHOLOGIST

## 2024-05-20 PROCEDURE — 97530 THERAPEUTIC ACTIVITIES: CPT | Mod: GP

## 2024-05-20 PROCEDURE — 258N000003 HC RX IP 258 OP 636: Performed by: PHYSICIAN ASSISTANT

## 2024-05-20 RX ORDER — SODIUM CHLORIDE, SODIUM LACTATE, POTASSIUM CHLORIDE, CALCIUM CHLORIDE 600; 310; 30; 20 MG/100ML; MG/100ML; MG/100ML; MG/100ML
INJECTION, SOLUTION INTRAVENOUS CONTINUOUS
Status: DISCONTINUED | OUTPATIENT
Start: 2024-05-20 | End: 2024-05-21

## 2024-05-20 RX ORDER — DEXTROSE MONOHYDRATE 25 G/50ML
25-50 INJECTION, SOLUTION INTRAVENOUS
Status: DISCONTINUED | OUTPATIENT
Start: 2024-05-20 | End: 2024-05-21

## 2024-05-20 RX ORDER — NALOXONE HYDROCHLORIDE 0.4 MG/ML
0.2 INJECTION, SOLUTION INTRAMUSCULAR; INTRAVENOUS; SUBCUTANEOUS
Status: DISCONTINUED | OUTPATIENT
Start: 2024-05-20 | End: 2024-05-24 | Stop reason: HOSPADM

## 2024-05-20 RX ORDER — NALOXONE HYDROCHLORIDE 0.4 MG/ML
0.4 INJECTION, SOLUTION INTRAMUSCULAR; INTRAVENOUS; SUBCUTANEOUS
Status: DISCONTINUED | OUTPATIENT
Start: 2024-05-20 | End: 2024-05-24 | Stop reason: HOSPADM

## 2024-05-20 RX ORDER — CARBIDOPA AND LEVODOPA 25; 100 MG/1; MG/1
2 TABLET ORAL 3 TIMES DAILY
Status: DISCONTINUED | OUTPATIENT
Start: 2024-05-20 | End: 2024-05-24 | Stop reason: HOSPADM

## 2024-05-20 RX ORDER — HYDROCODONE BITARTRATE AND ACETAMINOPHEN 5; 325 MG/1; MG/1
1 TABLET ORAL DAILY PRN
Status: DISCONTINUED | OUTPATIENT
Start: 2024-05-20 | End: 2024-05-21

## 2024-05-20 RX ORDER — NICOTINE POLACRILEX 4 MG
15-30 LOZENGE BUCCAL
Status: DISCONTINUED | OUTPATIENT
Start: 2024-05-20 | End: 2024-05-21

## 2024-05-20 RX ADMIN — CLONAZEPAM 0.5 MG: 0.5 TABLET ORAL at 00:19

## 2024-05-20 RX ADMIN — CLONAZEPAM 0.25 MG: 0.25 TABLET, ORALLY DISINTEGRATING ORAL at 08:56

## 2024-05-20 RX ADMIN — INSULIN ASPART 3 UNITS: 100 INJECTION, SOLUTION INTRAVENOUS; SUBCUTANEOUS at 18:43

## 2024-05-20 RX ADMIN — SERTRALINE HYDROCHLORIDE 100 MG: 100 TABLET ORAL at 08:56

## 2024-05-20 RX ADMIN — SODIUM CHLORIDE, POTASSIUM CHLORIDE, SODIUM LACTATE AND CALCIUM CHLORIDE: 600; 310; 30; 20 INJECTION, SOLUTION INTRAVENOUS at 11:19

## 2024-05-20 RX ADMIN — ACETAMINOPHEN 650 MG: 325 TABLET, FILM COATED ORAL at 08:56

## 2024-05-20 RX ADMIN — GABAPENTIN 600 MG: 300 CAPSULE ORAL at 00:19

## 2024-05-20 RX ADMIN — MAGNESIUM HYDROXIDE 30 ML: 2400 SUSPENSION ORAL at 01:09

## 2024-05-20 RX ADMIN — SENNOSIDES AND DOCUSATE SODIUM 1 TABLET: 8.6; 5 TABLET ORAL at 00:19

## 2024-05-20 RX ADMIN — INSULIN ASPART 2 UNITS: 100 INJECTION, SOLUTION INTRAVENOUS; SUBCUTANEOUS at 13:48

## 2024-05-20 RX ADMIN — PANTOPRAZOLE SODIUM 40 MG: 40 TABLET, DELAYED RELEASE ORAL at 22:20

## 2024-05-20 RX ADMIN — GABAPENTIN 300 MG: 300 CAPSULE ORAL at 08:56

## 2024-05-20 RX ADMIN — HYDROCODONE BITARTRATE AND ACETAMINOPHEN 1 TABLET: 5; 325 TABLET ORAL at 00:19

## 2024-05-20 RX ADMIN — ACETAMINOPHEN 650 MG: 325 TABLET, FILM COATED ORAL at 04:58

## 2024-05-20 RX ADMIN — CARBIDOPA AND LEVODOPA 2 TABLET: 25; 100 TABLET ORAL at 08:56

## 2024-05-20 RX ADMIN — CARBIDOPA AND LEVODOPA 2 TABLET: 25; 100 TABLET ORAL at 19:59

## 2024-05-20 RX ADMIN — GABAPENTIN 600 MG: 300 CAPSULE ORAL at 22:20

## 2024-05-20 RX ADMIN — HUMAN ALBUMIN MICROSPHERES AND PERFLUTREN 3 ML: 10; .22 INJECTION, SOLUTION INTRAVENOUS at 08:18

## 2024-05-20 RX ADMIN — SENNOSIDES AND DOCUSATE SODIUM 1 TABLET: 8.6; 5 TABLET ORAL at 08:56

## 2024-05-20 RX ADMIN — CARBIDOPA AND LEVODOPA 2 TABLET: 25; 100 TABLET ORAL at 13:48

## 2024-05-20 RX ADMIN — INSULIN ASPART 2 UNITS: 100 INJECTION, SOLUTION INTRAVENOUS; SUBCUTANEOUS at 10:35

## 2024-05-20 RX ADMIN — SODIUM CHLORIDE: 9 INJECTION, SOLUTION INTRAVENOUS at 00:20

## 2024-05-20 RX ADMIN — PANTOPRAZOLE SODIUM 40 MG: 40 TABLET, DELAYED RELEASE ORAL at 00:19

## 2024-05-20 RX ADMIN — SODIUM CHLORIDE, POTASSIUM CHLORIDE, SODIUM LACTATE AND CALCIUM CHLORIDE: 600; 310; 30; 20 INJECTION, SOLUTION INTRAVENOUS at 20:56

## 2024-05-20 RX ADMIN — ENOXAPARIN SODIUM 40 MG: 40 INJECTION SUBCUTANEOUS at 09:04

## 2024-05-20 ASSESSMENT — ACTIVITIES OF DAILY LIVING (ADL)
DEPENDENT_IADLS:: COOKING;MEAL PREPARATION;MEDICATION MANAGEMENT;TRANSPORTATION
ADLS_ACUITY_SCORE: 39
ADLS_ACUITY_SCORE: 33
ADLS_ACUITY_SCORE: 33
ADLS_ACUITY_SCORE: 39
ADLS_ACUITY_SCORE: 32
ADLS_ACUITY_SCORE: 33
ADLS_ACUITY_SCORE: 33
ADLS_ACUITY_SCORE: 32
ADLS_ACUITY_SCORE: 32
ADLS_ACUITY_SCORE: 33
ADLS_ACUITY_SCORE: 33
ADLS_ACUITY_SCORE: 32
ADLS_ACUITY_SCORE: 32
ADLS_ACUITY_SCORE: 33
ADLS_ACUITY_SCORE: 39
ADLS_ACUITY_SCORE: 33

## 2024-05-20 NOTE — PROGRESS NOTES
CLINICAL SWALLOW EVALUATION       05/20/24 6664   Appointment Info   Signing Clinician's Name / Credentials (SLP) Melody Bonilla M.A., CCC-SLP, BCS-S   General Information   Onset of Illness/Injury or Date of Surgery 05/19/24   Referring Physician More Saldivar   Patient/Family Therapy Goal Statement (SLP) Patient would like to continue eating his lunch.   Pertinent History of Current Problem Patient admitted with encephalopathy, weakness, and recent fall at [facility].  His PMH is significant for new Parkinson's disease dx, ataxia, chronic pain, DM2, and GERD.   General Observations Patient feeding self lunch in bed, upright but falling asleep.   Type of Evaluation   Type of Evaluation Swallow Evaluation   Oral Motor   Oral Musculature generally intact   Structural Abnormalities none present   Mucosal Quality good   Dentition (Oral Motor)   Dentition (Oral Motor) adequate dentition   Facial Symmetry (Oral Motor)   Facial Symmetry (Oral Motor) WNL   Tongue Function (Oral Motor)   Tongue ROM (Oral Motor) WNL   Cough/Swallow/Gag Reflex (Oral Motor)   Volitional Throat Clear/Cough (Oral Motor) WNL   Volitional Swallow (Oral Motor) WNL   Vocal Quality/Secretion Management (Oral Motor)   Vocal Quality (Oral Motor) WNL   Secretion Management (Oral Motor) WNL   General Swallowing Observations   Past History of Dysphagia Video swallow study completed at [facility] in 2023 indicating no aspiration but with cervical esophageal stasis above an osteophyte.  Upper GI endo in 5/2023 after video swallow study reiterated the same concern, noting food impaction above an area of known osteophyte impingement.  No other stricture or stenosis noted in upper 2/3 esophagus.   Current Diet/Method of Nutritional Intake (General Swallowing Observations, NIS) thin liquids (level 0);easy to chew (level 7)   Swallowing Evaluation Clinical swallow evaluation   Clinical Swallow Evaluation   Feeding Assistance frequent cues/help required   Adaptive Eating  Utensils May benefit from bigger handled flatware   Additional evaluation(s) completed today No   Clinical Swallow Evaluation Textures Trialed thin liquids;pureed;minced & moist;soft & bite-sized   Clinical Swallow Eval: Thin Liquid Texture Trial   Mode of Presentation, Thin Liquids cup;straw;self-fed   Volume of Liquid or Food Presented 8 oz   Oral Phase of Swallow WFL   Pharyngeal Phase of Swallow intact   Diagnostic Statement No overt Sx of aspiration   Clinical Swallow Evaluation: Puree Solid Texture Trial   Mode of Presentation, Puree spoon;self-fed   Volume of Puree Presented 3 oz   Oral Phase, Puree WFL   Pharyngeal Phase, Puree intact   Diagnostic Statement No overt Sx of aspiration   Clinical Swallow Eval: Minced & Moist   Mode of Presentation spoon;self-fed   Volume Presented 4 oz   Oral Phase WFL   Pharyngeal Phase intact   Diagnostic Statement No overt Sx of aspiration   Clinical Swallow Eval: Soft & Bite Sized   Mode of Presentation spoon;self-fed   Volume Presented 2 oz pot roast   Oral Phase impaired mastication;delayed AP movement;residue in oral cavity   Pharyngeal Phase intact   Diagnostic Statement Prolonged oral phase and difficulty with self-feeding today   Esophageal Phase of Swallow   Patient reports or presents with symptoms of esophageal dysphagia Yes   Esophageal comments Known cervical spine osteophyte restricting bolus flow   Swallowing Recommendations   Diet Consistency Recommendations thin liquids (level 0);soft & bite-sized (level 6)   Supervision Level for Intake close supervision needed   Mode of Delivery Recommendations slow rate of intake;bolus size, small;other (see comments)  (Chew well)   Swallowing Maneuver Recommendations alternate food and liquid intake   Monitoring/Assistance Required (Eating/Swallowing) stop eating activities when fatigue is present   Recommended Feeding/Eating Techniques (Swallow Eval) maintain upright sitting position for eating;maintain upright posture  "during/after eating for 30 minutes;set-up and prepare tray   Medication Administration Recommendations, Swallowing (SLP) Crush larger pills   Clinical Impression   Criteria for Skilled Therapeutic Interventions Met (SLP Eval) Yes, treatment indicated   SLP Diagnosis Mild to moderate oropharyngeal dysphagia   Risks & Benefits of therapy have been explained evaluation/treatment results reviewed;risks/benefits reviewed   Clinical Impression Comments Patient presents with baseline cervical esophageal dysphagia from osteophyte restriction.  Increased aspiration risk related to encephalopathy and fatigue at this time.  No overt Sx of aspiration noted during meal today, but with close assistance needed for safety and discontinuation of meal when falling asleep.   SLP Total Evaluation Time   Eval: oral/pharyngeal swallow function, clinical swallow Minutes (56316) 20   SLP Goals   Therapy Frequency (SLP Eval) 3 times/week   SLP Predicted Duration/Target Date for Goal Attainment 05/27/24   SLP Goals Swallow   SLP: Safely tolerate diet without signs/symptoms of aspiration Easy to chew diet;Thin liquids;With use of swallow precautions;With use of compensatory swallow strategies;Independently   Interventions   Interventions Quick Adds Swallowing Dysfunction   Swallowing Dysfunction &/or Oral Function for Feeding   Treatment of Swallowing Dysfunction &/or Oral Function for Feeding Minutes (74587) 8   Symptoms Noted During/After Treatment Fatigue   Treatment Detail/Skilled Intervention Patient trained re: safe swallowing strategies and positioning to lessen aspiration risk while tired and weaker than his baseline.  Patient verbalized comprehension for chewing food well and taking small bites and sips, required feeding assistance though insisting he could \"handle it.\"   SLP Discharge Planning   SLP Discharge Recommendation home with home health   SLP Rationale for DC Rec Consider SLP follow up at next level of care to ensure safe " return to least restrictive diet if goals not met by discharge.   SLP Brief overview of current status  Clinical swallow evaluation completed: recommend soft and bite size diet with thin liquids, eating only when alert and upright, close supervision to ensure safe alertness and positioning, offer tray set up and feeding assistance if needed.  Crush whole pills if they're larger size due to upper esophageal stenosis from cervical spine osteophyte.   Total Session Time   Total Session Time (sum of timed and untimed services) 28                                                                                  [facility]      OUTPATIENT SPEECH LANGUAGE PATHOLOGY EVALUATION  PLAN OF TREATMENT FOR OUTPATIENT REHABILITATION  (COMPLETE FOR INITIAL CLAIMS ONLY)  Patient's Last Name, First Name, M.I.  YOB: 1935  Cresencio Peguero                        Provider's Name  [facility] Medical Record No.  [id number]                               Onset Date:  05/19/24  Start of Care Date:   5/20/24   Type:     ___PT   ___OT   _X_SLP Medical Diagnosis: Parkinson's disease           SLP Diagnosis:  Mild to moderate oropharyngeal dysphagia  Visits from SOC:  1   ________________________________________________________________  Plan of Treatment/Functional Goals    Planned Interventions:                    Goals: See Speech Language Pathology Goals on Care Plan in [facility] electronic health record.    Therapy Frequency:3 times/week   Predicted Duration of Therapy Intervention: 05/27/24  ________________________________________________________________________________    I CERTIFY THE NEED FOR THESE SERVICES FURNISHED UNDER        THIS PLAN OF TREATMENT AND WHILE UNDER MY CARE     (Physician attestation of this document indicates review and certification of the therapy plan).                     Referring Physician: More Patel  Assessment        See Speech Language Pathology documentation in Epic electronic health record, evaluation dated

## 2024-05-20 NOTE — PROGRESS NOTES
[facility]    Medicine Progress Note - Hospitalist Service    Date of Admission:  5/19/2024    Assessment & Plan   Cresencio Peguero is a 89 year old male with PMHx of Parkinson's disease, ataxia, spinal stenosis, idiopathic peripheral neuropathy, chronic pain on narcotics with multiple previous spinal surgeries with implanted spinal stimulator, type 2 diabetes mellitus, hyperlipidemia, depression, GERD, constipation, who was admitted on 5/19/2024. He was brought in by EMS from assisted living facility for evaluation of global weakness. He reportedly sustained a fall without traumatic injury 2 weeks PTA with possible syncopal event while on the toilet having a bowel movement.     In the ED afebrile, VSS. Found to be orthostatic with SBP drop from 150 to 90's.  Lab work with hyperglycemia, no ketones or acidosis.  CBC with a mild thrombocytopenia, microcytic anemia with a hemoglobin of 10.5 and stable.  Urinalysis without signs of infection.  Viral PCR negative for COVID, influenza, RSV.  Chest x-ray with linear bibasilar opacities favored to be atelectasis. No chest pain in ED, troponin detectable at 23 EKG with NSR LVH repolarization abnormality seen on prior studies.  NCHCT without acute pathology.     He was admitted to observation for monitoring, was given 1 L of normal saline and persistently orthostatic.     Assumed care following admission, Patient lethargic and was given narcotics and PTA benzodiazepine this AM. TTE pending.     Acute Encephalopathy, concern for Metabolic vs Toxic Encephalopathy  Presented for evaluation of global weakness, falls. Initial infectious workup is without cause for symptoms. Clinically nonseptic appearing, remains lethargic. No localizing symptoms. Neurologic exam without focal deficit and vital signs stable. Per wife of patient he was recently started on Klonopin BID, increased fentanyl pump to TID.   Wife reports at baseline is drowsy, sleeps during the  day, progressed over the 3 days PTA.   Suspect increased global weakness is due to polypharmacy being on benzodiazepines, narcotics and has a ? pain pump in place which we are clarifying with the pain team which medications he is getting in the pain pump.  May be additional contribution from low intake and dehydration due to dysphagia, uncontrolled blood sugars.  -neuro checks q 4 hours  -narcan if RR depression or worsening somnolence  -aspiration precautions  -vital sign monitoring  -hold PTA Klonopin, Williams. Per wife of patient he was recently started on Klonopin BID, increased fentanyl pump to TID.   -clarifying pain pump status with Pharmacy and [facility]orio may need to adjust  -manage blood sugars  -check Blood cultures, ammonia. Per chart review at one point on lactulose but since discontinued  -monitor/notify if fever or new symptoms   -IV fluids until intake improved  -PT/OT/SW   -fall precautions       Orthostatic hypotension  Unwitnessed fall vs syncopal event   Had possible syncopal event while on the toilet during a bowel movement, per wife this was 2 weeks PTA.  No apparent injury.  Sounded vagovagal in etiology due to straining while having BM.   Suspect contribution of autonomic dysfunction from Parkinson's disease, ataxia and idiopathic neuropathy are contribution to ongoing orthostatics.  -Give IV fluids  -Hold prior to admission amlodipine  -Continue Sinemet  -Consider abdominal binder, compression stockings  -TTE was ordered on admission and pending, if no acute findings will discontinue telemetry    Pulmonary Opacities on XR, concerning for atelectasis  Afebrile, no dyspnea or cough noted. CXR with Low lung volumes with linear bibasilar opacities, favor atelectasis.  Pro greg is not elevated, no leukocytosis or fever.  -monitor symptoms  -incentive spirometer     Chronic Pain   Hx of Multiple spinal surgeries   Follows with [facility]. Has implanted spinal stimulator.   -as above holding pain medications and  "trying to clarify pain pump status   -narcan if RR depression or worsening somnolence    Parkinson's disease  Diagnosed 3 weeks ago per patient on sinemet which we will resume.  Per facility paperwork it looks like he follows with Dr. Johnson but am unable to review any documentation.  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  He was previously on a mechanical soft diet with thin liquids.  Last EGD 5/2023, with esophageal stenosis and cervical spine osteophyte thought to be contributing to symptoms.  -SLP eval  -aspiration precautions   -continue PPI     Hyperglycemia   Uncontrolled Type II DM  Due to dysphagia taken off of metformin PTA. Last A1c was 10.9 when checked on May 3rd.   Blood sugars were elevated in the ED with no acidosis, ketones or signs of DKA. Improved after fluids.   -No orders on admission for insulin or blood sugar checks, added.   -POCT, sliding scale insulin PRN  -will likely restart metformin and add insulin this admission     Notes Describing Hx of Possible CAD, s/p CABG, Stent   HTN  HLD  -per med rec [facility] not appear to be on aspirin or statin any longer  -no cardiology records able to review    Idiopathic neuropathy  -resume PTA gabapentin    MDD  -resume sertraline        Diet: Mechanical/Dental Soft Diet    DVT Prophylaxis: Lovenox   Henderson Catheter: Not present  Lines: None     Cardiac Monitoring: ACTIVE order. Indication: Syncope- high cardiac risk (48 hours)  Code Status: DNR DNI as d/w wife who is now at bedside.     Clinically Significant Risk Factors Present on Admission                      # DMII: A1C = 10.9 % (Ref range: <5.7 %) within past 6 months    # Overweight: Estimated body mass index is 27.86 kg/m  as calculated from the following:    Height as of this encounter: 1.803 m (5' 11\").    Weight as of this encounter: 90.6 " "kg (199 lb 11.8 oz).              Disposition Plan      Expected Discharge Date: 05/20/2024                    More Saldivar PA-C  Hospitalist Service  [facility]  Securely message with [employer] (more info)  Text page via [employer] Paging/Directory   ______________________________________________________________________    Interval History   Assumed care.  Patient is somnolent.  He awakens to voice.  He reports that he fell off the toilet yesterday and that is why he is in the hospital.  He endorses drowsiness stating \"I cannot wake up \".  He denies any lightheadedness or dizziness.  He denies chest pain or shortness of breath.  He reports that he was started on Sinemet with a recent Parkinson's diagnosis about 3 weeks ago.  Breakfast is sitting in front of him.  He is too weak to eat.   Awaiting callback from patient's family to clarify baseline and discussion with pharmacy regarding what he is getting in his pain pump as I cannot review these records.  No lightheadedness, dizziness.   Telemetry - NSR.   Patient's wife states that last week the pain clinic increased his pain pump dosing to 3 times a day instead of once a day she believes.  He was also started on Klonopin.  He actually fell 2 weeks ago.  Over the previous 3 days he had not been walking very much due to weakness.  Physical Exam   Vital Signs: Temp: 98.3  F (36.8  C) Temp src: Oral BP: 120/61 Pulse: 66   Resp: 18 SpO2: 92 % O2 Device: None (Room air)    Weight: 199 lbs 11.79 oz    Constitutional: Awakens to voice, somnolent, fatigued appearing.   Respiratory:  Normal work of breathing. Diminished at bases.   Cardiovascular: Regular rate and rhythm, normal S1 and S2, and no murmur appreciated.   GI: Bowel sounds present. soft, non-distended, non-tender.   Skin/Integument: Warm, dry. no peripheral edema.  Neuro: Oriented to self, knows he is in hospital. Does not know place, time, situation.No focal deficits. Moving all extremities " with globally reduced strength, 2/5 throughout. Coordination and sensation grossly intact. Speech slow and poorly annunciated.   Psych: no current hallucinations.         Medical Decision Making       120 MINUTES SPENT BY ME on the date of service doing chart review, history, exam, documentation & further activities per the note.      Data   ------------------------- PAST 24 HR DATA REVIEWED -----------------------------------------------[id number]}

## 2024-05-20 NOTE — PHARMACY-ADMISSION MEDICATION HISTORY
Pharmacy Intern Admission Medication History    Admission medication history is complete. The information provided in this note is only as accurate as the sources available at the time of the update.    Information Source(s): Facility (U/NH/) medication list/MAR and CareEverywhere/SureScripts via phone    Pertinent Information: Patient resides at [facility] [phone]. Last doses were verified by the nurse via phone. Pt gets pain injection from [facility] pain clinic per nurse.    Changes made to PTA medication list:  Added: Clonazepam, hydrocodone/APAP  Deleted: Tramadol, Miralax, metformin,Lipitor, tylenol, Americaine, lactulose  Changed: None    Allergies reviewed with patient and updates made in EHR: unable to assess    Medication History Completed By: Chandler Phillip 5/19/2024 10:46 PM    PTA Med List   Medication Sig Last Dose    amLODIPine (NORVASC) 2.5 MG tablet Take 1 tablet (2.5 mg) by mouth daily 5/19/2024 at AM    carbidopa-levodopa (SINEMET)  MG tablet Take 1 tablet by mouth 3 times daily 5/19/2024 at AM    clonazePAM (KLONOPIN) 0.5 MG tablet Take 0.5 mg by mouth at bedtime 5/18/2024 at PM    clonazePAM (KLONOPIN) 0.5 MG tablet Take 0.25 mg by mouth daily 5/19/2024 at AM    gabapentin (NEURONTIN) 300 MG capsule Take 300 mg by mouth every morning 5/19/2024 at AM    gabapentin (NEURONTIN) 300 MG capsule Take 600 mg by mouth at bedtime 5/18/2024 at pm    HYDROcodone-acetaminophen (NORCO) 5-325 MG tablet Take 1 tablet by mouth as needed for severe pain Unknown at PRN    HYDROcodone-acetaminophen (NORCO) 5-325 MG tablet Take 1 tablet by mouth every evening 5/18/2024 at pm    magnesium hydroxide (MILK OF MAGNESIA) 400 MG/5ML suspension Take 30 mLs by mouth daily as needed for constipation or heartburn Past Week at -    omeprazole (PRILOSEC) 20 MG DR capsule Take 20 mg by mouth at bedtime 5/18/2024 at pm    senna-docusate (SENOKOT-S/PERICOLACE) 8.6-50 MG tablet Take 1  tablet by mouth 2 times daily 5/18/2024 at pm    sertraline (ZOLOFT) 100 MG tablet Take 100 mg by mouth every morning 5/19/2024 at am

## 2024-05-20 NOTE — PLAN OF CARE
PRIMARY DIAGNOSIS: SYNCOPE/TIA    OUTPATIENT/OBSERVATION GOALS TO BE MET BEFORE DISCHARGE:    1. Orthostatic performed: Yes:          Lying Orthostatic BP: 150/66         Sitting Orthostatic BP: 157/76         Standing Orthostatic BP: 91/50      2. Diagnostic testing complete & at baseline neurologic testing: No     3. Cleared by consultants (if involved): No     4. Interpretation of cardiac rhythm per telemetry tech: SR      5. Tolerating adequate PO diet and medications: Yes     6. Return to near baseline physical activity or neurologic status: No        Discharge Planner Nurse  Safe discharge environment identified: No  Barriers to discharge: Yes       Entered by: Deja Gray RN 05/20/2024 2:07 AM    Vitals are Temp: 97.6  F (36.4  C) Temp src: Oral BP: 120/61 Pulse: 63   Resp: 18 SpO2: 95 %.  Patient is Alert and Oriented x3. They are 1 Assist with Gait Belt and Walker when pain is controlled.  Pt is a Diabetic diet, no orders entered for glucose check, has noted that BG have not been managed lately as he stopped taking metformin. They are complaining of 8/10 pain in their lower back. Tylenol given for pain. Medications decreased pain. Patient has Normal Saline 0.9% running at 100 mL per hour. Pt at times has spasms that stems from his back. OT, PT and pain team are following. Echo in morning.         Please review provider order for any additional goals.   Nurse to notify provider when observation goals have been met and patient is ready for discharge.Goal Outcome Evaluation:

## 2024-05-20 NOTE — PROGRESS NOTES
Will not see today:  PAIN MANAGEMENT SERVICE CHART CHECK  This patient's chart has been reviewed by the Pain Service. Pain team will see the patient (in person) tomorrow and will be available for any requested decreases in IT pump medications if needed.     Thank you!        INNA Carmichael-BC, [facility]   Acute Care Pain Management Program Mon-Fri 7a-1700  [facility] (Acute Pain)  Page via [employer] - Click here

## 2024-05-20 NOTE — PLAN OF CARE
PRIMARY DIAGNOSIS: SYNCOPE/TIA  OUTPATIENT/OBSERVATION GOALS TO BE MET BEFORE DISCHARGE:  1. Orthostatic performed: Yes:          Lying Orthostatic BP: 150/66         Sitting Orthostatic BP: 157/76         Standing Orthostatic BP: 91/50     2. Diagnostic testing complete & at baseline neurologic testing: No    3. Cleared by consultants (if involved): No    4. Interpretation of cardiac rhythm per telemetry tech: SR 78    5. Tolerating adequate PO diet and medications: Yes    6. Return to near baseline physical activity or neurologic status: No    Discharge Planner Nurse   Safe discharge environment identified: No  Barriers to discharge: Yes       Entered by: Deja Gray RN 05/20/2024 2:07 AM    Vitals are Temp: 97.6  F (36.4  C) Temp src: Oral BP: (!) 150/66 Pulse: 64   Resp: 20 SpO2: 97 %.  Patient is Alert and Oriented x4. They are 1 Assist with Gait Belt and Walker.  Pt is a Diabetic diet.  They are complaining of 3/10 pain in their lower back. Norco given for pain. Medications decreased pain. Patient has Normal Saline 0.9% running at 100 mL per hour.      Please review provider order for any additional goals.   Nurse to notify provider when observation goals have been met and patient is ready for discharge.Goal Outcome Evaluation:

## 2024-05-20 NOTE — PLAN OF CARE
ROOM # 213-1    Living Situation > NEFTALY with wife  Facility name:  : [facility]/ spouse    Activity level at baseline: sba  Activity level on admit: Ax 2 LIFT    Who will be transporting you at discharge: tbd    Patient registered to observation; given Patient Bill of Rights; given the opportunity to ask questions about observation status and their plan of care.  Patient has been oriented to the observation room, bathroom and call light is in place.    Discussed discharge goals and expectations with patient/family.         Goal Outcome Evaluation:

## 2024-05-20 NOTE — PLAN OF CARE
OT: Order received, chart reviewed and discussed with care team. Patient status discussed with PT, patient will require TCU at discharge to return to prior level of function. Pt presents with low tolerance for therapy, to avoid duplication of services OT will defer at this time. Defer discharge recommendations to PT. Will complete orders.

## 2024-05-20 NOTE — H&P
"[facility]    History and Physical - Hospitalist Service       Date of Admission:  5/19/2024    Assessment & Plan      Cresencio Peguero is a 89 year old male admitted on 5/19/2024.     Syncope  Probably related to vasovagal syncope/deconditioning  QT interval is 420 ms  Will keep patient on telemetry  Check echocardiogram of the heart to evaluate LV and RV function    Generalized weakness  Causes unknown at this time but could be due to multiple causes  Deconditioning, viral infection though COVID, influenza and RSV are negative  PT OT will be consulted  Patient is on Sinemet I have not seen a diagnosis of Parkinson's disease or restless leg syndrome and is diagnosis therefore I am holding it until confirmed  Total CK will be ordered    Elevated troponin  This will be trended and follow-up  Echocardiogram will be ordered    Chronic pain  On pain pump managed by neuro clinic  Pain management will be consulted    Uncontrolled hypertension  Continue amlodipine at 2.5 mg daily that he takes at home  Will also give the patient as needed clonidine for systolic blood pressure greater than 180    Neuropathy  Gabapentin will be continued 300 mg in the morning and 600 mg in the evening    Probable GERD  Omeprazole will be continued    Anxiety  Sertraline 100 mg daily will be continued    Coronary artery disease s/p CABG  Type 2 diabetes mellitus not on medications      Diet:  Diabetic diet  DVT Prophylaxis: Enoxaparin (Lovenox) SQ  Henderson Catheter: Not present  Lines: None     Cardiac Monitoring: None  Code Status:  Patient wants to be DNR/DNI    Clinically Significant Risk Factors Present on Admission                      # DMII: A1C = 10.9 % (Ref range: <5.7 %) within past 6 months    # Overweight: Estimated body mass index is 27.89 kg/m  as calculated from the following:    Height as of this encounter: 1.803 m (5' 11\").    Weight as of this encounter: 90.7 kg (200 lb).              Disposition Plan "     Medically Ready for Discharge: Anticipated Tomorrow           Ramesh Brice MD  Hospitalist Service  [facility]  Securely message with [employer] (more info)  Text page via [facility] Paging/Directory     ______________________________________________________________________    Chief Complaint   Syncope    History is obtained from the patient, Wife, medical records and my discussion with emergency department physician      History of Present Illness   Cresencio Peguero is a complicate 89 year old gentleman who lives in an assisted living place with history of anxiety, CAD s/p CABG, type 2 diabetes mellitus who was metformin was recently discontinued because of difficulty in swallowing, hypertension, hypercholesteremia, sensory ataxia, neuropathy, pain pump that was filled 2 days ago since which time patient states that he feels that his lower back pain has significantly increased 6/10 in intensity with him having shivering and shakes without increased yawning or diarrhea.    On the day of admission while sitting on toilet seat he could not get up and in fact passed out.  He called his wife and his wife thinks that he had lost consciousness for about 1 to 2 minutes and was confused after this episode for about 5 minutes or so.  When 911 came sugar was found to be high in 400s.  He denies any chest pain or shortness of breath.  Denies any headache he has slowing and difficulty in initiating urine which is not new.  He has been constipated.  Denies any nausea or vomiting.  Denies any hematemesis , melena or fever      Past Medical History    Past Medical History:   Diagnosis Date    Anxiety     Arthritis     CAD (coronary artery disease)     HX of stent,  CABG x2    Diabetes (H)     Hearing loss     Hyperlipidemia     Hypertension     Neuropathy     Sensory ataxia     Stented coronary artery        Past Surgical History   Past Surgical History:   Procedure Laterality Date    BACK SURGERY      cervical  laminectomy    BACK SURGERY      lumbar laminectomy    CARDIAC SURGERY      Stent,  CABGx2    ESOPHAGOSCOPY, GASTROSCOPY, DUODENOSCOPY (EGD), COMBINED N/A 5/5/2023    Procedure: Esophagoscopy, gastroscopy, duodenoscopy (EGD), combined;  Surgeon: Jeovanny Rizo MD;  Location: [facility]    EYE SURGERY      cataract    EYE SURGERY      corneal surgery    HERNIORRHAPHY INGUINAL Right 8/15/2016    Procedure: HERNIORRHAPHY INGUINAL;  Surgeon: Wu Adam MD;  Location: [facility]    LAPAROSCOPIC LYSIS ADHESIONS N/A 7/7/2017    Procedure: LAPAROSCOPIC LYSIS ADHESIONS;;  Surgeon: Sumeet Joiner MD;  Location: [facility] OR    LAPAROSCOPY DIAGNOSTIC (GENERAL) N/A 7/7/2017    Procedure: LAPAROSCOPY DIAGNOSTIC (GENERAL);  DIAGNOSTIC LAPAROSCOPY WITH LYSIS OF ADHESIONS ;  Surgeon: Sumeet Joiner MD;  Location: [facility] OR    ORTHOPEDIC SURGERY      rotator cuff repair,shoulder arthroplasty    REVERSE ARTHROPLASTY SHOULDER Right 8/12/2020    Procedure: RIGHT REVERSE TOTAL SHOULDER ARTHROPLASTY;  Surgeon: Adarsh Mello MD;  Location: [facility]    TONSILLECTOMY         Prior to Admission Medications   Prior to Admission Medications   Prescriptions Last Dose Informant Patient Reported? Taking?   acetaminophen (TYLENOL) 500 MG tablet   Yes No   Sig: Take 2 tablets (1,000 mg) by mouth 3 times daily   amLODIPine (NORVASC) 2.5 MG tablet   No No   Sig: Take 1 tablet (2.5 mg) by mouth daily   atorvastatin (LIPITOR) 10 MG tablet  Self Yes No   Sig: Take 10 mg by mouth every morning    benzocaine (AMERICAINE) 20 % rectal ointment   No No   Sig: Place rectally every 6 hours as needed for moderate pain   cyclobenzaprine (FLEXERIL) 5 MG tablet   No No   Sig: Take 1 tablet (5 mg) by mouth 3 times daily as needed for muscle spasms   gabapentin (NEURONTIN) 300 MG capsule   Yes No   Sig: Take 300 mg by mouth 2 times daily   lactulose (CEPHULAC) 20 GM packet   No No   Sig: Take 1 packet (20 g) by mouth 3 times daily as needed for  constipation (is no results with Miralax and milk of magnesia)   magnesium hydroxide (MILK OF MAGNESIA) 400 MG/5ML suspension   Yes No   Sig: Take 30 mLs by mouth daily as needed for constipation or heartburn   metFORMIN (GLUCOPHAGE) 850 MG tablet  Self Yes No   Sig: Take 850 mg by mouth daily (with breakfast)    polyethylene glycol (MIRALAX) 17 GM/Dose powder   No No   Sig: Take 17 g by mouth daily (constipation)   senna-docusate (SENOKOT-S/PERICOLACE) 8.6-50 MG tablet   Yes No   Sig: Take 1-2 tablets by mouth 2 times daily as needed for constipation   sertraline (ZOLOFT) 100 MG tablet  Self Yes No   Sig: Take 100 mg by mouth every morning   traMADol (ULTRAM) 50 MG tablet   No No   Sig: Take 1 tablet (50 mg) by mouth every 6 hours as needed (severe pain) . May cause constipation.      Facility-Administered Medications: None        Review of Systems    Denies any headache, blurring of vision or double vision, neck pain jaw pain or shoulder pain, chest pain, shortness of breath, cough or increased sputum production, nausea or vomiting, abdominal pain, diarrhea or constipation.  Denies any urinary symptoms of burning or increased frequency. Denies any weakness of upper or lower extremities or any seizure activity. Fever or chills. Bleeding from anywhere else.  No rashes or cellulitis.      Social History   I have reviewed this patient's social history and updated it with pertinent information if needed.  Social History     Tobacco Use    Smoking status: Former     Types: Pipe     Quit date: 2021     Years since quittin.9   Substance Use Topics    Alcohol use: Yes     Alcohol/week: 0.0 standard drinks of alcohol     Comment: 7 glasses wine per week         Family History   No significant history    Allergies   Allergies   Allergen Reactions    Percodan [Oxycodone-Aspirin] Rash        Physical Exam   Vital Signs: Temp: 98.1  F (36.7  C) Temp src: Oral BP: (!) 141/66 Pulse: 67   Resp: 18 SpO2: 98 % O2 Device:  None (Room air)    Weight: 200 lbs 0 oz      GENERAL: Patient does not look in any acute distress  HEENT: EOM+, Conjunctiva is clear   NECK:  no Jugular Venous distention  HEART: S1 S2 regular  Rate and Rhythm,  no murmur,    LUNGS: Respirations are not laboured, Lungs are clear to auscultation without Crepitations or Wheezing   ABDOMEN: Soft, there is no tenderness,  Bowel Sounds are Positive   LOWER LIMBS:  Pedal Edema  Bilaterally   CNS:  Alert,  Oriented x 3, Moving all the Four Limbs     EKG shows sinus rhythm with first-degree AV block I do not see any signs of acute ongoing ischemia    Data   Imaging results reviewed over the past 24 hrs:   Recent Results (from the past 24 hour(s))   Head CT w/o contrast    Narrative    EXAM: CT HEAD W/O CONTRAST  LOCATION: [facility]  DATE: 5/19/2024    INDICATION: Weakness and confusion.  COMPARISON: CT 1/24/2024.  TECHNIQUE: Routine CT Head without IV contrast. Multiplanar reformats. Dose reduction techniques were used.    FINDINGS:  INTRACRANIAL CONTENTS: No intracranial hemorrhage, extraaxial collection, or mass effect.  No CT evidence of acute infarct. Mild presumed chronic small vessel ischemic changes. Mild generalized volume loss. No hydrocephalus.     VISUALIZED ORBITS/SINUSES/MASTOIDS: No intraorbital abnormality. No paranasal sinus mucosal disease. No middle ear or mastoid effusion.    BONES/SOFT TISSUES: No acute abnormality.      Impression    IMPRESSION:  1.  No CT evidence for acute intracranial process.  2.  Mild chronic microvascular ischemic changes as above.   Chest XR,  PA & LAT    Narrative    EXAM: XR CHEST 2 VIEWS  LOCATION: [facility]  DATE: 5/19/2024    INDICATION: Generalized weakness  COMPARISON: None.      Impression    IMPRESSION: Normal size of cardiac silhouette status post median sternotomy and CABG. Low lung volumes with linear bibasilar opacities, favor atelectasis, less likely  consolidation/pneumonia. No significant pleural effusion or pneumothorax. No acute bony   abnormality. Right shoulder arthroplasty noted.     Most Recent 3 CBC's:  Recent Labs   Lab Test 05/19/24 1816 05/03/24  0912 01/24/24  1620   WBC 6.9 5.4 4.9   HGB 10.5* 10.3* 11.1*   MCV 85 87 89   * 133* 153     Most Recent 3 BMP's:  Recent Labs   Lab Test 05/19/24 1816 05/19/24 1811 05/03/24  0912 01/24/24  1620   *  --  138 135   POTASSIUM 4.3  --  4.2 4.2   CHLORIDE 98  --  103 98   CO2 25  --  24 25   BUN 33.8*  --  32.6* 21.7   CR 0.91  --  1.02 1.31*   ANIONGAP 10  --  11 12   ISIS 9.3  --  8.8 9.3   * 406* 253* 289*     Most Recent 2 LFT's:  Recent Labs   Lab Test 05/19/24 1816 07/07/17  0456   AST 19 20   ALT <5 22   ALKPHOS 131 111   BILITOTAL 0.3 0.5     Most Recent 3 Troponin's:No lab results found.  Most Recent Urinalysis:  Recent Labs   Lab Test 05/19/24  1931   COLOR Light Yellow   APPEARANCE Clear   URINEGLC >=1000*   URINEBILI Negative   URINEKETONE Negative   SG 1.031   UBLD Negative   URINEPH 5.5   PROTEIN Negative   NITRITE Negative   LEUKEST Negative   RBCU <1   WBCU <1

## 2024-05-20 NOTE — CONSULTS
Care Management Initial Consult    General Information  Assessment completed with: Care Team Member, ARTEM Gerber at [facility]  Type of CM/SW Visit: Initial Assessment    Primary Care Provider verified and updated as needed: Yes   Readmission within the last 30 days: no previous admission in last 30 days      Reason for Consult: discharge planning  Advance Care Planning: Advance Care Planning Reviewed: no concerns identified        Communication Assessment  Patient's communication style: spoken language (English or Bilingual)    Hearing Difficulty or Deaf: yes   Wear Glasses or Blind: no    Cognitive  Cognitive/Neuro/Behavioral: .WDL except, orientation  Level of Consciousness: somnolent  Arousal Level: arouses to touch/gentle shaking  Orientation: disoriented to, time, situation     Best Language: 1 - Mild to moderate  Speech: garbled    Living Environment:   People in home: facility resident     Current living Arrangements: assisted living  Name of Facility: [facility]   Able to return to prior arrangements: yes     Family/Social Support:  Care provided by: self, other (see comments) ([facility] staff)  Provides care for: no one  Marital Status:   Wife, Children, Facility resident(s)/Staff  Alina       Description of Support System: Supportive, Involved    Support Assessment: Adequate family and caregiver support    Current Resources:   Patient receiving home care services: No     Community Resources: None  Equipment currently used at home: walker, rolling  Supplies currently used at home:      Employment/Financial:  Employment Status: n/a         Financial Concerns: none   Referral to Financial Worker: No     Lifestyle & Psychosocial Needs:  Social Determinants of Health     Food Insecurity: Not on file   Depression: Not at risk (7/12/2019)    Received from [facility]    PHQ-2     PHQ-2 Score: 0   Housing Stability: Not on file   Tobacco Use: Medium Risk (3/13/2024)    Received from [facility]  "   Patient History     Smoking Tobacco Use: Former     Smokeless Tobacco Use: Never     Passive Exposure: Not on file   Financial Resource Strain: Not on file   Alcohol Use: Unknown (1/11/2021)    [facility]    AUDIT-C     Frequency of Alcohol Consumption: 4 or more times a week     Average Number of Drinks: Not on file     Frequency of Binge Drinking: Not on file   Transportation Needs: Not on file   Physical Activity: Not on file   Interpersonal Safety: Not on file   Stress: Not on file   Social Connections: Unknown (4/11/2023)    [facility]    Social Connections     Frequency of Communication with Friends and Family: Not on file   Health Literacy: Not on file     Functional Status:  Prior to admission patient needed assistance:   Dependent ADLs:: Ambulation-walker, Bathing  Dependent IADLs:: Cooking, Meal Preparation, Medication Management, Transportation     Mental Health Status:  Mental Health Status: No Current Concerns       Chemical Dependency Status:  Chemical Dependency Status: No Current Concerns           Values/Beliefs:  Spiritual, Cultural Beliefs, Oriental orthodox Practices, Values that affect care:  n/a             Additional Information:  Patient is 89 year old male admitted on 5/19/2024 with a chief complaint of \"Syncope\" (per H&P). Chart reviewed. Noted pt resides at [facility].     Per nursing notes, pt is currently only alert to self.     [facility] call to [facility] and spoke with ARTEM Gerber p:[phone] f:[phone]. Zabrina states pt is currently in their NEFTALY and receives med management/administration and assistance with showers weekly. At baseline, pt ambulates with a 4 wheeled walker and transfers independently. [facility] uses "[facility]" pharmacy in [place]. Zabrina states they would be able to take him back to [facility] if he is an Ax1. If pt is an Ax2 he would " need to move to their care suites and they do currently have an opening.     Will await PT recs.     Social work will continue to follow and assist with discharge planning as needed.    DEBORA Yu, [facility]  Care Coordination  [phone]    DEBORA Quigley

## 2024-05-20 NOTE — PROGRESS NOTES
05/20/24 1600   Appointment Info   Signing Clinician's Name / Credentials (PT) Joyce Kellogg, PT, DPT   Rehab Comments (PT) Monitor BPs   Quick Adds   Quick Adds Certification   Living Environment   People in Home spouse   Current Living Arrangements assisted living   Home Accessibility no concerns   Transportation Anticipated family or friend will provide   Living Environment Comments Patient lives with his wife in [facility] assisted living facility.   Self-Care   Usual Activity Tolerance moderate   Current Activity Tolerance poor   Regular Exercise No   Equipment Currently Used at Home walker, rolling   Fall history within last six months yes   Number of times patient has fallen within last six months 1   Activity/Exercise/Self-Care Comment Patient reports baseline independence with dressing and toileting.  He receives assist for med management, meals, and assistance with showers weekly. At baseline, pt ambulates with a 4-wheeled walker and transfers independently.  Reports fall off toilet when having bowel movement 2 weeks ago.   General Information   Onset of Illness/Injury or Date of Surgery 05/19/24   Referring Physician Ramesh Brice MD   Patient/Family Therapy Goals Statement (PT) Patient would like to get stronger.   Pertinent History of Current Problem (include personal factors and/or comorbidities that impact the POC) 89 year old male with PMHx of Parkinson's disease, ataxia, spinal stenosis, idiopathic peripheral neuropathy, chronic pain on narcotics with multiple previous spinal surgeries with implanted spinal stimulator, type 2 diabetes mellitus, hyperlipidemia, depression, GERD, constipation, who was admitted on 5/19/2024. He was brought in by EMS from assisted living facility for evaluation of global weakness. He reportedly sustained a fall without traumatic injury 2 weeks PTA with possible syncopal event while on the toilet having a bowel movement.   Existing Precautions/Restrictions fall    Cognition   Affect/Mental Status (Cognition) low arousal/lethargic   Orientation Status (Cognition) oriented to;person;situation   Follows Commands (Cognition) follows one-step commands;over 90% accuracy;increased processing time needed;delayed response/completion;repetition of directions required   Pain Assessment   Patient Currently in Pain Yes, see Vital Sign flowsheet  (Low back pain)   Integumentary/Edema   Integumentary/Edema Comments Age-related skin changes   Posture    Posture Forward head position;Protracted shoulders   Range of Motion (ROM)   Range of Motion ROM is WFL   Strength (Manual Muscle Testing)   Strength (Manual Muscle Testing) Able to perform R SLR;Able to perform L SLR;Deficits observed during functional mobility   Strength Comments LLE weaker than RLE   Bed Mobility   Comment, (Bed Mobility) Supine > sit with SBA, increased time required with HOB elevated and UE support on bed rail.   Transfers   Comment, (Transfers) CGA sit > stand, initially with unsafe hand placement pulling on walker.  Lightheaded and unsteady in standing, needing minAx1.   Gait/Stairs (Locomotion)   Comment, (Gait/Stairs) Patient performing 2' L lateral stepping along bed with FWW and CGA, up to minAx1 when he becomes tremulous.   Balance   Balance Comments Impaired dynamic balance, uses walker at baseline   Sensory Examination   Sensory Perception patient reports no sensory changes   Clinical Impression   Criteria for Skilled Therapeutic Intervention Yes, treatment indicated   PT Diagnosis (PT) Impaired functional mobility   Influenced by the following impairments positive orthostatic BPS, tremulous, pain, generalized weakness, deconditioning, impaired balance, poor activity tolerance   Functional limitations due to impairments Impaired independence with bed mobility, transfers, and gait   Clinical Presentation (PT Evaluation Complexity) evolving   Clinical Presentation Rationale Clinical judgement, PMH, social  support   Clinical Decision Making (Complexity) moderate complexity   Planned Therapy Interventions (PT) balance training;bed mobility training;gait training;home exercise program;neuromuscular re-education;patient/family education;postural re-education;strengthening;transfer training;progressive activity/exercise   Risk & Benefits of therapy have been explained evaluation/treatment results reviewed;care plan/treatment goals reviewed;risks/benefits reviewed;participants voiced agreement with care plan;participants included;patient   PT Total Evaluation Time   PT Eval, Moderate Complexity Minutes (76296) 14   Therapy Certification   Start of care date 05/20/24   Certification date from 05/20/24   Certification date to 05/27/24   Medical Diagnosis Generalized weakness   Physical Therapy Goals   PT Frequency 5x/week   PT Predicted Duration/Target Date for Goal Attainment 05/24/24   PT Goals Bed Mobility;Transfers;Gait   PT: Bed Mobility Supervision/stand-by assist;Supine to/from sit;Rolling   PT: Transfers Supervision/stand-by assist;Sit to/from stand;Bed to/from chair;Assistive device   PT: Gait Supervision/stand-by assist;100 feet;Rolling walker   Interventions   Interventions Quick Adds Therapeutic Activity   Therapeutic Activity   Therapeutic Activities: dynamic activities to improve functional performance Minutes (67911) 40   Symptoms Noted During/After Treatment Fatigue;Increased pain  (Lightheadedness)   Treatment Detail/Skilled Intervention Patient greeted asleep in bed.  Lethargic upon wakes, increased time needed to arouse.  Patient agreeable to PT session.  /59 while supine.  Patient cued for ankle pumps, heel slides, SLR, and shoulder elevation.  Movements slow, LLE weaker than RLE but with antigravity strength.  Patient reporting baseline shoulder pathology, is able to raise BUEs overhead. Patient cued for supine > sit.  Increased low back pain, cued for logroll technique to maintain spinal neutral.   Patient needing increased time to come to sitting but able to perform with SBA.  SBA for sitting balance x 10 minues.  /68 while sitting.  Patient completes x2 sit <> stand from bed during session, CGA for sit > stand with cued to push hands from bed.  Initially standing with CGA and B hands on walker but reporting lightheadedness and becoming increasingly tremulous.  High falls risk.  Needing minAx1 for controlled stand > sit.  /58 following first sit > stand.  Patient able to perform ~ 2 ft lateral stepping along bed with CGA-minAx1 and FWW become becoming unsteady and needing assist to return to bed.  Patient returning to bed at end of session, SBA for sit > sidelying.  Call light in reach and bed alarm activated.   PT Discharge Planning   PT Plan Repeated sit <> stands and gait with walker (rec close wc follow, unsteady)   PT Discharge Recommendation (DC Rec) Transitional Care Facility   PT Rationale for DC Rec Patient significantly below his mod I prior level of mobility.  Patient tolerating x2 sit <> stands and 2 ft lateral stepping along bed.  Very tremulous and unsteady within ~1 minute of standing.  Patient at high risk of falls given impaired balance, weakness, and positive orthostatic BPs.  Patient resides in long term.  long term able to provide Ax1, at this time patient appears to require Ax2 for ambulation (would need close wheelchair follow).  Recommend discharge to TCU to progress safety and independence with mobility.   PT Brief overview of current status Ax2 FWW stand-pivot, high falls risk   PT Equipment Needed at Discharge walker, rolling;wheelchair   Total Session Time   Timed Code Treatment Minutes 40   Total Session Time (sum of timed and untimed services) 54     [facility]  OUTPATIENT PHYSICAL THERAPY EVALUATION  PLAN OF TREATMENT FOR OUTPATIENT REHABILITATION  (COMPLETE FOR INITIAL CLAIMS ONLY)  Patient's Last Name, First Name, M.I.  Date of Birth:   1935  Cresencio Peguero                        Provider's Name  [facility] Medical Record No.  [id number]                             Onset Date:  05/19/24   Start of Care Date:  05/20/24   Type:     _X_PT   ___OT   ___SLP Medical Diagnosis:  Generalized weakness              PT Diagnosis:  Impaired functional mobility Visits from SOC:  1     See note for plan of treatment, functional goals and certification details    I CERTIFY THE NEED FOR THESE SERVICES FURNISHED UNDER        THIS PLAN OF TREATMENT AND WHILE UNDER MY CARE     (Physician co-signature of this document indicates review and certification of the therapy plan).

## 2024-05-20 NOTE — PLAN OF CARE
PRIMARY DIAGNOSIS: GENERALIZED WEAKNESS    OUTPATIENT/OBSERVATION GOALS TO BE MET BEFORE DISCHARGE  1. Orthostatic performed: Yes:          Lying Orthostatic BP: 186/85         Sitting Orthostatic BP: 157/72         Standing Orthostatic BP: 116/50     2. Tolerating PO medications: Yes, meds with apple sauce    3. Return to near baseline physical activity: No, unsteady on feet. Ax2 walker and GB    4. Cleared for discharge by consultants (if involved): No, PT consulted.    Discharge Planner Nurse   Safe discharge environment identified: No  Barriers to discharge: Yes       Entered by: Danita Kirby RN 2024     Alert to self, knows he is in the hospital. Disoriented to time, and situation. On RA, Orthos BP +. Constant request of going to bathroom, urinal with assistance. States hard to start a stream. Denies CP/SOB. Pain PAINAD 6, given PRN tylenol. B- sliding scale insulin 2 units. Bed alarm on, call light within reach       Please review provider order for any additional goals.   Nurse to notify provider when observation goals have been met and patient is ready for discharge.    Plan of Care Reviewed With: patient  Overall Patient Progress: no changeOverall Patient Progress: no change  Outcome Evaluation: confused, trouble starting stream, verbal cues in pain, PRN tylenol given. ECHO pending.      Problem: Adult Inpatient Plan of Care  Goal: Plan of Care Review  Outcome: Not Progressing  Flowsheets (Taken 2024 1216)  Outcome Evaluation: confused, trouble starting stream, verbal cues in pain, PRN tylenol given. ECHO pending.  Plan of Care Reviewed With: patient  Overall Patient Progress: no change  Goal: Patient-Specific Goal (Individualized)  Outcome: Progressing  Goal: Absence of Hospital-Acquired Illness or Injury  Outcome: Progressing  Intervention: Prevent Skin Injury  Recent Flowsheet Documentation  Taken 2024 1120 by Danita Kirby RN  Body Position:    turned   left  Goal: Optimal Comfort and Wellbeing  Outcome: Progressing  Goal: Readiness for Transition of Care  Outcome: Progressing     Problem: Oral Intake Inadequate  Goal: Improved Oral Intake  Outcome: Not Progressing     Problem: Comorbidity Management  Goal: Blood Glucose Levels Within Targeted Range  Outcome: Progressing  Goal: Blood Pressure in Desired Range  Outcome: Progressing

## 2024-05-20 NOTE — ED NOTES
[facility]  ED Nurse Handoff Report    ED Chief complaint: Generalized Weakness  . ED Diagnosis:   Final diagnoses:   None       Allergies:   Allergies   Allergen Reactions    Percodan [Oxycodone-Aspirin] Rash       Code Status: Full Code    Activity level - Baseline/Home:  standby.  Activity Level - Current:   assist of 1.   Lift room needed: No.   Bariatric: No   Needed: No   Isolation: No.   Infection: Not Applicable.     Respiratory status: Room air    Vital Signs (within 30 minutes):   Vitals:    05/19/24 2148 05/19/24 2149 05/19/24 2150 05/19/24 2151   BP:       Pulse: 61 61 61 60   Resp:       Temp:       TempSrc:       SpO2: 97% 97% 98% 97%   Weight:       Height:           Cardiac Rhythm:  ,      Pain level:    Patient confused: No.   Patient Falls Risk: patient and family education.   Elimination Status: Has voided     Patient Report - Initial Complaint: Generalized weakness.   Focused Assessment: Chief Complaint  Generalized Weakness     HPI  Cresencio Peguero is a 89 year old male with a history of type 2 diabetes, hypertension, CAD and hyperlipidemia who reports to the ED via EMS with wife for evaluation of generalized weakness. The patient comes from assisted living and reports a couple hours ago having a syncopal episode while on the toilet during a bowel movement. States he has been experiencing generalized weakness for the past couple of days. Endorses a sore throat and shortness of breath. Patient also has had back pain, constipation and difficulty breathing, but these are not new for him. Denies fever, nausea, vomit, cough, chest pain, or known sick contacts. Patient mentions another fall earlier today. He was able to walk earlier. Of note, mentions diabetic neuropathy and has a pain pump in his back.          Abnormal Results:   Labs Ordered and Resulted from Time of ED Arrival to Time of ED Departure   BASIC METABOLIC PANEL - Abnormal       Result Value    Sodium  133 (*)     Potassium 4.3      Chloride 98      Carbon Dioxide (CO2) 25      Anion Gap 10      Urea Nitrogen 33.8 (*)     Creatinine 0.91      GFR Estimate 81      Calcium 9.3      Glucose 401 (*)    CBC WITH PLATELETS AND DIFFERENTIAL - Abnormal    WBC Count 6.9      RBC Count 3.69 (*)     Hemoglobin 10.5 (*)     Hematocrit 31.4 (*)     MCV 85      MCH 28.5      MCHC 33.4      RDW 13.1      Platelet Count 133 (*)     % Neutrophils 72      % Lymphocytes 17      % Monocytes 7      % Eosinophils 4      % Basophils 0      % Immature Granulocytes 0      NRBCs per 100 WBC 0      Absolute Neutrophils 5.0      Absolute Lymphocytes 1.2      Absolute Monocytes 0.5      Absolute Eosinophils 0.2      Absolute Basophils 0.0      Absolute Immature Granulocytes 0.0      Absolute NRBCs 0.0     GLUCOSE BY METER - Abnormal    GLUCOSE BY METER POCT 406 (*)    TROPONIN T, HIGH SENSITIVITY - Abnormal    Troponin T, High Sensitivity 23 (*)    ROUTINE UA WITH MICROSCOPIC REFLEX TO CULTURE - Abnormal    Color Urine Light Yellow      Appearance Urine Clear      Glucose Urine >=1000 (*)     Bilirubin Urine Negative      Ketones Urine Negative      Specific Gravity Urine 1.031      Blood Urine Negative      pH Urine 5.5      Protein Albumin Urine Negative      Urobilinogen Urine Normal      Nitrite Urine Negative      Leukocyte Esterase Urine Negative      Mucus Urine Present (*)     RBC Urine <1      WBC Urine <1      Squamous Epithelials Urine <1      Hyaline Casts Urine 1     INFLUENZA A/B, RSV, & SARS-COV2 PCR - Normal    Influenza A PCR Negative      Influenza B PCR Negative      RSV PCR Negative      SARS CoV2 PCR Negative     KETONE BETA-HYDROXYBUTYRATE QUANTITATIVE, RAPID - Normal    Ketone (Beta-Hydroxybutyrate) Quantitative <0.18     HEPATIC FUNCTION PANEL - Normal    Protein Total 6.9      Albumin 3.8      Bilirubin Total 0.3      Alkaline Phosphatase 131      AST 19      ALT <5      Bilirubin Direct <0.20     PROCALCITONIN -  Normal    Procalcitonin 0.17          Chest XR,  PA & LAT   Final Result   IMPRESSION: Normal size of cardiac silhouette status post median sternotomy and CABG. Low lung volumes with linear bibasilar opacities, favor atelectasis, less likely consolidation/pneumonia. No significant pleural effusion or pneumothorax. No acute bony    abnormality. Right shoulder arthroplasty noted.      Head CT w/o contrast   Final Result   IMPRESSION:   1.  No CT evidence for acute intracranial process.   2.  Mild chronic microvascular ischemic changes as above.          Treatments provided: See MAR. Frequent monitoring of pt.  Family Comments: Wife present at bedside. Involved and supportive in pt plan of care.  OBS brochure/video discussed/provided to patient:  Yes  ED Medications:   Medications   sodium chloride 0.9% BOLUS 500 mL (500 mLs Intravenous $New Bag 5/19/24 2146)   sodium chloride 0.9% BOLUS 500 mL (0 mLs Intravenous Stopped 5/19/24 2143)       Drips infusing:  No  For the majority of the shift this patient was Green.   Interventions performed were N/a.    Sepsis treatment initiated: No    Cares/treatment/interventions/medications to be completed following ED care: N/a    ED Nurse Name: Mackenzie Monroy RN  9:53 PM  RECEIVING UNIT ED HANDOFF REVIEW    Above ED Nurse Handoff Report was reviewed: Yes  Reviewed by: Michel Carrera RN on May 19, 2024 at 10:03 PM

## 2024-05-21 ENCOUNTER — APPOINTMENT (OUTPATIENT)
Dept: PHYSICAL THERAPY | Facility: CLINIC | Age: 89
DRG: 092 | End: 2024-05-21
Payer: MEDICARE

## 2024-05-21 ENCOUNTER — APPOINTMENT (OUTPATIENT)
Dept: SPEECH THERAPY | Facility: CLINIC | Age: 89
DRG: 092 | End: 2024-05-21
Payer: MEDICARE

## 2024-05-21 PROBLEM — R73.9 HYPERGLYCEMIA: Status: ACTIVE | Noted: 2024-05-21

## 2024-05-21 PROBLEM — R26.2 AMBULATORY DYSFUNCTION: Status: ACTIVE | Noted: 2024-05-21

## 2024-05-21 LAB
ANION GAP SERPL CALCULATED.3IONS-SCNC: 11 MMOL/L (ref 7–15)
BUN SERPL-MCNC: 18 MG/DL (ref 8–23)
CALCIUM SERPL-MCNC: 8.7 MG/DL (ref 8.8–10.2)
CHLORIDE SERPL-SCNC: 104 MMOL/L (ref 98–107)
CREAT SERPL-MCNC: 0.87 MG/DL (ref 0.67–1.17)
DEPRECATED HCO3 PLAS-SCNC: 22 MMOL/L (ref 22–29)
EGFRCR SERPLBLD CKD-EPI 2021: 83 ML/MIN/1.73M2
ERYTHROCYTE [DISTWIDTH] IN BLOOD BY AUTOMATED COUNT: 13.2 % (ref 10–15)
GLUCOSE BLDC GLUCOMTR-MCNC: 167 MG/DL (ref 70–99)
GLUCOSE BLDC GLUCOMTR-MCNC: 171 MG/DL (ref 70–99)
GLUCOSE BLDC GLUCOMTR-MCNC: 210 MG/DL (ref 70–99)
GLUCOSE BLDC GLUCOMTR-MCNC: 247 MG/DL (ref 70–99)
GLUCOSE BLDC GLUCOMTR-MCNC: 254 MG/DL (ref 70–99)
GLUCOSE BLDC GLUCOMTR-MCNC: 282 MG/DL (ref 70–99)
GLUCOSE SERPL-MCNC: 229 MG/DL (ref 70–99)
HCT VFR BLD AUTO: 30.8 % (ref 40–53)
HGB BLD-MCNC: 10.5 G/DL (ref 13.3–17.7)
MCH RBC QN AUTO: 28.7 PG (ref 26.5–33)
MCHC RBC AUTO-ENTMCNC: 34.1 G/DL (ref 31.5–36.5)
MCV RBC AUTO: 84 FL (ref 78–100)
PLATELET # BLD AUTO: 136 10E3/UL (ref 150–450)
POTASSIUM SERPL-SCNC: 3.9 MMOL/L (ref 3.4–5.3)
RBC # BLD AUTO: 3.66 10E6/UL (ref 4.4–5.9)
SODIUM SERPL-SCNC: 137 MMOL/L (ref 135–145)
WBC # BLD AUTO: 6.2 10E3/UL (ref 4–11)

## 2024-05-21 PROCEDURE — 92526 ORAL FUNCTION THERAPY: CPT | Mod: GN

## 2024-05-21 PROCEDURE — G0378 HOSPITAL OBSERVATION PER HR: HCPCS

## 2024-05-21 PROCEDURE — 258N000003 HC RX IP 258 OP 636: Performed by: PHYSICIAN ASSISTANT

## 2024-05-21 PROCEDURE — 85027 COMPLETE CBC AUTOMATED: CPT | Performed by: PHYSICIAN ASSISTANT

## 2024-05-21 PROCEDURE — 96372 THER/PROPH/DIAG INJ SC/IM: CPT | Performed by: INTERNAL MEDICINE

## 2024-05-21 PROCEDURE — 99233 SBSQ HOSP IP/OBS HIGH 50: CPT | Performed by: PHYSICIAN ASSISTANT

## 2024-05-21 PROCEDURE — 250N000013 HC RX MED GY IP 250 OP 250 PS 637: Performed by: PHYSICIAN ASSISTANT

## 2024-05-21 PROCEDURE — 250N000011 HC RX IP 250 OP 636: Performed by: INTERNAL MEDICINE

## 2024-05-21 PROCEDURE — 250N000013 HC RX MED GY IP 250 OP 250 PS 637: Performed by: INTERNAL MEDICINE

## 2024-05-21 PROCEDURE — 36415 COLL VENOUS BLD VENIPUNCTURE: CPT | Performed by: PHYSICIAN ASSISTANT

## 2024-05-21 PROCEDURE — 250N000012 HC RX MED GY IP 250 OP 636 PS 637: Performed by: PHYSICIAN ASSISTANT

## 2024-05-21 PROCEDURE — 120N000001 HC R&B MED SURG/OB

## 2024-05-21 PROCEDURE — 80048 BASIC METABOLIC PNL TOTAL CA: CPT | Performed by: PHYSICIAN ASSISTANT

## 2024-05-21 PROCEDURE — 97530 THERAPEUTIC ACTIVITIES: CPT | Mod: GP | Performed by: PHYSICAL THERAPIST

## 2024-05-21 PROCEDURE — 99222 1ST HOSP IP/OBS MODERATE 55: CPT | Mod: 95 | Performed by: NURSE PRACTITIONER

## 2024-05-21 PROCEDURE — 82962 GLUCOSE BLOOD TEST: CPT

## 2024-05-21 PROCEDURE — 96361 HYDRATE IV INFUSION ADD-ON: CPT

## 2024-05-21 RX ORDER — MIDODRINE HYDROCHLORIDE 2.5 MG/1
2.5 TABLET ORAL
Status: DISCONTINUED | OUTPATIENT
Start: 2024-05-21 | End: 2024-05-24 | Stop reason: HOSPADM

## 2024-05-21 RX ORDER — CARBOXYMETHYLCELLULOSE SODIUM 5 MG/ML
1 SOLUTION/ DROPS OPHTHALMIC 3 TIMES DAILY
Status: DISCONTINUED | OUTPATIENT
Start: 2024-05-21 | End: 2024-05-24 | Stop reason: HOSPADM

## 2024-05-21 RX ORDER — NICOTINE POLACRILEX 4 MG
15-30 LOZENGE BUCCAL
Status: DISCONTINUED | OUTPATIENT
Start: 2024-05-21 | End: 2024-05-24 | Stop reason: HOSPADM

## 2024-05-21 RX ORDER — DEXTROSE MONOHYDRATE 25 G/50ML
25-50 INJECTION, SOLUTION INTRAVENOUS
Status: DISCONTINUED | OUTPATIENT
Start: 2024-05-21 | End: 2024-05-24 | Stop reason: HOSPADM

## 2024-05-21 RX ORDER — SODIUM CHLORIDE, SODIUM LACTATE, POTASSIUM CHLORIDE, CALCIUM CHLORIDE 600; 310; 30; 20 MG/100ML; MG/100ML; MG/100ML; MG/100ML
INJECTION, SOLUTION INTRAVENOUS CONTINUOUS
Status: ACTIVE | OUTPATIENT
Start: 2024-05-21 | End: 2024-05-21

## 2024-05-21 RX ORDER — HYDROCODONE BITARTRATE AND ACETAMINOPHEN 5; 325 MG/1; MG/1
1 TABLET ORAL
Status: DISCONTINUED | OUTPATIENT
Start: 2024-05-21 | End: 2024-05-22

## 2024-05-21 RX ADMIN — CARBOXYMETHYLCELLULOSE SODIUM 1 DROP: 5 SOLUTION/ DROPS OPHTHALMIC at 20:17

## 2024-05-21 RX ADMIN — INSULIN ASPART 1 UNITS: 100 INJECTION, SOLUTION INTRAVENOUS; SUBCUTANEOUS at 09:44

## 2024-05-21 RX ADMIN — METFORMIN HYDROCHLORIDE 500 MG: 500 TABLET ORAL at 18:15

## 2024-05-21 RX ADMIN — SENNOSIDES AND DOCUSATE SODIUM 1 TABLET: 8.6; 5 TABLET ORAL at 20:18

## 2024-05-21 RX ADMIN — CARBIDOPA AND LEVODOPA 2.5 MG: 50; 200 TABLET, EXTENDED RELEASE ORAL at 18:15

## 2024-05-21 RX ADMIN — CARBIDOPA AND LEVODOPA 2 TABLET: 25; 100 TABLET ORAL at 14:45

## 2024-05-21 RX ADMIN — CARBOXYMETHYLCELLULOSE SODIUM 1 DROP: 5 SOLUTION/ DROPS OPHTHALMIC at 14:45

## 2024-05-21 RX ADMIN — INSULIN GLARGINE 10 UNITS: 100 INJECTION, SOLUTION SUBCUTANEOUS at 22:36

## 2024-05-21 RX ADMIN — PANTOPRAZOLE SODIUM 40 MG: 40 TABLET, DELAYED RELEASE ORAL at 21:09

## 2024-05-21 RX ADMIN — CARBIDOPA AND LEVODOPA 2 TABLET: 25; 100 TABLET ORAL at 20:17

## 2024-05-21 RX ADMIN — CARBIDOPA AND LEVODOPA 2 TABLET: 25; 100 TABLET ORAL at 09:53

## 2024-05-21 RX ADMIN — GABAPENTIN 600 MG: 300 CAPSULE ORAL at 21:09

## 2024-05-21 RX ADMIN — INSULIN ASPART 2 UNITS: 100 INJECTION, SOLUTION INTRAVENOUS; SUBCUTANEOUS at 12:50

## 2024-05-21 RX ADMIN — ENOXAPARIN SODIUM 40 MG: 40 INJECTION SUBCUTANEOUS at 09:45

## 2024-05-21 RX ADMIN — INSULIN ASPART 3 UNITS: 100 INJECTION, SOLUTION INTRAVENOUS; SUBCUTANEOUS at 18:15

## 2024-05-21 RX ADMIN — GABAPENTIN 300 MG: 300 CAPSULE ORAL at 09:53

## 2024-05-21 RX ADMIN — SODIUM CHLORIDE, POTASSIUM CHLORIDE, SODIUM LACTATE AND CALCIUM CHLORIDE: 600; 310; 30; 20 INJECTION, SOLUTION INTRAVENOUS at 09:52

## 2024-05-21 RX ADMIN — ACETAMINOPHEN 650 MG: 325 TABLET, FILM COATED ORAL at 09:53

## 2024-05-21 RX ADMIN — SERTRALINE HYDROCHLORIDE 100 MG: 100 TABLET ORAL at 09:53

## 2024-05-21 RX ADMIN — MAGNESIUM HYDROXIDE 30 ML: 2400 SUSPENSION ORAL at 20:17

## 2024-05-21 RX ADMIN — SENNOSIDES AND DOCUSATE SODIUM 1 TABLET: 8.6; 5 TABLET ORAL at 09:53

## 2024-05-21 ASSESSMENT — ACTIVITIES OF DAILY LIVING (ADL)
ADLS_ACUITY_SCORE: 35
ADLS_ACUITY_SCORE: 33
ADLS_ACUITY_SCORE: 36
ADLS_ACUITY_SCORE: 36
ADLS_ACUITY_SCORE: 35
ADLS_ACUITY_SCORE: 33
ADLS_ACUITY_SCORE: 33
ADLS_ACUITY_SCORE: 35
ADLS_ACUITY_SCORE: 33
ADLS_ACUITY_SCORE: 35
ADLS_ACUITY_SCORE: 33
ADLS_ACUITY_SCORE: 35
ADLS_ACUITY_SCORE: 33
ADLS_ACUITY_SCORE: 34
ADLS_ACUITY_SCORE: 35
ADLS_ACUITY_SCORE: 35
ADLS_ACUITY_SCORE: 36
ADLS_ACUITY_SCORE: 35
ADLS_ACUITY_SCORE: 36
ADLS_ACUITY_SCORE: 36
ADLS_ACUITY_SCORE: 34
ADLS_ACUITY_SCORE: 36
ADLS_ACUITY_SCORE: 34

## 2024-05-21 NOTE — PROGRESS NOTES
[facility]    Medicine Progress Note - Hospitalist Service    Date of Admission:  5/19/2024    Assessment & Plan Cresencio Peguero is a 89 year old male with PMHx of Parkinson's disease, ataxia, spinal stenosis, idiopathic peripheral neuropathy, chronic pain on narcotics with multiple previous spinal surgeries with implanted spinal stimulator, type 2 diabetes mellitus, hyperlipidemia, depression, GERD, constipation, who was admitted on 5/19/2024. He was brought in by EMS from assisted living facility for evaluation of global weakness. He reportedly sustained a fall without traumatic injury 2 weeks PTA with possible syncopal event while on the toilet having a bowel movement.      In the ED afebrile, VSS. Found to be orthostatic with SBP drop from 150 to 90's.  Lab work with hyperglycemia, no ketones or acidosis. CBC with a mild thrombocytopenia, microcytic anemia with a hemoglobin of 10.5 and stable.  Urinalysis without signs of infection.  Viral PCR negative for COVID, influenza, RSV.  Chest x-ray with linear bibasilar opacities favored to be atelectasis. No chest pain in ED, troponin detectable at 23 EKG with NSR LVH repolarization abnormality seen on prior studies.  NCHCT without acute pathology.      He was admitted to observation for monitoring, was given 1 L of normal saline and persistently orthostatic despite fluid resuscitation. Echocardiogram obtained for syncope showed no valvular pathology or change in heart function as cause for syncopal episode. Cardiac telemetry unremarkable.  On admission he had been resumed on Klonopin twice daily which was since discontinued with improvement, as it was likely the cause of his increased sedation.    Call out to Neurologist to discuss starting midodrine for concern of autonomic dysfunction with Parkinson's disease contributing to ongoing orthostasis despite fluid resuscitation.    Acute Encephalopathy, concern for Metabolic vs Toxic  Encephalopathy (Improved)  Wife reports at baseline is drowsy, sleeps during the day, progressed over the 3 days PTA.   Presented for evaluation of global weakness, falls. Initial infectious workup is without cause for symptoms. Clinically nonseptic appearing. No localizing symptoms. Neurologic exam without focal deficit and vital signs stable.   Improved with discontinuation of Klonopin.   May be additional contribution from low intake and dehydration due to dysphagia, uncontrolled blood sugars.  -Pain management was consulted as there was concern for a change in his intrathecal pain pump however would not correlate with the timeline of his symptoms  -Given improvement in mental status ok to resume PTA Lawrence, can offer at bedtime PRN Hydrocodone-Acetaminophen 5-325mg 1 tablet every evening   -aspiration precautions  -checking ammonia level for completion, lactulose on previous chart review although I suspect this was for constipation  -monitor/notify if fever or new symptoms   -IV fluids until intake improved  -PT/OT/SW: ultimately needs higher level of care but no TCU coverage   -fall precautions      Orthostatic hypotension, suspect autonomic driven from Parkinson's Disease   Unwitnessed fall vs syncopal event   Had possible syncopal event while on the toilet during a bowel movement, per wife this was 2 weeks PTA.  No apparent injury.    Persistently orthostatic despite IV fluids, ACE wrapped legs and symptomatic. Preventing progression of activity. On exam  does appear euvolemic.  Suspect contribution of autonomic dysfunction from Parkinson's disease, ataxia and idiopathic neuropathy are contribution to ongoing orthostatics.  -continue IV fluids end time evening 5/21  -continue Hold prior to admission amlodipine  -Continue Sinemet  -Consider abdominal binder, continue ACE wraps until can obtain compression stockings  -Awaiting return call from Primary Neurologist (Dr Peterson, \[facility]) to  "discuss starting Midodrine. Starting 2.5 mg TID  -anticipate even with addition of midodrine will need higher level of care      Parkinson's disease  On sinemet, follows with [facility],  -Stopped PTA Klonopin due to sedation and lethargy.  Per report from patient's wife this was started for \"jerking, involuntary movements \"  -Continue Sinemet   -Awaiting return call from Primary Neurologist (Dr Peterson, [facility]) to discuss starting Midodrine and review discontinuation of Klonopin     Pulmonary Opacities on XR, concerning for atelectasis  Afebrile, no dyspnea or cough noted. CXR with Low lung volumes with linear bibasilar opacities, favor atelectasis.  Pro greg is not elevated, no leukocytosis or fever.  -monitor symptoms  -incentive spirometer      Chronic Pain   Hx of Multiple spinal surgeries   Follows with [facility]. Has implanted spinal stimulator.   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  He was previously on a mechanical soft diet with thin liquids.  Last EGD 5/2023, with esophageal stenosis and cervical spine osteophyte thought to be contributing to symptoms.  -SLP eval 5/20: recommend soft and bite size diet with thin liquids, eating only when alert and upright, close supervision to ensure safe alertness and positioning, offer tray set up and feeding assistance if needed. Crush whole pills if they're larger size due to upper esophageal stenosis from cervical spine osteophyte.   -aspiration precautions   -continue PPI      Hyperglycemia   Uncontrolled Type II DM  Due to dysphagia taken off of metformin PTA. Last A1c was 10.9 when checked on May 3rd.   Blood sugars were elevated in the ED with no acidosis, " "ketones or signs of DKA. Improved after fluids.   -restart Metformin, 500 mg BID  -start basal insulin with glargine, titrate. Total daily insulin requirement of 36 Units. Start with 10 Units qpm   -POCT, sliding scale insulin PRN     Notes Describing Hx of Possible CAD, s/p CABG, Stent   HTN  HLD  -per med rec [facility] not appear to be on aspirin or statin any longer  -no cardiology records able to review     Idiopathic neuropathy  -resume PTA gabapentin     MDD  -resume sertraline           Diet: Combination Diet Soft and Bite Sized Diet (level 6); Thin Liquids (level 0)  Room Service    DVT Prophylaxis: Enoxaparin (Lovenox) SQ  Henderson Catheter: Not present  Lines: None     Cardiac Monitoring: None  Code Status: No CPR- Do NOT Intubate      Clinically Significant Risk Factors Present on Admission                      # DMII: A1C = 10.9 % (Ref range: <5.7 %) within past 6 months    # Overweight: Estimated body mass index is 27.86 kg/m  as calculated from the following:    Height as of this encounter: 1.803 m (5' 11\").    Weight as of this encounter: 90.6 kg (199 lb 11.8 oz).       # Financial/Environmental Concerns: none         Disposition Plan     Expected Discharge Date: 05/21/2024, 12:00 PM                  More Saldivar PA-C  Hospitalist Service  [facility]  Securely message with [facility] (more info)  Text page via [facility] Paging/Directory   ______________________________________________________________________    Interval History   Somnolence continues to improve after hold of klonopin, percocet.  Endorsed lightheadedness with sitting to standing remains orthostatic despite IV fluids overnight and Ace wrap's to lower extremities.  He endorses generalized body pain \"but not that severe\" he has chronic back pain he has chronic shoulder pain.  Was able to stand with a walker but no real ambulatory activity due to degree of ortho stasis.  His wife is at bedside and feels like he has been closer to " his baseline neurologically.  Is tolerating diet.  Denies any increased cough.  Denies any increased shortness of breath from his baseline.  Afebrile.    Awaiting return call from Primary Neurologist (Linda) regarding consideration of starting midodrine.     Physical Exam   Vital Signs: Temp: 99.6  F (37.6  C) Temp src: Oral BP: (!) 186/82 Pulse: 72   Resp: 16 SpO2: 98 % O2 Device: None (Room air)    Weight: 199 lbs 11.79 oz    Constitutional: Awakens to voice, somnolent, fatigued appearing.   Respiratory:  Normal work of breathing. Diminished at bases.   Cardiovascular: Regular rate and rhythm, normal S1 and S2, and no murmur appreciated.   GI: Bowel sounds present. soft, non-distended, non-tender.   Skin/Integument: Warm, dry. no peripheral edema.  Neuro: Oriented to self, knows he is in hospital, knows situation. Pupil defect right from prior surgery. No focal deficits. Moving all extremities with globally reduced strength, 2/5 throughout. Coordination and sensation grossly intact. Speech slow and poorly annunciated.   Psych: no current hallucinations.        Medical Decision Making       55 MINUTES SPENT BY ME on the date of service doing chart review, history, exam, documentation & further activities per the note.      Data   ------------------------- PAST 24 HR DATA REVIEWED -----------------------------------------------[id number]}

## 2024-05-21 NOTE — PHARMACY-ADMISSION MEDICATION HISTORY
Pharmacist Admission Medication History    Admission medication history re-addressed to add Intrathecal Pain Pump information. The information provided in this note is only as accurate as the sources available at the time of the update.    Information Source(s): [facility] [phone]    Pertinent Information: See updated list below.    Medication History Completed By: Adarsh Espinoza [facility] 5/21/2024 11:06 AM      PTA Med List   Medication Sig Last Dose    amLODIPine (NORVASC) 2.5 MG tablet Take 1 tablet (2.5 mg) by mouth daily 5/19/2024 at AM    carbidopa-levodopa (SINEMET)  MG tablet Take 2 tablets by mouth 3 times daily 5/19/2024 at AM    clonazePAM (KLONOPIN) 0.5 MG tablet Take 0.5 mg by mouth at bedtime 5/18/2024 at PM    clonazePAM (KLONOPIN) 0.5 MG tablet Take 0.25 mg by mouth daily 5/19/2024 at AM    gabapentin (NEURONTIN) 300 MG capsule Take 300 mg by mouth every morning 5/19/2024 at AM    gabapentin (NEURONTIN) 300 MG capsule Take 600 mg by mouth at bedtime 5/18/2024 at pm    HYDROcodone-acetaminophen (NORCO) 5-325 MG tablet Take 1 tablet by mouth daily as needed for severe pain Unknown at PRN    HYDROcodone-acetaminophen (NORCO) 5-325 MG tablet Take 1 tablet by mouth every evening 5/18/2024 at pm    magnesium hydroxide (MILK OF MAGNESIA) 400 MG/5ML suspension Take 30 mLs by mouth daily as needed for constipation or heartburn Past Week at -    medication given by implanted intrathecal pump continuous   Drug # 1: Fentanyl (Sublimaze) - Conc:625 mcg/mL - Total Dose / 24 hours: 376.54 mcg    Drug # 2: Bupivacaine (Marcaine)  - Conc:25 mg/mL - Total Dose / 24 hours: 15.062 mg    Continuous rate: Fentanyl 301.52 mcg/day and Bupivacaine 12.061 mg/day  Flex Bolus dosing: 3 equal scheduled bolus doses per day containing Fentanyl 25.01 mcg and Bupivacaine 1 mg at 1100, 1600 and 2000 daily.     Diluent: NS    Infusion Rate: 0.482 mL/24 hrs  (including bolus doses the total daily rate = 0.602 ml/24  hrs)  Outside Clinic & Provider: [facility] [phone]  Last Refill Date: 05/16/2024  Next Refill Date: 06/10/2024     Pump : Medtronic Synchromed II pain pump (serial # [id number])      Please consider consulting the pain team if the patient is admitted with an IT pump.     omeprazole (PRILOSEC) 20 MG DR capsule Take 20 mg by mouth at bedtime 5/18/2024 at pm    senna-docusate (SENOKOT-S/PERICOLACE) 8.6-50 MG tablet Take 1 tablet by mouth 2 times daily 5/18/2024 at pm    sertraline (ZOLOFT) 100 MG tablet Take 100 mg by mouth every morning 5/19/2024 at am

## 2024-05-21 NOTE — UTILIZATION REVIEW
Admission Status; Secondary Review Determination     Admission Date: 5/19/2024  5:58 PM       Under the authority of the Utilization Management Committee, the utilization review process indicated a secondary review on the above patient.  The review outcome is based on review of the medical records, discussions with staff, and applying clinical experience noted on the date of the review.        (x)      Inpatient Status Appropriate - This patient's medical care is consistent with medical management for inpatient care and reasonable inpatient medical practice.       RATIONALE FOR DETERMINATION      Brief clinical presentation, information copied from the chart, abbreviated and edited for relevant content:     Patient with mulitple issues, failing OBS, continuing to need further work up and treatment. Discussed with attending and will advance to IP.      Cresencio Peguero is a 89 year old male with PMHx of Parkinson's disease, ataxia, spinal stenosis, idiopathic peripheral neuropathy, chronic pain on narcotics with multiple previous spinal surgeries with implanted spinal stimulator, type 2 diabetes mellitus, hyperlipidemia, depression, GERD, constipation, who was admitted on 5/19/2024. He was brought in by EMS from assisted living facility for evaluation of global weakness. He reportedly sustained a fall without traumatic injury 2 weeks PTA with possible syncopal event while on the toilet having a bowel movement. Found to be orthostatic with SBP drop from 150 to 90's. Diagnostic work up otherwise unremarkable.  Initially admitted to OBS for monitoring, was given 1 L of normal saline and persistently orthostatic despite fluid resuscitation. Echocardiogram obtained for syncope showed no valvular pathology or change in heart function as cause for syncopal episode. Cardiac telemetry unremarkable.  Since admission, klonopin discontinued with improvement in weakness. Call out to Neurologist to discuss starting midodrine for  concern of autonomic dysfunction with Parkinson's disease contributing to ongoing orthostasis despite fluid resuscitation.Continue Sinemet. Consider abdominal binder, continue ACE wraps until can obtain compression stockings. Also still on IVF due to poor oral intake.        At the time of admission with the information available to the attending physician, more than 2 nights hospital complex care was anticipated. Also, there was a risk of adverse outcome if patient was treated outpatient or observation. High intensity of services anticipated. Inpatient admission appropriate based on Medicare guidelines.       The information on this document is developed by the utilization review team in order for the business office to ensure compliance.  This only denotes the appropriateness of proper admission status and does not reflect the quality of care rendered.         The definitions of Inpatient Status and Observation Status used in making the determination above are those provided in the CMS Coverage Manual, Chapter 1 and Chapter 6, section 70.4.      Sincerely,      Emerald Garrido MD   Utilization Review/ Case Management  [facility].

## 2024-05-21 NOTE — PLAN OF CARE
PRIMARY DIAGNOSIS: GENERALIZED WEAKNESS    OUTPATIENT/OBSERVATION GOALS TO BE MET BEFORE DISCHARGE  1. Orthostatic performed: N/A    2. Tolerating PO medications: Yes, with applesauce    3. Return to near baseline physical activity: No    4. Cleared for discharge by consultants (if involved): No    Discharge Planner Nurse   Safe discharge environment identified: No  Barriers to discharge: Yes       Entered by: Argelia Campos RN 05/21/2024        Pt is A&Ox3, disoriented to time. VSS on RA. LR running @ 100ml/hr. Ax1-2. Neuro check q4h.   Please review provider order for any additional goals.   Nurse to notify provider when observation goals have been met and patient is ready for discharge.

## 2024-05-21 NOTE — PLAN OF CARE
PRIMARY DIAGNOSIS: GENERALIZED WEAKNESS    OUTPATIENT/OBSERVATION GOALS TO BE MET BEFORE DISCHARGE  1. Orthostatic performed: N/A    2. Tolerating PO medications: Yes with apple sauce    3. Return to near baseline physical activity: No    4. Cleared for discharge by consultants (if involved): No    Discharge Planner Nurse   Safe discharge environment identified: No  Barriers to discharge: Yes       Entered by: Danita Kirby RN 05/20/2024    Lethargic, unable to keep conversation or eyes open. Poor breakfast intake. Orders for Speech and PT. IVF LR @ 100 mL. Continuous pulse due to shallow breathing. Call light within reach.   Please review provider order for any additional goals.   Nurse to notify provider when observation goals have been met and patient is ready for discharge.    Plan of Care Reviewed With: patient  Overall Patient Progress: no changeOverall Patient Progress: no change  Outcome Evaluation: Lethargic, ECHO completed, Speech consult, PT. Holding sedating meds    Problem: Adult Inpatient Plan of Care  Goal: Plan of Care Review  5/20/2024 1933 by Danita Kirby RN  Outcome: Not Progressing  Flowsheets (Taken 5/20/2024 1933)  Outcome Evaluation: Lethargic, ECHO completed, Speech consult, PT. Holding sedating meds  Plan of Care Reviewed With: patient  Overall Patient Progress: no change  5/20/2024 1216 by Danita Kirby RN  Outcome: Not Progressing  Flowsheets (Taken 5/20/2024 1216)  Outcome Evaluation: confused, trouble starting stream, verbal cues in pain, PRN tylenol given. ECHO pending.  Plan of Care Reviewed With: patient  Overall Patient Progress: no change  Goal: Patient-Specific Goal (Individualized)  5/20/2024 1933 by Danita Kirby RN  Outcome: Progressing  5/20/2024 1216 by Danita Kirby RN  Outcome: Progressing  Goal: Absence of Hospital-Acquired Illness or Injury  5/20/2024 1933 by Mirta  Danita DOYLE RN  Outcome: Progressing  5/20/2024 1216 by Danita Kirby RN  Outcome: Progressing  Intervention: Identify and Manage Fall Risk  Recent Flowsheet Documentation  Taken 5/20/2024 0833 by Danita Kirby RN  Safety Promotion/Fall Prevention:   activity supervised   assistive device/personal items within reach   clutter free environment maintained   nonskid shoes/slippers when out of bed   patient and family education   room near nurse's station   room organization consistent   safety round/check completed  Intervention: Prevent Skin Injury  Recent Flowsheet Documentation  Taken 5/20/2024 1120 by Danita Kirby RN  Body Position:   turned   left  Intervention: Prevent and Manage VTE (Venous Thromboembolism) Risk  Recent Flowsheet Documentation  Taken 5/20/2024 0833 by Danita Kirby RN  VTE Prevention/Management: SCDs (sequential compression devices) off  Intervention: Prevent Infection  Recent Flowsheet Documentation  Taken 5/20/2024 0833 by Danita Kirby RN  Infection Prevention:   rest/sleep promoted   hand hygiene promoted  Goal: Optimal Comfort and Wellbeing  5/20/2024 1933 by Danita Kirby RN  Outcome: Progressing  5/20/2024 1216 by Danita Kirby RN  Outcome: Progressing  Goal: Readiness for Transition of Care  5/20/2024 1933 by Danita Kirby RN  Outcome: Progressing  5/20/2024 1216 by Danita Kirby RN  Outcome: Progressing     Problem: Oral Intake Inadequate  Goal: Improved Oral Intake  5/20/2024 1933 by Danita Kirby RN  Outcome: Progressing  5/20/2024 1217 by Danita Kirby RN  Outcome: Not Progressing     Problem: Comorbidity Management  Goal: Blood Glucose Levels Within Targeted Range  5/20/2024 1933 by Danita Kirby RN  Outcome: Progressing  5/20/2024 1217 by Danita Kirby  S, RN  Outcome: Progressing  Intervention: Monitor and Manage Glycemia  Recent Flowsheet Documentation  Taken 5/20/2024 0833 by Danita Kirby RN  Medication Review/Management: medications reviewed  Goal: Blood Pressure in Desired Range  5/20/2024 1933 by Danita Kirby, RN  Outcome: Progressing  5/20/2024 1217 by Danita Kirby, RN  Outcome: Progressing  Intervention: Maintain Blood Pressure Management  Recent Flowsheet Documentation  Taken 5/20/2024 0833 by Danita Kirby, RN  Medication Review/Management: medications reviewed

## 2024-05-21 NOTE — PLAN OF CARE
Expand All Collapse All    PRIMARY DIAGNOSIS: GENERALIZED WEAKNESS     OUTPATIENT/OBSERVATION GOALS TO BE MET BEFORE DISCHARGE  1. Orthostatic performed: N/A     2. Tolerating PO medications: Yes, with applesauce     3. Return to near baseline physical activity: No     4. Cleared for discharge by consultants (if involved): No        Discharge Planner Nurse  Safe discharge environment identified: No  Barriers to discharge: Yes       Entered by: Argelia Campos RN 05/21/2024          Pt is A&Ox3, disoriented to time. VSS on RA. Incontinent of urine. LR running @ 100ml/hr. Ax1-2. Neuro check q4h. Scheduled Gabapentin given for back pain.   Please review provider order for any additional goals.   Nurse to notify provider when observation goals have been met and patient is ready for discharge.

## 2024-05-21 NOTE — CONSULTS
[facility] ACUTE PAIN SERVICE CONSULTATION   [facility], [facility], [facility], [facility], [place]     Date of Admission:  5/19/2024  Date of Consult (When I saw the patient): 05/21/24     Assessment/Plan:     Cresencio Peguero is a 89 year old male who was admitted on 5/19/2024.  Pain team was asked to see the patient for management of chronic pain in a patient with an intrathecal pain pump, patient presented with acute encephalopathy. Admitted from his assisted living facility for evaluation of global weakness, falls, lethargy, confusion.  It was reported that the patient had sustained a fall without traumatic injury 2 weeks prior to admission with possible syncopal event while on the toilet having a bowel movement. History of Parkinson's disease, ataxia, spinal stenosis, peripheral neuropathy, chronic pain, previous spinal surgeries with implanted spinal stimulator, intrathecal pain pump, type 2 diabetes mellitus, hyperlipidemia, depression, GERD.  Describes pain as chronic inb the low back, right hip. The patient does not smoke and denies chemical dependency history.     Discussed with [facility] pain clinic.  Patient has had an intrathecal pain pump since January 2024.  Patient was last seen at pain clinic 5/16/2024.  At that visit there was no overall change to the 24-hour dose of medication in his intrathecal pain pump.  Per pain clinic, on April 22 his intrathecal pain pump daily basal dose was increased by 75 mcg and a 1/day bolus dose was added to his pain pump settings.  On 5/16/2024 the basal rate was decreased and the bolus dosing was changed to 3 boluses per day.  Per the pain clinic there was no overall change to the 24-hour dose of medication the patient could receive via his intrathecal pain pump on 5/16/24.      PLAN:   1) Pain is consistent with chronic pain.  Patient with history of chronic pain, opioid dependence, presence of an intrathecal pain pump.  Patient presented with acute encephalopathy  concerning for metabolic versus toxic encephalopathy.  Per review of admission notes, wife reported that at baseline patient is drowsy and sleeps during the day, however this progressively worsened 3 days prior to admission.  Per review of  patient was recently prescribed clonazepam on 5/16/2024.  Pain team did discuss his pain pump settings with his outpatient pain clinic and there have been no recent increase dose adjustments to his pain pump.  I do not suspect that weakness and encephalopathy is due to pain pump settings however likely from new prescription of benzodiazepines.  Additional contribution could be from dehydration, and hyperglycemia.  In the ED afebrile, VSS. Found to be orthostatic with SBP drop from 150 to 90's.  Lab work with hyperglycemia, no ketones or acidosis. CBC with a mild thrombocytopenia, microcytic anemia with a hemoglobin of 10.5 and stable.  Urinalysis without signs of infection.  Viral PCR negative for COVID, influenza, RSV.  Chest x-ray with linear bibasilar opacities favored to be atelectasis. No chest pain in ED, troponin detectable at 23 EKG with NSR LVH repolarization abnormality seen on prior studies. N[facility]CT without acute pathology.   Multimodal Medication Therapy  Topical: none  NSAID'S: none  Steroids: none  Muscle Relaxants: none  Adjuvants: Gabapentin 300 mg every morning and 600 mg at bedtime, acetaminophen as needed  Antidepressants/anxiolytics: Recommend to stop clonazepam.  This is a new prescription as of 5/16/2024.  Do not recommend benzodiazepines with concurrent use of opioids.  Sertraline 100 mg every morning  Opioids: Hydrocodone-Acetaminophen 5-325mg 1 tablet every evening, and one tablet daily prn on hold (home med). Can resume this to daily prn. Patient reports taking once per day.    Intrathecal pain pump:  Drug # 1: Fentanyl (Sublimaze) - Conc:625 mcg/mL - Total Dose / 24 hours: 376.54 mcg   Drug # 2: Bupivacaine (Marcaine)  - Conc:25 mg/mL - Total Dose / 24  hours: 15.062 mg   Continuous rate: Fentanyl 301.52 mcg/day and Bupivacaine 12.061 mg/day   Flex Bolus dosing: 3 equal scheduled bolus doses per day containing Fentanyl 25.01 mcg and Bupivacaine 1 mg at 1100, 1600 and 2000 daily.   Diluent: NS   Infusion Rate: 0.482 mL/24 hrs  (including bolus doses the total daily rate = 0.602 ml/24 hrs)   Outside Clinic & Provider: [facility] [phone]   Last Refill Date: 05/16/2024   Next Refill Date: 06/10/2024   Pump : Medtronic Synchromed II pain pump   IV Pain medication: None  Non-medication interventions: Ice, Rest, PT, OT, and Distraction (TV, Music, Reading)  Constipation Prophylaxis: Milk of Magnesia and Senna-docusate    -Opioid prescriber has been [facility] pain clinic   -MN  pulled from system on 5/21/2024. This indicates ongoing prescriptions for gabapentin, hydrocodone-acetaminophen, and 1 recent prescription for clonazepam  5/20/2024 gabapentin 300 mg #90 for 30 days  5/16/2024 clonazepam 0.5 mg #15 for 10 days -this is a new prescription prior to this patient has not been on benzodiazepines  5/5/2024 Norco 5-325 mg #36 for 18 days  4/25/2024 Norco 5-325 mg #30 for 30 days same on 3/20/2024, 2/21/2024-   4/23/2024 gabapentin 300 mg #84  4/12/2024 gabapentin 300 mg #37  Discharge Recommendations - We recommend prescribing the following at the time of discharge: Stop clonazepam.  Follow-up pain clinic for intrathecal pain pump.  No opioids at discharge.  Patient does have prescriptions for hydrocodone-acetaminophen 5-325mg 1-2 tabs daily prn for chronic pain.     History of Present Illness (HPI):       Cresencio Peguero is a 89 year old male who presented for fall while sitting on the toilet.  Per review of notes wife reported he lost consciousness for 1 to 2 minutes and was confused after this episode.  Patient comes from an assisted living.  His metformin was recently discontinued because of difficulty swallowing. Past medical history as above.  The pain is reported to be chronic, located in the low back, and it does not radiate.  The patient denies nausea, vomiting, constipation, diarrhea, chest pain, shortness of breath, dizziness, fever, chills, and paresthesia. T    Per MN  review, the patient does have an opioid tolerance.     Reviewed medical record, labs, imaging, ED note, and care everywhere.     Visit/Communication Style   Visit/Communication Style   Virtual (Video) communication was used to evaluate Cresencio.  Cresencio consented to the use of video communication.  Video START time: 0900, 5/21/2024  Video STOP time: 0940, 5/21/2024   Patient's location: [facility] OBSERVATION DEPT  Provider's location during the visit: [facility]             Medical History   PAST MEDICAL HISTORY:   Past Medical History:   Diagnosis Date    Anxiety     Arthritis     CAD (coronary artery disease)     HX of stent,  CABG x2    Diabetes (H)     Hearing loss     Hyperlipidemia     Hypertension     Neuropathy     Sensory ataxia     Stented coronary artery        PAST SURGICAL HISTORY:   Past Surgical History:   Procedure Laterality Date    BACK SURGERY      cervical laminectomy    BACK SURGERY      lumbar laminectomy    CARDIAC SURGERY      Stent,  CABGx2    ESOPHAGOSCOPY, GASTROSCOPY, DUODENOSCOPY (EGD), COMBINED N/A 5/5/2023    Procedure: Esophagoscopy, gastroscopy, duodenoscopy (EGD), combined;  Surgeon: Jeovanny Rizo MD;  Location:  GI    EYE SURGERY      cataract    EYE SURGERY      corneal surgery    HERNIORRHAPHY INGUINAL Right 8/15/2016    Procedure: HERNIORRHAPHY INGUINAL;  Surgeon: Wu Adam MD;  Location:  OR    LAPAROSCOPIC LYSIS ADHESIONS N/A 7/7/2017    Procedure: LAPAROSCOPIC LYSIS ADHESIONS;;  Surgeon: Sumeet Joiner MD;  Location:  OR    LAPAROSCOPY DIAGNOSTIC (GENERAL) N/A 7/7/2017    Procedure: LAPAROSCOPY DIAGNOSTIC (GENERAL);  DIAGNOSTIC LAPAROSCOPY WITH LYSIS OF ADHESIONS ;  Surgeon: Rhys  Sumeet Greenberg MD;  Location:  OR    ORTHOPEDIC SURGERY      rotator cuff repair,shoulder arthroplasty    REVERSE ARTHROPLASTY SHOULDER Right 2020    Procedure: RIGHT REVERSE TOTAL SHOULDER ARTHROPLASTY;  Surgeon: Adarsh Mello MD;  Location:  OR    TONSILLECTOMY         FAMILY HISTORY: Reviewed    SOCIAL HISTORY:   Social History     Tobacco Use    Smoking status: Former     Types: Pipe     Quit date: 2021     Years since quittin.9    Smokeless tobacco: Not on file   Substance Use Topics    Alcohol use: Yes     Alcohol/week: 0.0 standard drinks of alcohol     Comment: 7 glasses wine per week        [employer]IF[employer]ACTICES  Tobacco:  reports that he quit smoking about 2 years ago. His smoking use included pipe. He does not have any smokeless tobacco history on file.  Alcohol:  reports current alcohol use.  Illicit drugs:  has no history on file for drug use.    Allergies  Allergies   Allergen Reactions    Percodan [Oxycodone-Aspirin] Rash       Problem List  Patient Active Problem List    Diagnosis Date Noted    Generalized weakness 2024     Priority: Medium    Elevated blood pressure reading without diagnosis of hypertension 2021     Priority: Medium    Acute bilateral low back pain without sciatica 2021     Priority: Medium    Rotator cuff tear arthropathy, right 2020     Priority: Medium    SBO (small bowel obstruction) (H) 2017     Priority: Medium    Inguinal hernia 2016     Priority: Medium       Prior to Admission Medications   Medications Prior to Admission   Medication Sig Dispense Refill Last Dose    amLODIPine (NORVASC) 2.5 MG tablet Take 1 tablet (2.5 mg) by mouth daily 20 tablet 0 2024 at AM    carbidopa-levodopa (SINEMET)  MG tablet Take 2 tablets by mouth 3 times daily   2024 at AM    clonazePAM (KLONOPIN) 0.5 MG tablet Take 0.5 mg by mouth at bedtime   2024 at PM    clonazePAM (KLONOPIN) 0.5 MG tablet Take 0.25 mg by  mouth daily   5/19/2024 at AM    gabapentin (NEURONTIN) 300 MG capsule Take 300 mg by mouth every morning   5/19/2024 at AM    gabapentin (NEURONTIN) 300 MG capsule Take 600 mg by mouth at bedtime   5/18/2024 at pm    HYDROcodone-acetaminophen (NORCO) 5-325 MG tablet Take 1 tablet by mouth daily as needed for severe pain   Unknown at PRN    HYDROcodone-acetaminophen (NORCO) 5-325 MG tablet Take 1 tablet by mouth every evening   5/18/2024 at pm    magnesium hydroxide (MILK OF MAGNESIA) 400 MG/5ML suspension Take 30 mLs by mouth daily as needed for constipation or heartburn   Past Week at -    medication given by implanted intrathecal pump continuous   Drug # 1: Fentanyl (Sublimaze) - Conc:625 mcg/mL - Total Dose / 24 hours: 376.54 mcg    Drug # 2: Bupivacaine (Marcaine)  - Conc:25 mg/mL - Total Dose / 24 hours: 15.062 mg    Continuous rate: Fentanyl 301.52 mcg/day and Bupivacaine 12.061 mg/day  Flex Bolus dosing: 3 equal scheduled bolus doses per day containing Fentanyl 25.01 mcg and Bupivacaine 1 mg at 1100, 1600 and 2000 daily.     Diluent: NS    Infusion Rate: 0.482 mL/24 hrs  (including bolus doses the total daily rate = 0.602 ml/24 hrs)  Outside Clinic & Provider: [facility] [phone]  Last Refill Date: 05/16/2024  Next Refill Date: 06/10/2024     Pump : Medtronic Synchromed II pain pump (serial # [id number])      Please consider consulting the pain team if the patient is admitted with an IT pump.       omeprazole (PRILOSEC) 20 MG DR capsule Take 20 mg by mouth at bedtime   5/18/2024 at pm    senna-docusate (SENOKOT-S/PERICOLACE) 8.6-50 MG tablet Take 1 tablet by mouth 2 times daily   5/18/2024 at pm    sertraline (ZOLOFT) 100 MG tablet Take 100 mg by mouth every morning   5/19/2024 at am       Review of Systems  Complete ROS reviewed, unless noted in HPI, all other systems reviewed (with patient) and all others found to be negative.      Objective:     Physical Exam:  /63 (BP Location:  "Right arm)   Pulse 65   Temp 98.5  F (36.9  C) (Oral)   Resp 16   Ht 1.803 m (5' 11\")   Wt 90.6 kg (199 lb 11.8 oz)   SpO2 94%   BMI 27.86 kg/m    Weight:   Vitals:    05/19/24 1804 05/19/24 2251   Weight: 90.7 kg (200 lb) 90.6 kg (199 lb 11.8 oz)      Body mass index is 27.86 kg/m .    Exam limited, telemedicine visit  General Appearance:  Alert, cooperative, no distress, laying in bed, answers questions appropriately   Head:  Normocephalic, without obvious abnormality, atraumatic, hard of hearing, wears hearing aids   Eyes:  PERRL, conjunctiva/corneas clear, EOM's intact   ENT/Throat: Lips, mucosa, and tongue normal; teeth and gums normal   Lymph/Neck: Supple, symmetrical, trachea midline   Lungs:   Respirations unlabored   Skin: Skin warm, dry   Neurologic: Alert and oriented to place, time, situation, Moves all 4 extremities     Psych: Affect is appropriate     Imaging: Reviewed I have personally reviewed pertinent notes, labs, tests, and radiologic imaging in patient's chart.  Labs: Reviewed I have personally reviewed pertinent notes, labs, tests, and radiologic imaging in patient's chart.  Notes: Reviewed I have personally reviewed pertinent notes, labs, tests, and radiologic imaging in patient's chart.    Total time spent 65 minutes with greater than 50% in consultation, education and coordination of care.   Also discussed with RN and [facility].   Treatment plan includes: multimodal pain approach, Hospital Medicine Service for medical management.   Patient educated regarding: multimodal pain approach and medications as listed above.   Elements of Medical Decision Making as described above. Acute or chronic illness or injury or surgery. High risk therapy including opioids, high risk drug therapy including oral and/or parenteral controlled substances.    Patient is understanding of the plan. All questions and concerns addressed to patient's satisfaction.     Thank you for this " consultation.    TAMI ThaoP-C  Acute Care Pain Management Program   [facility]   Monday-Friday 8a-4p   Page via Epic or [employer] network

## 2024-05-21 NOTE — PLAN OF CARE
PRIMARY DIAGNOSIS: GENERALIZED WEAKNESS    OUTPATIENT/OBSERVATION GOALS TO BE MET BEFORE DISCHARGE  1. Orthostatic performed: N/A    2. Tolerating PO medications: Yes with apple sauce    3. Return to near baseline physical activity: No    4. Cleared for discharge by consultants (if involved): No    Discharge Planner Nurse   Safe discharge environment identified: No  Barriers to discharge: Yes       Entered by: Dnaita Kirby RN 05/20/2024    More alert, able to keep conversation. Tolerating diet. IVF LR @ 100 mL/hr. Pain to see tomorrow. PT recs TCU. Up Ax1-2. Using urinal at bedside. Ordered ace wrap, awaiting supplies. Call light within reach.   Please review provider order for any additional goals.   Nurse to notify provider when observation goals have been met and patient is ready for discharge.    Plan of Care Reviewed With: patient  Overall Patient Progress: no change  Outcome Evaluation: Lethargic, ECHO completed, Speech consult, PT. Holding sedating meds    Problem: Adult Inpatient Plan of Care  Goal: Plan of Care Review  5/20/2024 1933 by Danita Kirby RN  Outcome: Not Progressing  Flowsheets (Taken 5/20/2024 1933)  Outcome Evaluation: Lethargic, ECHO completed, Speech consult, PT. Holding sedating meds  Plan of Care Reviewed With: patient  Overall Patient Progress: no change  5/20/2024 1216 by Danita Kirby RN  Outcome: Not Progressing  Flowsheets (Taken 5/20/2024 1216)  Outcome Evaluation: confused, trouble starting stream, verbal cues in pain, PRN tylenol given. ECHO pending.  Plan of Care Reviewed With: patient  Overall Patient Progress: no change  Goal: Patient-Specific Goal (Individualized)  5/20/2024 1933 by Danita Kirby RN  Outcome: Progressing  5/20/2024 1216 by Danita Kirby RN  Outcome: Progressing  Goal: Absence of Hospital-Acquired Illness or Injury  5/20/2024 1933 by Danita Kirby  RN  Outcome: Progressing  5/20/2024 1216 by Danita Kirby RN  Outcome: Progressing  Intervention: Identify and Manage Fall Risk  Recent Flowsheet Documentation  Taken 5/20/2024 0833 by Danita Kirby RN  Safety Promotion/Fall Prevention:   activity supervised   assistive device/personal items within reach   clutter free environment maintained   nonskid shoes/slippers when out of bed   patient and family education   room near nurse's station   room organization consistent   safety round/check completed  Intervention: Prevent Skin Injury  Recent Flowsheet Documentation  Taken 5/20/2024 1120 by Danita Kirby RN  Body Position:   turned   left  Intervention: Prevent and Manage VTE (Venous Thromboembolism) Risk  Recent Flowsheet Documentation  Taken 5/20/2024 0833 by Danita Kirby RN  VTE Prevention/Management: SCDs (sequential compression devices) off  Intervention: Prevent Infection  Recent Flowsheet Documentation  Taken 5/20/2024 0833 by Danita Kirby RN  Infection Prevention:   rest/sleep promoted   hand hygiene promoted  Goal: Optimal Comfort and Wellbeing  5/20/2024 1933 by Danita Kirby RN  Outcome: Progressing  5/20/2024 1216 by Danita Kirby RN  Outcome: Progressing  Goal: Readiness for Transition of Care  5/20/2024 1933 by Danita Kirby RN  Outcome: Progressing  5/20/2024 1216 by Danita Kirby RN  Outcome: Progressing     Problem: Oral Intake Inadequate  Goal: Improved Oral Intake  5/20/2024 1933 by Danita Kirby RN  Outcome: Progressing  5/20/2024 1217 by Danita Kiryb RN  Outcome: Not Progressing     Problem: Comorbidity Management  Goal: Blood Glucose Levels Within Targeted Range  5/20/2024 1933 by Danita Kirby RN  Outcome: Progressing  5/20/2024 1217 by Danita Kirby  RN  Outcome: Progressing  Intervention: Monitor and Manage Glycemia  Recent Flowsheet Documentation  Taken 5/20/2024 0833 by Danita Kirby, RN  Medication Review/Management: medications reviewed  Goal: Blood Pressure in Desired Range  5/20/2024 1933 by Danita Kirby, RN  Outcome: Progressing  5/20/2024 1217 by Danita Kirby, RN  Outcome: Progressing  Intervention: Maintain Blood Pressure Management  Recent Flowsheet Documentation  Taken 5/20/2024 0833 by Danita Kirby, RN  Medication Review/Management: medications reviewed

## 2024-05-21 NOTE — PLAN OF CARE
INPATIENT NOTE: CARES PROVIDED FROM [id number]  Diagnosis:  Syncopal/Weakness  Temp: 98.6  F (37  C) Temp src: Oral BP: 125/61 Pulse: 64   Resp: 16 SpO2: 94 % O2 Device: None (Room air)      Labs: B, 210, 282  Cardiac: Ortho BP +, Denies CP. ACE wrapped legs. Abdominal binder.   Edema: +1 on lower extremities.   Resp: LS diminished at bases, denies SOB. On RA  Neuro: Alert to self, place, knows he fell at home. More alert today, intermittently confused.   GI: No BM yet on shift, Senna and prune juice given. No abdominal pain, Passing gas  : Incontinent of urine, urinal at bedside with assistance   Skin: Redness to bottom  Activity: if Standing Ax1, ambulation Ax1-2. Walked to bathroom and outside door chair, walker and GB.   Diet: Soft bite diet, tolerating.   Pain: PRN Tylenol x1, Internal pain pump continuous.   Lines: PIV IVF @ 100 mL/hr LR.     Plan: Monitor Ortho Bps, Up and ambulate when possible. Plan for TCU.       Plan of Care Reviewed With: patient  Overall Patient Progress: improving  Outcome Evaluation: Alert, intermittent confusion, Up Ax1-2 walker and GB.  Problem: Adult Inpatient Plan of Care  Goal: Plan of Care Review  2024 by Danita Kirby RN  Outcome: Progressing  Flowsheets (Taken 2024)  Outcome Evaluation: Alert, intermittent confusion, Up Ax1-2 walker and GB.  Plan of Care Reviewed With: patient  Overall Patient Progress: improving  2024 1321 by Danita Kirby RN  Outcome: Progressing  Flowsheets (Taken 2024 1321)  Outcome Evaluation: Alert, intermittent confusion. ACE wrapped legs. PT to work with pt. Wife updated at bedside. SW following  2024 by Danita Kirby RN  Outcome: Progressing  Flowsheets (Taken 2024 1117)  Outcome Evaluation: More awake, intermittent confused. Orthos BP +.  Plan of Care Reviewed With: patient  Overall Patient Progress: improving  Goal: Patient-Specific Goal  (Individualized)  5/21/2024 1826 by Danita Kirby RN  Outcome: Progressing  5/21/2024 1321 by Danita Kirby RN  Outcome: Progressing  5/21/2024 1117 by Danita Kirby RN  Outcome: Progressing  Goal: Absence of Hospital-Acquired Illness or Injury  5/21/2024 1826 by Danita Kirby RN  Outcome: Progressing  5/21/2024 1321 by Danita Kirby RN  Outcome: Progressing  5/21/2024 1117 by Danita Kirby RN  Outcome: Progressing  Intervention: Identify and Manage Fall Risk  Recent Flowsheet Documentation  Taken 5/21/2024 0832 by Danita Kirby RN  Safety Promotion/Fall Prevention:   activity supervised   assistive device/personal items within reach   clutter free environment maintained   mobility aid in reach   nonskid shoes/slippers when out of bed   patient and family education   room near nurse's station   room organization consistent   safety round/check completed  Intervention: Prevent Skin Injury  Recent Flowsheet Documentation  Taken 5/21/2024 0832 by Danita Kirby RN  Body Position: side-lying  Intervention: Prevent and Manage VTE (Venous Thromboembolism) Risk  Recent Flowsheet Documentation  Taken 5/21/2024 0832 by Danita Kirby RN  VTE Prevention/Management: SCDs (sequential compression devices) off  Intervention: Prevent Infection  Recent Flowsheet Documentation  Taken 5/21/2024 0832 by Danita Kirby RN  Infection Prevention:   rest/sleep promoted   equipment surfaces disinfected   environmental surveillance performed  Goal: Optimal Comfort and Wellbeing  5/21/2024 1826 by Danita Kirby RN  Outcome: Progressing  5/21/2024 1321 by Danita Kirby RN  Outcome: Progressing  5/21/2024 1117 by Danita Kirby RN  Outcome: Progressing  Goal: Readiness for Transition of Care  5/21/2024 1826 by  Danita Kirby, RN  Outcome: Progressing  5/21/2024 1321 by Danita Kirby RN  Outcome: Progressing  5/21/2024 1117 by Danita Kirby RN  Outcome: Progressing     Problem: Oral Intake Inadequate  Goal: Improved Oral Intake  5/21/2024 1826 by Danita Kirby, RN  Outcome: Progressing  5/21/2024 1321 by Danita Kirby RN  Outcome: Progressing  5/21/2024 1117 by Danita Kirby RN  Outcome: Progressing     Problem: Comorbidity Management  Goal: Blood Glucose Levels Within Targeted Range  5/21/2024 1826 by Danita Kirby, RN  Outcome: Progressing  5/21/2024 1321 by Danita Kirby, RN  Outcome: Progressing  5/21/2024 1117 by Danita Kirby RN  Outcome: Progressing  Intervention: Monitor and Manage Glycemia  Recent Flowsheet Documentation  Taken 5/21/2024 0832 by Danita Kirby, RN  Medication Review/Management: medications reviewed  Goal: Blood Pressure in Desired Range  5/21/2024 1826 by Danita Kirby, RN  Outcome: Progressing  5/21/2024 1321 by Danita Kirby, RN  Outcome: Progressing  5/21/2024 1117 by Danita Kirby RN  Outcome: Progressing  Intervention: Maintain Blood Pressure Management  Recent Flowsheet Documentation  Taken 5/21/2024 0832 by Danita Kirby, RN  Medication Review/Management: medications reviewed

## 2024-05-21 NOTE — PLAN OF CARE
PRIMARY DIAGNOSIS: Weakness/Fall    OUTPATIENT/OBSERVATION GOALS TO BE MET BEFORE DISCHARGE  1. Orthostatic performed: Yes:          Lying Orthostatic BP: 184/90         Sitting Orthostatic BP: 132/81         Standing Orthostatic BP: 95/64     2. Tolerating PO medications: Yes    3. Return to near baseline physical activity:  Ax1 stand at bedside, Ax2 if trying ambulating.     4. Cleared for discharge by consultants (if involved): No, Pain team to see, virtual visit.     Discharge Planner Nurse   Safe discharge environment identified: No  Barriers to discharge: No       Entered by: Danita Kirby RN 2024    Alert to self and place. More alert and awake this morning compared to yesterday. Intermittently confused, Placed hearing aids in. Ortho BP +, unsteady on feet. Using urinal, incontinent of urine. B. Sliding scale insulin. Set up for breakfast. Pain to see pt.   Please review provider order for any additional goals.   Nurse to notify provider when observation goals have been met and patient is ready for discharge.    Plan of Care Reviewed With: patient  Overall Patient Progress: improvingOverall Patient Progress: improving  Outcome Evaluation: More awake, intermittent confused. Orthos BP +.    Problem: Adult Inpatient Plan of Care  Goal: Plan of Care Review  Outcome: Progressing  Flowsheets (Taken 2024 1117)  Outcome Evaluation: More awake, intermittent confused. Orthos BP +.  Plan of Care Reviewed With: patient  Overall Patient Progress: improving  Goal: Patient-Specific Goal (Individualized)  Outcome: Progressing  Goal: Absence of Hospital-Acquired Illness or Injury  Outcome: Progressing  Intervention: Prevent Skin Injury  Recent Flowsheet Documentation  Taken 2024 0832 by Danita Kirby RN  Body Position: side-lying  Intervention: Prevent and Manage VTE (Venous Thromboembolism) Risk  Recent Flowsheet Documentation  Taken 2024 0832 by  Danita Kirby, RN  VTE Prevention/Management: SCDs (sequential compression devices) off  Intervention: Prevent Infection  Recent Flowsheet Documentation  Taken 5/21/2024 0832 by Danita Kirby, RN  Infection Prevention:   rest/sleep promoted   equipment surfaces disinfected   environmental surveillance performed  Goal: Optimal Comfort and Wellbeing  Outcome: Progressing  Goal: Readiness for Transition of Care  Outcome: Progressing     Problem: Oral Intake Inadequate  Goal: Improved Oral Intake  Outcome: Progressing     Problem: Comorbidity Management  Goal: Blood Glucose Levels Within Targeted Range  Outcome: Progressing  Goal: Blood Pressure in Desired Range  Outcome: Progressing

## 2024-05-21 NOTE — PLAN OF CARE
PRIMARY DIAGNOSIS: GENERALIZED WEAKNESS     OUTPATIENT/OBSERVATION GOALS TO BE MET BEFORE DISCHARGE  1. Orthostatic performed: N/A     2. Tolerating PO medications: Yes, with applesauce     3. Return to near baseline physical activity: No     4. Cleared for discharge by consultants (if involved): No        Discharge Planner Nurse  Safe discharge environment identified: No  Barriers to discharge: Yes       Entered by: Argelia Campos RN 05/21/2024         Pt is A&Ox3, disoriented to time. VSS on RA. Combo diet of Soft /Bite sized food, Thin liquids. AX1.Using bedside urinal.  New IV to R wrist. LR running @ 100ml/hr.  Neuro check q4h. PT recommending TCU.

## 2024-05-21 NOTE — PROGRESS NOTES
Care Management Follow Up    Length of Stay (days): 0    Expected Discharge Date: 05/21/2024     Concerns to be Addressed:  discharge planning     Patient plan of care discussed at interdisciplinary rounds: Yes    Anticipated Discharge Disposition:  likely return to [facility]     Anticipated Discharge Services:  resume HC PT/OT  Anticipated Discharge DME:      Patient/Family in Agreement with the Plan:  yes    Referrals Placed by CM/SW:    Private pay costs discussed: Not applicable    Additional Information:  SW following for discharge planning.     Noted PT has evaluated pt and currently recommending TCU.     Met with pt and pt's spouse at bedside. Reviewed HERNAN[facility] letter. Pt/pt's spouse Lamar wanted time to review letter prior to signing. SW reviewed therapies recommendation for TCU and unfortunately no insurance coverage due to observation status and medicare as primary insurance. Did offer option to private pay or option for pt to move to care suite at [facility] as they have an opening, per Zabrina. Appears privately paying for TCU is not an option and Lamar is not interested in moving pt to the care suites. BAILEY did encourage Lamar to reach out to Zabrina to receive more information about the care provided in the care suites vs what pt currently receives in [facility]. Lamar hopes that pt's pain can be better controlled and he will be able to return to [facility] with resumption of his home therapy. Lamar is unsure what the agency name is for home care but she states she has been in touch with them and they are aware pt is in the hospital. Lamar plans to transport pt home at discharge.     BAILEY left  for Zabrina p:[phone] at [facility] requesting she reach out to pt's spouse Laamr to discuss their case suite option and answer any questions she may have.     Social work will continue to follow and assist with discharge planning as needed.    DEBORA Yu, LSW  Care Coordination  [phone]    Becky Cheema,  BSW

## 2024-05-21 NOTE — PLAN OF CARE
PRIMARY DIAGNOSIS: GENERALIZED WEAKNESS    OUTPATIENT/OBSERVATION GOALS TO BE MET BEFORE DISCHARGE  1. Orthostatic performed: N/A    2. Tolerating PO medications: Yes    3. Return to near baseline physical activity:  Ax1 if standing up. Ax2 if ambulating.     4. Cleared for discharge by consultants (if involved): Yes; PT recommends TCU    Discharge Planner Nurse   Safe discharge environment identified: Yes  Barriers to discharge: Yes       Entered by: Danita Kirby RN 2024    Alert to self, place, able to tell a bit more about why he came in. Pain 4/10, repositioning/pillow support. Urinating with urinal, no BM. B given sliding scale insulin. Pain seen pt, got information regarding internal pain pump. IVF LR @ 100 mL/hr. Wife updated at bedside. SW following. PT to see later this afternoon.     Please review provider order for any additional goals.   Nurse to notify provider when observation goals have been met and patient is ready for discharge.    Plan of Care Reviewed With: patient  Overall Patient Progress: improving  Outcome Evaluation: Alert, intermittent confusion. ACE wrapped legs. PT to work with pt. Wife updated at bedside. SW following    Problem: Adult Inpatient Plan of Care  Goal: Plan of Care Review  2024 1321 by Danita Kirby RN  Outcome: Progressing  Flowsheets (Taken 2024 1321)  Outcome Evaluation: Alert, intermittent confusion. ACE wrapped legs. PT to work with pt. Wife updated at bedside. SW following  2024 1117 by Danita Kirby RN  Outcome: Progressing  Flowsheets (Taken 2024 1117)  Outcome Evaluation: More awake, intermittent confused. Orthos BP +.  Plan of Care Reviewed With: patient  Overall Patient Progress: improving  Goal: Patient-Specific Goal (Individualized)  2024 1321 by Danita Kirby RN  Outcome: Progressing  2024 1117 by Danita Kirby RN  Outcome:  Progressing  Goal: Absence of Hospital-Acquired Illness or Injury  5/21/2024 1321 by Danita Kirby RN  Outcome: Progressing  5/21/2024 1117 by Danita Kirby RN  Outcome: Progressing  Intervention: Identify and Manage Fall Risk  Recent Flowsheet Documentation  Taken 5/21/2024 0832 by Danita Kirby RN  Safety Promotion/Fall Prevention:   activity supervised   assistive device/personal items within reach   clutter free environment maintained   mobility aid in reach   nonskid shoes/slippers when out of bed   patient and family education   room near nurse's station   room organization consistent   safety round/check completed  Intervention: Prevent Skin Injury  Recent Flowsheet Documentation  Taken 5/21/2024 0832 by Danita Kirby RN  Body Position: side-lying  Intervention: Prevent and Manage VTE (Venous Thromboembolism) Risk  Recent Flowsheet Documentation  Taken 5/21/2024 0832 by Danita Kirby RN  VTE Prevention/Management: SCDs (sequential compression devices) off  Intervention: Prevent Infection  Recent Flowsheet Documentation  Taken 5/21/2024 0832 by Danita Kirby RN  Infection Prevention:   rest/sleep promoted   equipment surfaces disinfected   environmental surveillance performed  Goal: Optimal Comfort and Wellbeing  5/21/2024 1321 by Danita Kirby RN  Outcome: Progressing  5/21/2024 1117 by Danita Kirby RN  Outcome: Progressing  Goal: Readiness for Transition of Care  5/21/2024 1321 by Danita Kirby RN  Outcome: Progressing  5/21/2024 1117 by Danita Kirby RN  Outcome: Progressing     Problem: Oral Intake Inadequate  Goal: Improved Oral Intake  5/21/2024 1321 by Danita Kirby RN  Outcome: Progressing  5/21/2024 1117 by Danita Kirby RN  Outcome: Progressing     Problem: Comorbidity Management  Goal:  Blood Glucose Levels Within Targeted Range  5/21/2024 1321 by Danita Kirby, RN  Outcome: Progressing  5/21/2024 1117 by Danita Kirby RN  Outcome: Progressing  Intervention: Monitor and Manage Glycemia  Recent Flowsheet Documentation  Taken 5/21/2024 0832 by Danita Kirby, RN  Medication Review/Management: medications reviewed  Goal: Blood Pressure in Desired Range  5/21/2024 1321 by Danita Kirby, RN  Outcome: Progressing  5/21/2024 1117 by Danita Kirby, RN  Outcome: Progressing  Intervention: Maintain Blood Pressure Management  Recent Flowsheet Documentation  Taken 5/21/2024 0832 by Danita Kirby, RN  Medication Review/Management: medications reviewed

## 2024-05-22 ENCOUNTER — APPOINTMENT (OUTPATIENT)
Dept: SPEECH THERAPY | Facility: CLINIC | Age: 89
DRG: 092 | End: 2024-05-22
Payer: MEDICARE

## 2024-05-22 LAB
AMMONIA PLAS-SCNC: 36 UMOL/L (ref 16–60)
CREAT SERPL-MCNC: 0.83 MG/DL (ref 0.67–1.17)
EGFRCR SERPLBLD CKD-EPI 2021: 84 ML/MIN/1.73M2
GLUCOSE BLDC GLUCOMTR-MCNC: 157 MG/DL (ref 70–99)
GLUCOSE BLDC GLUCOMTR-MCNC: 178 MG/DL (ref 70–99)
GLUCOSE BLDC GLUCOMTR-MCNC: 186 MG/DL (ref 70–99)
GLUCOSE BLDC GLUCOMTR-MCNC: 196 MG/DL (ref 70–99)
GLUCOSE BLDC GLUCOMTR-MCNC: 234 MG/DL (ref 70–99)
HOLD SPECIMEN: NORMAL
PLATELET # BLD AUTO: 148 10E3/UL (ref 150–450)

## 2024-05-22 PROCEDURE — 99232 SBSQ HOSP IP/OBS MODERATE 35: CPT | Mod: 25 | Performed by: NURSE PRACTITIONER

## 2024-05-22 PROCEDURE — 250N000011 HC RX IP 250 OP 636: Performed by: INTERNAL MEDICINE

## 2024-05-22 PROCEDURE — 82140 ASSAY OF AMMONIA: CPT | Performed by: PHYSICIAN ASSISTANT

## 2024-05-22 PROCEDURE — 250N000013 HC RX MED GY IP 250 OP 250 PS 637: Performed by: PHYSICIAN ASSISTANT

## 2024-05-22 PROCEDURE — 85049 AUTOMATED PLATELET COUNT: CPT | Performed by: INTERNAL MEDICINE

## 2024-05-22 PROCEDURE — 62367 ANALYZE SPINE INFUS PUMP: CPT | Performed by: NURSE PRACTITIONER

## 2024-05-22 PROCEDURE — 82565 ASSAY OF CREATININE: CPT | Performed by: INTERNAL MEDICINE

## 2024-05-22 PROCEDURE — 99233 SBSQ HOSP IP/OBS HIGH 50: CPT | Performed by: HOSPITALIST

## 2024-05-22 PROCEDURE — 36415 COLL VENOUS BLD VENIPUNCTURE: CPT | Performed by: INTERNAL MEDICINE

## 2024-05-22 PROCEDURE — 250N000013 HC RX MED GY IP 250 OP 250 PS 637: Performed by: HOSPITALIST

## 2024-05-22 PROCEDURE — 36415 COLL VENOUS BLD VENIPUNCTURE: CPT | Performed by: PHYSICIAN ASSISTANT

## 2024-05-22 PROCEDURE — 92526 ORAL FUNCTION THERAPY: CPT | Mod: GN | Performed by: SPEECH-LANGUAGE PATHOLOGIST

## 2024-05-22 PROCEDURE — 250N000012 HC RX MED GY IP 250 OP 636 PS 637: Performed by: PHYSICIAN ASSISTANT

## 2024-05-22 PROCEDURE — 250N000013 HC RX MED GY IP 250 OP 250 PS 637: Performed by: NURSE PRACTITIONER

## 2024-05-22 PROCEDURE — 120N000001 HC R&B MED SURG/OB

## 2024-05-22 PROCEDURE — 250N000013 HC RX MED GY IP 250 OP 250 PS 637: Performed by: INTERNAL MEDICINE

## 2024-05-22 RX ORDER — LACTULOSE 10 G/15ML
20 SOLUTION ORAL 2 TIMES DAILY PRN
Status: DISCONTINUED | OUTPATIENT
Start: 2024-05-22 | End: 2024-05-24 | Stop reason: HOSPADM

## 2024-05-22 RX ORDER — GABAPENTIN 300 MG/1
300 CAPSULE ORAL AT BEDTIME
Status: DISCONTINUED | OUTPATIENT
Start: 2024-05-22 | End: 2024-05-24 | Stop reason: HOSPADM

## 2024-05-22 RX ORDER — GABAPENTIN 100 MG/1
100 CAPSULE ORAL 2 TIMES DAILY
Status: DISCONTINUED | OUTPATIENT
Start: 2024-05-22 | End: 2024-05-24 | Stop reason: HOSPADM

## 2024-05-22 RX ORDER — GABAPENTIN 100 MG/1
100 CAPSULE ORAL 2 TIMES DAILY
Status: DISCONTINUED | OUTPATIENT
Start: 2024-05-22 | End: 2024-05-22

## 2024-05-22 RX ORDER — LIDOCAINE 4 G/G
1 PATCH TOPICAL
Status: DISCONTINUED | OUTPATIENT
Start: 2024-05-22 | End: 2024-05-24 | Stop reason: HOSPADM

## 2024-05-22 RX ORDER — AMLODIPINE BESYLATE 5 MG/1
5 TABLET ORAL DAILY
Status: DISCONTINUED | OUTPATIENT
Start: 2024-05-22 | End: 2024-05-24 | Stop reason: HOSPADM

## 2024-05-22 RX ADMIN — GABAPENTIN 100 MG: 100 CAPSULE ORAL at 16:59

## 2024-05-22 RX ADMIN — METFORMIN HYDROCHLORIDE 1000 MG: 500 TABLET ORAL at 17:56

## 2024-05-22 RX ADMIN — INSULIN ASPART 2 UNITS: 100 INJECTION, SOLUTION INTRAVENOUS; SUBCUTANEOUS at 12:22

## 2024-05-22 RX ADMIN — CARBOXYMETHYLCELLULOSE SODIUM 1 DROP: 5 SOLUTION/ DROPS OPHTHALMIC at 09:00

## 2024-05-22 RX ADMIN — GABAPENTIN 100 MG: 100 CAPSULE ORAL at 12:18

## 2024-05-22 RX ADMIN — LACTULOSE 20 G: 20 SOLUTION ORAL at 17:00

## 2024-05-22 RX ADMIN — CARBIDOPA AND LEVODOPA 2.5 MG: 50; 200 TABLET, EXTENDED RELEASE ORAL at 12:18

## 2024-05-22 RX ADMIN — INSULIN ASPART 1 UNITS: 100 INJECTION, SOLUTION INTRAVENOUS; SUBCUTANEOUS at 17:56

## 2024-05-22 RX ADMIN — MAGNESIUM HYDROXIDE 30 ML: 2400 SUSPENSION ORAL at 12:18

## 2024-05-22 RX ADMIN — GABAPENTIN 300 MG: 300 CAPSULE ORAL at 21:16

## 2024-05-22 RX ADMIN — CARBOXYMETHYLCELLULOSE SODIUM 1 DROP: 5 SOLUTION/ DROPS OPHTHALMIC at 20:17

## 2024-05-22 RX ADMIN — CARBIDOPA AND LEVODOPA 2 TABLET: 25; 100 TABLET ORAL at 20:17

## 2024-05-22 RX ADMIN — SENNOSIDES AND DOCUSATE SODIUM 1 TABLET: 8.6; 5 TABLET ORAL at 09:00

## 2024-05-22 RX ADMIN — PANTOPRAZOLE SODIUM 40 MG: 40 TABLET, DELAYED RELEASE ORAL at 21:17

## 2024-05-22 RX ADMIN — AMLODIPINE BESYLATE 5 MG: 5 TABLET ORAL at 14:28

## 2024-05-22 RX ADMIN — METFORMIN HYDROCHLORIDE 500 MG: 500 TABLET ORAL at 14:28

## 2024-05-22 RX ADMIN — CARBIDOPA AND LEVODOPA 2 TABLET: 25; 100 TABLET ORAL at 14:28

## 2024-05-22 RX ADMIN — INSULIN ASPART 1 UNITS: 100 INJECTION, SOLUTION INTRAVENOUS; SUBCUTANEOUS at 09:01

## 2024-05-22 RX ADMIN — LIDOCAINE 1 PATCH: 4 PATCH TOPICAL at 20:18

## 2024-05-22 RX ADMIN — CARBIDOPA AND LEVODOPA 2.5 MG: 50; 200 TABLET, EXTENDED RELEASE ORAL at 09:00

## 2024-05-22 RX ADMIN — SENNOSIDES AND DOCUSATE SODIUM 1 TABLET: 8.6; 5 TABLET ORAL at 20:31

## 2024-05-22 RX ADMIN — DICLOFENAC SODIUM 2 G: 10 GEL TOPICAL at 20:22

## 2024-05-22 RX ADMIN — CARBIDOPA AND LEVODOPA 2.5 MG: 50; 200 TABLET, EXTENDED RELEASE ORAL at 17:56

## 2024-05-22 RX ADMIN — CARBOXYMETHYLCELLULOSE SODIUM 1 DROP: 5 SOLUTION/ DROPS OPHTHALMIC at 14:28

## 2024-05-22 RX ADMIN — SENNOSIDES AND DOCUSATE SODIUM 1 TABLET: 50; 8.6 TABLET ORAL at 20:17

## 2024-05-22 RX ADMIN — ENOXAPARIN SODIUM 40 MG: 40 INJECTION SUBCUTANEOUS at 09:01

## 2024-05-22 RX ADMIN — SERTRALINE HYDROCHLORIDE 100 MG: 100 TABLET ORAL at 09:00

## 2024-05-22 RX ADMIN — CARBIDOPA AND LEVODOPA 2 TABLET: 25; 100 TABLET ORAL at 09:00

## 2024-05-22 ASSESSMENT — ACTIVITIES OF DAILY LIVING (ADL)
ADLS_ACUITY_SCORE: 38
ADLS_ACUITY_SCORE: 36
ADLS_ACUITY_SCORE: 33
ADLS_ACUITY_SCORE: 38
ADLS_ACUITY_SCORE: 34
ADLS_ACUITY_SCORE: 38
ADLS_ACUITY_SCORE: 36
ADLS_ACUITY_SCORE: 35
ADLS_ACUITY_SCORE: 38
ADLS_ACUITY_SCORE: 38
ADLS_ACUITY_SCORE: 35
ADLS_ACUITY_SCORE: 35
ADLS_ACUITY_SCORE: 36
ADLS_ACUITY_SCORE: 33
ADLS_ACUITY_SCORE: 38
ADLS_ACUITY_SCORE: 36
ADLS_ACUITY_SCORE: 33
ADLS_ACUITY_SCORE: 36
ADLS_ACUITY_SCORE: 33
ADLS_ACUITY_SCORE: 36
ADLS_ACUITY_SCORE: 36

## 2024-05-22 NOTE — PROCEDURES
"    [facility] ACUTE PAIN SERVICE CONSULTATION   [facility]   Text Page Pain Via [employer] [facility]      Procedure Note - Intrathecal Pump Interrogation           Procedure:    Pump Check         Pre-procedure  Diagnosis: chronic pain & presence of intrathecal pump         Post-procedure Diagnosis: Same         Indication: chronic pain         Procedure Details: The intended procedure, risks, and alternatives were discussed with the patient and informed consent was obtained. \"Pause for the cause\" was utilized to identify correct patient and intended procedure. The pump was interrogated.  Pump Type Medtronic SynchroMed -20  Serial Number [id number]  Pump Reservoir 20 mL, present volume 16.6 mL  Last interrogation 5/16/24  Findings: His pump is filled with    Fentanyl concentration is 625.0 mcg/ML and is delivering basal rate of 376.54 mg/day, 12.67 mcg/hr.     Bupivacaine concentration is 25.0 mg/mL and delivering 12.061 mg/day, 0.57 ML/hr.    Scheduled PTM's (Bolus) over 4 minute   Fentanyl 25.01 mcg/ Bupivacaine 1.000 mg at 11 AM, 4 PM and 8 PM.          He is not due to have an alarm run off until 6/15/24.     Alarm date 6/15/24     Pain Clinic notified: yes on 5/21/24    Lorrie Winkler RN, PGMT-BC APRN,CNP,ACHPN   Acute Pain Team ( SD/RH) 8-4:30 after 3:30 page house officer   No weekend coverage   [facility] Paging/Directory Pain  Securely message with the [employer] Web Console (learn more here)      "

## 2024-05-22 NOTE — PROGRESS NOTES
[facility]    Medicine Progress Note - Hospitalist Service    Date of Admission:  5/19/2024    Assessment & Plan   Cresencio Peguero is a 89 year old male with PMHx of Parkinson's disease, ataxia, spinal stenosis, idiopathic peripheral neuropathy, chronic pain on narcotics with multiple previous spinal surgeries with implanted spinal stimulator, type 2 diabetes mellitus, hyperlipidemia, depression, GERD, constipation, who was admitted on 5/19/2024 for generalized weakness.  Found to be orthostatic with SBP drop from 150 to 90's.   Now on midodrine  Sedation likely due to medications    Acute Encephalopathy  Metabolic vs Toxic Encephalopathy (Improved)    - wife reports at baseline is drowsy, sleeps during the day, progressed over the 3 days PTA    - infectious workup is without cause for symptoms    -  improved with discontinuation of Klonopin    - pain management was consulted as there was concern for a change in his intrathecal pain pump however would not correlate with the timeline of his symptoms    - improvement in mental status, so resumed PTA [place]    - checking ammonia level for completion (pending)     - PT/OT/SW: ultimately needs higher level of care      Orthostatic hypotension, suspect autonomic driven from Parkinson's Disease   Unwitnessed fall vs syncopal event     - had possible syncopal event while on the toilet during a bowel movement, per wife this was 2 weeks PTA.    - persistently orthostatic despite IV fluids, ACE wrapped legs and symptomatic    - continue to hold prior to admission amlodipine    - abdominal binder, continue ACE wraps until can obtain compression stockings    - previous provider spoke with Dr. Nathan who recommended starting midodrine     Parkinson's disease    - on sinemet, follows with [facility]    - stopped PTA Klonopin due to sedation and lethargy    Pulmonary Opacities on XR, concerning for atelectasis    - afebrile, no dyspnea or  cough noted    - CXR with Low lung volumes with linear bibasilar opacities, favor atelectasis    - pro greg is not elevated, no leukocytosis or fever    - IS     Chronic Pain   Hx of Multiple spinal surgeries     - follows with [facility]    - has implanted spinal stimulator    - pain Team has seen patient and placed orders     Dysphagia  GERD, Hx of esophageal stenosis  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 Crush whole pills if they're larger size due to upper esophageal stenosis from cervical spine osteophyte.     - aspiration precautions     - continue PPI      Hyperglycemia   Uncontrolled Type II DM    - last A1c was 10.9 when checked on May 3rd    - blood sugars were elevated in the ED with no acidosis, ketones or signs of DKA     - increase metformin to 1000mg BID    - will stop glargine as patient will possibly need to manage his meds when he goes home and ideally would not be on insulin (if ultimately goes to a facility where they are able to manage meds, lantus would make sense)    - change to diabetic diet    - ISS     Notes Describing Hx of Possible CAD, s/p CABG, Stent   HTN  HLD    - per med rec [facility] not appear to be on aspirin or statin any longer    - no cardiology records able to review     Idiopathic neuropathy    - resume PTA gabapentin     MDD    - resume sertraline      Spoke with son and wife  No labs needed in am     Diet: Room Service  Combination Diet Moderate Consistent Carb (60 g CHO per Meal) Diet; Soft  "and Bite Sized Diet (level 6); Thin Liquids (level 0)    DVT Prophylaxis: Enoxaparin (Lovenox) SQ  [place] Catheter: Not present  Lines: None     Cardiac Monitoring: None  Code Status: No CPR- Do NOT Intubate      Clinically Significant Risk Factors Present on Admission                      # DMII: A1C = 10.9 % (Ref range: <5.7 %) within past 6 months    # Overweight: Estimated body mass index is 27.86 kg/m  as calculated from the following:    Height as of this encounter: 1.803 m (5' 11\").    Weight as of this encounter: 90.6 kg (199 lb 11.8 oz).         # Financial/Environmental Concerns: none         Disposition Plan      Expected Discharge Date: 05/24/2024                    Obdulio Saleh MD  Hospitalist Service  [facility]  Securely message with [email or web address] (more info)  Text page via [facility] Paging/Directory   ______________________________________________________________________    Interval History   Patient seen and examined.  He is doing much better today.  He is sitting up in bed feeding himself lunch.  He is alert and oriented.  He states he is constipated.  He states his last bowel movement was 3 days ago.  No chest pain or shortness of breath.  He does get dizzy when he stands up.  Physical Exam   Vital Signs: Temp: 99  F (37.2  C) Temp src: Oral BP: (!) 170/80 Pulse: 62   Resp: 16 SpO2: 95 % O2 Device: None (Room air)    Weight: 199 lbs 11.79 oz    Constitutional: Awakens to voice, somnolent, fatigued appearing.   Respiratory:  Normal work of breathing. Diminished at bases.   Cardiovascular: Regular rate and rhythm, normal S1 and S2, and no murmur appreciated.   GI: Bowel sounds present. soft, non-distended, non-tender.   Skin/Integument: Warm, dry. no peripheral edema.       Medical Decision Making       55 MINUTES SPENT BY ME on the date of service doing chart review, history, exam, documentation & further activities per the note.      Data   ------------------------- PAST 24 HR DATA " REVIEWED -----------------------------------------------[id number]}

## 2024-05-22 NOTE — PROVIDER NOTIFICATION
Paged Provider regarding patient orthostatic BP: Lying 157/83 P 63, Sitting 150/74, P 65, Standing 136/62 P 77.

## 2024-05-22 NOTE — PLAN OF CARE
"[id number]    Inpatient Progress Note:  A & O X 4 with forgetfulness. Lung sounds clear bilaterally to auscultation. No wheezes, crackles, or rales auscultated. Denies chills, shortness of breath, nausea, vomit, or diarrhea. Bowel sounds present all quads and active. Prn Milk magnesia given for constipation. Admitting DX: Generalized weakness, Syncope episode. A X 1-2 walker and gait belt. Patient on scheduled Sinemet .Afebrile.        Problem: Adult Inpatient Plan of Care  Goal: Plan of Care Review  Description: The Plan of Care Review/Shift note should be completed every shift.  The Outcome Evaluation is a brief statement about your assessment that the patient is improving, declining, or no change.  This information will be displayed automatically on your shift  note.  Outcome: Progressing  Flowsheets (Taken 5/22/2024 0757)  Plan of Care Reviewed With: patient  Goal: Patient-Specific Goal (Individualized)  Description: You can add care plan individualizations to a care plan. Examples of Individualization might be:  \"Parent requests to be called daily at 9am for status\", \"I have a hard time hearing out of my right ear\", or \"Do not touch me to wake me up as it startles  me\".  Outcome: Progressing  Goal: Absence of Hospital-Acquired Illness or Injury  Outcome: Progressing  Intervention: Identify and Manage Fall Risk  Recent Flowsheet Documentation  Taken 5/21/2024 2052 by Leilani Harris, RN  Safety Promotion/Fall Prevention:   activity supervised   assistive device/personal items within reach   clutter free environment maintained   mobility aid in reach   nonskid shoes/slippers when out of bed   patient and family education   room near nurse's station   room organization consistent   safety round/check completed  Intervention: Prevent Skin Injury  Recent Flowsheet Documentation  Taken 5/21/2024 2052 by Leilani Harris, RN  Body Position: supine, head elevated  Skin Protection: adhesive use limited  Device " "Skin Pressure Protection:   absorbent pad utilized/changed   adhesive use limited  Intervention: Prevent and Manage VTE (Venous Thromboembolism) Risk  Recent Flowsheet Documentation  Taken 5/21/2024 2052 by Leilani Harris RN  VTE Prevention/Management: SCDs (sequential compression devices) off  Intervention: Prevent Infection  Recent Flowsheet Documentation  Taken 5/21/2024 2052 by Leilani Harris RN  Infection Prevention:   rest/sleep promoted   equipment surfaces disinfected   environmental surveillance performed  Goal: Optimal Comfort and Wellbeing  Outcome: Progressing  Goal: Readiness for Transition of Care  Outcome: Progressing     Problem: Oral Intake Inadequate  Goal: Improved Oral Intake  Outcome: Progressing     Problem: Comorbidity Management  Goal: Blood Glucose Levels Within Targeted Range  Outcome: Progressing  Intervention: Monitor and Manage Glycemia  Recent Flowsheet Documentation  Taken 5/21/2024 2052 by Leilani Harris RN  Medication Review/Management: medications reviewed  Goal: Blood Pressure in Desired Range  Outcome: Progressing  Intervention: Maintain Blood Pressure Management  Recent Flowsheet Documentation  Taken 5/21/2024 2052 by Leilani Harris RN  Medication Review/Management: medications reviewed       BP (!) 154/73 (BP Location: Left arm)   Pulse 65   Temp 99  F (37.2  C) (Oral)   Resp 16   Ht 1.803 m (5' 11\")   Wt 90.6 kg (199 lb 11.8 oz)   SpO2 96%   BMI 27.86 kg/m       Will continue to provide supportive cares.     Leilani Harris RN       Problem: Adult Inpatient Plan of Care  Goal: Plan of Care Review  Description: The Plan of Care Review/Shift note should be completed every shift.  The Outcome Evaluation is a brief statement about your assessment that the patient is improving, declining, or no change.  This information will be displayed automatically on your shift  note.  Outcome: Progressing  Flowsheets (Taken 5/22/2024 0757)  Plan of Care " "Reviewed With: patient  Goal: Patient-Specific Goal (Individualized)  Description: You can add care plan individualizations to a care plan. Examples of Individualization might be:  \"Parent requests to be called daily at 9am for status\", \"I have a hard time hearing out of my right ear\", or \"Do not touch me to wake me up as it startles  me\".  Outcome: Progressing  Goal: Absence of Hospital-Acquired Illness or Injury  Outcome: Progressing  Intervention: Identify and Manage Fall Risk  Recent Flowsheet Documentation  Taken 5/21/2024 2052 by Leilani Harris RN  Safety Promotion/Fall Prevention:   activity supervised   assistive device/personal items within reach   clutter free environment maintained   mobility aid in reach   nonskid shoes/slippers when out of bed   patient and family education   room near nurse's station   room organization consistent   safety round/check completed  Intervention: Prevent Skin Injury  Recent Flowsheet Documentation  Taken 5/21/2024 2052 by Leilani Harris RN  Body Position: supine, head elevated  Skin Protection: adhesive use limited  Device Skin Pressure Protection:   absorbent pad utilized/changed   adhesive use limited  Intervention: Prevent and Manage VTE (Venous Thromboembolism) Risk  Recent Flowsheet Documentation  Taken 5/21/2024 2052 by Leilani Harris RN  VTE Prevention/Management: SCDs (sequential compression devices) off  Intervention: Prevent Infection  Recent Flowsheet Documentation  Taken 5/21/2024 2052 by Leilani Harris RN  Infection Prevention:   rest/sleep promoted   equipment surfaces disinfected   environmental surveillance performed  Goal: Optimal Comfort and Wellbeing  Outcome: Progressing  Goal: Readiness for Transition of Care  Outcome: Progressing     Problem: Oral Intake Inadequate  Goal: Improved Oral Intake  Outcome: Progressing     Problem: Comorbidity Management  Goal: Blood Glucose Levels Within Targeted Range  Outcome: " Progressing  Intervention: Monitor and Manage Glycemia  Recent Flowsheet Documentation  Taken 5/21/2024 2052 by Leilani Harris RN  Medication Review/Management: medications reviewed  Goal: Blood Pressure in Desired Range  Outcome: Progressing  Intervention: Maintain Blood Pressure Management  Recent Flowsheet Documentation  Taken 5/21/2024 2052 by Leilani Harris RN  Medication Review/Management: medications reviewed       Plan of Care Reviewed With: patient

## 2024-05-22 NOTE — PROGRESS NOTES
Care Management Follow Up    Expected Discharge Date: 05/23/2024     Concerns to be Addressed: Discharge planning     Patient plan of care discussed at interdisciplinary rounds: Yes    Anticipated Discharge Disposition: TCU     Anticipated Discharge Services: PT/OT     Patient/family educated on Medicare website which has current facility and service quality ratings: Yes   Education Provided on the Discharge Plan: Yes   Patient/Family in Agreement with the Plan: Yes     Referrals Placed by CM/SW: Skilled Nursing Facility   Private pay costs discussed: private room/amenity fees and transportation costs    Additional Information:  SW received call from WILBERT Gerber at [facility], [phone]. She stated that pt's wife toured [facility] and [facility] could [facility] and provide a higher level of care.     BAILEY spoke with patient's wife Lamar via phone, [phone]. Reviewed option for [facility] vs [facility]. Explained that patient is now IP status with Medicare and would have a 3 day qualifying stay 5/24. She would prefer TCU but deferred to her son Nj to further discuss discharge planning.     SW received a call from son Nj ([phone]). He is in agreement with TCU. He does not feel pt returning to [facility] at this time would be best for him with his current mobility. He is requesting that referrals be sent to [facility] and [facility] as their first choices. He is also agreeable to additional referrals being sent to [facility], [facility], and [facility]. He would prefer a private room and is aware of and agreeable to the private room fee.     Updated [facility] of plan for pt to discharge to TCU.      WC transport to be arranged at discharge. BAILEY will continue to follow.     1550: [facility] has clinically accepted patient, private room available Friday. All other referrals declined d/t bed availability. Patient, spouse and son Nj updated. Agreeable to plan for AV  [facility] Friday. [id number].     KWADWO Subramanian, [facility]   Inpatient Care Coordination  [facility]   [phone]

## 2024-05-22 NOTE — PLAN OF CARE
"Vitals: /62 (BP Location: Right arm)   Pulse 70   Temp 98.4  F (36.9  C) (Oral)   Resp 16   Ht 1.803 m (5' 11\")   Wt 90.6 kg (199 lb 11.8 oz)   SpO2 94%   BMI 27.86 kg/m      Cardiac: WDL  Resp: On room air, using incentive spirometer  Neuro: intermittent confusion, more alert/awake today.  GI: constipation, last BM 5/19, PRN bowel meds given, patient passing gas, active bowel sounds  : incontinent  Skin: slight redness on bottom, PIV saline locked.  Activity: Up with 1 assist, GB/W, bed alarm on for safety  Diet: moderate carb, soft bite size  Pain: Intrathecal pain pump, voltaren gel, lidocaine patches, on scheduled gabapentin, declined tylenol  Plan: PT, SLP, SW following. Meds crushed with pudding or applesauce, TCU referrals sent. ACHS B.S. checks. Orthostatic blood pressures completed. Ace wraps to BLE and abd binder in place.       Goal Outcome Evaluation:      Plan of Care Reviewed With: patient    Overall Patient Progress: improvingOverall Patient Progress: improving    Outcome Evaluation: Intermittent confusion, more alert today. Up with 1 assist, GB/W, no BM this shift- PRN bowel meds given.      Problem: Adult Inpatient Plan of Care  Goal: Plan of Care Review  Description: The Plan of Care Review/Shift note should be completed every shift.  The Outcome Evaluation is a brief statement about your assessment that the patient is improving, declining, or no change.  This information will be displayed automatically on your shift  note.  Outcome: Progressing  Flowsheets (Taken 5/22/2024 1842)  Outcome Evaluation: Intermittent confusion, more alert today. Up with 1 assist, GB/W, no BM this shift- PRN bowel meds given.  Plan of Care Reviewed With: patient  Overall Patient Progress: improving  Goal: Patient-Specific Goal (Individualized)  Description: You can add care plan individualizations to a care plan. Examples of Individualization might be:  \"Parent requests to be called daily at 9am for " "status\", \"I have a hard time hearing out of my right ear\", or \"Do not touch me to wake me up as it startles  me\".  Outcome: Progressing  Goal: Absence of Hospital-Acquired Illness or Injury  Outcome: Progressing  Intervention: Identify and Manage Fall Risk  Recent Flowsheet Documentation  Taken 5/22/2024 0902 by Lisset Segovia RN  Safety Promotion/Fall Prevention:   activity supervised   assistive device/personal items within reach   clutter free environment maintained   mobility aid in reach   nonskid shoes/slippers when out of bed   patient and family education   room near nurse's station   room organization consistent   safety round/check completed  Intervention: Prevent Skin Injury  Recent Flowsheet Documentation  Taken 5/22/2024 0902 by Lisset Segovia RN  Body Position: supine, head elevated  Skin Protection: adhesive use limited  Device Skin Pressure Protection:   absorbent pad utilized/changed   adhesive use limited  Intervention: Prevent Infection  Recent Flowsheet Documentation  Taken 5/22/2024 0902 by Lisset Segovia RN  Infection Prevention:   rest/sleep promoted   equipment surfaces disinfected   environmental surveillance performed  Goal: Optimal Comfort and Wellbeing  Outcome: Progressing  Intervention: Monitor Pain and Promote Comfort  Recent Flowsheet Documentation  Taken 5/22/2024 0902 by Lisset Segovia RN  Pain Management Interventions: (Pain Doctor to adjust medications this morning)   repositioned   rest   other (see comments)  Goal: Readiness for Transition of Care  Outcome: Progressing     Problem: Comorbidity Management  Goal: Blood Glucose Levels Within Targeted Range  Outcome: Progressing  Intervention: Monitor and Manage Glycemia  Recent Flowsheet Documentation  Taken 5/22/2024 0902 by Lisset Segovia RN  Medication Review/Management: medications reviewed  Goal: Blood Pressure in Desired Range  Outcome: Progressing  Intervention: Maintain Blood Pressure " Management  Recent Flowsheet Documentation  Taken 5/22/2024 0902 by Lisset Segovia, RN  Medication Review/Management: medications reviewed

## 2024-05-22 NOTE — PROGRESS NOTES
[facility] ACUTE PAIN SERVICE    [facility]   Daily PAIN Progress Note        Securely message with the RecordSetter Web Console (learn more here)  (When I saw the patient): 05/22/24  Assessment/Plan:  Cresencio Peguero is an 89 year old admitted on 5/1/24.  I am seeing in follow up for  management of chronic pain in a patient with an intrathecal pain pump, patient presented with acute encephalopathy. Admitted from his assisted living facility for evaluation of global weakness, falls, lethargy, confusion.  It was reported that the patient had sustained a fall without traumatic injury 2 weeks prior to admission with possible syncopal event while on the toilet having a bowel movement. History of Parkinson's disease, ataxia, spinal stenosis, peripheral neuropathy, chronic pain, previous spinal surgeries with implanted spinal stimulator, intrathecal pain pump, type 2 diabetes mellitus, hyperlipidemia, depression, GERD.  Describes pain as chronic inb the low back, right hip. The patient does not smoke and denies chemical dependency history.    My colleague contacted [facility] on 5/21/24. He has had an intrathecal pain pump since January 2024.  Patient was last seen at pain clinic 5/16/2024.  At that visit there no overall change to the 24-hour dose of medication but scheduled bolus doses to from every day to  tid but.  On his visit prior to this on April 22 his intrathecal pain pump daily basal dose was increased by 75 mcg and a 1/day bolus dose was added to his pain pump settings.    Opioid risk for unintended respiratory suppression : high due to age,frailty and chronic disease   Opioid use chronic IT pump plus Hydrocodone 5/325 mg 2 per day    MN  pulled from system on 5/22/2024. This indicates ongoing prescriptions for gabapentin, hydrocodone-acetaminophen, and 1 recent prescription for clonazepam  5/20/2024 gabapentin 300 mg #90 for 30 days  5/16/2024 clonazepam 0.5 mg #15 for 10 days -this is a new  prescription prior to this patient has not been on benzodiazepines  5/5/2024 Norco 5-325 mg #36 for 18 days  4/25/2024 Norco 5-325 mg #30 for 30 days same on 3/20/2024, 2/21/2024-   4/23/2024 gabapentin 300 mg #84  4/12/2024 gabapentin 300 mg #37  Opioid use this past 24 hrs IT pump 376.54 mcg   Cr Cl= 67.6 ML/min  Adjuvants: gabapentin 600 mg   PLAN:   1) Pain is consistent with chronic pain.  Patient with history of chronic pain, opioid dependence, presence of an intrathecal pain pump.  Patient presented with acute encephalopathy concerning for metabolic versus toxic encephalopathy.  Per review of admission notes, wife reported that at baseline patient is drowsy and sleeps during the day, however this progressively worsened 3 days prior to admission.  Per review of  patient was recently prescribed clonazepam on 5/16/2024.  Pain team did discuss his pain pump settings with his outpatient pain clinic and there have been no recent increase dose adjustments to his pain pump.  I do not suspect that weakness and encephalopathy is due to pain pump settings however likely from new prescription of benzodiazepines.  Additional contribution could be from dehydration, and hyperglycemia.  In the ED afebrile, VSS. Found to be orthostatic with SBP drop from 150 to 90's.  Lab work with hyperglycemia, no ketones or acidosis. CBC with a mild thrombocytopenia, microcytic anemia with a hemoglobin of 10.5 and stable.  Urinalysis without signs of infection.  Viral PCR negative for COVID, influenza, RSV.  Chest x-ray with linear bibasilar opacities favored to be atelectasis. No chest pain in ED, troponin detectable at 23 EKG with NSR LVH repolarization abnormality seen on prior studies. [facility] without acute pathology.   Multimodal Medication Therapy  Topical: today add diclofenac gel tid to low back and lidocaine patch at hs   NSAID'S: none due to age and ASA allergy  Steroids: none  Muscle Relaxants: none indicated  Adjuvants: change  "Gabapentin from 300 mg every morning and 600 mg at bedtime to 100 mg bid and 300 mg at hs acetaminophen as needed  Antidepressants/anxiolytics: Recommend to stop clonazepam(recently added 24). Do not recommend benzodiazepines with concurrent use of opioids.  Sertraline 100 mg every morning  Opioids: Stop Hydrocodone-Acetaminophen 5-325 mg    Intrathecal pain pump:  Drug # 1: Fentanyl (Sublimaze) - Conc:625 mcg/mL - Total Dose / 24 hours: 376.54 mcg   Drug # 2: Bupivacaine (Marcaine)  - Conc:25 mg/mL - Total Dose / 24 hours: 15.062 mg   Continuous rate: Fentanyl 301.52 mcg/day and Bupivacaine 12.061 mg/day   Flex Bolus dosing: 3 equal scheduled bolus doses per day containing Fentanyl 25.01 mcg and Bupivacaine 1 mg at 1100, 1600 and 2000 daily.   Diluent: NS   Infusion Rate: 0.482 mL/24 hrs  (including bolus doses the total daily rate = 0.602 ml/24 hrs)   Outside Clinic & Provider: [facility] [phone]   Last Refill Date: 2024   Next Refill Date: 06/15/2024   Pump : Medtronic Synchromed II pain pump [id number] serial number [id number]  IV Pain medication: None  Non-medication interventions: Ice, Rest, PT, OT, and Distraction (TV, Music, Reading)  Constipation Prophylaxis: Milk of Magnesia and Senna-docusate    Subjective:  Asleep when entered room disoriented to time place events  \" I have back pain and neuropathy\" Lamar is my wife                 <principal problem not specified>   Patient Active Problem List   Diagnosis    Inguinal hernia    SBO (small bowel obstruction) (H)    Rotator cuff tear arthropathy, right    Elevated blood pressure reading without diagnosis of hypertension    Acute bilateral low back pain without sciatica    Generalized weakness    Hyperglycemia    Ambulatory dysfunction        History   Drug Use Not on file         Tobacco Use      Smoking status: Former        Types: Pipe        Quit date: 2021        Years since quittin.9      Smokeless tobacco: " "None        Current Facility-Administered Medications   Medication Dose Route Frequency Provider Last Rate Last Admin    [Held by provider] amLODIPine (NORVASC) tablet 2.5 mg  2.5 mg Oral Daily Ramesh Brice MD        carbidopa-levodopa (SINEMET)  MG per tablet 2 tablet  2 tablet Oral TID More Saldivar PA-C   2 tablet at 05/22/24 0900    carboxymethylcellulose PF (REFRESH PLUS) 0.5 % ophthalmic solution 1 drop  1 drop Both Eyes TID More Saldivar PA-C   1 drop at 05/22/24 0900    enoxaparin ANTICOAGULANT (LOVENOX) injection 40 mg  40 mg Subcutaneous Q24H Ramesh Brice MD   40 mg at 05/22/24 0901    gabapentin (NEURONTIN) capsule 100 mg  100 mg Oral BID Lorrie Winkler APRN CNP        gabapentin (NEURONTIN) capsule 300 mg  300 mg Oral At Bedtime Lorrie Winkler APRN CNP        insulin aspart (NovoLOG) injection (RAPID ACTING)  1-7 Units Subcutaneous TID AC More Saldivar PA-C   1 Units at 05/22/24 0901    insulin aspart (NovoLOG) injection (RAPID ACTING)  1-5 Units Subcutaneous At Bedtime More Saldivar PA-C   2 Units at 05/21/24 2236    metFORMIN (GLUCOPHAGE) tablet 1,000 mg  1,000 mg Oral BID w/meals Obdulio Saleh MD        midodrine (PROAMATINE) tablet 2.5 mg  2.5 mg Oral TID w/meals More Saldivar PA-C   2.5 mg at 05/22/24 0900    pantoprazole (PROTONIX) EC tablet 40 mg  40 mg Oral At Bedtime Ramesh Brice MD   40 mg at 05/21/24 2109    senna-docusate (SENOKOT-S/PERICOLACE) 8.6-50 MG per tablet 1 tablet  1 tablet Oral BID Ramesh Brice MD   1 tablet at 05/22/24 0900    sertraline (ZOLOFT) tablet 100 mg  100 mg Oral QAM Ramesh Brice MD   100 mg at 05/22/24 0900       Objective:  Vital signs in last 24 hours:  B/P: 154/73, T: 99, P: 75, R: 16   Blood pressure (!) 154/73, pulse 75, temperature 99  F (37.2  C), temperature source Oral, resp. rate 16, height 1.803 m (5' 11\"), weight 90.6 kg (199 lb 11.8 oz), SpO2 99%.      Weight:   Wt " Readings from Last 3 Encounters:   05/19/24 90.6 kg (199 lb 11.8 oz)   01/24/24 87.5 kg (193 lb)   10/05/21 88.5 kg (195 lb)           Intake/Output:    Intake/Output Summary (Last 24 hours) at 5/22/2024 0911  Last data filed at 5/22/2024 0858  Gross per 24 hour   Intake 120 ml   Output 450 ml   Net -330 ml        Review of Systems:   As per subjective, all others negative.    Physical Exam:     General Appearance:  Alert, cooperative, no distress. Patient is d grooming is poor   Head:  Normocephalic, without obvious abnormality, atraumatic   Eyes:   Conjunctiva/corneas clear, EOM's intact   ENT/Throat: Lips, mouth dry    Lymph/Neck: Symmetrical, trachea midline, no adenopathy, thyroid: not enlarged, symmetric    Lungs:   Clear to auscultation bilaterally, respirations unlabored, even chest rise. Symmetrical movement    Chest Wall:  No tenderness or deformity   Cardiovascular/Heart:  Regular rate and rhythm, S1, S2 normal,no murmur, rub or gallop.     Abdomen:   Soft, non-tender, bowel sounds active all four quadrants,  no masses, no organomegaly   Musculoskeletal: Extremities normal, atraumatic  Incision none    Skin: Skin color good, lesions  none   Neurologic: Alert and oriented X 1, Impulsive Moves all 4 extremities, gait and stance unsteady.         Psych: Affect is restless aberrant pain behaviors, No             Imaging:  Personally Reviewed.     Results for orders placed or performed during the hospital encounter of 05/19/24   Head CT w/o contrast    Impression    IMPRESSION:  1.  No CT evidence for acute intracranial process.  2.  Mild chronic microvascular ischemic changes as above.   Chest XR,  PA & LAT    Impression    IMPRESSION: Normal size of cardiac silhouette status post median sternotomy and CABG. Low lung volumes with linear bibasilar opacities, favor atelectasis, less likely consolidation/pneumonia. No significant pleural effusion or pneumothorax. No acute bony   abnormality. Right shoulder  arthroplasty noted.   Echocardiogram Complete   Result Value Ref Range    LVEF  55-60%           Lab Results:  Personally Reviewed.   Last Comprehensive Metabolic Panel:  Sodium   Date Value Ref Range Status   05/21/2024 137 135 - 145 mmol/L Final     Comment:     Reference intervals for this test were updated on 09/26/2023 to more accurately reflect our healthy population. There may be differences in the flagging of prior results with similar values performed with this method. Interpretation of those prior results can be made in the context of the updated reference intervals.    06/30/2021 139 133 - 144 mmol/L Final     Potassium   Date Value Ref Range Status   05/21/2024 3.9 3.4 - 5.3 mmol/L Final   06/30/2021 3.9 3.4 - 5.3 mmol/L Final     Chloride   Date Value Ref Range Status   05/21/2024 104 98 - 107 mmol/L Final   06/30/2021 108 94 - 109 mmol/L Final     Carbon Dioxide   Date Value Ref Range Status   06/30/2021 29 20 - 32 mmol/L Final     Carbon Dioxide (CO2)   Date Value Ref Range Status   05/21/2024 22 22 - 29 mmol/L Final     Anion Gap   Date Value Ref Range Status   05/21/2024 11 7 - 15 mmol/L Final   06/30/2021 2 (L) 3 - 14 mmol/L Final     Glucose   Date Value Ref Range Status   06/30/2021 86 70 - 99 mg/dL Final     GLUCOSE BY METER POCT   Date Value Ref Range Status   05/22/2024 157 (H) 70 - 99 mg/dL Final     Urea Nitrogen   Date Value Ref Range Status   05/21/2024 18.0 8.0 - 23.0 mg/dL Final   06/30/2021 20 7 - 30 mg/dL Final     Creatinine   Date Value Ref Range Status   05/21/2024 0.87 0.67 - 1.17 mg/dL Final   06/30/2021 0.88 0.66 - 1.25 mg/dL Final     GFR Estimate   Date Value Ref Range Status   05/21/2024 83 >60 mL/min/1.73m2 Final   06/30/2021 78 >60 mL/min/[1.73_m2] Final     Comment:     Non  GFR Calc  Starting 12/18/2018, serum creatinine based estimated GFR (eGFR) will be   calculated using the Chronic Kidney Disease Epidemiology Collaboration   (CKD-EPI) equation.    "    Calcium   Date Value Ref Range Status   05/21/2024 8.7 (L) 8.8 - 10.2 mg/dL Final   06/30/2021 8.4 (L) 8.5 - 10.1 mg/dL Final        UA: No results found for: \"UAMP\", \"UBARB\", \"BENZODIAZEUR\", \"UCANN\", \"UCOC\", \"OPIT\", \"UPCP\"         Critical decision making elements:  Use of high risk medications: Yes, Ongoing monitoring for adverse effects of medications by me: Yes, Relevant labs, imaging, notes reviewed by me:  Yes  Please see A&P for additional details of medical decision making.  Medical complexity over the past 24 hours:  - Decision to DE-ESCALATE CARE based on prognosis  - Prescription DRUG MANAGEMENT performed  - see medication changes  40 MINUTES SPENT BY ME on the date of service doing chart review, history, exam, documentation & further activities per the note.  Communicated plan with hospitalist managing care? Yes  Communicated plan with bedside nurse?  Yes    Lorrie Winkler, APRN CNP, PGMT-BC, ACHPN  [facility]   Coverage Monday-Friday 8:00-4:30  No weekend coverage contact house officer    Securely message with the Vocera Web Console (learn more here)                "

## 2024-05-23 ENCOUNTER — APPOINTMENT (OUTPATIENT)
Dept: PHYSICAL THERAPY | Facility: CLINIC | Age: 89
DRG: 092 | End: 2024-05-23
Payer: MEDICARE

## 2024-05-23 LAB
GLUCOSE BLDC GLUCOMTR-MCNC: 120 MG/DL (ref 70–99)
GLUCOSE BLDC GLUCOMTR-MCNC: 150 MG/DL (ref 70–99)
GLUCOSE BLDC GLUCOMTR-MCNC: 192 MG/DL (ref 70–99)

## 2024-05-23 PROCEDURE — 99233 SBSQ HOSP IP/OBS HIGH 50: CPT | Performed by: HOSPITALIST

## 2024-05-23 PROCEDURE — 97530 THERAPEUTIC ACTIVITIES: CPT | Mod: GP

## 2024-05-23 PROCEDURE — 250N000013 HC RX MED GY IP 250 OP 250 PS 637: Performed by: NURSE PRACTITIONER

## 2024-05-23 PROCEDURE — 120N000001 HC R&B MED SURG/OB

## 2024-05-23 PROCEDURE — 250N000013 HC RX MED GY IP 250 OP 250 PS 637: Performed by: HOSPITALIST

## 2024-05-23 PROCEDURE — 250N000013 HC RX MED GY IP 250 OP 250 PS 637: Performed by: INTERNAL MEDICINE

## 2024-05-23 PROCEDURE — 99231 SBSQ HOSP IP/OBS SF/LOW 25: CPT | Performed by: NURSE PRACTITIONER

## 2024-05-23 PROCEDURE — 250N000013 HC RX MED GY IP 250 OP 250 PS 637: Performed by: PHYSICIAN ASSISTANT

## 2024-05-23 PROCEDURE — 250N000011 HC RX IP 250 OP 636: Performed by: INTERNAL MEDICINE

## 2024-05-23 RX ADMIN — SENNOSIDES AND DOCUSATE SODIUM 1 TABLET: 8.6; 5 TABLET ORAL at 20:31

## 2024-05-23 RX ADMIN — CARBOXYMETHYLCELLULOSE SODIUM 1 DROP: 5 SOLUTION/ DROPS OPHTHALMIC at 20:31

## 2024-05-23 RX ADMIN — METFORMIN HYDROCHLORIDE 1000 MG: 500 TABLET ORAL at 08:21

## 2024-05-23 RX ADMIN — GABAPENTIN 100 MG: 100 CAPSULE ORAL at 12:33

## 2024-05-23 RX ADMIN — SENNOSIDES AND DOCUSATE SODIUM 1 TABLET: 8.6; 5 TABLET ORAL at 08:21

## 2024-05-23 RX ADMIN — METFORMIN HYDROCHLORIDE 1000 MG: 500 TABLET ORAL at 17:17

## 2024-05-23 RX ADMIN — CARBIDOPA AND LEVODOPA 2 TABLET: 25; 100 TABLET ORAL at 14:31

## 2024-05-23 RX ADMIN — CARBIDOPA AND LEVODOPA 2 TABLET: 25; 100 TABLET ORAL at 20:31

## 2024-05-23 RX ADMIN — CARBIDOPA AND LEVODOPA 2.5 MG: 50; 200 TABLET, EXTENDED RELEASE ORAL at 12:33

## 2024-05-23 RX ADMIN — INSULIN ASPART 1 UNITS: 100 INJECTION, SOLUTION INTRAVENOUS; SUBCUTANEOUS at 17:11

## 2024-05-23 RX ADMIN — DICLOFENAC SODIUM 2 G: 10 GEL TOPICAL at 08:21

## 2024-05-23 RX ADMIN — PANTOPRAZOLE SODIUM 40 MG: 40 TABLET, DELAYED RELEASE ORAL at 20:37

## 2024-05-23 RX ADMIN — CARBIDOPA AND LEVODOPA 2.5 MG: 50; 200 TABLET, EXTENDED RELEASE ORAL at 08:22

## 2024-05-23 RX ADMIN — LIDOCAINE 1 PATCH: 4 PATCH TOPICAL at 20:30

## 2024-05-23 RX ADMIN — SERTRALINE HYDROCHLORIDE 100 MG: 100 TABLET ORAL at 08:22

## 2024-05-23 RX ADMIN — ENOXAPARIN SODIUM 40 MG: 40 INJECTION SUBCUTANEOUS at 08:21

## 2024-05-23 RX ADMIN — GABAPENTIN 300 MG: 300 CAPSULE ORAL at 20:37

## 2024-05-23 RX ADMIN — AMLODIPINE BESYLATE 5 MG: 5 TABLET ORAL at 08:22

## 2024-05-23 RX ADMIN — INSULIN ASPART 2 UNITS: 100 INJECTION, SOLUTION INTRAVENOUS; SUBCUTANEOUS at 08:21

## 2024-05-23 RX ADMIN — CARBIDOPA AND LEVODOPA 2 TABLET: 25; 100 TABLET ORAL at 08:21

## 2024-05-23 ASSESSMENT — ACTIVITIES OF DAILY LIVING (ADL)
ADLS_ACUITY_SCORE: 30
ADLS_ACUITY_SCORE: 32
ADLS_ACUITY_SCORE: 32
ADLS_ACUITY_SCORE: 30
ADLS_ACUITY_SCORE: 30
ADLS_ACUITY_SCORE: 32
ADLS_ACUITY_SCORE: 32
ADLS_ACUITY_SCORE: 30
ADLS_ACUITY_SCORE: 33
ADLS_ACUITY_SCORE: 30
ADLS_ACUITY_SCORE: 32
ADLS_ACUITY_SCORE: 30
ADLS_ACUITY_SCORE: 32
ADLS_ACUITY_SCORE: 33
ADLS_ACUITY_SCORE: 30
ADLS_ACUITY_SCORE: 32
ADLS_ACUITY_SCORE: 30
ADLS_ACUITY_SCORE: 32
ADLS_ACUITY_SCORE: 30

## 2024-05-23 NOTE — PROGRESS NOTES
[facility] ACUTE PAIN SERVICE    [facility]   Daily PAIN Progress Note        Securely message with the [employer] Web Console (learn more here)  (When I saw the patient): 05/23/24  Assessment/Plan:  Cresencio Peguero is an 89 year old admitted on 5/1/24.  I am seeing in follow up for  management of chronic pain in a patient with an intrathecal pain pump, patient presented with acute encephalopathy. Admitted from his assisted living facility for evaluation of global weakness, falls, lethargy, confusion.  It was reported that the patient had sustained a fall without traumatic injury 2 weeks prior to admission with possible syncopal event while on the toilet having a bowel movement. History of Parkinson's disease, ataxia, spinal stenosis, peripheral neuropathy, chronic pain, previous spinal surgeries with implanted spinal stimulator, intrathecal pain pump, type 2 diabetes mellitus, hyperlipidemia, depression, GERD.  Describes pain as chronic inb the low back, right hip. The patient does not smoke and denies chemical dependency history.    My colleague contacted [facility] on 5/21/24. He has had an intrathecal pain pump since January 2024.  Patient was last seen at pain clinic 5/16/2024.  At that visit there no overall change to the 24-hour dose of medication but scheduled bolus doses to from every day to  tid but.  On his visit prior to this on April 22 his intrathecal pain pump daily basal dose was increased by 75 mcg and a 1/day bolus dose was added to his pain pump settings.    Today patient is more awake and alert. He still feels he has trouble sorting things out like medications and events leading to hospital stay.  He is aware that hydrocodone 5 mg APAP will not be restarted as well as Clonidine as theses medication were felt to lead to increase confusion and sedation.     Opioid risk for unintended respiratory suppression : high due to age,frailty and chronic disease   Opioid use chronic IT pump  plus Hydrocodone 5/325 mg 2 per day     MN  pulled from system on 5/22/2024. This indicates ongoing prescriptions for gabapentin, hydrocodone-acetaminophen, and 1 recent prescription for clonazepam  5/20/2024 gabapentin 300 mg #90 for 30 days  5/16/2024 clonazepam 0.5 mg #15 for 10 days -this is a new prescription prior to this patient has not been on benzodiazepines  5/5/2024 Norco 5-325 mg #36 for 18 days  4/25/2024 Norco 5-325 mg #30 for 30 days same on 3/20/2024, 2/21/2024-   4/23/2024 gabapentin 300 mg #84  4/12/2024 gabapentin 300 mg #37  Opioid use this past 24 hrs IT pump 376.54 mcg   Cr Cl= 70.8 ML/min  Adjuvants: gabapentin 300 mg (1), 100 mg (2)    PLAN:   1) Pain is consistent with chronic pain.  Patient with history of chronic pain, opioid dependence, presence of an intrathecal pain pump.  Patient presented with acute encephalopathy concerning for metabolic versus toxic encephalopathy.  Per review of admission notes, wife reported that at baseline patient is drowsy and sleeps during the day, however this progressively worsened 3 days prior to admission.  Per review of  patient was recently prescribed clonazepam on 5/16/2024.  Pain team did discuss his pain pump settings with his outpatient pain clinic and there have been no recent increase dose adjustments to his pain pump.  I do not suspect that weakness and encephalopathy is due to pain pump settings however likely from new prescription of benzodiazepines.  Additional contribution could be from dehydration, and hyperglycemia.  In the ED afebrile, VSS. Found to be orthostatic with SBP drop from 150 to 90's.  Lab work with hyperglycemia, no ketones or acidosis. CBC with a mild thrombocytopenia, microcytic anemia with a hemoglobin of 10.5 and stable.  Urinalysis without signs of infection.  Viral PCR negative for COVID, influenza, RSV.  Chest x-ray with linear bibasilar opacities favored to be atelectasis. No chest pain in ED, troponin detectable  at 23 EKG with NSR LVH repolarization abnormality seen on prior studies. NCHCT without acute pathology.   Multimodal Medication Therapy  Topical: today add diclofenac gel tid to low back and lidocaine patch at hs   NSAID'S: none due to age and ASA allergy  Steroids: none  Muscle Relaxants: none indicated  Adjuvants: Gabapentin 100 mg bid and 300 mg at hs acetaminophen as needed  Antidepressants/anxiolytics: Recommend to stop clonazepam (recently added 5/16/24). Do not recommend benzodiazepines with concurrent use of opioids.  Sertraline 100 mg every morning  Opioids: do not resume Hydrocodone-Acetaminophen 5-325 mg     Intrathecal pain pump:  Drug # 1: Fentanyl (Sublimaze) - Conc:625 mcg/mL - Total Dose / 24 hours: 376.54 mcg   Drug # 2: Bupivacaine (Marcaine)  - Conc:25 mg/mL - Total Dose / 24 hours: 15.062 mg   Continuous rate: Fentanyl 301.52 mcg/day and Bupivacaine 12.061 mg/day   Flex Bolus dosing: 3 equal scheduled bolus doses per day containing Fentanyl 25.01 mcg and Bupivacaine 1 mg at 1100, 1600 and 2000 daily.   Diluent: NS   Infusion Rate: 0.482 mL/24 hrs  (including bolus doses the total daily rate = 0.602 ml/24 hrs)   Outside Clinic & Provider: [facility] [phone]   Last Refill Date: 05/16/2024   Next Refill Date: 06/15/2024   Pump : Medtronic Synchromed II pain pump [id number] serial number [id number]  IV Pain medication: None  Non-medication interventions: Ice, Rest, PT, OT, and Distraction (TV, Music, Reading)  Constipation Prophylaxis: Milk of Magnesia and Senna-docusate    Recommendations:  IT pump with CR and scheduled doses PTA  No hydrocodone or Clonidine  recommended   Gabapentin 100 mg am. Mid day and 300 mg at hs   Topicals if he finds benefit   Follow up with pump refill 6/15/24  Prescribing at discharge:  Hospitalist  Disposition: TCU tomorrow   Pain service will sign off   Subjective:  Sitting in bed with head up  Awake alert,             <principal problem not specified>    Patient Active Problem List   Diagnosis    Inguinal hernia    SBO (small bowel obstruction) (H)    Rotator cuff tear arthropathy, right    Elevated blood pressure reading without diagnosis of hypertension    Acute bilateral low back pain without sciatica    Generalized weakness    Hyperglycemia    Ambulatory dysfunction        History   Drug Use Not on file         Tobacco Use      Smoking status: Former        Types: Pipe        Quit date: 2021        Years since quittin.9      Smokeless tobacco: None        Current Facility-Administered Medications   Medication Dose Route Frequency Provider Last Rate Last Admin    amLODIPine (NORVASC) tablet 5 mg  5 mg Oral Daily Obdulio Saleh MD   5 mg at 24 0822    carbidopa-levodopa (SINEMET)  MG per tablet 2 tablet  2 tablet Oral TID More Saldivar PA-C   2 tablet at 24 08    carboxymethylcellulose PF (REFRESH PLUS) 0.5 % ophthalmic solution 1 drop  1 drop Both Eyes TID More Saldivar PA-C   1 drop at 24    diclofenac (VOLTAREN) 1 % topical gel 2 g  2 g Topical TID Lorrie Winkler APRN CNP   2 g at 24 08    enoxaparin ANTICOAGULANT (LOVENOX) injection 40 mg  40 mg Subcutaneous Q24H Ramesh Brice MD   40 mg at 24 0821    gabapentin (NEURONTIN) capsule 100 mg  100 mg Oral BID Lorrie Winkler APRN CNP   100 mg at 24 1233    gabapentin (NEURONTIN) capsule 300 mg  300 mg Oral At Bedtime Lorrie Winkler APRN CNP   300 mg at 246    insulin aspart (NovoLOG) injection (RAPID ACTING)  1-7 Units Subcutaneous TID AC More Saldivar PA-C   2 Units at 24 08    insulin aspart (NovoLOG) injection (RAPID ACTING)  1-5 Units Subcutaneous At Bedtime More Saldivar PA-C   2 Units at 24    Lidocaine (LIDOCARE) 4 % Patch 1 patch  1 patch Transdermal Q24h Lorrie Winkler APRN CNP   1 patch at 24     "metFORMIN (GLUCOPHAGE) tablet 1,000 mg  1,000 mg Oral BID w/meals Obdulio Saleh MD   1,000 mg at 05/23/24 0821    midodrine (PROAMATINE) tablet 2.5 mg  2.5 mg Oral TID w/meals Obdulio Saleh MD   2.5 mg at 05/23/24 1233    pantoprazole (PROTONIX) EC tablet 40 mg  40 mg Oral At Bedtime Ramesh Brice MD   40 mg at 05/22/24 2117    senna-docusate (SENOKOT-S/PERICOLACE) 8.6-50 MG per tablet 1 tablet  1 tablet Oral BID Ramesh Brice MD   1 tablet at 05/23/24 0821    sertraline (ZOLOFT) tablet 100 mg  100 mg Oral QAM Ramesh Brice MD   100 mg at 05/23/24 0822       Objective:  Vital signs in last 24 hours:  B/P: 129/55, T: 98.7, P: 69, R: 16   Blood pressure 129/55, pulse 69, temperature 98.7  F (37.1  C), temperature source Oral, resp. rate 16, height 1.803 m (5' 11\"), weight 90.6 kg (199 lb 11.8 oz), SpO2 96%.      Weight:   Wt Readings from Last 3 Encounters:   05/19/24 90.6 kg (199 lb 11.8 oz)   01/24/24 87.5 kg (193 lb)   10/05/21 88.5 kg (195 lb)           Intake/Output:    Intake/Output Summary (Last 24 hours) at 5/23/2024 1314  Last data filed at 5/22/2024 1848  Gross per 24 hour   Intake 420 ml   Output --   Net 420 ml        Review of Systems:   As per subjective, all others negative.    Physical Exam:     General Appearance:  Alert, cooperative, no distress. Patient is pleasant, grooming is good.   Head:  Normocephalic, without obvious abnormality, atraumatic   Eyes:   Conjunctiva/corneas clear, EOM's intact   ENT/Throat: Lips, mouth moist    Lymph/Neck: Symmetrical, trachea midline.   Lungs:   Clear to auscultation bilaterally, respirations unlabored, even chest rise. Symmetrical movement    Chest Wall:  No tenderness or deformity   Cardiovascular/Heart:  Regular rate normotensive    Musculoskeletal: Extremities normal, atraumatic  Incision none    Skin: Skin color good, lesions  none    Neurologic: Alert and oriented X 3, Moves all 4 extremities       Psych: Affect is normal aberrant pain behaviors, " No             Imaging:  Personally Reviewed.     Results for orders placed or performed during the hospital encounter of 05/19/24   Head CT w/o contrast    Impression    IMPRESSION:  1.  No CT evidence for acute intracranial process.  2.  Mild chronic microvascular ischemic changes as above.   Chest XR,  PA & LAT    Impression    IMPRESSION: Normal size of cardiac silhouette status post median sternotomy and CABG. Low lung volumes with linear bibasilar opacities, favor atelectasis, less likely consolidation/pneumonia. No significant pleural effusion or pneumothorax. No acute bony   abnormality. Right shoulder arthroplasty noted.   Echocardiogram Complete   Result Value Ref Range    LVEF  55-60%           Lab Results:  Personally Reviewed.   Last Comprehensive Metabolic Panel:  Sodium   Date Value Ref Range Status   05/21/2024 137 135 - 145 mmol/L Final     Comment:     Reference intervals for this test were updated on 09/26/2023 to more accurately reflect our healthy population. There may be differences in the flagging of prior results with similar values performed with this method. Interpretation of those prior results can be made in the context of the updated reference intervals.    06/30/2021 139 133 - 144 mmol/L Final     Potassium   Date Value Ref Range Status   05/21/2024 3.9 3.4 - 5.3 mmol/L Final   06/30/2021 3.9 3.4 - 5.3 mmol/L Final     Chloride   Date Value Ref Range Status   05/21/2024 104 98 - 107 mmol/L Final   06/30/2021 108 94 - 109 mmol/L Final     Carbon Dioxide   Date Value Ref Range Status   06/30/2021 29 20 - 32 mmol/L Final     Carbon Dioxide (CO2)   Date Value Ref Range Status   05/21/2024 22 22 - 29 mmol/L Final     Anion Gap   Date Value Ref Range Status   05/21/2024 11 7 - 15 mmol/L Final   06/30/2021 2 (L) 3 - 14 mmol/L Final     Glucose   Date Value Ref Range Status   06/30/2021 86 70 - 99 mg/dL Final     GLUCOSE BY METER POCT   Date Value Ref Range Status   05/23/2024 192 (H) 70 - 99  "mg/dL Final     Urea Nitrogen   Date Value Ref Range Status   05/21/2024 18.0 8.0 - 23.0 mg/dL Final   06/30/2021 20 7 - 30 mg/dL Final     Creatinine   Date Value Ref Range Status   05/22/2024 0.83 0.67 - 1.17 mg/dL Final   06/30/2021 0.88 0.66 - 1.25 mg/dL Final     GFR Estimate   Date Value Ref Range Status   05/22/2024 84 >60 mL/min/1.73m2 Final   06/30/2021 78 >60 mL/min/[1.73_m2] Final     Comment:     Non  GFR Calc  Starting 12/18/2018, serum creatinine based estimated GFR (eGFR) will be   calculated using the Chronic Kidney Disease Epidemiology Collaboration   (CKD-EPI) equation.       Calcium   Date Value Ref Range Status   05/21/2024 8.7 (L) 8.8 - 10.2 mg/dL Final   06/30/2021 8.4 (L) 8.5 - 10.1 mg/dL Final        UA: No results found for: \"UAMP\", \"UBARB\", \"BENZODIAZEUR\", \"UCANN\", \"UCOC\", \"OPIT\", \"UPCP\"         Critical decision making elements:  Use of high risk medications: Yes, Ongoing monitoring for adverse effects of medications by me: Yes, Relevant labs, imaging, notes reviewed by me:  Yes  Please see A&P for additional details of medical decision making.  MANAGEMENT DISCUSSED with the following over the past 24 hours: Madhavi Lee RN    Medical complexity over the past 24 hours:  - no medication changes   25 MINUTES SPENT BY ME on the date of service doing chart review, history, exam, documentation & further activities per the note.  Communicated plan with hospitalist managing care? Yes  Communicated plan with bedside nurse?  Yes    Lorrie Winkler, APRN CNP, PGMT-BC, [facility]   Coverage Monday-Friday 8:00-4:30  No weekend coverage contact house officer    Securely message with the Voc[employer] Web Console (learn more here)                "

## 2024-05-23 NOTE — PLAN OF CARE
"[id number]    Inpatient Progress Note:  A & O X 4 with forgetfulness. Lung sounds clear bilaterally to auscultation. No wheezes, crackles, or rales auscultated. Denies chills, shortness of breath, nausea, vomit, or diarrhea. Bowel sounds present all quads and active. Prn senna-docusate  given for constipation.  Bowel movement reported this shift. Admitting DX: Generalized weakness, Syncope episode. A X 1 walker and gait belt. Patient on scheduled Sinemet . Pain is managed with scheduled Voltaren gel, Lidocaine patch, Gabapentin , and prn Tylenol-See MAR. Patient on aspiration precaution. On soft bite diet. Peripheral IV saline lock. On blood glucose monitoring and sliding scale for DM 2 management . PT recommended TCU. Referrals sent . [facility] TCU accepted patient . PT/Speech/SW following. Afebrile.     /66 (BP Location: Right arm)   Pulse 80   Temp 98.9  F (37.2  C) (Oral)   Resp 16   Ht 1.803 m (5' 11\")   Wt 90.6 kg (199 lb 11.8 oz)   SpO2 95%   BMI 27.86 kg/m       Will continue to provide supportive cares.   Leilani Harris RN       Problem: Adult Inpatient Plan of Care  Goal: Plan of Care Review  Description: The Plan of Care Review/Shift note should be completed every shift.  The Outcome Evaluation is a brief statement about your assessment that the patient is improving, declining, or no change.  This information will be displayed automatically on your shift  note.  Outcome: Progressing  Flowsheets (Taken 5/23/2024 0243)  Plan of Care Reviewed With: patient  Goal: Patient-Specific Goal (Individualized)  Description: You can add care plan individualizations to a care plan. Examples of Individualization might be:  \"Parent requests to be called daily at 9am for status\", \"I have a hard time hearing out of my right ear\", or \"Do not touch me to wake me up as it startles  me\".  Outcome: Progressing  Goal: Absence of Hospital-Acquired Illness or Injury  Outcome: Progressing  Intervention: " Identify and Manage Fall Risk  Recent Flowsheet Documentation  Taken 5/22/2024 2030 by Leilani Harris RN  Safety Promotion/Fall Prevention:   activity supervised   assistive device/personal items within reach   clutter free environment maintained   mobility aid in reach   nonskid shoes/slippers when out of bed   patient and family education   room near nurse's station   room organization consistent   safety round/check completed  Intervention: Prevent Skin Injury  Recent Flowsheet Documentation  Taken 5/22/2024 2030 by Leilani Harris RN  Body Position: position changed independently  Skin Protection: adhesive use limited  Device Skin Pressure Protection:   absorbent pad utilized/changed   adhesive use limited  Intervention: Prevent and Manage VTE (Venous Thromboembolism) Risk  Recent Flowsheet Documentation  Taken 5/22/2024 2030 by Leilani Harris RN  VTE Prevention/Management: SCDs (sequential compression devices) off  Intervention: Prevent Infection  Recent Flowsheet Documentation  Taken 5/22/2024 2030 by Leilani Harris RN  Infection Prevention:   rest/sleep promoted   equipment surfaces disinfected  Goal: Optimal Comfort and Wellbeing  Outcome: Progressing  Goal: Readiness for Transition of Care  Outcome: Progressing     Problem: Oral Intake Inadequate  Goal: Improved Oral Intake  Outcome: Progressing     Problem: Comorbidity Management  Goal: Blood Glucose Levels Within Targeted Range  Outcome: Progressing  Intervention: Monitor and Manage Glycemia  Recent Flowsheet Documentation  Taken 5/22/2024 2030 by Leilani Harris RN  Medication Review/Management: medications reviewed  Goal: Blood Pressure in Desired Range  Outcome: Progressing  Intervention: Maintain Blood Pressure Management  Recent Flowsheet Documentation  Taken 5/22/2024 2030 by Leilani Harris RN  Medication Review/Management: medications reviewed        Plan of Care Reviewed With: patient

## 2024-05-23 NOTE — PROGRESS NOTES
Care Management Note    Discharge Date: 05/24/2024     Discharge Disposition: [facility] TCU    Discharge Services: PT/OT     Discharge Transportation: [facility] transport arranged for [id number] 5/24    Private pay costs discussed: private room/amenity fees and transportation costs    Does the patient's insurance plan have a 3 day qualifying hospital stay waiver?  No    PAS Confirmation Code: [id number]   Patient/family educated on Medicare website which has current facility and service quality ratings: Yes     Education Provided on the Discharge Plan: Yes   Persons Notified of Discharge Plans: Patient, george Mauro, TIMOTHY [facility] admissions  Patient/Family in Agreement with the Plan: Yes     Handoff Referral Completed: No    Additional Information:  Plan for [facility] TCU tomorrow, 5/24. [facility] transport scheduled for tomorrow, 5/24, between [id number]. Facility updated. They may not have a private room available until early next week, but they will admit patient into a semi-prvt room and move patient into a private room as soon as one becomes available. Left VM to update george Mauro on transport time and tentative discharge plans for tomorrow. SW will continue to follow.     1350: Spoke with george Mauro via phone, [phone]. Updated on discharge plans for tomorrow.     KWADWO Subramanian, [facility]   Inpatient Care Coordination  [facility]   [phone]

## 2024-05-23 NOTE — PROGRESS NOTES
[facility]    Medicine Progress Note - Hospitalist Service    Date of Admission:  5/19/2024    Assessment & Plan   Cresencio Peguero is a 89 year old male with PMHx of Parkinson's disease, ataxia, spinal stenosis, idiopathic peripheral neuropathy, chronic pain on narcotics with multiple previous spinal surgeries with implanted spinal stimulator, type 2 diabetes mellitus, hyperlipidemia, depression, GERD, constipation, who was admitted on 5/19/2024 for generalized weakness.  Found to be orthostatic with SBP drop from 150 to 90's.   Now on midodrine  Sedation likely due to medications  Improved and now waiting for TCU    Acute Encephalopathy  Metabolic vs Toxic Encephalopathy (Improved)    - wife reports at baseline is drowsy, sleeps during the day, progressed over the 3 days PTA    - infectious workup is without cause for symptoms    -  improved with discontinuation of Klonopin    - pain management was consulted as there was concern for a change in his intrathecal pain pump however would not correlate with the timeline of his symptoms    - improvement in mental status, so resumed PTA [place]    - checking ammonia level for completion (pending)     - PT/OT/SW: going to TCU tomorrow     Orthostatic hypotension, suspect autonomic driven from Parkinson's Disease   Unwitnessed fall vs syncopal event     - had possible syncopal event while on the toilet during a bowel movement, per wife this was 2 weeks PTA.    - persistently orthostatic despite IV fluids, ACE wrapped legs and symptomatic    - continue to hold prior to admission amlodipine    - abdominal binder, continue ACE wraps until can obtain compression stockings    - previous provider spoke with Dr. Nathan who recommended starting midodrine     Parkinson's disease    - on sinemet, follows with [facility]    - stopped PTA Klonopin due to sedation and lethargy    Pulmonary Opacities on XR, concerning for atelectasis    - afebrile,  no dyspnea or cough noted    - CXR with Low lung volumes with linear bibasilar opacities, favor atelectasis    - pro greg is not elevated, no leukocytosis or fever    - IS     Chronic Pain   Hx of Multiple spinal surgeries     - follows with [facility]    - has implanted spinal stimulator    - pain Team has seen patient and placed orders     Dysphagia  GERD, Hx of esophageal stenosis  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 Crush whole pills if they're larger size due to upper esophageal stenosis from cervical spine osteophyte.     - aspiration precautions     - continue PPI      Hyperglycemia   Uncontrolled Type II DM    - last A1c was 10.9 when checked on May 3rd    - blood sugars were elevated in the ED with no acidosis, ketones or signs of DKA     - increase metformin to 1000mg BID    - will stop glargine as patient will possibly need to manage his meds when he goes home and ideally would not be on insulin (if ultimately goes to a facility where they are able to manage meds, lantus would make sense)    - change to diabetic diet    - ISS     Notes Describing Hx of Possible CAD, s/p CABG, Stent   HTN  HLD    - per med rec [facility] not appear to be on aspirin or statin any longer    - no cardiology records able to review     Idiopathic neuropathy    - resume PTA gabapentin     MDD    - resume sertraline      Spoke with wife  No labs needed in am     Diet: Room Service  Combination Diet Moderate Consistent Carb (60 g CHO per Meal) Diet;  "Soft and Bite Sized Diet (level 6); Thin Liquids (level 0)    DVT Prophylaxis: Enoxaparin (Lovenox) SQ  [place]end[place] Catheter: Not present  Lines: None     Cardiac Monitoring: None  Code Status: No CPR- Do NOT Intubate      Clinically Significant Risk Factors                        # DMII: A1C = 10.9 % (Ref range: <5.7 %) within past 6 months, PRESENT ON ADMISSION  # Overweight: Estimated body mass index is 27.86 kg/m  as calculated from the following:    Height as of this encounter: 1.803 m (5' 11\").    Weight as of this encounter: 90.6 kg (199 lb 11.8 oz)., PRESENT ON ADMISSION       # Financial/Environmental Concerns: none         Disposition Plan      Expected Discharge Date: 05/24/2024                    Obdulio Saleh MD  Hospitalist Service  [facility]  Securely message with [employer] (more info)  Text page via [facility] Paging/Directory   ______________________________________________________________________    Interval History   Patient is in the room with his wife.  He is doing well today.  No specific complaints.  No chest pain or shortness of breath.  No abdominal pain.  He had a large bowel movement this morning.    Physical Exam   Vital Signs: Temp: 99.6  F (37.6  C) Temp src: Oral BP: 131/59 Pulse: 82   Resp: 16 SpO2: 94 % O2 Device: None (Room air)    Weight: 199 lbs 11.79 oz    Constitutional: Awakens to voice, somnolent, fatigued appearing.   Respiratory:  Normal work of breathing. Diminished at bases.   Cardiovascular: Regular rate and rhythm, normal S1 and S2, and no murmur appreciated.   GI: Bowel sounds present. soft, non-distended, non-tender.   Skin/Integument: Warm, dry. no peripheral edema.       Medical Decision Making       55 MINUTES SPENT BY ME on the date of service doing chart review, history, exam, documentation & further activities per the note.      Data   ------------------------- PAST 24 HR DATA REVIEWED -----------------------------------------------[id number]}    "

## 2024-05-23 NOTE — PLAN OF CARE
Vitals: /55  Pulse 69  Temp 98.7  F  Resp 16  SpO2 96% on RA      Alert to self, place, year. Reports continued back pain - 'I'm always in pain'. Gabapentin, voltaren, lido patches, pain pump continued. Pain team following and reports patient appears more comfortable. Tolerating diet, eating approx 50% of meals. Orthostatic BP remains positive with abdominal binder, ace wraps in place but patient less symptomatic. Up with Ax1-2, GB, walker. BM on previous shift. Plan to discharge to [facility] 5/24 between 13:10-13:50.    Goal Outcome Evaluation:    Plan of Care Reviewed With: patient, spouse    Overall Patient Progress: improving    Outcome Evaluation: Plan for discharge 5/24.

## 2024-05-24 ENCOUNTER — DOCUMENTATION ONLY (OUTPATIENT)
Dept: GERIATRICS | Facility: CLINIC | Age: 89
End: 2024-05-24
Payer: MEDICARE

## 2024-05-24 ENCOUNTER — LAB REQUISITION (OUTPATIENT)
Dept: LAB | Facility: CLINIC | Age: 89
End: 2024-05-24
Payer: MEDICARE

## 2024-05-24 VITALS
TEMPERATURE: 98.7 F | RESPIRATION RATE: 16 BRPM | HEIGHT: 71 IN | WEIGHT: 199.74 LBS | OXYGEN SATURATION: 94 % | HEART RATE: 69 BPM | BODY MASS INDEX: 27.96 KG/M2 | SYSTOLIC BLOOD PRESSURE: 120 MMHG | DIASTOLIC BLOOD PRESSURE: 62 MMHG

## 2024-05-24 DIAGNOSIS — Z11.1 ENCOUNTER FOR SCREENING FOR RESPIRATORY TUBERCULOSIS: ICD-10-CM

## 2024-05-24 LAB
GLUCOSE BLDC GLUCOMTR-MCNC: 150 MG/DL (ref 70–99)
GLUCOSE BLDC GLUCOMTR-MCNC: 152 MG/DL (ref 70–99)
GLUCOSE BLDC GLUCOMTR-MCNC: 168 MG/DL (ref 70–99)

## 2024-05-24 PROCEDURE — 250N000013 HC RX MED GY IP 250 OP 250 PS 637: Performed by: PHYSICIAN ASSISTANT

## 2024-05-24 PROCEDURE — 250N000013 HC RX MED GY IP 250 OP 250 PS 637: Performed by: HOSPITALIST

## 2024-05-24 PROCEDURE — 250N000013 HC RX MED GY IP 250 OP 250 PS 637: Performed by: INTERNAL MEDICINE

## 2024-05-24 PROCEDURE — 99239 HOSP IP/OBS DSCHRG MGMT >30: CPT | Performed by: HOSPITALIST

## 2024-05-24 PROCEDURE — 250N000011 HC RX IP 250 OP 636: Performed by: INTERNAL MEDICINE

## 2024-05-24 PROCEDURE — 250N000013 HC RX MED GY IP 250 OP 250 PS 637: Performed by: NURSE PRACTITIONER

## 2024-05-24 RX ORDER — CARBOXYMETHYLCELLULOSE SODIUM 5 MG/ML
1 SOLUTION/ DROPS OPHTHALMIC 3 TIMES DAILY
DISCHARGE
Start: 2024-05-24

## 2024-05-24 RX ORDER — ACETAMINOPHEN 325 MG/1
650 TABLET ORAL EVERY 4 HOURS PRN
DISCHARGE
Start: 2024-05-24

## 2024-05-24 RX ORDER — GABAPENTIN 300 MG/1
300 CAPSULE ORAL AT BEDTIME
DISCHARGE
Start: 2024-05-24

## 2024-05-24 RX ORDER — LACTULOSE 10 G/15ML
20 SOLUTION ORAL 2 TIMES DAILY PRN
DISCHARGE
Start: 2024-05-24

## 2024-05-24 RX ORDER — MIDODRINE HYDROCHLORIDE 2.5 MG/1
2.5 TABLET ORAL
DISCHARGE
Start: 2024-05-24

## 2024-05-24 RX ORDER — ENOXAPARIN SODIUM 100 MG/ML
40 INJECTION SUBCUTANEOUS EVERY 24 HOURS
DISCHARGE
Start: 2024-05-24 | End: 2024-05-30

## 2024-05-24 RX ORDER — GABAPENTIN 100 MG/1
100 CAPSULE ORAL 2 TIMES DAILY
DISCHARGE
Start: 2024-05-24

## 2024-05-24 RX ORDER — LIDOCAINE 4 G/G
1 PATCH TOPICAL EVERY 24 HOURS
DISCHARGE
Start: 2024-05-24

## 2024-05-24 RX ORDER — AMLODIPINE BESYLATE 2.5 MG/1
5 TABLET ORAL DAILY
DISCHARGE
Start: 2024-05-24 | End: 2024-05-30

## 2024-05-24 RX ADMIN — CARBIDOPA AND LEVODOPA 2.5 MG: 50; 200 TABLET, EXTENDED RELEASE ORAL at 09:33

## 2024-05-24 RX ADMIN — GABAPENTIN 100 MG: 100 CAPSULE ORAL at 09:31

## 2024-05-24 RX ADMIN — SENNOSIDES AND DOCUSATE SODIUM 2 TABLET: 50; 8.6 TABLET ORAL at 09:29

## 2024-05-24 RX ADMIN — AMLODIPINE BESYLATE 5 MG: 5 TABLET ORAL at 09:30

## 2024-05-24 RX ADMIN — CARBIDOPA AND LEVODOPA 2 TABLET: 25; 100 TABLET ORAL at 09:28

## 2024-05-24 RX ADMIN — INSULIN ASPART 1 UNITS: 100 INJECTION, SOLUTION INTRAVENOUS; SUBCUTANEOUS at 09:46

## 2024-05-24 RX ADMIN — SERTRALINE HYDROCHLORIDE 100 MG: 100 TABLET ORAL at 09:32

## 2024-05-24 RX ADMIN — ENOXAPARIN SODIUM 40 MG: 40 INJECTION SUBCUTANEOUS at 09:31

## 2024-05-24 RX ADMIN — CARBOXYMETHYLCELLULOSE SODIUM 1 DROP: 5 SOLUTION/ DROPS OPHTHALMIC at 09:46

## 2024-05-24 RX ADMIN — METFORMIN HYDROCHLORIDE 1000 MG: 500 TABLET ORAL at 09:28

## 2024-05-24 RX ADMIN — GABAPENTIN 100 MG: 100 CAPSULE ORAL at 12:43

## 2024-05-24 RX ADMIN — INSULIN ASPART 1 UNITS: 100 INJECTION, SOLUTION INTRAVENOUS; SUBCUTANEOUS at 12:45

## 2024-05-24 RX ADMIN — ACETAMINOPHEN 650 MG: 325 TABLET, FILM COATED ORAL at 13:05

## 2024-05-24 RX ADMIN — CARBIDOPA AND LEVODOPA 2.5 MG: 50; 200 TABLET, EXTENDED RELEASE ORAL at 12:44

## 2024-05-24 ASSESSMENT — ACTIVITIES OF DAILY LIVING (ADL)
ADLS_ACUITY_SCORE: 30
ADLS_ACUITY_SCORE: 31
ADLS_ACUITY_SCORE: 37
ADLS_ACUITY_SCORE: 37
ADLS_ACUITY_SCORE: 31
ADLS_ACUITY_SCORE: 30
ADLS_ACUITY_SCORE: 31
ADLS_ACUITY_SCORE: 37
ADLS_ACUITY_SCORE: 31
ADLS_ACUITY_SCORE: 37
ADLS_ACUITY_SCORE: 37

## 2024-05-24 NOTE — PLAN OF CARE
Physical Therapy Discharge Summary    Reason for therapy discharge:    Discharged to transitional care facility.    Progress towards therapy goal(s). See goals on Care Plan in [facility] electronic health record for goal details.  Goals not met.  Barriers to achieving goals:   discharge from facility.    Therapy recommendation(s):    Continued therapy is recommended.  Rationale/Recommendations:  Recommend continued therapy at TCU to progress independence with mobility and strength.

## 2024-05-24 NOTE — DISCHARGE SUMMARY
"[facility]  Hospitalist Discharge Summary      Date of Admission:  5/19/2024  Date of Discharge:  5/24/2024  Discharging Provider: Obdulio Saleh MD  Discharge Service: Hospitalist Service    Discharge Diagnoses   Acute Toxic Encephalopathy  Orthostatic hypotension, suspect autonomic driven from Parkinson's Disease     Clinically Significant Risk Factors     # DMII: A1C = 10.9 % (Ref range: <5.7 %) within past 6 months  # Overweight: Estimated body mass index is 27.86 kg/m  as calculated from the following:    Height as of this encounter: 1.803 m (5' 11\").    Weight as of this encounter: 90.6 kg (199 lb 11.8 oz).       Follow-ups Needed After Discharge   Follow-up Appointments     Follow Up and recommended labs and tests      Follow up with retirement physician.  The following labs/tests are   recommended: follow-up on blood pressures.  Follow up with primary care provider in 2-3 weeks.  The following   labs/tests are recommended: follow-up on blood pressures.            Unresulted Labs Ordered in the Past 30 Days of this Admission       Date and Time Order Name Status Description    5/20/2024 10:12 AM Blood Culture Arm, Right Preliminary     5/20/2024 10:12 AM Blood Culture Hand, Right Preliminary         These results will be followed up by  Hospitalist group    Discharge Disposition   Discharged to rehabilitation facility  Condition at discharge: Stable    Hospital Course   Cresencio Peguero is a complicate 89 year old gentleman who lives in an assisted living place with history of anxiety, CAD s/p CABG, type 2 diabetes mellitus who was metformin was recently discontinued because of difficulty in swallowing, hypertension, hypercholesteremia, sensory ataxia, neuropathy, pain pump that was filled 2 days ago since which time patient states that he feels that his lower back pain has significantly increased 6/10 in intensity with him having shivering and shakes without increased yawning or " diarrhea.     On the day of admission while sitting on toilet seat he could not get up and in fact passed out.  He called his wife and his wife thinks that he had lost consciousness for about 1 to 2 minutes and was confused after this episode for about 5 minutes or so.  When 911 came sugar was found to be high in 400s.  He denies any chest pain or shortness of breath.  Denies any headache he has slowing and difficulty in initiating urine which is not new.  He has been constipated.  Denies any nausea or vomiting.  Denies any hematemesis , melena or fever  Found to be orthostatic with SBP drop from 150 to 90's.   Now on midodrine  Sedation likely due to medications  Improved and now waiting for TCU     Acute Encephalopathy  Metabolic vs Toxic Encephalopathy (Improved)    - wife reports at baseline is drowsy, sleeps during the day, progressed over the 3 days PTA    - infectious workup is without cause for symptoms    -  improved with discontinuation of Klonopin    - pain management was consulted as there was concern for a change in his intrathecal pain pump however would not correlate with the timeline of his symptoms    - improvement in mental status, so resumed PTA [place]    - checking ammonia level for completion (pending)     - PT/OT/SW: going to TCU tomorrow     Orthostatic hypotension, suspect autonomic driven from Parkinson's Disease   Unwitnessed fall vs syncopal event     - had possible syncopal event while on the toilet during a bowel movement, per wife this was 2 weeks PTA.    - persistently orthostatic despite IV fluids, ACE wrapped legs and symptomatic    - continue to hold prior to admission amlodipine    - abdominal binder, continue ACE wraps until can obtain compression stockings    - previous provider spoke with Dr. Nathan who recommended starting midodrine     Parkinson's disease    - on sinemet, follows with [facility]    - stopped PTA Klonopin due to sedation and lethargy      Pulmonary Opacities on XR, concerning for atelectasis    - afebrile, no dyspnea or cough noted    - CXR with Low lung volumes with linear bibasilar opacities, favor atelectasis    - pro greg is not elevated, no leukocytosis or fever    - IS     Chronic Pain   Hx of Multiple spinal surgeries     - follows with [facility]    - has implanted spinal stimulator    - pain Team has seen patient and placed orders     Dysphagia  GERD, Hx of esophageal stenosis  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 Crush whole pills if they're larger size due to upper esophageal stenosis from cervical spine osteophyte.     - aspiration precautions     - continue PPI      Hyperglycemia   Uncontrolled Type II DM    - last A1c was 10.9 when checked on May 3rd    - blood sugars were elevated in the ED with no acidosis, ketones or signs of DKA     - increase metformin to 1000mg BID    - will stop glargine as patient will possibly need to manage his meds when he goes home and ideally would not be on insulin (if ultimately goes to a facility where they are able to manage meds, lantus would make sense)    - change to diabetic diet    - ISS     Notes Describing Hx of Possible CAD, s/p CABG, Stent   HTN  HLD    - per med rec [facility] not appear to be on aspirin or statin any longer    - no cardiology records able to review     Idiopathic neuropathy    - resume PTA gabapentin     MDD    - resume sertraline      Patient is in bed. Doing well. Eating breakfast. Had  slight lightheadedness with getting OOB this am. No chest pain, sob, abdo pain, n/v.  Spoke to son and answered questions.  Consultations This Hospital Stay   PHYSICAL THERAPY ADULT IP CONSULT  OCCUPATIONAL THERAPY ADULT IP CONSULT  PAIN MANAGEMENT ADULT IP CONSULT  SPEECH LANGUAGE PATH ADULT IP CONSULT  CARE MANAGEMENT / SOCIAL WORK IP CONSULT  PHYSICAL THERAPY ADULT IP CONSULT  OCCUPATIONAL THERAPY ADULT IP CONSULT    Code Status   No CPR- Do NOT Intubate    Time Spent on this Encounter   I, Obdulio Saleh MD, personally saw the patient today and spent greater than 30 minutes discharging this patient.       Obdulio Saleh MD  [facility] OBSERVATION DEPT  [address] [place] [address]  Phone: [phone]  ______________________________________________________________________    Physical Exam   Vital Signs: Temp: 98.7  F (37.1  C) Temp src: Oral BP: 120/62 Pulse: 69   Resp: 16 SpO2: 94 % O2 Device: None (Room air)    Weight: 199 lbs 11.79 oz  Constitutional: awake, alert, cooperative, no apparent distress, and appears stated age  Eyes: Lids and lashes normal, pupils equal, round and reactive to light, extra ocular muscles intact, sclera clear, conjunctiva normal  ENT: Normocephalic, without obvious abnormality, atraumatic, sinuses nontender on palpation, external ears without lesions, oral pharynx with moist mucous membranes, tonsils without erythema or exudates, gums normal and good dentition.  Respiratory: No increased work of breathing, good air exchange, clear to auscultation bilaterally, no crackles or wheezing  Cardiovascular: Normal apical impulse, regular rate and rhythm, normal S1 and S2, no S3 or S4, and no murmur noted       Primary Care Physician   Parrish Padgett    Discharge Orders      General info for SNF    Length of Stay Estimate: Short Term Care: Estimated # of Days 31-90  Condition at Discharge: Stable  Level of care:skilled   Rehabilitation Potential:  Fair  Admission H&P remains valid and up-to-date: Yes  Recent Chemotherapy: N/A  Use Nursing Home Standing Orders: Yes     Mantoux instructions    Give two-step Mantoux (PPD) Per Facility Policy Yes     Follow Up and recommended labs and tests    Follow up with assisted physician.  The following labs/tests are recommended: follow-up on blood pressures.  Follow up with primary care provider in 2-3 weeks.  The following labs/tests are recommended: follow-up on blood pressures.     Reason for your hospital stay    Acute Encephalopathy  Likely Toxic Encephalopathy (due to medications)  Orthostatic hypotension  Falls     Activity - Up with nursing assistance     Glucose monitor nursing POCT    Before meals and at bedtime     No CPR- Do NOT Intubate     Physical Therapy Adult Consult    Evaluate and treat as clinically indicated.    Reason:  weakness, falls, Parkinsons     Occupational Therapy Adult Consult    Evaluate and treat as clinically indicated.    Reason:  weakness, parkinsons     Fall precautions     Diet    Follow this diet upon discharge: Orders Placed This Encounter      Room Service      Combination Diet Moderate Consistent Carb (60 g CHO per Meal) Diet; Soft and Bite Sized Diet (level 6); Thin Liquids (level 0)       Significant Results and Procedures   Most Recent 3 CBC's:  Recent Labs   Lab Test 05/22/24  1044 05/21/24  1125 05/20/24  0531 05/19/24  1816   WBC  --  6.2 5.0 6.9   HGB  --  10.5* 10.6* 10.5*   MCV  --  84 86 85   * 136* 138* 133*     Most Recent 3 BMP's:  Recent Labs   Lab Test 05/24/24  0930 05/24/24  0425 05/23/24  2145 05/22/24  1217 05/22/24  1044 05/21/24  1213 05/21/24  1125 05/20/24  0851 05/20/24  0531 05/20/24  0220 05/19/24  1816   NA  --   --   --   --   --   --  137  --  138  --  133*   POTASSIUM  --   --   --   --   --   --  3.9  --  3.9  --  4.3   CHLORIDE  --   --   --   --   --   --  104  --  104  --  98   CO2  --   --   --   --   --   --  22  --  25  --  25   BUN   --   --   --   --   --   --  18.0  --  26.8*  --  33.8*   CR  --   --   --   --  0.83  --  0.87  --  0.94  --  0.91   ANIONGAP  --   --   --   --   --   --  11  --  9  --  10   ISIS  --   --   --   --   --   --  8.7*  --  8.9  --  9.3   * 152* 120*   < >  --    < > 229*   < > 261*   < > 401*    < > = values in this interval not displayed.     Most Recent 2 LFT's:  Recent Labs   Lab Test 05/19/24  1816 07/07/17  0456   AST 19 20   ALT <5 22   ALKPHOS 131 111   BILITOTAL 0.3 0.5   ,   Results for orders placed or performed during the hospital encounter of 05/19/24   Head CT w/o contrast    Narrative    EXAM: CT HEAD W/O CONTRAST  LOCATION: [facility]  DATE: 5/19/2024    INDICATION: Weakness and confusion.  COMPARISON: CT 1/24/2024.  TECHNIQUE: Routine CT Head without IV contrast. Multiplanar reformats. Dose reduction techniques were used.    FINDINGS:  INTRACRANIAL CONTENTS: No intracranial hemorrhage, extraaxial collection, or mass effect.  No CT evidence of acute infarct. Mild presumed chronic small vessel ischemic changes. Mild generalized volume loss. No hydrocephalus.     VISUALIZED ORBITS/SINUSES/MASTOIDS: No intraorbital abnormality. No paranasal sinus mucosal disease. No middle ear or mastoid effusion.    BONES/SOFT TISSUES: No acute abnormality.      Impression    IMPRESSION:  1.  No CT evidence for acute intracranial process.  2.  Mild chronic microvascular ischemic changes as above.   Chest XR,  PA & LAT    Narrative    EXAM: XR CHEST 2 VIEWS  LOCATION: [facility]  DATE: 5/19/2024    INDICATION: Generalized weakness  COMPARISON: None.      Impression    IMPRESSION: Normal size of cardiac silhouette status post median sternotomy and CABG. Low lung volumes with linear bibasilar opacities, favor atelectasis, less likely consolidation/pneumonia. No significant pleural effusion or pneumothorax. No acute bony   abnormality. Right shoulder arthroplasty noted.    Echocardiogram Complete     Value    LVEF  55-60%    [facility]    [id number]  [id number]  [id number]  [id number]^HALIE^MINERVA^SCOTT     [facility]  Echocardiography Laboratory  [address] [place] [address]     Name: MARIAA GUTHRIE  MRN: [id number]  : 1935  Study Date: 2024 07:53 AM  Age: 89 yrs  Gender: Male  Patient Location: [facility]  Reason For Study: Syncope and Collapse  Ordering Physician: MINERVA AMBROSE  Performed By: Pam Tompkins     BSA: 2.1 m2  Height: 71 in  Weight: 199 lb  HR: 66  BP: 188/80 mmHg  ______________________________________________________________________________  Procedure  Complete Portable Echo Adult. Optison (NDC #2016-4750) given intravenously.  ______________________________________________________________________________  Interpretation Summary     The visual ejection fraction is 55-60%.  There is mild concentric left ventricular hypertrophy.  The left atrium is mildly dilated.  The study was technically difficult. Contrast was used without apparent  complications. There is no comparison study available.  ______________________________________________________________________________  Left Ventricle  The left ventricle is normal in size. There is mild concentric left  ventricular hypertrophy. Grade I or early diastolic dysfunction. The visual  ejection fraction is 55-60%. Septal motion is consistent with conduction  abnormality.     Right Ventricle  The right ventricle is normal in structure, function and size.     Atria  The left atrium is mildly dilated. Right atrial size is normal.     Mitral Valve  There is mild mitral annular calcification. The mitral valve leaflets appear  normal. There is no evidence of stenosis, fluttering, or prolapse.     Tricuspid Valve  The tricuspid valve is not well visualized, but is grossly normal.     Aortic Valve  There is moderate trileaflet aortic sclerosis.     Pulmonic Valve  The pulmonic valve is not well seen, but  is grossly normal.     Vessels  The aortic root is normal size. Normal size ascending aorta.     Pericardium  There is no pericardial effusion.     Rhythm  Sinus rhythm was noted.  ______________________________________________________________________________  MMode/2D Measurements & Calculations     IVSd: 1.3 cm  LVIDd: 3.6 cm  LVIDs: 2.5 cm  LVPWd: 1.1 cm  FS: 30.7 %  LV mass(C)d: 136.4 grams  LV mass(C)dI: 64.8 grams/m2  Ao root diam: 3.5 cm  asc Aorta Diam: 3.5 cm  LVOT diam: 2.2 cm  LVOT area: 3.7 cm2  Ao root diam index Ht(cm/m): 2.0  Ao root diam index BSA (cm/m2): 1.7  Asc Ao diam index BSA (cm/m2): 1.6  Asc Ao diam index Ht(cm/m): 1.9  LA Volume (BP): 70.0 ml     LA Volume Index (BP): 33.3 ml/m2  RV Base: 3.8 cm  RWT: 0.60  TAPSE: 2.5 cm     Doppler Measurements & Calculations  MV E max inder: 62.6 cm/sec  MV A max inder: 114.0 cm/sec  MV E/A: 0.55  MV max P.4 mmHg  MV mean PG: 3.0 mmHg  MV V2 VTI: 35.4 cm  MVA(VTI): 2.0 cm2  MV dec time: 0.31 sec  LV V1 max P.7 mmHg  LV V1 max: 82.7 cm/sec  LV V1 VTI: 19.0 cm  SV(LVOT): 71.2 ml  SI(LVOT): 33.8 ml/m2  PA acc time: 0.12 sec  E/E' av.8  Lateral E/e': 6.6     Medial E/e': 13.1  RV S Inder: 12.6 cm/sec     ______________________________________________________________________________  Report approved by: Emerita Perez 2024 10:32 AM             Discharge Medications   Current Discharge Medication List        START taking these medications    Details   acetaminophen (TYLENOL) 325 MG tablet Take 2 tablets (650 mg) by mouth every 4 hours as needed for mild pain or other (and adjunct with moderate or severe pain or per patient request)    Associated Diagnoses: Pain      carboxymethylcellulose PF (REFRESH PLUS) 0.5 % ophthalmic solution Place 1 drop into both eyes 3 times daily    Associated Diagnoses: Dry eyes      diclofenac (VOLTAREN) 1 % topical gel Apply 2 g topically 3 times daily    Associated Diagnoses: Pain      enoxaparin ANTICOAGULANT  (LOVENOX) 40 MG/0.4ML syringe Inject 0.4 mLs (40 mg) Subcutaneous every 24 hours for 7 days DVT prophylaxis    Associated Diagnoses: On deep vein thrombosis (DVT) prophylaxis      !! insulin aspart (NOVOLOG PEN) 100 UNIT/ML pen Inject 1-7 Units Subcutaneous 3 times daily (before meals) Correction Scale - MEDIUM INSULIN RESISTANCE DOSING     Do Not give Correction Insulin if Pre-Meal BG less than 140.   For Pre-Meal  - 189 give 1 unit.   For Pre-Meal  - 239 give 2 units.   For Pre-Meal  - 289 give 3 units.   For Pre-Meal  - 339 give 4 units.   For Pre-Meal - 389 give 5 units.   For Pre-Meal -439 give 6 units  For Pre-Meal BG greater than or equal to 440 give 7 units.   To be given with prandial insulin, and based on pre-meal blood glucose. Administering insulin within 5 minutes of the start of the meal is ideal. Administer insulin no more than 30 minutes after the start of the meal, unless directed otherwise by provider.     Notify provider if glucose greater than or equal to 350 mg/dL after administration of correction dose.    Associated Diagnoses: Other specified diabetes mellitus with other specified complication, unspecified whether long term insulin use (H)      !! insulin aspart (NOVOLOG PEN) 100 UNIT/ML pen Inject 1-5 Units Subcutaneous at bedtime MEDIUM INSULIN RESISTANCE DOSING    Do Not give Bedtime Correction Insulin if BG less than  200.   For  - 249 give 1 units.   For  - 299 give 2 units.   For  - 349 give 3 units.   For  -399 give 4 units.   For BG greater than or equal to 400 give 5 units.  Notify provider if glucose greater than or equal to 350 mg/dL after administration of correction dose.    Associated Diagnoses: Other specified diabetes mellitus with other specified complication, unspecified whether long term insulin use (H)      lactulose (CHRONULAC) 10 GM/15ML solution Take 30 mLs (20 g) by mouth 2 times daily as needed for  constipation    Associated Diagnoses: Constipation, unspecified constipation type      Lidocaine (LIDOCARE) 4 % Patch Place 1 patch onto the skin every 24 hours To prevent lidocaine toxicity, patient should be patch free for 12 hrs daily.    Associated Diagnoses: Pain      metFORMIN (GLUCOPHAGE) 1000 MG tablet Take 1 tablet (1,000 mg) by mouth 2 times daily (with meals)    Associated Diagnoses: Other specified diabetes mellitus with other specified complication, unspecified whether long term insulin use (H)      midodrine (PROAMATINE) 2.5 MG tablet Take 1 tablet (2.5 mg) by mouth 3 times daily (with meals) This medication should NOT be taken after evening meal or less than 4 hours before bedtime.  Hold for sBP >140    Associated Diagnoses: Orthostatic hypotension       !! - Potential duplicate medications found. Please discuss with provider.        CONTINUE these medications which have CHANGED    Details   amLODIPine (NORVASC) 2.5 MG tablet Take 2 tablets (5 mg) by mouth daily    Associated Diagnoses: Elevated blood pressure reading without diagnosis of hypertension      !! gabapentin (NEURONTIN) 100 MG capsule Take 1 capsule (100 mg) by mouth 2 times daily    Associated Diagnoses: Neuropathy      !! gabapentin (NEURONTIN) 300 MG capsule Take 1 capsule (300 mg) by mouth at bedtime    Associated Diagnoses: Neuropathy       !! - Potential duplicate medications found. Please discuss with provider.        CONTINUE these medications which have NOT CHANGED    Details   carbidopa-levodopa (SINEMET)  MG tablet Take 2 tablets by mouth 3 times daily      magnesium hydroxide (MILK OF MAGNESIA) 400 MG/5ML suspension Take 30 mLs by mouth daily as needed for constipation or heartburn      medication given by implanted intrathecal pump continuous   Drug # 1: Fentanyl (Sublimaze) - Conc:625 mcg/mL - Total Dose / 24 hours: 376.54 mcg    Drug # 2: Bupivacaine (Marcaine)  - Conc:25 mg/mL - Total Dose / 24 hours: 15.062  mg    Continuous rate: Fentanyl 301.52 mcg/day and Bupivacaine 12.061 mg/day  Flex Bolus dosing: 3 equal scheduled bolus doses per day containing Fentanyl 25.01 mcg and Bupivacaine 1 mg at 1100, 1600 and 2000 daily.     Diluent: NS    Infusion Rate: 0.482 mL/24 hrs  (including bolus doses the total daily rate = 0.602 ml/24 hrs)  Outside Clinic & Provider: [facility] [phone]  Last Refill Date: 05/16/2024  Next Refill Date: 06/10/2024     Pump : Corthera Synchromed II pain pump (serial # [id number])      Please consider consulting the pain team if the patient is admitted with an IT pump.      omeprazole (PRILOSEC) 20 MG DR capsule Take 20 mg by mouth at bedtime      senna-docusate (SENOKOT-S/PERICOLACE) 8.6-50 MG tablet Take 1 tablet by mouth 2 times daily      sertraline (ZOLOFT) 100 MG tablet Take 100 mg by mouth every morning           STOP taking these medications       clonazePAM (KLONOPIN) 0.5 MG tablet Comments:   Reason for Stopping:         clonazePAM (KLONOPIN) 0.5 MG tablet Comments:   Reason for Stopping:         HYDROcodone-acetaminophen (NORCO) 5-325 MG tablet Comments:   Reason for Stopping:         HYDROcodone-acetaminophen (NORCO) 5-325 MG tablet Comments:   Reason for Stopping:             Allergies   Allergies   Allergen Reactions    Percodan [Oxycodone-Aspirin] Rash

## 2024-05-24 NOTE — PLAN OF CARE
"Care from [id number]      Inpatient Progress Note:  For complete assessment see flow sheet documentation.       BP (!) 156/72 (BP Location: Left arm)   Pulse 65   Temp 98.4  F (36.9  C) (Oral)   Resp 18   Ht 1.803 m (5' 11\")   Wt 90.6 kg (199 lb 11.8 oz)   SpO2 94%   BMI 27.86 kg/m         Neuro: Intact, A&O  x3, disoriented to time. Sometimes situation as well.   Vital Signs: Stable but BP elevated   Pain/Comfort: Scheduled gabapentin, lidocaine patch, pt has refused voltaren gel.    Diet/po intake: Diabetic diet.   Output: Incontinent   Activity/Ambulation: Ax 1 walker and GB  Pertinent assessments: Continues to be confused, pain has been more manageable for patient.   Infusions: N/A  Major Shift Events: Pt walking better on this shift.   Plan (Upcoming Events): TCU 5/24 13:10- 13:50    Will continue with POC.          Will continue to monitor and provide cares.    Goal Outcome Evaluation:      Plan of Care Reviewed With: patient    Overall Patient Progress: improvingOverall Patient Progress: improving    Pt still A&O x3, walking now Ax1 with walker and GB, continues to be incontinent. Has been refusing to have meds crushed due to unpleasant taste. Aspiration precautions still in place. Discharge this afternoon.       "

## 2024-05-24 NOTE — PROGRESS NOTES
Care Management Discharge Note    Discharge Date: 05/24/2024     [facility]    Discharge Services: PT/OT     Discharge Transportation: [facility] transport arranged for [id number]    Private pay costs discussed: transportation costs    Does the patient's insurance plan have a 3 day qualifying hospital stay waiver?  No    PAS Confirmation Code:  [id number]   Patient/family educated on Medicare website which has current facility and service quality ratings: Yes     Education Provided on the Discharge Plan: Yes   Persons Notified of Discharge Plans: Patient, son, [facility] admissions  Patient/Family in Agreement with the Plan: Yes     Handoff Referral Completed: No    Additional Information:  Plan for [facility]. PAS completed. Orders faxed. [facility] transport arranged for [id number] today. Facility and family updated. SW will remain available for any further needs.     KWADWO Subramanian, [facility]   Inpatient Care Coordination  [facility]   [phone]

## 2024-05-25 LAB
BACTERIA BLD CULT: NO GROWTH
BACTERIA BLD CULT: NO GROWTH

## 2024-05-27 PROCEDURE — 86481 TB AG RESPONSE T-CELL SUSP: CPT | Performed by: INTERNAL MEDICINE

## 2024-05-28 ENCOUNTER — TRANSITIONAL CARE UNIT VISIT (OUTPATIENT)
Dept: GERIATRICS | Facility: CLINIC | Age: 89
End: 2024-05-28
Payer: MEDICARE

## 2024-05-28 VITALS
HEIGHT: 71 IN | TEMPERATURE: 97 F | WEIGHT: 189 LBS | DIASTOLIC BLOOD PRESSURE: 87 MMHG | OXYGEN SATURATION: 97 % | SYSTOLIC BLOOD PRESSURE: 132 MMHG | RESPIRATION RATE: 18 BRPM | BODY MASS INDEX: 26.46 KG/M2 | HEART RATE: 67 BPM

## 2024-05-28 DIAGNOSIS — I25.10 CORONARY ARTERY DISEASE INVOLVING NATIVE CORONARY ARTERY OF NATIVE HEART WITHOUT ANGINA PECTORIS: ICD-10-CM

## 2024-05-28 DIAGNOSIS — K21.9 CHRONIC GERD: ICD-10-CM
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
## 2024-05-28 DIAGNOSIS — G62.9 NEUROPATHY: ICD-10-CM

## 2024-05-28 DIAGNOSIS — I95.1 ORTHOSTATIC HYPOTENSION: ICD-10-CM

## 2024-05-28 DIAGNOSIS — F32.A DEPRESSION, UNSPECIFIED DEPRESSION TYPE: ICD-10-CM

## 2024-05-28 DIAGNOSIS — G93.40 ENCEPHALOPATHY ACUTE: ICD-10-CM

## 2024-05-28 DIAGNOSIS — R13.10 DYSPHAGIA, UNSPECIFIED TYPE: ICD-10-CM

## 2024-05-28 DIAGNOSIS — E11.42 TYPE 2 DIABETES MELLITUS WITH DIABETIC POLYNEUROPATHY, WITHOUT LONG-TERM CURRENT USE OF INSULIN (H): Primary | ICD-10-CM

## 2024-05-28 DIAGNOSIS — G89.29 OTHER CHRONIC PAIN: ICD-10-CM

## 2024-05-28 LAB
GAMMA INTERFERON BACKGROUND BLD IA-ACNC: 0.02 IU/ML
M TB IFN-G BLD-IMP: NEGATIVE
M TB IFN-G CD4+ BCKGRND COR BLD-ACNC: 8.69 IU/ML
MITOGEN IGNF BCKGRD COR BLD-ACNC: 0.12 IU/ML
MITOGEN IGNF BCKGRD COR BLD-ACNC: 0.22 IU/ML
QUANTIFERON MITOGEN: 8.71 IU/ML
QUANTIFERON NIL TUBE: 0.02 IU/ML
QUANTIFERON TB1 TUBE: 0.14 IU/ML
QUANTIFERON TB2 TUBE: 0.24

## 2024-05-28 PROCEDURE — 99310 SBSQ NF CARE HIGH MDM 45: CPT | Performed by: PHYSICIAN ASSISTANT

## 2024-05-28 NOTE — PROGRESS NOTES
[facility] GERIATRICS    PRIMARY CARE PROVIDER AND CLINIC:  Parrish Padgett MD, [address]  / [place] [address]  Chief Complaint   Patient presents with    Hospital F/U      [place] Medical Record Number:  [id number]  Place of Service where encounter took place:  [facility] ([facility]) [[id number]]    Cresencio Peguero  is a 89 year old  (1935), admitted to the above facility from  [facility]. Hospital stay 5/19/24 through 5/24/24..   HPI:    Notes from hospitalization reviewed including H&P pain medicine consult and D/c summary    Cresencio Peguero is an exceptionally pleasant 89-year-old male with a past medical history of Parkinson's, spinal stenosis, chronic pain, esophageal stricture, hypertension who was recently hospitalized at [facility].  Presented from his [facility] with lethargy and encephalopathy.  Noted to have hyperglycemia.  Infectious workup negative, as well as head CT.  Admitted to observation.  Had recently been initiated on Klonopin which was discontinued.  Pain medicine consulted to determine if pain medications were contributing.  Not thought to be related to fentanyl pain pump but they did recommend discontinuation of [place].  Hospital course complicated by hyperglycemia for which she was restarted on metformin.  Additionally noted to have intermittent orthostatic hypotension.  Discussed with neurology who recommended addition of midodrine.  Assessed by therapies and discharged to U     Cresencio is evaluated in his room with his wife Lamar.  Overall doing okay.  Ambulating with a walker.  He uses a four-wheel walker at baseline.  He lives at [facility] and gets med services.  Wife additionally lives at the facility but does not receive services.  They both feel he is back to baseline.  Complaining of difficulty swallowing pills.  Denies issues with food.  Currently on a soft bite diet.  Discussed we will have speech eval as well.  Blood glucoses  appropriately controlled.  Will discontinue aspirin.  Discussed medication changes made in the hospital including discontinuation of Klonopin and Norco and they are both in agreement.  He has a pain pump managed by [facility] pain [facility] that is filled with fentanyl.  Wife states he has had it for the past 3-4 months.  Overall despite discontinuation of Norco his pain has been well-controlled.  Constipation well-controlled.  Some dizziness when standing which she states is chronic.  Discussed addition of midodrine.  He was a bit concerned as his SBP's were in the 150s this morning but discussed may need to tolerate slightly higher blood pressures while he is laying down/sitting to avoid drops with ambulation.      Review of nursing home EMR: -119. Weight 189. -176    CODE STATUS/ADVANCE DIRECTIVES DISCUSSION:  No CPR- Do NOT Intubate  DNR / DNI  ALLERGIES:   Allergies   Allergen Reactions    Hydrocodone Itching     LW Reaction: Itching, Pruritis    Constipation    Constipation, Itching, Pruritis    Contrast Dye      LW CM1: >>> NO CONTRAST ADVERSE REACTION <<<     Reaction :    Food      LW FI1: nka LW FI2: nka    Percodan [Oxycodone-Aspirin] Rash      PAST MEDICAL HISTORY:   Past Medical History:   Diagnosis Date    Anxiety     Arthritis     CAD (coronary artery disease)     HX of stent,  CABG x2    Diabetes (H)     Hearing loss     Hyperlipidemia     Hypertension     Neuropathy     Sensory ataxia     Stented coronary artery       PAST SURGICAL HISTORY:   has a past surgical history that includes Herniorrhaphy inguinal (Right, 8/15/2016); Laparoscopy diagnostic (general) (N/A, 7/7/2017); Laparoscopic lysis adhesions (N/A, 7/7/2017); Cardiac surgery; Eye surgery; Eye surgery; back surgery; back surgery; orthopedic surgery; tonsillectomy; Reverse arthroplasty shoulder (Right, 8/12/2020); and Esophagoscopy, gastroscopy, duodenoscopy (EGD), combined (N/A, 5/5/2023).  FAMILY HISTORY: family history is not on  file.  SOCIAL HISTORY:   reports that he quit smoking about 2 years ago. His smoking use included pipe. He does not have any smokeless tobacco history on file. He reports current alcohol use.  Patient's living condition: lives with spouse.  Patient is an assisted.  Spouse is in assisted living    Post Discharge Medication Reconciliation Status:   MED REC REQUIRED  Post Medication Reconciliation Status: discharge medications reconciled and changed, per note/orders       Current Outpatient Medications   Medication Sig Dispense Refill    acetaminophen (TYLENOL) 325 MG tablet Take 2 tablets (650 mg) by mouth every 4 hours as needed for mild pain or other (and adjunct with moderate or severe pain or per patient request)      amLODIPine (NORVASC) 2.5 MG tablet Take 2 tablets (5 mg) by mouth daily      carbidopa-levodopa (SINEMET)  MG tablet Take 2 tablets by mouth 3 times daily      diclofenac (VOLTAREN) 1 % topical gel Apply 2 g topically 3 times daily      enoxaparin ANTICOAGULANT (LOVENOX) 40 MG/0.4ML syringe Inject 0.4 mLs (40 mg) Subcutaneous every 24 hours for 7 days DVT prophylaxis      gabapentin (NEURONTIN) 100 MG capsule Take 1 capsule (100 mg) by mouth 2 times daily      gabapentin (NEURONTIN) 300 MG capsule Take 1 capsule (300 mg) by mouth at bedtime      insulin aspart (NOVOLOG PEN) 100 UNIT/ML pen Inject 1-7 Units Subcutaneous 3 times daily (before meals) Correction Scale - MEDIUM INSULIN RESISTANCE DOSING     Do Not give Correction Insulin if Pre-Meal BG less than 140.   For Pre-Meal  - 189 give 1 unit.   For Pre-Meal  - 239 give 2 units.   For Pre-Meal  - 289 give 3 units.   For Pre-Meal  - 339 give 4 units.   For Pre-Meal - 389 give 5 units.   For Pre-Meal -439 give 6 units  For Pre-Meal BG greater than or equal to 440 give 7 units.   To be given with prandial insulin, and based on pre-meal blood glucose. Administering insulin within 5 minutes of the start of the meal  is ideal. Administer insulin no more than 30 minutes after the start of the meal, unless directed otherwise by provider.     Notify provider if glucose greater than or equal to 350 mg/dL after administration of correction dose.      insulin aspart (NOVOLOG PEN) 100 UNIT/ML pen Inject 1-5 Units Subcutaneous at bedtime MEDIUM INSULIN RESISTANCE DOSING    Do Not give Bedtime Correction Insulin if BG less than  200.   For  - 249 give 1 units.   For  - 299 give 2 units.   For  - 349 give 3 units.   For  -399 give 4 units.   For BG greater than or equal to 400 give 5 units.  Notify provider if glucose greater than or equal to 350 mg/dL after administration of correction dose.      lactulose (CHRONULAC) 10 GM/15ML solution Take 30 mLs (20 g) by mouth 2 times daily as needed for constipation      Lidocaine (LIDOCARE) 4 % Patch Place 1 patch onto the skin every 24 hours To prevent lidocaine toxicity, patient should be patch free for 12 hrs daily.      magnesium hydroxide (MILK OF MAGNESIA) 400 MG/5ML suspension Take 30 mLs by mouth daily as needed for constipation or heartburn      metFORMIN (GLUCOPHAGE) 1000 MG tablet Take 1 tablet (1,000 mg) by mouth 2 times daily (with meals)      midodrine (PROAMATINE) 2.5 MG tablet Take 1 tablet (2.5 mg) by mouth 3 times daily (with meals) This medication should NOT be taken after evening meal or less than 4 hours before bedtime.  Hold for sBP >140      omeprazole (PRILOSEC) 20 MG DR capsule Take 20 mg by mouth at bedtime      senna-docusate (SENOKOT-S/PERICOLACE) 8.6-50 MG tablet Take 1 tablet by mouth 2 times daily      sertraline (ZOLOFT) 100 MG tablet Take 100 mg by mouth every morning      carboxymethylcellulose PF (REFRESH PLUS) 0.5 % ophthalmic solution Place 1 drop into both eyes 3 times daily      medication given by implanted intrathecal pump continuous   Drug # 1: Fentanyl (Sublimaze) - Conc:625 mcg/mL - Total Dose / 24 hours: 376.54 mcg    Drug # 2:  "Bupivacaine (Marcaine)  - Conc:25 mg/mL - Total Dose / 24 hours: 15.062 mg    Continuous rate: Fentanyl 301.52 mcg/day and Bupivacaine 12.061 mg/day  Flex Bolus dosing: 3 equal scheduled bolus doses per day containing Fentanyl 25.01 mcg and Bupivacaine 1 mg at 1100, 1600 and 2000 daily.     Diluent: NS    Infusion Rate: 0.482 mL/24 hrs  (including bolus doses the total daily rate = 0.602 ml/24 hrs)  Outside Clinic & Provider: [facility] [phone]  Last Refill Date: 05/16/2024  Next Refill Date: 06/10/2024     Pump : Medtronic Synchromed II pain pump (serial # [id number])      Please consider consulting the pain team if the patient is admitted with an IT pump.       No current facility-administered medications for this visit.       ROS:  10 point ROS of systems including Constitutional, Eyes, Respiratory, Cardiovascular, Gastroenterology, Genitourinary, Integumentary, Musculoskeletal, Psychiatric were all negative except for pertinent positives noted in my HPI.    Vitals:  /87   Pulse 67   Temp 97  F (36.1  C)   Resp 18   Ht 1.803 m (5' 11\")   Wt 85.7 kg (189 lb)   SpO2 97%   BMI 26.36 kg/m    Exam:  Physical Exam  Vitals (In facility EMR) reviewed.   HENT:      Head: Normocephalic and atraumatic.   Eyes:      General: No scleral icterus.  Cardiovascular:      Rate and Rhythm: Normal rate and regular rhythm.      Heart sounds: No murmur heard.  Pulmonary:      Effort: Pulmonary effort is normal.      Breath sounds: No wheezing or rales.   Abdominal:      General: There is no distension.   Musculoskeletal:      Right lower leg: No edema.      Left lower leg: No edema.   Skin:     General: Skin is warm and dry.      Findings: No rash.   Neurological:      Mental Status: He is alert. Mental status is at baseline.   Psychiatric:         Behavior: Behavior normal.           Lab/Diagnostic data:  Recent labs in [facility] reviewed by me today.  and Most Recent 3 CBC's:  Recent Labs   Lab Test " 05/22/24  1044 05/21/24  1125 05/20/24  0531 05/19/24 1816   WBC  --  6.2 5.0 6.9   HGB  --  10.5* 10.6* 10.5*   MCV  --  84 86 85   * 136* 138* 133*     Most Recent 3 BMP's:  Recent Labs   Lab Test 05/24/24  1242 05/24/24  0930 05/24/24  0425 05/22/24  1217 05/22/24  1044 05/21/24  1213 05/21/24  1125 05/20/24  0851 05/20/24  0531 05/20/24  0220 05/19/24 1816   NA  --   --   --   --   --   --  137  --  138  --  133*   POTASSIUM  --   --   --   --   --   --  3.9  --  3.9  --  4.3   CHLORIDE  --   --   --   --   --   --  104  --  104  --  98   CO2  --   --   --   --   --   --  22  --  25  --  25   BUN  --   --   --   --   --   --  18.0  --  26.8*  --  33.8*   CR  --   --   --   --  0.83  --  0.87  --  0.94  --  0.91   ANIONGAP  --   --   --   --   --   --  11  --  9  --  10   ISIS  --   --   --   --   --   --  8.7*  --  8.9  --  9.3   * 168* 152*   < >  --    < > 229*   < > 261*   < > 401*    < > = values in this interval not displayed.     Most Recent 2 LFT's:  Recent Labs   Lab Test 05/19/24 1816 07/07/17  0456   AST 19 20   ALT <5 22   ALKPHOS 131 111   BILITOTAL 0.3 0.5     7-Day Micro Results       Collected Updated Procedure Result Status      05/27/2024 0925 05/28/2024 1242 Quantiferon TB Gold Plus Grey Tube [[id number]]   Peripheral Blood    Final result Component Value Units   Quantiferon Nil Tube 0.02 IU/mL            05/27/2024 0925 05/28/2024 1242 Quantiferon TB Gold Plus Green Tube [[id number]]   Peripheral Blood    Final result Component Value Units   Quantiferon TB1 Tube 0.14 IU/mL            05/27/2024 0925 05/28/2024 1242 Quantiferon TB Gold Plus Yellow Tube [[id number]]   Peripheral Blood    Final result Component Value   Quantiferon TB2 Tube 0.24            05/27/2024 0925 05/28/2024 1240 Quantiferon TB Gold Plus Purple Tube [[id number]]   Peripheral Blood    Final result Component Value Units   Quantiferon Mitogen 8.71 IU/mL            05/27/2024 0925 05/28/2024 1242  Quantiferon TB Gold Plus [[id number]]   Peripheral Blood    In process Component Value   No component results                  Most Recent TSH and T4:No lab results found.  Most Recent Hemoglobin A1c:  Recent Labs   Lab Test 05/03/24  0912   A1C 10.9*     Most Recent Urinalysis:  Recent Labs   Lab Test 05/19/24  1931   COLOR Light Yellow   APPEARANCE Clear   URINEGLC >=1000*   URINEBILI Negative   URINEKETONE Negative   SG 1.031   UBLD Negative   URINEPH 5.5   PROTEIN Negative   NITRITE Negative   LEUKEST Negative   RBCU <1   WBCU <1       TTE:    The visual ejection fraction is 55-60%.  There is mild concentric left ventricular hypertrophy.  The left atrium is mildly dilated.  The study was technically difficult. Contrast was used without apparent  complications. There is no comparison study available.  ______________________________________________________    ASSESSMENT/PLAN:  Acute encephalopathy: Infectious workup negative.  Thought possibly to be secondary to Klonopin which was discontinued as was Norco.  Pain medicine did not feel it was related to pain pump.  - Per patient and spouse mentation is back to baseline  - OT and social work following.  [facility]    Orthostatic hypotension: Thought to be secondary to autonomic dysfunction from Parkinson's disease.   - Continue amlodipine and low-dose midodrine.  Monitor BP.  Consider discontinuation of amlodipine which may limit need for midodrine.  Discussed with patient and spouse may need to tolerate slightly higher supine BPs to avoid orthostatic drops  - PT/OT  - Monitor orthostatics    Fall versus syncope:  - PT/OT    Parkinson's: Follows with [facility]  - Continue Sinemet, confirm timing with patient/spouse  - Klonopin which was recently started was discontinued due to sedation and lethargy    Chronic pain: Follows with [facility].  Spine stimulator placed approximately 3 months ago.  Took Norco as needed in addition to pain pump.  Norco  was discontinued in the hospital due to encephalopathy.  Patient notes adequate pain control  - Continue fentanyl pain pump  - Continue follow-up with pain clinic as outpatient    Dysphagia  GERD: Complains of difficulty swallowing pills.  Previous history of stricture.  Last EGD 8/2023 which was notable for white plaques that were biopsied but no significant stricture.  - Soft bite diet w/ thin liquids.  Spoke with speech therapy today.  She will assess and attempt to help problem solve swallowing the pills  - Continue PPI    Uncontrolled type 2 diabetes: A1c 10.9.  Metformin increased to 1000 mg twice daily and sliding scale insulin added  - Continue metformin.  Blood glucose appropriately controlled at TCU  - Discontinue sliding scale      History of CAD  Hypertension  Hyperlipidemia:  - Continue amlodipine for now  - No longer on statin or aspirin    Neuropathy:  - Continue gabapentin    Depression:  - Continue Zoloft      A total 50 min were spent on this hospital follow-up visit including review of medical record, evaluating patient discussing plan of care with both patient and wife present at bedside, coordinating with facility staff including speech therapy, writing orders and documenting.  All services performed on day of visit    This note was completed in part using Dragon voice recognition software. Although reviewed after completion, some word and grammatical errors may occur.          Electronically signed by:  Erika Zaman PA-C

## 2024-05-28 NOTE — LETTER
5/28/2024        RE: Cresencio Peguero  [address]  [facility] [address]        [facility] GERIATRICS    PRIMARY CARE PROVIDER AND CLINIC:  Parrish Padgett MD, [address]  / [place] [id number]  Chief Complaint   Patient presents with     Hospital F/U      [place] Medical Record Number:  [id number]  Place of Service where encounter took place:  [facility] ([facility]) [[id number]]    Cresencio Peguero  is a 89 year old  (1935), admitted to the above facility from  [facility]. Hospital stay 5/19/24 through 5/24/24..   HPI:    Notes from hospitalization reviewed including H&P pain medicine consult and D/c summary    Cresencio Peguero is an exceptionally pleasant 89-year-old male with a past medical history of Parkinson's, spinal stenosis, chronic pain, esophageal stricture, hypertension who was recently hospitalized at [facility].  Presented from his [facility] with lethargy and encephalopathy.  Noted to have hyperglycemia.  Infectious workup negative, as well as head CT.  Admitted to observation.  Had recently been initiated on Klonopin which was discontinued.  Pain medicine consulted to determine if pain medications were contributing.  Not thought to be related to fentanyl pain pump but they did recommend discontinuation of [place].  Hospital course complicated by hyperglycemia for which she was restarted on metformin.  Additionally noted to have intermittent orthostatic hypotension.  Discussed with neurology who recommended addition of midodrine.  Assessed by therapies and discharged to [facility]     Cresencio is evaluated in his room with his wife Lamar.  Overall doing okay.  Ambulating with a walker.  He uses a four-wheel walker at baseline.  He lives at [facility] and gets med services.  Wife additionally lives at the facility but does not receive services.  They both feel he is back to baseline.  Complaining of difficulty swallowing pills.  Denies issues with food.   Currently on a soft bite diet.  Discussed we will have speech eval as well.  Blood glucoses appropriately controlled.  Will discontinue aspirin.  Discussed medication changes made in the hospital including discontinuation of Klonopin and Norco and they are both in agreement.  He has a pain pump managed by [facility] pain clinic that is filled with fentanyl.  Wife states he has had it for the past 3-4 months.  Overall despite discontinuation of Norco his pain has been well-controlled.  Constipation well-controlled.  Some dizziness when standing which she states is chronic.  Discussed addition of midodrine.  He was a bit concerned as his SBP's were in the 150s this morning but discussed may need to tolerate slightly higher blood pressures while he is laying down/sitting to avoid drops with ambulation.      Review of nursing home EMR: -119. Weight 189. -176    CODE STATUS/ADVANCE DIRECTIVES DISCUSSION:  No CPR- Do NOT Intubate  DNR / DNI  ALLERGIES:   Allergies   Allergen Reactions     Hydrocodone Itching     LW Reaction: Itching, Pruritis    Constipation    Constipation, Itching, Pruritis     Contrast Dye      LW CM1: >>> NO CONTRAST ADVERSE REACTION <<<     Reaction :     Food      LW FI1: nka LW FI2: nka     Percodan [Oxycodone-Aspirin] Rash      PAST MEDICAL HISTORY:   Past Medical History:   Diagnosis Date     Anxiety      Arthritis      CAD (coronary artery disease)     HX of stent,  CABG x2     Diabetes (H)      Hearing loss      Hyperlipidemia      Hypertension      Neuropathy      Sensory ataxia      Stented coronary artery       PAST SURGICAL HISTORY:   has a past surgical history that includes Herniorrhaphy inguinal (Right, 8/15/2016); Laparoscopy diagnostic (general) (N/A, 7/7/2017); Laparoscopic lysis adhesions (N/A, 7/7/2017); Cardiac surgery; Eye surgery; Eye surgery; back surgery; back surgery; orthopedic surgery; tonsillectomy; Reverse arthroplasty shoulder (Right, 8/12/2020); and Esophagoscopy,  gastroscopy, duodenoscopy (EGD), combined (N/A, 5/5/2023).  FAMILY HISTORY: family history is not on file.  SOCIAL HISTORY:   reports that he quit smoking about 2 years ago. His smoking use included pipe. He does not have any smokeless tobacco history on file. He reports current alcohol use.  Patient's living condition: lives with spouse.  Patient is an NEFTALY.  Spouse is in assisted living    Post Discharge Medication Reconciliation Status:   MED REC REQUIRED  Post Medication Reconciliation Status: discharge medications reconciled and changed, per note/orders       Current Outpatient Medications   Medication Sig Dispense Refill     acetaminophen (TYLENOL) 325 MG tablet Take 2 tablets (650 mg) by mouth every 4 hours as needed for mild pain or other (and adjunct with moderate or severe pain or per patient request)       amLODIPine (NORVASC) 2.5 MG tablet Take 2 tablets (5 mg) by mouth daily       carbidopa-levodopa (SINEMET)  MG tablet Take 2 tablets by mouth 3 times daily       diclofenac (VOLTAREN) 1 % topical gel Apply 2 g topically 3 times daily       enoxaparin ANTICOAGULANT (LOVENOX) 40 MG/0.4ML syringe Inject 0.4 mLs (40 mg) Subcutaneous every 24 hours for 7 days DVT prophylaxis       gabapentin (NEURONTIN) 100 MG capsule Take 1 capsule (100 mg) by mouth 2 times daily       gabapentin (NEURONTIN) 300 MG capsule Take 1 capsule (300 mg) by mouth at bedtime       insulin aspart (NOVOLOG PEN) 100 UNIT/ML pen Inject 1-7 Units Subcutaneous 3 times daily (before meals) Correction Scale - MEDIUM INSULIN RESISTANCE DOSING     Do Not give Correction Insulin if Pre-Meal BG less than 140.   For Pre-Meal  - 189 give 1 unit.   For Pre-Meal  - 239 give 2 units.   For Pre-Meal  - 289 give 3 units.   For Pre-Meal  - 339 give 4 units.   For Pre-Meal - 389 give 5 units.   For Pre-Meal -439 give 6 units  For Pre-Meal BG greater than or equal to 440 give 7 units.   To be given with prandial  insulin, and based on pre-meal blood glucose. Administering insulin within 5 minutes of the start of the meal is ideal. Administer insulin no more than 30 minutes after the start of the meal, unless directed otherwise by provider.     Notify provider if glucose greater than or equal to 350 mg/dL after administration of correction dose.       insulin aspart (NOVOLOG PEN) 100 UNIT/ML pen Inject 1-5 Units Subcutaneous at bedtime MEDIUM INSULIN RESISTANCE DOSING    Do Not give Bedtime Correction Insulin if BG less than  200.   For  - 249 give 1 units.   For  - 299 give 2 units.   For  - 349 give 3 units.   For  -399 give 4 units.   For BG greater than or equal to 400 give 5 units.  Notify provider if glucose greater than or equal to 350 mg/dL after administration of correction dose.       lactulose (CHRONULAC) 10 GM/15ML solution Take 30 mLs (20 g) by mouth 2 times daily as needed for constipation       Lidocaine (LIDOCARE) 4 % Patch Place 1 patch onto the skin every 24 hours To prevent lidocaine toxicity, patient should be patch free for 12 hrs daily.       magnesium hydroxide (MILK OF MAGNESIA) 400 MG/5ML suspension Take 30 mLs by mouth daily as needed for constipation or heartburn       metFORMIN (GLUCOPHAGE) 1000 MG tablet Take 1 tablet (1,000 mg) by mouth 2 times daily (with meals)       midodrine (PROAMATINE) 2.5 MG tablet Take 1 tablet (2.5 mg) by mouth 3 times daily (with meals) This medication should NOT be taken after evening meal or less than 4 hours before bedtime.  Hold for sBP >140       omeprazole (PRILOSEC) 20 MG DR capsule Take 20 mg by mouth at bedtime       senna-docusate (SENOKOT-S/PERICOLACE) 8.6-50 MG tablet Take 1 tablet by mouth 2 times daily       sertraline (ZOLOFT) 100 MG tablet Take 100 mg by mouth every morning       carboxymethylcellulose PF (REFRESH PLUS) 0.5 % ophthalmic solution Place 1 drop into both eyes 3 times daily       medication given by implanted  "intrathecal pump continuous   Drug # 1: Fentanyl (Sublimaze) - Conc:625 mcg/mL - Total Dose / 24 hours: 376.54 mcg    Drug # 2: Bupivacaine (Marcaine)  - Conc:25 mg/mL - Total Dose / 24 hours: 15.062 mg    Continuous rate: Fentanyl 301.52 mcg/day and Bupivacaine 12.061 mg/day  Flex Bolus dosing: 3 equal scheduled bolus doses per day containing Fentanyl 25.01 mcg and Bupivacaine 1 mg at 1100, 1600 and 2000 daily.     Diluent: NS    Infusion Rate: 0.482 mL/24 hrs  (including bolus doses the total daily rate = 0.602 ml/24 hrs)  Outside Clinic & Provider: [facility] [phone]  Last Refill Date: 05/16/2024  Next Refill Date: 06/10/2024     Pump : Digify Synchromed II pain pump (serial # [id number])      Please consider consulting the pain team if the patient is admitted with an IT pump.       No current facility-administered medications for this visit.       ROS:  10 point ROS of systems including Constitutional, Eyes, Respiratory, Cardiovascular, Gastroenterology, Genitourinary, Integumentary, Musculoskeletal, Psychiatric were all negative except for pertinent positives noted in my HPI.    Vitals:  /87   Pulse 67   Temp 97  F (36.1  C)   Resp 18   Ht 1.803 m (5' 11\")   Wt 85.7 kg (189 lb)   SpO2 97%   BMI 26.36 kg/m    Exam:  Physical Exam  Vitals (In facility EMR) reviewed.   HENT:      Head: Normocephalic and atraumatic.   Eyes:      General: No scleral icterus.  Cardiovascular:      Rate and Rhythm: Normal rate and regular rhythm.      Heart sounds: No murmur heard.  Pulmonary:      Effort: Pulmonary effort is normal.      Breath sounds: No wheezing or rales.   Abdominal:      General: There is no distension.   Musculoskeletal:      Right lower leg: No edema.      Left lower leg: No edema.   Skin:     General: Skin is warm and dry.      Findings: No rash.   Neurological:      Mental Status: He is alert. Mental status is at baseline.   Psychiatric:         Behavior: Behavior normal. "           Lab/Diagnostic data:  Recent labs in [facility] reviewed by me today.  and Most Recent 3 CBC's:  Recent Labs   Lab Test 05/22/24  1044 05/21/24  1125 05/20/24 0531 05/19/24 1816   WBC  --  6.2 5.0 6.9   HGB  --  10.5* 10.6* 10.5*   MCV  --  84 86 85   * 136* 138* 133*     Most Recent 3 BMP's:  Recent Labs   Lab Test 05/24/24  1242 05/24/24  0930 05/24/24  0425 05/22/24  1217 05/22/24  1044 05/21/24  1213 05/21/24  1125 05/20/24  0851 05/20/24 0531 05/20/24 0220 05/19/24 1816   NA  --   --   --   --   --   --  137  --  138  --  133*   POTASSIUM  --   --   --   --   --   --  3.9  --  3.9  --  4.3   CHLORIDE  --   --   --   --   --   --  104  --  104  --  98   CO2  --   --   --   --   --   --  22  --  25  --  25   BUN  --   --   --   --   --   --  18.0  --  26.8*  --  33.8*   CR  --   --   --   --  0.83  --  0.87  --  0.94  --  0.91   ANIONGAP  --   --   --   --   --   --  11  --  9  --  10   ISIS  --   --   --   --   --   --  8.7*  --  8.9  --  9.3   * 168* 152*   < >  --    < > 229*   < > 261*   < > 401*    < > = values in this interval not displayed.     Most Recent 2 LFT's:  Recent Labs   Lab Test 05/19/24 1816 07/07/17  0456   AST 19 20   ALT <5 22   ALKPHOS 131 111   BILITOTAL 0.3 0.5     7-Day Micro Results       Collected Updated Procedure Result Status      05/27/2024 0925 05/28/2024 1242 Quantiferon TB Gold Plus Grey Tube [[id number]]   Peripheral Blood    Final result Component Value Units   Quantiferon Nil Tube 0.02 IU/mL            05/27/2024 0925 05/28/2024 1242 Quantiferon TB Gold Plus Green Tube [[id number]]   Peripheral Blood    Final result Component Value Units   Quantiferon TB1 Tube 0.14 IU/mL            05/27/2024 0925 05/28/2024 1242 Quantiferon TB Gold Plus Yellow Tube [[id number]]   Peripheral Blood    Final result Component Value   Quantiferon TB2 Tube 0.24            05/27/2024 0925 05/28/2024 1240 Quantiferon TB Gold Plus Purple Tube [[id number]]   Peripheral  Blood    Final result Component Value Units   Quantiferon Mitogen 8.71 IU/mL            05/27/2024 0925 05/28/2024 1242 Quantiferon TB Gold Plus [[id number]]   Peripheral Blood    In process Component Value   No component results                  Most Recent TSH and T4:No lab results found.  Most Recent Hemoglobin A1c:  Recent Labs   Lab Test 05/03/24  0912   A1C 10.9*     Most Recent Urinalysis:  Recent Labs   Lab Test 05/19/24  1931   COLOR Light Yellow   APPEARANCE Clear   URINEGLC >=1000*   URINEBILI Negative   URINEKETONE Negative   SG 1.031   UBLD Negative   URINEPH 5.5   PROTEIN Negative   NITRITE Negative   LEUKEST Negative   RBCU <1   WBCU <1       TTE:    The visual ejection fraction is 55-60%.  There is mild concentric left ventricular hypertrophy.  The left atrium is mildly dilated.  The study was technically difficult. Contrast was used without apparent  complications. There is no comparison study available.  ______________________________________________________    ASSESSMENT/PLAN:  Acute encephalopathy: Infectious workup negative.  Thought possibly to be secondary to Klonopin which was discontinued as was Norco.  Pain medicine did not feel it was related to pain pump.  - Per patient and spouse mentation is back to baseline  - OT and social work following.  Lives at [facility]    Orthostatic hypotension: Thought to be secondary to autonomic dysfunction from Parkinson's disease.   - Continue amlodipine and low-dose midodrine.  Monitor BP.  Consider discontinuation of amlodipine which may limit need for midodrine.  Discussed with patient and spouse may need to tolerate slightly higher supine BPs to avoid orthostatic drops  - PT/OT  - Monitor orthostatics    Fall versus syncope:  - PT/OT    Parkinson's: Follows with [place] [facility]  - Continue Sinemet, confirm timing with patient/spouse  - Klonopin which was recently started was discontinued due to sedation and lethargy    Chronic pain: Follows  with [facility].  Spine stimulator placed approximately 3 months ago.  Took Norco as needed in addition to pain pump.  Norco was discontinued in the hospital due to encephalopathy.  Patient notes adequate pain control  - Continue fentanyl pain pump  - Continue follow-up with pain clinic as outpatient    Dysphagia  GERD: Complains of difficulty swallowing pills.  Previous history of stricture.  Last EGD 8/2023 which was notable for white plaques that were biopsied but no significant stricture.  - Soft bite diet w/ thin liquids.  Spoke with speech therapy today.  She will assess and attempt to help problem solve swallowing the pills  - Continue PPI    Uncontrolled type 2 diabetes: A1c 10.9.  Metformin increased to 1000 mg twice daily and sliding scale insulin added  - Continue metformin.  Blood glucose appropriately controlled at TCU  - Discontinue sliding scale      History of CAD  Hypertension  Hyperlipidemia:  - Continue amlodipine for now  - No longer on statin or aspirin    Neuropathy:  - Continue gabapentin    Depression:  - Continue Zoloft      A total 50 min were spent on this hospital follow-up visit including review of medical record, evaluating patient discussing plan of care with both patient and wife present at bedside, coordinating with facility staff including speech therapy, writing orders and documenting.  All services performed on day of visit    This note was completed in part using Dragon voice recognition software. Although reviewed after completion, some word and grammatical errors may occur.          Electronically signed by:  Erika Zaman PA-C                    Sincerely,        Erika Zaman PA-C

## 2024-05-30 ENCOUNTER — TRANSITIONAL CARE UNIT VISIT (OUTPATIENT)
Dept: GERIATRICS | Facility: CLINIC | Age: 89
End: 2024-05-30
Payer: MEDICARE

## 2024-05-30 VITALS
RESPIRATION RATE: 18 BRPM | SYSTOLIC BLOOD PRESSURE: 126 MMHG | BODY MASS INDEX: 26.15 KG/M2 | TEMPERATURE: 98.2 F | WEIGHT: 186.8 LBS | HEART RATE: 79 BPM | OXYGEN SATURATION: 96 % | HEIGHT: 71 IN | DIASTOLIC BLOOD PRESSURE: 70 MMHG

## 2024-05-30 DIAGNOSIS — G93.40 ENCEPHALOPATHY ACUTE: ICD-10-CM

## 2024-05-30 DIAGNOSIS — R13.10 DYSPHAGIA, UNSPECIFIED TYPE: Primary | ICD-10-CM

## 2024-05-30 DIAGNOSIS — I95.1 ORTHOSTATIC HYPOTENSION: ICD-10-CM

## 2024-05-30 DIAGNOSIS — E11.42 TYPE 2 DIABETES MELLITUS WITH DIABETIC POLYNEUROPATHY, WITHOUT LONG-TERM CURRENT USE OF INSULIN (H): ICD-10-CM

## 2024-05-30 PROCEDURE — 99309 SBSQ NF CARE MODERATE MDM 30: CPT | Performed by: PHYSICIAN ASSISTANT

## 2024-05-30 NOTE — PROGRESS NOTES
"  Chief Complaint   Patient presents with    Nursing Home Acute       HPI:  Cresencio Peguero is a 89 year old  (1935), who is being seen today for an episodic care visit at: [facility] ([facility]) [[id number]].     Brief summary: Cresencio Peguero is an exceptionally pleasant 89-year-old male with a past medical history of Parkinson's, spinal stenosis, chronic pain, esophageal stricture, hypertension who was recently hospitalized at [facility].  Presented from his [facility] with lethargy and encephalopathy.  Noted to have hyperglycemia.  Infectious workup negative, as well as head CT.  Admitted to observation.  Had recently been initiated on Klonopin which was discontinued.  Pain medicine consulted to determine if pain medications were contributing.  Not thought to be related to fentanyl pain pump but they did recommend discontinuation of [place].  Hospital course complicated by hyperglycemia for which she was restarted on metformin.  Additionally noted to have intermittent orthostatic hypotension.  Discussed with neurology who recommended addition of midodrine.  Assessed by therapies and discharged to [facility]    Today's concern is: Spoke with speech. Taking pills crushed in applesauce well. Does c/o dry mouth. Spoke with RN. Still working on obtaining orthostatics    Cresencio is evaluated after working with OT. Still c/o dizziness. Pain well controlled. Agrees swallowing is going well. No constipation.      Review of nursing home EMR:-132, Wt 186.8, -149      Allergies, and PMH/PSH reviewed in [employer] today.    REVIEW OF SYSTEMS:  4 point ROS including Respiratory, CV, GI and , other than that noted in the HPI,  is negative    Objective:   /70   Pulse 79   Temp 98.2  F (36.8  C)   Resp 18   Ht 1.803 m (5' 11\")   Wt 84.7 kg (186 lb 12.8 oz)   SpO2 96%   BMI 26.05 kg/m      Physical Exam  Vitals (In facility EMR) reviewed.   HENT:      Head: Normocephalic and atraumatic.   Eyes:      " General: No scleral icterus.  Cardiovascular:      Rate and Rhythm: Normal rate and regular rhythm.   Pulmonary:      Effort: Pulmonary effort is normal.   Abdominal:      General: There is no distension.   Musculoskeletal:      Right lower leg: No edema.      Left lower leg: No edema.   Skin:     General: Skin is warm and dry.      Findings: No rash.   Neurological:      Mental Status: He is alert. Mental status is at baseline.   Psychiatric:         Behavior: Behavior normal.          MED REC REQUIRED  Post Medication Reconciliation Status: discharge medications reconciled and changed, per note/orders      Recent labs in [facility] reviewed by me today.     Assessment/Plan:  Acute encephalopathy: Infectious workup negative.  Thought possibly to be secondary to Klonopin which was discontinued as was Norco.  Pain medicine did not feel it was related to pain pump.  - Per patient and spouse mentation is back to baseline  - OT and social work following. CPT 3.3/5.6    Orthostatic hypotension: Thought to be secondary to autonomic dysfunction from Parkinson's disease.  Midodrine added during hospitalization  - No significant supine hypertension. D/c amlodipine  - Continue low dose midodrine  - Still working on obtaining Orthostatics      Fall versus syncope:  - PT/OT    Parkinson's: Follows with [place] [facility]  - Continue Sinemet,  - Klonopin which was recently started was discontinued during hospitalization due to sedation and lethargy    Chronic pain: Follows with [facility].  Spine stimulator placed approximately 3 months ago.  Took Norco as needed in addition to pain pump.  Norco was discontinued in the hospital due to encephalopathy.  Patient notes adequate pain control  - Continue fentanyl pain pump  - Continue follow-up with pain clinic as outpatient    Dysphagia  GERD:   Previous history of stricture.  Last EGD 8/2023 which was notable for white plaques that were biopsied but no significant stricture.  -  Soft bite diet w/ thin liquids.  Speech following. Tolerating pills crushed in applesauce  -Add biotene spray due to c/o dry mouth  - Continue PPI    Uncontrolled type 2 diabetes: A1c 10.9.  Metformin increased to 1000 mg twice daily and sliding scale insulin added  - Continue metformin.  Blood glucose appropriately controlled at TCU  - SSI d/c at TCU      History of CAD  Hypertension  Hyperlipidemia:  -D/c amlodipine as above  - No longer on statin or aspirin    Neuropathy:  - Continue gabapentin    Depression:  - Continue Zoloft      Orders:  As above      Electronically signed by: Erika Zaman PA-C

## 2024-05-30 NOTE — LETTER
"    5/30/2024        RE: Cresencio Peguero  [address]  [facility] [address]          Chief Complaint   Patient presents with     Nursing Home Acute       HPI:  Cresencio Peguero is a 89 year old  (1935), who is being seen today for an episodic care visit at: [facility] ([facility]) [[id number]].     Brief summary: Cresencio Peguero is an exceptionally pleasant 89-year-old male with a past medical history of Parkinson's, spinal stenosis, chronic pain, esophageal stricture, hypertension who was recently hospitalized at [facility].  Presented from his [facility] with lethargy and encephalopathy.  Noted to have hyperglycemia.  Infectious workup negative, as well as head CT.  Admitted to observation.  Had recently been initiated on Klonopin which was discontinued.  Pain medicine consulted to determine if pain medications were contributing.  Not thought to be related to fentanyl pain pump but they did recommend discontinuation of [place].  Hospital course complicated by hyperglycemia for which she was restarted on metformin.  Additionally noted to have intermittent orthostatic hypotension.  Discussed with neurology who recommended addition of midodrine.  Assessed by therapies and discharged to [facility]    Today's concern is: Spoke with speech. Taking pills crushed in applesauce well. Does c/o dry mouth. Spoke with RN. Still working on obtaining orthostatics    Cresencio is evaluated after working with OT. Still c/o dizziness. Pain well controlled. Agrees swallowing is going well. No constipation.      Review of nursing home EMR:-132, Wt 186.8, -149      Allergies, and PMH/PSH reviewed in [employer] today.    REVIEW OF SYSTEMS:  4 point ROS including Respiratory, CV, GI and , other than that noted in the HPI,  is negative    Objective:   /70   Pulse 79   Temp 98.2  F (36.8  C)   Resp 18   Ht 1.803 m (5' 11\")   Wt 84.7 kg (186 lb 12.8 oz)   SpO2 96%   BMI 26.05 kg/m      Physical Exam  Vitals " (In facility EMR) reviewed.   HENT:      Head: Normocephalic and atraumatic.   Eyes:      General: No scleral icterus.  Cardiovascular:      Rate and Rhythm: Normal rate and regular rhythm.   Pulmonary:      Effort: Pulmonary effort is normal.   Abdominal:      General: There is no distension.   Musculoskeletal:      Right lower leg: No edema.      Left lower leg: No edema.   Skin:     General: Skin is warm and dry.      Findings: No rash.   Neurological:      Mental Status: He is alert. Mental status is at baseline.   Psychiatric:         Behavior: Behavior normal.          MED REC REQUIRED  Post Medication Reconciliation Status: discharge medications reconciled and changed, per note/orders      Recent labs in [facility] reviewed by me today.     Assessment/Plan:  Acute encephalopathy: Infectious workup negative.  Thought possibly to be secondary to Klonopin which was discontinued as was Norco.  Pain medicine did not feel it was related to pain pump.  - Per patient and spouse mentation is back to baseline  - OT and social work following. CPT 3.3/5.6    Orthostatic hypotension: Thought to be secondary to autonomic dysfunction from Parkinson's disease.  Midodrine added during hospitalization  - No significant supine hypertension. D/c amlodipine  - Continue low dose midodrine  - Still working on obtaining Orthostatics      Fall versus syncope:  - PT/OT    Parkinson's: Follows with [place] [facility]  - Continue Sinemet,  - Klonopin which was recently started was discontinued during hospitalization due to sedation and lethargy    Chronic pain: Follows with [facility].  Spine stimulator placed approximately 3 months ago.  Took Norco as needed in addition to pain pump.  Norco was discontinued in the hospital due to encephalopathy.  Patient notes adequate pain control  - Continue fentanyl pain pump  - Continue follow-up with pain clinic as outpatient    Dysphagia  GERD:   Previous history of stricture.  Last EGD 8/2023  which was notable for white plaques that were biopsied but no significant stricture.  - Soft bite diet w/ thin liquids.  Speech following. Tolerating pills crushed in applesauce  -Add biotene spray due to c/o dry mouth  - Continue PPI    Uncontrolled type 2 diabetes: A1c 10.9.  Metformin increased to 1000 mg twice daily and sliding scale insulin added  - Continue metformin.  Blood glucose appropriately controlled at TCU  - SSI d/c at TCU      History of CAD  Hypertension  Hyperlipidemia:  -D/c amlodipine as above  - No longer on statin or aspirin    Neuropathy:  - Continue gabapentin    Depression:  - Continue Zoloft      Orders:  As above      Electronically signed by: Erika Zaman PA-C       Sincerely,        Erika Zaman PA-C

## 2024-06-05 ENCOUNTER — DISCHARGE SUMMARY NURSING HOME (OUTPATIENT)
Dept: GERIATRICS | Facility: CLINIC | Age: 89
End: 2024-06-05
Payer: MEDICARE

## 2024-06-05 VITALS
HEART RATE: 73 BPM | DIASTOLIC BLOOD PRESSURE: 76 MMHG | RESPIRATION RATE: 18 BRPM | SYSTOLIC BLOOD PRESSURE: 165 MMHG | HEIGHT: 71 IN | BODY MASS INDEX: 26.15 KG/M2 | WEIGHT: 186.8 LBS | TEMPERATURE: 98.3 F | OXYGEN SATURATION: 96 %

## 2024-06-05 DIAGNOSIS — E11.42 TYPE 2 DIABETES MELLITUS WITH DIABETIC POLYNEUROPATHY, WITHOUT LONG-TERM CURRENT USE OF INSULIN (H): ICD-10-CM

## 2024-06-05 DIAGNOSIS — R13.10 DYSPHAGIA, UNSPECIFIED TYPE: ICD-10-CM

## 2024-06-05 DIAGNOSIS — I95.1 ORTHOSTATIC HYPOTENSION: ICD-10-CM

## 2024-06-05 DIAGNOSIS — K21.9 CHRONIC GERD: ICD-10-CM

## 2024-06-05 DIAGNOSIS — G62.9 NEUROPATHY: ICD-10-CM

## 2024-06-05 DIAGNOSIS — G93.40 ENCEPHALOPATHY ACUTE: Primary | ICD-10-CM

## 2024-06-05 DIAGNOSIS — R41.89 COGNITIVE IMPAIRMENT: ICD-10-CM

## 2024-06-05 PROCEDURE — 99316 NF DSCHRG MGMT 30 MIN+: CPT | Performed by: PHYSICIAN ASSISTANT

## 2024-06-05 NOTE — LETTER
6/5/2024      Cresencio Peguero  [address]  [place] [address]        [facility] GERIATRICS DISCHARGE SUMMARY  PATIENT'S NAME: Cresencio Peguero  YOB: 1935  MEDICAL RECORD NUMBER:  [id number]  Place of Service where encounter took place:  [facility] [[id number]]    PRIMARY CARE PROVIDER AND CLINIC RESPONSIBLE AFTER TRANSFER:   Parrish Padgett MD, [address] [address] [facility] [address]    [facility] Provider     Transferring providers: Erika Zaman PA-C, Dr. Milagro Kellogg MD  Recent Hospitalization/ED:  [facility] Hospital stay 5/19/24 to 5/24/24.  Date of SNF Admission: May 24, 2024  Date of SNF (anticipated) Discharge: June 06, 2024  Discharged to: previous independent home  Cognitive Scores: CPT: 3.3/5.6  Physical Function: Ambulating 330 ft with FWW and SBA  DME: No new DME needed    CODE STATUS/ADVANCE DIRECTIVES DISCUSSION:  No CPR- Do NOT Intubate   ALLERGIES: Hydrocodone, Contrast dye, Food, and Percodan [oxycodone-aspirin]    NURSING FACILITY COURSE   Summary of nursing facility stay:   Acute encephalopathy  Cognitive Impairment: Infectious workup negative.  Thought possibly to be secondary to Klonopin which was discontinued as was Norco.  Pain medicine did not feel it was related to pain pump.  - Per patient and spouse mentation is back to baseline  - Cognition scores at TCU with CPT 3.3/5.6.  - Patient discharging back to [facility] with wife and home care    Orthostatic hypotension: Thought to be secondary to autonomic dysfunction from Parkinson's disease.  Midodrine added during hospitalization  -Despite addition of midodrine continued orthostatic BP changes at TCU.  Amlodipine subsequently discontinued.  SBP is variable.  Can be as high as 160.  Ultimately, likely benefit and excepting slightly elevated supine hypertension to avoid falls in the setting of orthostatic hypotension  - Continue off amlodipine and continue low-dose  midodrine 3 times daily    Fall versus syncope:  - PT/OT    Parkinson's: Follows with [place] [facility] of neurology  - Continue Sinemet,  - Klonopin which was recently started was discontinued during hospitalization due to sedation and lethargy    Chronic pain: Follows with [facility].  Spine stimulator placed approximately 3 months ago.  Took Norco as needed in addition to pain pump.  Norco was discontinued in the hospital due to encephalopathy.  Patient notes adequate pain control  - Continue fentanyl pain pump  - Continue follow-up with pain clinic as outpatient    Dysphagia  GERD:   Previous history of stricture.  Last EGD 8/2023 which was notable for white plaques that were biopsied but no significant stricture.  - Soft bite diet w/ thin liquids.  Speech following.  Taking meds whole with phazix  - Continue PPI  - Home ST    Uncontrolled type 2 diabetes: A1c 10.9.  Metformin increased to 1000 mg twice daily and sliding scale insulin added  - Continue metformin.  Blood glucose appropriately controlled at TCU    History of CAD  Hypertension  Hyperlipidemia:  -D/c amlodipine as above  - No longer on statin or aspirin    Neuropathy:  - Continue gabapentin    Depression:  - Continue Zoloft    Discharge Medications:  MED REC REQUIRED  Post Medication Reconciliation Status: discharge medications reconciled and changed, per note/orders    Current Outpatient Medications   Medication Sig Dispense Refill     acetaminophen (TYLENOL) 325 MG tablet Take 2 tablets (650 mg) by mouth every 4 hours as needed for mild pain or other (and adjunct with moderate or severe pain or per patient request)       artificial saliva (BIOTENE MT) AERS spray Take 1 spray by mouth every 2 hours as needed for dry mouth       carbidopa-levodopa (SINEMET)  MG tablet Take 2 tablets by mouth 3 times daily       carboxymethylcellulose PF (REFRESH PLUS) 0.5 % ophthalmic solution Place 1 drop into both eyes 3 times daily       diclofenac (VOLTAREN) 1  % topical gel Apply 2 g topically 3 times daily       gabapentin (NEURONTIN) 100 MG capsule Take 1 capsule (100 mg) by mouth 2 times daily       gabapentin (NEURONTIN) 300 MG capsule Take 1 capsule (300 mg) by mouth at bedtime       lactulose (CHRONULAC) 10 GM/15ML solution Take 30 mLs (20 g) by mouth 2 times daily as needed for constipation       Lidocaine (LIDOCARE) 4 % Patch Place 1 patch onto the skin every 24 hours To prevent lidocaine toxicity, patient should be patch free for 12 hrs daily.       magnesium hydroxide (MILK OF MAGNESIA) 400 MG/5ML suspension Take 30 mLs by mouth daily as needed for constipation or heartburn       medication given by implanted intrathecal pump continuous   Drug # 1: Fentanyl (Sublimaze) - Conc:625 mcg/mL - Total Dose / 24 hours: 376.54 mcg    Drug # 2: Bupivacaine (Marcaine)  - Conc:25 mg/mL - Total Dose / 24 hours: 15.062 mg    Continuous rate: Fentanyl 301.52 mcg/day and Bupivacaine 12.061 mg/day  Flex Bolus dosing: 3 equal scheduled bolus doses per day containing Fentanyl 25.01 mcg and Bupivacaine 1 mg at 1100, 1600 and 2000 daily.     Diluent: NS    Infusion Rate: 0.482 mL/24 hrs  (including bolus doses the total daily rate = 0.602 ml/24 hrs)  Outside Clinic & Provider: [facility] [phone]  Last Refill Date: 05/16/2024  Next Refill Date: 06/10/2024     Pump : Medtronic Synchromed II pain pump (serial # [id number])      Please consider consulting the pain team if the patient is admitted with an IT pump.       metFORMIN (GLUCOPHAGE) 1000 MG tablet Take 1 tablet (1,000 mg) by mouth 2 times daily (with meals)       midodrine (PROAMATINE) 2.5 MG tablet Take 1 tablet (2.5 mg) by mouth 3 times daily (with meals) This medication should NOT be taken after evening meal or less than 4 hours before bedtime.  Hold for sBP >140       omeprazole (PRILOSEC) 20 MG DR capsule Take 20 mg by mouth at bedtime       senna-docusate (SENOKOT-S/PERICOLACE) 8.6-50 MG tablet Take 1  "tablet by mouth 2 times daily       sertraline (ZOLOFT) 100 MG tablet Take 100 mg by mouth every morning          Controlled medications:   not applicable/none     Past Medical History:   Past Medical History:   Diagnosis Date     Anxiety      Arthritis      CAD (coronary artery disease)     HX of stent,  CABG x2     Diabetes (H)      Hearing loss      Hyperlipidemia      Hypertension      Neuropathy      Sensory ataxia      Stented coronary artery      Physical Exam:   Vitals: BP (!) 165/76   Pulse 73   Temp 98.3  F (36.8  C)   Resp 18   Ht 1.803 m (5' 11\")   Wt 84.7 kg (186 lb 12.8 oz)   SpO2 96%   BMI 26.05 kg/m    BMI: Body mass index is 26.05 kg/m .  Physical Exam  Vitals (In facility EMR) reviewed.   HENT:      Head: Normocephalic and atraumatic.   Eyes:      General: No scleral icterus.  Cardiovascular:      Rate and Rhythm: Normal rate and regular rhythm.   Pulmonary:      Effort: Pulmonary effort is normal.   Abdominal:      General: There is no distension.   Musculoskeletal:      Right lower leg: No edema.      Left lower leg: No edema.   Skin:     General: Skin is warm and dry.      Findings: No rash.   Neurological:      Mental Status: He is alert. Mental status is at baseline.   Psychiatric:         Behavior: Behavior normal.         SNF labs: Recent labs in [facility] reviewed by me today.  and Most Recent 3 CBC's:  Recent Labs   Lab Test 05/22/24  1044 05/21/24  1125 05/20/24  0531 05/19/24  1816   WBC  --  6.2 5.0 6.9   HGB  --  10.5* 10.6* 10.5*   MCV  --  84 86 85   * 136* 138* 133*     Most Recent 3 BMP's:  Recent Labs   Lab Test 05/24/24  1242 05/24/24  0930 05/24/24  0425 05/22/24  1217 05/22/24  1044 05/21/24  1213 05/21/24  1125 05/20/24  0851 05/20/24  0531 05/20/24  0220 05/19/24  1816   NA  --   --   --   --   --   --  137  --  138  --  133*   POTASSIUM  --   --   --   --   --   --  3.9  --  3.9  --  4.3   CHLORIDE  --   --   --   --   --   --  104  --  104  --  98   CO2  --   --   " --   --   --   --  22  --  25  --  25   BUN  --   --   --   --   --   --  18.0  --  26.8*  --  33.8*   CR  --   --   --   --  0.83  --  0.87  --  0.94  --  0.91   ANIONGAP  --   --   --   --   --   --  11  --  9  --  10   ISIS  --   --   --   --   --   --  8.7*  --  8.9  --  9.3   * 168* 152*   < >  --    < > 229*   < > 261*   < > 401*    < > = values in this interval not displayed.     Most Recent 2 LFT's:  Recent Labs   Lab Test 05/19/24  1816 07/07/17  0456   AST 19 20   ALT <5 22   ALKPHOS 131 111   BILITOTAL 0.3 0.5       DISCHARGE PLAN:  Follow up labs: No labs orders/due  Medical Follow Up:      Follow up with primary care provider in 1-2 weeks   Continue routine follow-up with pain clinic   Continue routine follow-up with neurology  [facility] scheduled appointments:  Appointments in Next Year    No future appts.    Discharge Services: Home Care:  Occupational Therapy, Physical Therapy, and Speech Therapy   Discharge Instructions Verbalized to Patient at Discharge:   Continue to follow your diet:  Consistent Carb, Easy to Chew Special Instructions: No bread, add extra gravy/sauce to meal, continue dietary specifics..   24-hour supervision is recommended for safety.     TOTAL DISCHARGE TIME:   Greater than 30 minutes  Electronically signed by:  Erika Zaman PA-C     Documentation of Face to Face and Certification for Home Health Services    I certify that services are/were furnished while this patient was under the care of a physician and that a physician or an allowed non-physician practitioner (NPP), had a face-to-face encounter that meets the physician face-to-face encounter requirements. The encounter was in whole, or in part, related to the primary reason for home health. The patient is confined to his/her home and needs intermittent skilled nursing, physical therapy, speech-language pathology, or the continued need for occupational therapy. A plan of care has been established by a  physician and is periodically reviewed by a physician.  Date of Face-to-Face Encounter: 6/5/2024.    I certify that, based on my findings, the following services are medically necessary home health services: Nursing, Occupational Therapy, and Speech Language Therapy.    My clinical findings support the need for the above skilled services because: Requires assistance of another person or specialized equipment to access medical services because patient: Requires supervision of another for safe transfer...    Patient to re-establish plan of care with their PCP within 7-10 days after leaving the facility to reestablish care.  Medicare certified [facility] provider: Erika Zaman PA-C  Date: June 5, 2024                 Sincerely,        Erika Zaman PA-C

## 2024-06-05 NOTE — PROGRESS NOTES
[facility] GERIATRICS DISCHARGE SUMMARY  PATIENT'S NAME: Cresencio Peguero  YOB: 1935  MEDICAL RECORD NUMBER:  [id number]  Place of Service where encounter took place:  [facility] ([facility]) [[id number]]    PRIMARY CARE PROVIDER AND CLINIC RESPONSIBLE AFTER TRANSFER:   Parrish Padgett MD, [address] [facility] / [place] [address]    [facility] [place]     Transferring providers: Erika Zaman PA-C, Dr. Milagro Kellogg MD  Recent Hospitalization/ED:  [facility] stay 5/19/24 to 5/24/24.  Date of SNF Admission: May 24, 2024  Date of SNF (anticipated) Discharge: June 06, 2024  Discharged to: previous [facility]  Cognitive Scores: CPT: 3.3/5.6  Physical Function: Ambulating 330 ft with FWW and SBA  DME: No new DME needed    CODE STATUS/ADVANCE DIRECTIVES DISCUSSION:  No CPR- Do NOT Intubate   ALLERGIES: Hydrocodone, Contrast dye, Food, and Percodan [oxycodone-aspirin]    NURSING FACILITY COURSE   Summary of nursing facility stay:    Cresencio Peguero is an exceptionally pleasant 89-year-old male with a past medical history of Parkinson's, spinal stenosis, chronic pain, esophageal stricture, hypertension who was recently hospitalized at [facility].  Presented from his [facility] with lethargy and encephalopathy.  Noted to have hyperglycemia.  Infectious workup negative, as well as head CT.  Admitted to observation.  Had recently been initiated on Klonopin which was discontinued.  Pain medicine consulted to determine if pain medications were contributing.  Not thought to be related to fentanyl pain pump but they did recommend discontinuation of [place].  Hospital course complicated by hyperglycemia for which she was restarted on metformin.  Additionally noted to have intermittent orthostatic hypotension.  Discussed with neurology who recommended addition of midodrine.  Assessed by therapies and discharged to TCU    Cresencio is evaluated today.  BP has continued to be a bit  labile with discontinuation of amlodipine.  Can be as high as 160s when [facility].  Orthostatics positive on Monday with physical therapy.  Denies headache or chest pain.  Will continue off amlodipine with addition of midodrine.  CPT score 3.3/5.6.  Patient is discharging back to [facility] where he lives with his wife.    Acute encephalopathy  Cognitive Impairment: Infectious workup negative.  Thought possibly to be secondary to Klonopin which was discontinued as was Norco.  Pain medicine did not feel it was related to pain pump.  - Per patient and spouse mentation is back to baseline  - Cognition scores at TCU with CPT 3.3/5.6.  - Patient discharging back to [facility] with wife and home care    Orthostatic hypotension: Thought to be secondary to autonomic dysfunction from Parkinson's disease.  Midodrine added during hospitalization  -Despite addition of midodrine continued orthostatic BP changes at TCU.  Amlodipine subsequently discontinued.  SBP is variable.  Can be as high as 160.  Ultimately, likely benefit and excepting slightly elevated supine hypertension to avoid falls in the setting of orthostatic hypotension  - Continue off amlodipine and continue low-dose midodrine 3 times daily    Fall versus syncope:  - PT/OT    Parkinson's: Follows with [facility] of neurology  - Continue Sinemet,  - Klonopin which was recently started was discontinued during hospitalization due to sedation and lethargy    Chronic pain: Follows with [facility].  Spine stimulator placed approximately 3 months ago.  Took Norco as needed in addition to pain pump.  Norco was discontinued in the hospital due to encephalopathy.  Patient notes adequate pain control  - Continue fentanyl pain pump  - Continue follow-up with pain clinic as outpatient    Dysphagia  GERD:   Previous history of stricture.  Last EGD 8/2023 which was notable for white plaques that were biopsied but no significant stricture.  - Soft bite diet w/ thin liquids.  Speech following.   Taking meds whole with phazix  - Continue PPI  - Home ST    Uncontrolled type 2 diabetes: A1c 10.9.  Metformin increased to 1000 mg twice daily and sliding scale insulin added  - Continue metformin.  Blood glucose appropriately controlled at TCU    History of CAD  Hypertension  Hyperlipidemia:  -D/c amlodipine as above  - No longer on statin or aspirin    Neuropathy:  - Continue gabapentin    Depression:  - Continue Zoloft    Discharge Medications:  MED REC REQUIRED  Post Medication Reconciliation Status: discharge medications reconciled and changed, per note/orders    Current Outpatient Medications   Medication Sig Dispense Refill    acetaminophen (TYLENOL) 325 MG tablet Take 2 tablets (650 mg) by mouth every 4 hours as needed for mild pain or other (and adjunct with moderate or severe pain or per patient request)      artificial saliva (BIOTENE MT) AERS spray Take 1 spray by mouth every 2 hours as needed for dry mouth      carbidopa-levodopa (SINEMET)  MG tablet Take 2 tablets by mouth 3 times daily      carboxymethylcellulose PF (REFRESH PLUS) 0.5 % ophthalmic solution Place 1 drop into both eyes 3 times daily      diclofenac (VOLTAREN) 1 % topical gel Apply 2 g topically 3 times daily      gabapentin (NEURONTIN) 100 MG capsule Take 1 capsule (100 mg) by mouth 2 times daily      gabapentin (NEURONTIN) 300 MG capsule Take 1 capsule (300 mg) by mouth at bedtime      lactulose (CHRONULAC) 10 GM/15ML solution Take 30 mLs (20 g) by mouth 2 times daily as needed for constipation      Lidocaine (LIDOCARE) 4 % Patch Place 1 patch onto the skin every 24 hours To prevent lidocaine toxicity, patient should be patch free for 12 hrs daily.      magnesium hydroxide (MILK OF MAGNESIA) 400 MG/5ML suspension Take 30 mLs by mouth daily as needed for constipation or heartburn      medication given by implanted intrathecal pump continuous   Drug # 1: Fentanyl (Sublimaze) - Conc:625 mcg/mL - Total Dose / 24 hours: 376.54  "mcg    Drug # 2: Bupivacaine (Marcaine)  - Conc:25 mg/mL - Total Dose / 24 hours: 15.062 mg    Continuous rate: Fentanyl 301.52 mcg/day and Bupivacaine 12.061 mg/day  Flex Bolus dosing: 3 equal scheduled bolus doses per day containing Fentanyl 25.01 mcg and Bupivacaine 1 mg at 1100, 1600 and 2000 daily.     Diluent: NS    Infusion Rate: 0.482 mL/24 hrs  (including bolus doses the total daily rate = 0.602 ml/24 hrs)  Outside Clinic & Provider: [facility] [phone]  Last Refill Date: 05/16/2024  Next Refill Date: 06/10/2024     Pump : [employer] Synchromed II pain pump (serial # [id number])      Please consider consulting the pain team if the patient is admitted with an IT pump.      metFORMIN (GLUCOPHAGE) 1000 MG tablet Take 1 tablet (1,000 mg) by mouth 2 times daily (with meals)      midodrine (PROAMATINE) 2.5 MG tablet Take 1 tablet (2.5 mg) by mouth 3 times daily (with meals) This medication should NOT be taken after evening meal or less than 4 hours before bedtime.  Hold for sBP >140      omeprazole (PRILOSEC) 20 MG DR capsule Take 20 mg by mouth at bedtime      senna-docusate (SENOKOT-S/PERICOLACE) 8.6-50 MG tablet Take 1 tablet by mouth 2 times daily      sertraline (ZOLOFT) 100 MG tablet Take 100 mg by mouth every morning          Controlled medications:   not applicable/none     Past Medical History:   Past Medical History:   Diagnosis Date    Anxiety     Arthritis     CAD (coronary artery disease)     HX of stent,  CABG x2    Diabetes (H)     Hearing loss     Hyperlipidemia     Hypertension     Neuropathy     Sensory ataxia     Stented coronary artery      Physical Exam:   Vitals: BP (!) 165/76   Pulse 73   Temp 98.3  F (36.8  C)   Resp 18   Ht 1.803 m (5' 11\")   Wt 84.7 kg (186 lb 12.8 oz)   SpO2 96%   BMI 26.05 kg/m    BMI: Body mass index is 26.05 kg/m .  Physical Exam  Vitals (In facility EMR) reviewed.   HENT:      Head: Normocephalic and atraumatic.   Eyes:      General: No " scleral icterus.  Cardiovascular:      Rate and Rhythm: Normal rate and regular rhythm.   Pulmonary:      Effort: Pulmonary effort is normal.   Abdominal:      General: There is no distension.   Musculoskeletal:      Right lower leg: No edema.      Left lower leg: No edema.   Skin:     General: Skin is warm and dry.      Findings: No rash.   Neurological:      Mental Status: He is alert. Mental status is at baseline.   Psychiatric:         Behavior: Behavior normal.         SNF labs: Recent labs in [facility] reviewed by me today.  and Most Recent 3 CBC's:  Recent Labs   Lab Test 05/22/24  1044 05/21/24  1125 05/20/24  0531 05/19/24  1816   WBC  --  6.2 5.0 6.9   HGB  --  10.5* 10.6* 10.5*   MCV  --  84 86 85   * 136* 138* 133*     Most Recent 3 BMP's:  Recent Labs   Lab Test 05/24/24  1242 05/24/24  0930 05/24/24  0425 05/22/24  1217 05/22/24  1044 05/21/24  1213 05/21/24  1125 05/20/24  0851 05/20/24  0531 05/20/24  0220 05/19/24  1816   NA  --   --   --   --   --   --  137  --  138  --  133*   POTASSIUM  --   --   --   --   --   --  3.9  --  3.9  --  4.3   CHLORIDE  --   --   --   --   --   --  104  --  104  --  98   CO2  --   --   --   --   --   --  22  --  25  --  25   BUN  --   --   --   --   --   --  18.0  --  26.8*  --  33.8*   CR  --   --   --   --  0.83  --  0.87  --  0.94  --  0.91   ANIONGAP  --   --   --   --   --   --  11  --  9  --  10   ISIS  --   --   --   --   --   --  8.7*  --  8.9  --  9.3   * 168* 152*   < >  --    < > 229*   < > 261*   < > 401*    < > = values in this interval not displayed.     Most Recent 2 LFT's:  Recent Labs   Lab Test 05/19/24  1816 07/07/17  0456   AST 19 20   ALT <5 22   ALKPHOS 131 111   BILITOTAL 0.3 0.5       DISCHARGE PLAN:  Follow up labs: No labs orders/due  Medical Follow Up:      Follow up with primary care provider in 1-2 weeks   Continue routine follow-up with pain clinic   Continue routine follow-up with neurology  Current [facility] scheduled  appointments:  Appointments in Next Year    No future appts.    Discharge Services: Home Care:  Occupational Therapy, Physical Therapy, and Speech Therapy   Discharge Instructions Verbalized to Patient at Discharge:   Continue to follow your diet:  Consistent Carb, Easy to Chew Special Instructions: No bread, add extra gravy/sauce to meal, continue dietary specifics..   24-hour supervision is recommended for safety.     TOTAL DISCHARGE TIME:   Greater than 30 minutes  Electronically signed by:  Erika Zaman PA-C     Documentation of Face to Face and Certification for Home Health Services    I certify that services are/were furnished while this patient was under the care of a physician and that a physician or an allowed non-physician practitioner (NPP), had a face-to-face encounter that meets the physician face-to-face encounter requirements. The encounter was in whole, or in part, related to the primary reason for home health. The patient is confined to his/her home and needs intermittent skilled nursing, physical therapy, speech-language pathology, or the continued need for occupational therapy. A plan of care has been established by a physician and is periodically reviewed by a physician.  Date of Face-to-Face Encounter: 6/5/2024.    I certify that, based on my findings, the following services are medically necessary home health services: Nursing, Occupational Therapy, and Speech Language Therapy.    My clinical findings support the need for the above skilled services because: Requires assistance of another person or specialized equipment to access medical services because patient: Requires supervision of another for safe transfer...    Patient to re-establish plan of care with their PCP within 7-10 days after leaving the facility to reestablish care.  Medicare certified PECOS provider: Erika Zaman PA-C  Date: June 5, 2024

## 2024-06-05 NOTE — LETTER
6/5/2024      Cresencio Peguero  [address]  [place] [address]        [facility] GERIATRICS DISCHARGE SUMMARY  PATIENT'S NAME: Cresencio Peguero  YOB: 1935  MEDICAL RECORD NUMBER:  [id number]  Place of Service where encounter took place:  [facility] ([facility] [[id number]]    PRIMARY CARE PROVIDER AND CLINIC RESPONSIBLE AFTER TRANSFER:   Parrish Padgett MD, [address] [facility] / [facility] [address]    [facility] [place]     Transferring providers: Erika Zaman PA-C, Dr. Milagro Kellogg MD  Recent Hospitalization/ED:  [facility] stay 5/19/24 to 5/24/24.  Date of SNF Admission: May 24, 2024  Date of SNF (anticipated) Discharge: June 06, 2024  Discharged to: previous independent home  Cognitive Scores: CPT: 3.3/5.6  Physical Function: Ambulating 330 ft with FWW and SBA  DME: No new DME needed    CODE STATUS/ADVANCE DIRECTIVES DISCUSSION:  No CPR- Do NOT Intubate   ALLERGIES: Hydrocodone, Contrast dye, Food, and Percodan [oxycodone-aspirin]    NURSING FACILITY COURSE   Summary of nursing facility stay:   Acute encephalopathy  Cognitive Impairment: Infectious workup negative.  Thought possibly to be secondary to Klonopin which was discontinued as was Norco.  Pain medicine did not feel it was related to pain pump.  - Per patient and spouse mentation is back to baseline  - Cognition scores at TCU with CPT 3.3/5.6.  - Patient discharging back to [facility] with wife and home care    Orthostatic hypotension: Thought to be secondary to autonomic dysfunction from Parkinson's disease.  Midodrine added during hospitalization  -Despite addition of midodrine continued orthostatic BP changes at TCU.  Amlodipine subsequently discontinued.  SBP is variable.  Can be as high as 160.  Ultimately, likely benefit and excepting slightly elevated supine hypertension to avoid falls in the setting of orthostatic hypotension  - Continue off amlodipine and continue low-dose  midodrine 3 times daily    Fall versus syncope:  - PT/OT    Parkinson's: Follows with [facility]  - Continue Sinemet,  - Klonopin which was recently started was discontinued during hospitalization due to sedation and lethargy    Chronic pain: Follows with [facility].  Spine stimulator placed approximately 3 months ago.  Took Norco as needed in addition to pain pump.  Norco was discontinued in the hospital due to encephalopathy.  Patient notes adequate pain control  - Continue fentanyl pain pump  - Continue follow-up with pain clinic as outpatient    Dysphagia  GERD:   Previous history of stricture.  Last EGD 8/2023 which was notable for white plaques that were biopsied but no significant stricture.  - Soft bite diet w/ thin liquids.  Speech following.  Taking meds whole with phazix  - Continue PPI  - Home ST    Uncontrolled type 2 diabetes: A1c 10.9.  Metformin increased to 1000 mg twice daily and sliding scale insulin added  - Continue metformin.  Blood glucose appropriately controlled at TCU    History of CAD  Hypertension  Hyperlipidemia:  -D/c amlodipine as above  - No longer on statin or aspirin    Neuropathy:  - Continue gabapentin    Depression:  - Continue Zoloft    Discharge Medications:  MED REC REQUIRED  Post Medication Reconciliation Status: discharge medications reconciled and changed, per note/orders    Current Outpatient Medications   Medication Sig Dispense Refill     acetaminophen (TYLENOL) 325 MG tablet Take 2 tablets (650 mg) by mouth every 4 hours as needed for mild pain or other (and adjunct with moderate or severe pain or per patient request)       artificial saliva (BIOTENE MT) AERS spray Take 1 spray by mouth every 2 hours as needed for dry mouth       carbidopa-levodopa (SINEMET)  MG tablet Take 2 tablets by mouth 3 times daily       carboxymethylcellulose PF (REFRESH PLUS) 0.5 % ophthalmic solution Place 1 drop into both eyes 3 times daily       diclofenac (VOLTAREN) 1  % topical gel Apply 2 g topically 3 times daily       gabapentin (NEURONTIN) 100 MG capsule Take 1 capsule (100 mg) by mouth 2 times daily       gabapentin (NEURONTIN) 300 MG capsule Take 1 capsule (300 mg) by mouth at bedtime       lactulose (CHRONULAC) 10 GM/15ML solution Take 30 mLs (20 g) by mouth 2 times daily as needed for constipation       Lidocaine (LIDOCARE) 4 % Patch Place 1 patch onto the skin every 24 hours To prevent lidocaine toxicity, patient should be patch free for 12 hrs daily.       magnesium hydroxide (MILK OF MAGNESIA) 400 MG/5ML suspension Take 30 mLs by mouth daily as needed for constipation or heartburn       medication given by implanted intrathecal pump continuous   Drug # 1: Fentanyl (Sublimaze) - Conc:625 mcg/mL - Total Dose / 24 hours: 376.54 mcg    Drug # 2: Bupivacaine (Marcaine)  - Conc:25 mg/mL - Total Dose / 24 hours: 15.062 mg    Continuous rate: Fentanyl 301.52 mcg/day and Bupivacaine 12.061 mg/day  Flex Bolus dosing: 3 equal scheduled bolus doses per day containing Fentanyl 25.01 mcg and Bupivacaine 1 mg at 1100, 1600 and 2000 daily.     Diluent: NS    Infusion Rate: 0.482 mL/24 hrs  (including bolus doses the total daily rate = 0.602 ml/24 hrs)  Outside Clinic & Provider: [facility] [phone]  Last Refill Date: 05/16/2024  Next Refill Date: 06/10/2024     Pump : [employer] Synchromed II pain pump (serial # [id number])      Please consider consulting the pain team if the patient is admitted with an IT pump.       metFORMIN (GLUCOPHAGE) 1000 MG tablet Take 1 tablet (1,000 mg) by mouth 2 times daily (with meals)       midodrine (PROAMATINE) 2.5 MG tablet Take 1 tablet (2.5 mg) by mouth 3 times daily (with meals) This medication should NOT be taken after evening meal or less than 4 hours before bedtime.  Hold for sBP >140       omeprazole (PRILOSEC) 20 MG DR capsule Take 20 mg by mouth at bedtime       senna-docusate (SENOKOT-S/PERICOLACE) 8.6-50 MG tablet Take 1  "tablet by mouth 2 times daily       sertraline (ZOLOFT) 100 MG tablet Take 100 mg by mouth every morning          Controlled medications:   not applicable/none     Past Medical History:   Past Medical History:   Diagnosis Date     Anxiety      Arthritis      CAD (coronary artery disease)     HX of stent,  CABG x2     Diabetes (H)      Hearing loss      Hyperlipidemia      Hypertension      Neuropathy      Sensory ataxia      Stented coronary artery      Physical Exam:   Vitals: BP (!) 165/76   Pulse 73   Temp 98.3  F (36.8  C)   Resp 18   Ht 1.803 m (5' 11\")   Wt 84.7 kg (186 lb 12.8 oz)   SpO2 96%   BMI 26.05 kg/m    BMI: Body mass index is 26.05 kg/m .  Physical Exam  Vitals (In facility EMR) reviewed.   HENT:      Head: Normocephalic and atraumatic.   Eyes:      General: No scleral icterus.  Cardiovascular:      Rate and Rhythm: Normal rate and regular rhythm.   Pulmonary:      Effort: Pulmonary effort is normal.   Abdominal:      General: There is no distension.   Musculoskeletal:      Right lower leg: No edema.      Left lower leg: No edema.   Skin:     General: Skin is warm and dry.      Findings: No rash.   Neurological:      Mental Status: He is alert. Mental status is at baseline.   Psychiatric:         Behavior: Behavior normal.         SNF labs: Recent labs in [facility] reviewed by me today.  and Most Recent 3 CBC's:  Recent Labs   Lab Test 05/22/24  1044 05/21/24  1125 05/20/24  0531 05/19/24  1816   WBC  --  6.2 5.0 6.9   HGB  --  10.5* 10.6* 10.5*   MCV  --  84 86 85   * 136* 138* 133*     Most Recent 3 BMP's:  Recent Labs   Lab Test 05/24/24  1242 05/24/24  0930 05/24/24  0425 05/22/24  1217 05/22/24  1044 05/21/24  1213 05/21/24  1125 05/20/24  0851 05/20/24  0531 05/20/24  0220 05/19/24  1816   NA  --   --   --   --   --   --  137  --  138  --  133*   POTASSIUM  --   --   --   --   --   --  3.9  --  3.9  --  4.3   CHLORIDE  --   --   --   --   --   --  104  --  104  --  98   CO2  --   --   " --   --   --   --  22  --  25  --  25   BUN  --   --   --   --   --   --  18.0  --  26.8*  --  33.8*   CR  --   --   --   --  0.83  --  0.87  --  0.94  --  0.91   ANIONGAP  --   --   --   --   --   --  11  --  9  --  10   ISIS  --   --   --   --   --   --  8.7*  --  8.9  --  9.3   * 168* 152*   < >  --    < > 229*   < > 261*   < > 401*    < > = values in this interval not displayed.     Most Recent 2 LFT's:  Recent Labs   Lab Test 05/19/24  1816 07/07/17  0456   AST 19 20   ALT <5 22   ALKPHOS 131 111   BILITOTAL 0.3 0.5       DISCHARGE PLAN:  Follow up labs: No labs orders/due  Medical Follow Up:      Follow up with primary care provider in 1-2 weeks   Continue routine follow-up with pain clinic   Continue routine follow-up with neurology  [facility] scheduled appointments:  Appointments in Next Year    No future appts.    Discharge Services: Home Care:  Occupational Therapy, Physical Therapy, and Speech Therapy   Discharge Instructions Verbalized to Patient at Discharge:   Continue to follow your diet:  Consistent Carb, Easy to Chew Special Instructions: No bread, add extra gravy/sauce to meal, continue dietary specifics..   24-hour supervision is recommended for safety.     TOTAL DISCHARGE TIME:   Greater than 30 minutes  Electronically signed by:  Erika Zaman PA-C     Documentation of Face to Face and Certification for Home Health Services    I certify that services are/were furnished while this patient was under the care of a physician and that a physician or an allowed non-physician practitioner (NPP), had a face-to-face encounter that meets the physician face-to-face encounter requirements. The encounter was in whole, or in part, related to the primary reason for home health. The patient is confined to his/her home and needs intermittent skilled nursing, physical therapy, speech-language pathology, or the continued need for occupational therapy. A plan of care has been established by a  physician and is periodically reviewed by a physician.  Date of Face-to-Face Encounter: 6/5/2024.    I certify that, based on my findings, the following services are medically necessary home health services: Nursing, Occupational Therapy, and Speech Language Therapy.    My clinical findings support the need for the above skilled services because: Requires assistance of another person or specialized equipment to access medical services because patient: Requires supervision of another for safe transfer...    Patient to re-establish plan of care with their PCP within 7-10 days after leaving the facility to reestablish care.  Medicare certified PECOS provider: Erika Zaman PA-C  Date: June 5, 2024                 Sincerely,        Erika Zaman PA-C

## 2024-06-12 ENCOUNTER — APPOINTMENT (OUTPATIENT)
Dept: CT IMAGING | Facility: CLINIC | Age: 89
End: 2024-06-12
Attending: SOCIAL WORKER
Payer: MEDICARE

## 2024-06-12 ENCOUNTER — HOSPITAL ENCOUNTER (EMERGENCY)
Facility: CLINIC | Age: 89
Discharge: HOME | End: 2024-06-12
Attending: SOCIAL WORKER | Admitting: SOCIAL WORKER
Payer: MEDICARE

## 2024-06-12 VITALS
OXYGEN SATURATION: 96 % | SYSTOLIC BLOOD PRESSURE: 109 MMHG | TEMPERATURE: 97.8 F | HEART RATE: 70 BPM | RESPIRATION RATE: 12 BRPM | DIASTOLIC BLOOD PRESSURE: 54 MMHG

## 2024-06-12 DIAGNOSIS — K59.09 OTHER CONSTIPATION: ICD-10-CM

## 2024-06-12 DIAGNOSIS — R55 NEAR SYNCOPE: ICD-10-CM

## 2024-06-12 DIAGNOSIS — R13.10 DYSPHAGIA, UNSPECIFIED TYPE: ICD-10-CM

## 2024-06-12 LAB
ALBUMIN UR-MCNC: NEGATIVE MG/DL
AMORPH CRY #/AREA URNS HPF: ABNORMAL /HPF
ANION GAP SERPL CALCULATED.3IONS-SCNC: 13 MMOL/L (ref 7–15)
APPEARANCE UR: CLEAR
ATRIAL RATE - MUSE: 64 BPM
BASOPHILS # BLD AUTO: 0 10E3/UL (ref 0–0.2)
BASOPHILS NFR BLD AUTO: 0 %
BILIRUB UR QL STRIP: NEGATIVE
BUN SERPL-MCNC: 24.2 MG/DL (ref 8–23)
CALCIUM SERPL-MCNC: 9.8 MG/DL (ref 8.8–10.2)
CHLORIDE SERPL-SCNC: 102 MMOL/L (ref 98–107)
COLOR UR AUTO: ABNORMAL
CREAT SERPL-MCNC: 1.08 MG/DL (ref 0.67–1.17)
DEPRECATED HCO3 PLAS-SCNC: 25 MMOL/L (ref 22–29)
DIASTOLIC BLOOD PRESSURE - MUSE: NORMAL MMHG
EGFRCR SERPLBLD CKD-EPI 2021: 66 ML/MIN/1.73M2
EOSINOPHIL # BLD AUTO: 0.2 10E3/UL (ref 0–0.7)
EOSINOPHIL NFR BLD AUTO: 3 %
ERYTHROCYTE [DISTWIDTH] IN BLOOD BY AUTOMATED COUNT: 13.9 % (ref 10–15)
GLUCOSE SERPL-MCNC: 185 MG/DL (ref 70–99)
GLUCOSE UR STRIP-MCNC: NEGATIVE MG/DL
HCT VFR BLD AUTO: 34.2 % (ref 40–53)
HGB BLD-MCNC: 11.3 G/DL (ref 13.3–17.7)
HGB UR QL STRIP: NEGATIVE
HOLD SPECIMEN: NORMAL
HOLD SPECIMEN: NORMAL
HYALINE CASTS: 7 /LPF
IMM GRANULOCYTES # BLD: 0.1 10E3/UL
IMM GRANULOCYTES NFR BLD: 1 %
INTERPRETATION ECG - MUSE: NORMAL
KETONES UR STRIP-MCNC: NEGATIVE MG/DL
LEUKOCYTE ESTERASE UR QL STRIP: ABNORMAL
LYMPHOCYTES # BLD AUTO: 0.8 10E3/UL (ref 0.8–5.3)
LYMPHOCYTES NFR BLD AUTO: 14 %
MCH RBC QN AUTO: 28.6 PG (ref 26.5–33)
MCHC RBC AUTO-ENTMCNC: 33 G/DL (ref 31.5–36.5)
MCV RBC AUTO: 87 FL (ref 78–100)
MONOCYTES # BLD AUTO: 0.4 10E3/UL (ref 0–1.3)
MONOCYTES NFR BLD AUTO: 7 %
NEUTROPHILS # BLD AUTO: 4.4 10E3/UL (ref 1.6–8.3)
NEUTROPHILS NFR BLD AUTO: 75 %
NITRATE UR QL: NEGATIVE
NRBC # BLD AUTO: 0 10E3/UL
NRBC BLD AUTO-RTO: 0 /100
P AXIS - MUSE: 21 DEGREES
PH UR STRIP: 6.5 [PH] (ref 5–7)
PLATELET # BLD AUTO: 163 10E3/UL (ref 150–450)
POTASSIUM SERPL-SCNC: 4.9 MMOL/L (ref 3.4–5.3)
PR INTERVAL - MUSE: 206 MS
QRS DURATION - MUSE: 110 MS
QT - MUSE: 432 MS
QTC - MUSE: 445 MS
R AXIS - MUSE: -7 DEGREES
RBC # BLD AUTO: 3.95 10E6/UL (ref 4.4–5.9)
RBC URINE: 1 /HPF
SODIUM SERPL-SCNC: 140 MMOL/L (ref 135–145)
SP GR UR STRIP: 1.01 (ref 1–1.03)
SQUAMOUS EPITHELIAL: 10 /HPF
SYSTOLIC BLOOD PRESSURE - MUSE: NORMAL MMHG
T AXIS - MUSE: 14 DEGREES
UROBILINOGEN UR STRIP-MCNC: NORMAL MG/DL
VENTRICULAR RATE- MUSE: 64 BPM
WBC # BLD AUTO: 5.9 10E3/UL (ref 4–11)
WBC URINE: 15 /HPF

## 2024-06-12 PROCEDURE — 87186 SC STD MICRODIL/AGAR DIL: CPT | Performed by: SOCIAL WORKER

## 2024-06-12 PROCEDURE — 96361 HYDRATE IV INFUSION ADD-ON: CPT

## 2024-06-12 PROCEDURE — 258N000003 HC RX IP 258 OP 636: Mod: JZ | Performed by: SOCIAL WORKER

## 2024-06-12 PROCEDURE — 80048 BASIC METABOLIC PNL TOTAL CA: CPT | Performed by: SOCIAL WORKER

## 2024-06-12 PROCEDURE — 87086 URINE CULTURE/COLONY COUNT: CPT | Performed by: SOCIAL WORKER

## 2024-06-12 PROCEDURE — 74177 CT ABD & PELVIS W/CONTRAST: CPT | Mod: MG

## 2024-06-12 PROCEDURE — 96360 HYDRATION IV INFUSION INIT: CPT | Mod: 59

## 2024-06-12 PROCEDURE — 93005 ELECTROCARDIOGRAM TRACING: CPT

## 2024-06-12 PROCEDURE — G1010 CDSM STANSON: HCPCS

## 2024-06-12 PROCEDURE — 85048 AUTOMATED LEUKOCYTE COUNT: CPT | Performed by: SOCIAL WORKER

## 2024-06-12 PROCEDURE — 81001 URINALYSIS AUTO W/SCOPE: CPT | Performed by: SOCIAL WORKER

## 2024-06-12 PROCEDURE — 250N000011 HC RX IP 250 OP 636: Performed by: SOCIAL WORKER

## 2024-06-12 PROCEDURE — 36415 COLL VENOUS BLD VENIPUNCTURE: CPT | Performed by: SOCIAL WORKER

## 2024-06-12 PROCEDURE — 99285 EMERGENCY DEPT VISIT HI MDM: CPT | Mod: 25

## 2024-06-12 RX ORDER — IOPAMIDOL 755 MG/ML
500 INJECTION, SOLUTION INTRAVASCULAR ONCE
Status: COMPLETED | OUTPATIENT
Start: 2024-06-12 | End: 2024-06-12

## 2024-06-12 RX ADMIN — SODIUM CHLORIDE 500 ML: 9 INJECTION, SOLUTION INTRAVENOUS at 17:02

## 2024-06-12 RX ADMIN — IOPAMIDOL 100 ML: 755 INJECTION, SOLUTION INTRAVENOUS at 17:20

## 2024-06-12 ASSESSMENT — ACTIVITIES OF DAILY LIVING (ADL)
ADLS_ACUITY_SCORE: 38

## 2024-06-12 ASSESSMENT — COLUMBIA-SUICIDE SEVERITY RATING SCALE - C-SSRS
1. IN THE PAST MONTH, HAVE YOU WISHED YOU WERE DEAD OR WISHED YOU COULD GO TO SLEEP AND NOT WAKE UP?: NO
2. HAVE YOU ACTUALLY HAD ANY THOUGHTS OF KILLING YOURSELF IN THE PAST MONTH?: NO
6. HAVE YOU EVER DONE ANYTHING, STARTED TO DO ANYTHING, OR PREPARED TO DO ANYTHING TO END YOUR LIFE?: NO

## 2024-06-12 NOTE — ED PROVIDER NOTES
Emergency Department Note      History of Present Illness     Chief Complaint  lightheaded and constipated    HPI  Cresencio Peguero is a 89 year old male with a history of CAD, hypertension, type 2 diabetes, and a stented coronary artery who presents to the emergency department for lightheadedness and constipation. The patient states that for 2 days, he has been experiencing constipation and is not passing gas. He notes that today while attempting a bowel movement, he became lightheaded and near-syncopal while on the toilet. Denies syncope. He adds that he has lightheadedness upon ambulation as wlel. Patient reports that he has had difficulty swallowing his pills however he has been able to get them down it is just difficult to do so. Denies chest pain or shortness of breath. Denies fever, cough, or throat. Denies nausea. Denies new numbness as he has a hx of ongoing back pain and neuropathy. He ambulates with a walker at baseline.     Independent Historian  None    Review of External Notes  I reviewed the patient's TCU discharge summary from 06/05/24.    I reviewed the patient's discharge summary from 05/24/24. 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 Crush whole pills if they're larger size due to upper esophageal stenosis from cervical spine osteophyte.     - aspiration precautions     - continue PPI   Past Medical History   Medical  History and Problem List  Anxiety  Arthritis  CAD  Type 2 diabetes  Hyperlipidemia  Hypertension  Neuropathy  Stented coronary artery  Sensory ataxia  SBO    Medications  acetaminophen (TYLENOL) 325 MG tablet  carbidopa-levodopa (SINEMET)  MG tablet  gabapentin (NEURONTIN) 100 MG capsule  metFORMIN (GLUCOPHAGE) 1000 MG tablet  midodrine (PROAMATINE) 2.5 MG tablet  omeprazole (PRILOSEC) 20 MG DR capsule  sertraline (ZOLOFT) 100 MG tablet    Surgical History   Cervical laminectomy  Lumbar laminectomy  CABG x 2  Cataract and corneal surgery  Herniorrhaphy inguinal  LAP lysis of adhesions  Rotator cuff repair  R shoulder arthroplasty  Tonsillectomy    Physical Exam   Patient Vitals for the past 24 hrs:   BP Temp Temp src Pulse Resp SpO2   06/12/24 1815 -- -- -- 74 12 98 %   06/12/24 1800 (!) 184/82 -- -- 71 -- --   06/12/24 1700 (!) 154/72 -- -- 64 11 97 %   06/12/24 1645 (!) 161/75 -- -- 63 12 95 %   06/12/24 1630 138/72 -- -- 64 14 94 %   06/12/24 1615 (!) 168/77 -- -- 64 13 95 %   06/12/24 1600 (!) 181/99 -- -- 64 13 96 %   06/12/24 1545 (!) 184/87 -- -- 64 12 98 %   06/12/24 1530 (!) 203/90 -- -- 63 12 96 %   06/12/24 1515 (!) 168/86 -- -- 65 13 97 %   06/12/24 1500 (!) 195/87 -- -- 62 13 96 %   06/12/24 1435 -- 97.8  F (36.6  C) Oral -- -- --   06/12/24 1430 (!) 186/86 -- -- 63 18 98 %   06/12/24 1420 -- -- -- -- 16 98 %     Physical Exam  General: Overall stable and nontoxic appearing. Appears fatigued and chronically ill   HEENT: Mucous membranes dry  Neuro: Alert, conversant; no facial droop, no slurred speech; strength intact bilaterally upper and lower, baseline neuropathy in blt lower extremities   CV: Regular rate, regular rhythm, radial and DP pulses equal  Respiratory: No signs of respiratory distress, lungs clear to auscultation bilaterally   Abdomen: Soft, without rigidity or rebound throughout  MSK: No lower extremity swelling or tenderness     Diagnostics   Lab Results   Labs Ordered and Resulted  from Time of ED Arrival to Time of ED Departure   BASIC METABOLIC PANEL - Abnormal       Result Value    Sodium 140      Potassium 4.9      Chloride 102      Carbon Dioxide (CO2) 25      Anion Gap 13      Urea Nitrogen 24.2 (*)     Creatinine 1.08      GFR Estimate 66      Calcium 9.8      Glucose 185 (*)    CBC WITH PLATELETS AND DIFFERENTIAL - Abnormal    WBC Count 5.9      RBC Count 3.95 (*)     Hemoglobin 11.3 (*)     Hematocrit 34.2 (*)     MCV 87      MCH 28.6      MCHC 33.0      RDW 13.9      Platelet Count 163      % Neutrophils 75      % Lymphocytes 14      % Monocytes 7      % Eosinophils 3      % Basophils 0      % Immature Granulocytes 1      NRBCs per 100 WBC 0      Absolute Neutrophils 4.4      Absolute Lymphocytes 0.8      Absolute Monocytes 0.4      Absolute Eosinophils 0.2      Absolute Basophils 0.0      Absolute Immature Granulocytes 0.1      Absolute NRBCs 0.0     ROUTINE UA WITH MICROSCOPIC REFLEX TO CULTURE - Abnormal    Color Urine Light Yellow      Appearance Urine Clear      Glucose Urine Negative      Bilirubin Urine Negative      Ketones Urine Negative      Specific Gravity Urine 1.014      Blood Urine Negative      pH Urine 6.5      Protein Albumin Urine Negative      Urobilinogen Urine Normal      Nitrite Urine Negative      Leukocyte Esterase Urine Small (*)     Amorphous Crystals Urine Few (*)     RBC Urine 1      WBC Urine 15 (*)     Squamous Epithelials Urine 10 (*)     Hyaline Casts Urine 7 (*)    URINE CULTURE     Imaging  CT Abdomen Pelvis w Contrast   Final Result   IMPRESSION:    1.  No acute abnormality in the abdomen or pelvis.   2.  Mild rectal fecal impaction.        EKG   EKG 1419  Sinus rhythm with rare PVC, no T wave inversions or ST elevations  Rate 64   QTc 445      Independent Interpretation  None  ED Course    Medications Administered  Medications   sodium chloride 0.9% BOLUS 500 mL (0 mLs Intravenous Stopped 6/12/24 2014)   sodium chloride (PF) 0.9% PF  flush 100 mL (65 mLs Intravenous $Given 6/12/24 1720)   iopamidol (ISOVUE-370) solution 500 mL (100 mLs Intravenous $Given 6/12/24 1720)     Discussion of Management  None    Social Determinants of Health adding to complexity of care  None    ED Course  ED Course as of 06/12/24 2023 Wed Jun 12, 2024   1414 I obtained history and examined the patient as noted above.      1801 I rechecked the patient and explained results. I offered disimpaction.      1847 I rechecked the patient. I attempted fecal disimpaction. Small amount of stool was disimpacted.     1948 Passed ambulation trial   2016 I discussed findings and discharge with the patient. All questions answered.        Medical Decision Making / Diagnosis   CMS Diagnoses: None    MIPS  None    MDM  Cresencio Peguero is a 89 year old male with a known history of dysautonomia likely in the setting of his Parkinson disease, previous SBO, esophageal stenosis and GERD, presented to the emergency department with a chief complaint of near syncope while having a bowel movement.  Here in the emergency department, patient was asymptomatic.  Per hospital discharge and notes through admission, patient has known orthostasis and likely dysautonomia. I suspect that the near syncope was due to bearing down while trying to have bowel movement. No chest pain or other anginal equivalents to suggest ischemic etiology. No sudden chest, abdominal, or back pain to suggest vascular catastrophe. No new focal neurologic deficits. CT a/p without signs of obstruction. Small amount of stool disimpacted and patient had stools after enema. He was able to ambulate in the ED. Per wife, patient at his baseline. UA is not convincing for infection, if culture is positive will contact to start antibiotics. No signs of systemic infection. Discussed bowel regimen when he returns home and placed GI referral for dysphagia as likely will need outpatient endoscopy, no emergent indications for endoscopy at  this time as he is able to take PO and swallow his pills. Strict return precautions discussed. Patient discharged in stable condition.     Disposition  The patient was discharged.     ICD-10 Codes:    ICD-10-CM    1. Other constipation  K59.09       2. Near syncope  R55       3. Dysphagia, unspecified type  R13.10 Adult GI  Referral - Consult Only         Discharge Medications  New Prescriptions    No medications on file     Scribe Disclosure:  I, Guero Monroy, am serving as a scribe at 2:17 PM on 6/12/2024 to document services personally performed by Joanna Gustafson MD based on my observations and the provider's statements to me.        Joanna Gustafson MD  06/13/24 0023

## 2024-06-12 NOTE — ED TRIAGE NOTES
Presents to ED via EMS from nursing home.     Pt had near syncopal episode today while trying to have a bowel movement. Pt wife was with him at the time. Staf checked pt BP and it was 70 systolic. Last bowel movement 2 days ago. Hx frequent constipation. EMS states that BP was low for them initially, but has normalized. Pt c/o feeling weak for the last few days. Alert and oriented.

## 2024-06-14 ENCOUNTER — DOCUMENTATION ONLY (OUTPATIENT)
Dept: OTHER | Facility: CLINIC | Age: 89
End: 2024-06-14
Payer: MEDICARE

## 2024-06-15 LAB
BACTERIA UR CULT: ABNORMAL
BACTERIA UR CULT: ABNORMAL

## 2024-06-16 ENCOUNTER — TELEPHONE (OUTPATIENT)
Dept: NURSING | Facility: CLINIC | Age: 89
End: 2024-06-16
Payer: MEDICARE

## 2024-06-16 NOTE — LETTER
June 16, 2024        Cresencio Peguero  [address]   [place] [address]          Dear Cresencio Peguero:    You were seen in the [facility] Emergency Department at [facility] on 6/12/2024.  We are unable to reach you by phone, so we are sending you this letter.     It is important that you call [facility] Emergency Department lab result nurse at [phone], as we have information to relay to you AND/OR we MAY have to make some changes in your treatment.    Best time to call back is between 9AM and 5:30PM, 7 days a week.      Sincerely,     [facility] Emergency Department Lab Result RN  [phone]

## 2024-06-16 NOTE — TELEPHONE ENCOUNTER
[facility]    Reason for call: Lab Result Notification     Lab Result (including Rx patient on, if applicable).  If culture, copy of lab report at bottom.  Lab Result: Urine Culture  6/12/24 No antibiotic prescribed    Creatinine Level (mg/dl)   Creatinine   Date Value Ref Range Status   06/12/2024 1.08 0.67 - 1.17 mg/dL Final   06/30/2021 0.88 0.66 - 1.25 mg/dL Final    Creatinine clearance (ml/min), if applicable    Serum creatinine: 1.08 mg/dL 06/12/24 1517  Estimated creatinine clearance: 56.6 mL/min     RN Recommendations/Instructions per [place] ED lab result protocol:   [facility] ED lab result protocol utilized: Urine Protocol    Unable to reach patient/caregiver.   Left voicemail message requesting a call back to [phone] between 9 a.m. and 5:30 p.m. for patient's ED/UC lab results.    Letter pended to be sent via [employer]PS mail.       Esthela Hobson RN

## 2024-06-21 NOTE — TELEPHONE ENCOUNTER
"[facility]    Reason for call: Lab Result Notification - wife calling back after receiving a letter in the mail    Lab Result (including Rx patient on, if applicable).  If culture, copy of lab report at bottom.  Lab Result: Urine Culture - see below    ED Rx - None prescribed    Creatinine Level (mg/dl)   Creatinine   Date Value Ref Range Status   06/12/2024 1.08 0.67 - 1.17 mg/dL Final   06/30/2021 0.88 0.66 - 1.25 mg/dL Final    Creatinine clearance (ml/min), if applicable    Serum creatinine: 1.08 mg/dL 06/12/24 1517  Estimated creatinine clearance: 56.6 mL/min     RN Recommendations/Instructions per [facility] lab result protocol:   [facility] lab result protocol utilized: Urine Culture - final positive    Patient's current Symptoms:   Spoke to wife. CTC is on file. Reports that patient is tired, states he \"hurts all over\", and is urinating frequently. Patient is currently residing at [facility]. Spoke with RN, Darby, who gave fax number for [facility] where pt's provider is located. Called [facility] to confirm provider is HUONG Chacon and fax number is [phone]. Will send report to [facility] provider to address.    Patient/care giver notified to contact your PCP clinic or return to the Emergency department if your:  Symptoms return.  Symptoms do not improve after 3 days on antibiotic.  Symptoms do not resolve after completing antibiotic.  Symptoms worsen or other concerning symptoms.         Bettina Barker RN  "

## 2024-06-23 ENCOUNTER — APPOINTMENT (OUTPATIENT)
Dept: CT IMAGING | Facility: CLINIC | Age: 89
End: 2024-06-23
Attending: EMERGENCY MEDICINE
Payer: MEDICARE

## 2024-06-23 ENCOUNTER — HOSPITAL ENCOUNTER (EMERGENCY)
Facility: CLINIC | Age: 89
Discharge: HOME | End: 2024-06-23
Attending: EMERGENCY MEDICINE | Admitting: EMERGENCY MEDICINE
Payer: MEDICARE

## 2024-06-23 ENCOUNTER — APPOINTMENT (OUTPATIENT)
Dept: GENERAL RADIOLOGY | Facility: CLINIC | Age: 89
End: 2024-06-23
Attending: EMERGENCY MEDICINE
Payer: MEDICARE

## 2024-06-23 VITALS
HEART RATE: 72 BPM | TEMPERATURE: 96.8 F | RESPIRATION RATE: 18 BRPM | OXYGEN SATURATION: 98 % | SYSTOLIC BLOOD PRESSURE: 140 MMHG | DIASTOLIC BLOOD PRESSURE: 78 MMHG

## 2024-06-23 DIAGNOSIS — R55 SYNCOPE AND COLLAPSE: ICD-10-CM

## 2024-06-23 LAB
ANION GAP SERPL CALCULATED.3IONS-SCNC: 18 MMOL/L (ref 7–15)
BASOPHILS # BLD AUTO: 0 10E3/UL (ref 0–0.2)
BASOPHILS NFR BLD AUTO: 0 %
BUN SERPL-MCNC: 21.7 MG/DL (ref 8–23)
CALCIUM SERPL-MCNC: 9.8 MG/DL (ref 8.8–10.2)
CHLORIDE SERPL-SCNC: 100 MMOL/L (ref 98–107)
CREAT SERPL-MCNC: 0.99 MG/DL (ref 0.67–1.17)
DEPRECATED HCO3 PLAS-SCNC: 19 MMOL/L (ref 22–29)
EGFRCR SERPLBLD CKD-EPI 2021: 73 ML/MIN/1.73M2
EOSINOPHIL # BLD AUTO: 0.2 10E3/UL (ref 0–0.7)
EOSINOPHIL NFR BLD AUTO: 3 %
ERYTHROCYTE [DISTWIDTH] IN BLOOD BY AUTOMATED COUNT: 14.1 % (ref 10–15)
GLUCOSE SERPL-MCNC: 186 MG/DL (ref 70–99)
HCT VFR BLD AUTO: 34.1 % (ref 40–53)
HGB BLD-MCNC: 11.4 G/DL (ref 13.3–17.7)
IMM GRANULOCYTES # BLD: 0 10E3/UL
IMM GRANULOCYTES NFR BLD: 1 %
LYMPHOCYTES # BLD AUTO: 1 10E3/UL (ref 0.8–5.3)
LYMPHOCYTES NFR BLD AUTO: 17 %
MCH RBC QN AUTO: 28.8 PG (ref 26.5–33)
MCHC RBC AUTO-ENTMCNC: 33.4 G/DL (ref 31.5–36.5)
MCV RBC AUTO: 86 FL (ref 78–100)
MONOCYTES # BLD AUTO: 0.4 10E3/UL (ref 0–1.3)
MONOCYTES NFR BLD AUTO: 7 %
NEUTROPHILS # BLD AUTO: 4.2 10E3/UL (ref 1.6–8.3)
NEUTROPHILS NFR BLD AUTO: 73 %
NRBC # BLD AUTO: 0 10E3/UL
NRBC BLD AUTO-RTO: 0 /100
PLATELET # BLD AUTO: 170 10E3/UL (ref 150–450)
POTASSIUM SERPL-SCNC: 4.6 MMOL/L (ref 3.4–5.3)
RBC # BLD AUTO: 3.96 10E6/UL (ref 4.4–5.9)
SODIUM SERPL-SCNC: 137 MMOL/L (ref 135–145)
TROPONIN T SERPL HS-MCNC: 23 NG/L
TROPONIN T SERPL HS-MCNC: 27 NG/L
WBC # BLD AUTO: 5.7 10E3/UL (ref 4–11)

## 2024-06-23 PROCEDURE — 71101 X-RAY EXAM UNILAT RIBS/CHEST: CPT | Mod: LT

## 2024-06-23 PROCEDURE — 250N000009 HC RX 250: Performed by: EMERGENCY MEDICINE

## 2024-06-23 PROCEDURE — 80048 BASIC METABOLIC PNL TOTAL CA: CPT | Performed by: EMERGENCY MEDICINE

## 2024-06-23 PROCEDURE — 93005 ELECTROCARDIOGRAM TRACING: CPT

## 2024-06-23 PROCEDURE — 250N000013 HC RX MED GY IP 250 OP 250 PS 637: Performed by: EMERGENCY MEDICINE

## 2024-06-23 PROCEDURE — 84484 ASSAY OF TROPONIN QUANT: CPT | Performed by: EMERGENCY MEDICINE

## 2024-06-23 PROCEDURE — 71260 CT THORAX DX C+: CPT | Mod: MG

## 2024-06-23 PROCEDURE — 250N000011 HC RX IP 250 OP 636: Performed by: EMERGENCY MEDICINE

## 2024-06-23 PROCEDURE — 85025 COMPLETE CBC W/AUTO DIFF WBC: CPT | Performed by: EMERGENCY MEDICINE

## 2024-06-23 PROCEDURE — 99285 EMERGENCY DEPT VISIT HI MDM: CPT | Mod: 25

## 2024-06-23 PROCEDURE — 36415 COLL VENOUS BLD VENIPUNCTURE: CPT | Performed by: EMERGENCY MEDICINE

## 2024-06-23 RX ORDER — ACETAMINOPHEN 500 MG
1000 TABLET ORAL ONCE
Status: COMPLETED | OUTPATIENT
Start: 2024-06-23 | End: 2024-06-23

## 2024-06-23 RX ORDER — IOPAMIDOL 755 MG/ML
500 INJECTION, SOLUTION INTRAVASCULAR ONCE
Status: COMPLETED | OUTPATIENT
Start: 2024-06-23 | End: 2024-06-23

## 2024-06-23 RX ORDER — CARBIDOPA AND LEVODOPA 25; 100 MG/1; MG/1
2 TABLET ORAL ONCE
Status: COMPLETED | OUTPATIENT
Start: 2024-06-23 | End: 2024-06-23

## 2024-06-23 RX ORDER — LIDOCAINE 4 G/G
1 PATCH TOPICAL ONCE
Status: DISCONTINUED | OUTPATIENT
Start: 2024-06-23 | End: 2024-06-23 | Stop reason: HOSPADM

## 2024-06-23 RX ADMIN — SODIUM CHLORIDE 63 ML: 9 INJECTION, SOLUTION INTRAVENOUS at 19:22

## 2024-06-23 RX ADMIN — CARBIDOPA AND LEVODOPA 2 TABLET: 25; 100 TABLET ORAL at 20:58

## 2024-06-23 RX ADMIN — IOPAMIDOL 93 ML: 755 INJECTION, SOLUTION INTRAVENOUS at 19:22

## 2024-06-23 RX ADMIN — ACETAMINOPHEN 1000 MG: 500 TABLET, FILM COATED ORAL at 20:23

## 2024-06-23 RX ADMIN — LIDOCAINE 1 PATCH: 4 PATCH TOPICAL at 20:23

## 2024-06-23 ASSESSMENT — COLUMBIA-SUICIDE SEVERITY RATING SCALE - C-SSRS
2. HAVE YOU ACTUALLY HAD ANY THOUGHTS OF KILLING YOURSELF IN THE PAST MONTH?: NO
1. IN THE PAST MONTH, HAVE YOU WISHED YOU WERE DEAD OR WISHED YOU COULD GO TO SLEEP AND NOT WAKE UP?: NO
6. HAVE YOU EVER DONE ANYTHING, STARTED TO DO ANYTHING, OR PREPARED TO DO ANYTHING TO END YOUR LIFE?: NO

## 2024-06-23 ASSESSMENT — ACTIVITIES OF DAILY LIVING (ADL)
ADLS_ACUITY_SCORE: 38

## 2024-06-23 NOTE — ED PROVIDER NOTES
Emergency Department Note      History of Present Illness     Chief Complaint  Fall    HPI  Cresencio Peguero is a 89 year old male with history of Type 2 diabetes mellitus, hypertension, CAD, and arthritis who presents with a fall. Patient's wife reports he was sitting with his walker today when he slumped over his walker and landed on his chest. He reports blacking out just before the fall and does not remember the fall. Since then he has had left sided chest, shoulder, and elbow pain that have been slowly improving. He did not feel chest pain, shortness of breath, or dizziness prior to the fall. His current chest pain is worse if his lower left ribs are palpated. He endorses loss of appetite and nausea without vomiting but denies vision change, shortness of breath, fever, chills. He also endorses neck pain, numbness, and tingling but none of these are new. Patient's wife reports he has had 3 similar episodes of fall and apparent loss of consciousness. His son notes he almost feel several more times while being assisted into the ED.    Independent Historian  Wife and Son as detailed above.    Review of External Notes  6/12/24: ED note reviewed    Past Medical History   Medical History and Problem List  Past Medical History:   Diagnosis Date    Anxiety     Arthritis     CAD (coronary artery disease)     Diabetes (H)     Hearing loss     Hyperlipidemia     Hypertension     Neuropathy     Sensory ataxia     Stented coronary artery    SBO   Diabetes type 2   Inguinal hernia     Medications  acetaminophen (TYLENOL) 325 MG tablet  artificial saliva (BIOTENE MT) AERS spray  carbidopa-levodopa (SINEMET)  MG tablet  carboxymethylcellulose PF (REFRESH PLUS) 0.5 % ophthalmic solution  diclofenac (VOLTAREN) 1 % topical gel  gabapentin (NEURONTIN) 100 MG capsule  gabapentin (NEURONTIN) 300 MG capsule  lactulose (CHRONULAC) 10 GM/15ML solution  Lidocaine (LIDOCARE) 4 % Patch  magnesium hydroxide (MILK OF MAGNESIA) 400  MG/5ML suspension  medication given by implanted intrathecal pump  metFORMIN (GLUCOPHAGE) 1000 MG tablet  midodrine (PROAMATINE) 2.5 MG tablet  omeprazole (PRILOSEC) 20 MG DR capsule  senna-docusate (SENOKOT-S/PERICOLACE) 8.6-50 MG tablet  sertraline (ZOLOFT) 100 MG tablet        Surgical History   Past Surgical History:   Procedure Laterality Date    BACK SURGERY      cervical laminectomy    BACK SURGERY      lumbar laminectomy    CARDIAC SURGERY      Stent,  CABGx2    ESOPHAGOSCOPY, GASTROSCOPY, DUODENOSCOPY (EGD), COMBINED N/A 5/5/2023    Procedure: Esophagoscopy, gastroscopy, duodenoscopy (EGD), combined;  Surgeon: Jeovanny Rizo MD;  Location:  GI    EYE SURGERY      cataract    EYE SURGERY      corneal surgery    HERNIORRHAPHY INGUINAL Right 8/15/2016    Procedure: HERNIORRHAPHY INGUINAL;  Surgeon: Wu Adam MD;  Location:  OR    LAPAROSCOPIC LYSIS ADHESIONS N/A 7/7/2017    Procedure: LAPAROSCOPIC LYSIS ADHESIONS;;  Surgeon: Sumeet Joiner MD;  Location:  OR    LAPAROSCOPY DIAGNOSTIC (GENERAL) N/A 7/7/2017    Procedure: LAPAROSCOPY DIAGNOSTIC (GENERAL);  DIAGNOSTIC LAPAROSCOPY WITH LYSIS OF ADHESIONS ;  Surgeon: Sumeet Joiner MD;  Location:  OR    ORTHOPEDIC SURGERY      rotator cuff repair,shoulder arthroplasty    REVERSE ARTHROPLASTY SHOULDER Right 8/12/2020    Procedure: RIGHT REVERSE TOTAL SHOULDER ARTHROPLASTY;  Surgeon: Adarsh Mello MD;  Location:  OR    TONSILLECTOMY       Physical Exam   Patient Vitals for the past 24 hrs:   BP Temp Temp src Pulse Resp SpO2   06/23/24 1838 (!) 157/81 -- -- 70 -- 96 %   06/23/24 1644 90/52 96.8  F (36  C) Temporal 83 18 95 %     Physical Exam  General: No acute distress  Head: No obvious trauma to head.  Ears, Nose, Throat:  External ears normal.  Nose normal.  No pharyngeal erythema, swelling or exudate.  Midline uvula. Moist mucus membranes.  Eyes:  Conjunctivae clear.   Neck: Normal range of motion.  Neck  supple.   CV: Regular rate and rhythm.  No murmurs.      Respiratory: Effort normal and breath sounds normal.  No wheezing or crackles.   Gastrointestinal: Soft.  No distension. There is tenderness to palpation in the left upper quadrant.  There is no rigidity, no rebound and no guarding.   Musculoskeletal: Normal range of motion.  Non tender extremities to palpations. No lower extremity edema. Left anterior inferior chest wall tenderness to palpation without no crepitous or bony deformity.  Neuro: Alert. Moving all extremities appropriately.  Normal speech.    Skin: Skin is warm and dry.  No rash noted.   Psych: Normal mood and affect. Behavior is normal.      Diagnostics   Lab Results   Labs Ordered and Resulted from Time of ED Arrival to Time of ED Departure   BASIC METABOLIC PANEL - Abnormal       Result Value    Sodium 137      Potassium 4.6      Chloride 100      Carbon Dioxide (CO2) 19 (*)     Anion Gap 18 (*)     Urea Nitrogen 21.7      Creatinine 0.99      GFR Estimate 73      Calcium 9.8      Glucose 186 (*)    TROPONIN T, HIGH SENSITIVITY - Abnormal    Troponin T, High Sensitivity 27 (*)    CBC WITH PLATELETS AND DIFFERENTIAL - Abnormal    WBC Count 5.7      RBC Count 3.96 (*)     Hemoglobin 11.4 (*)     Hematocrit 34.1 (*)     MCV 86      MCH 28.8      MCHC 33.4      RDW 14.1      Platelet Count 170      % Neutrophils 73      % Lymphocytes 17      % Monocytes 7      % Eosinophils 3      % Basophils 0      % Immature Granulocytes 1      NRBCs per 100 WBC 0      Absolute Neutrophils 4.2      Absolute Lymphocytes 1.0      Absolute Monocytes 0.4      Absolute Eosinophils 0.2      Absolute Basophils 0.0      Absolute Immature Granulocytes 0.0      Absolute NRBCs 0.0     TROPONIN T, HIGH SENSITIVITY - Abnormal    Troponin T, High Sensitivity 23 (*)        Imaging  CT Chest/Abdomen/Pelvis w Contrast   Final Result   IMPRESSION:   1.  No evidence of acute trauma in the chest, abdomen, or pelvis.      Ribs XR,  unilat 3 views + PA chest,  left   Final Result   IMPRESSION: No acute left rib fracture. No pleural effusion. No pneumothorax. No infiltrate.      Benign-appearing sclerosis humeral shaft.          EKG   ECG results from 06/23/24   EKG 12 lead     Value    Systolic Blood Pressure     Diastolic Blood Pressure     Ventricular Rate 73    Atrial Rate 73    FL Interval 190    QRS Duration 110        QTc 458    P Axis 44    R AXIS 10    T Axis 24    Interpretation ECG      Sinus rhythm  Incomplete left bundle branch block  Nonspecific T wave abnormality  Abnormal ECG  When compared with ECG of 12-JUN-2024   No significant change           Independent Interpretation  CXR: No visible rib fracture.    ED Course    Medications Administered  Medications   Lidocaine (LIDOCARE) 4 % Patch 1 patch (1 patch Transdermal $Patch/Med Applied 6/23/24 2023)   iopamidol (ISOVUE-370) solution 500 mL (93 mLs Intravenous $Given 6/23/24 1922)   for CT scan flush use (63 mLs Intravenous $Given 6/23/24 1922)   acetaminophen (TYLENOL) tablet 1,000 mg (1,000 mg Oral $Given 6/23/24 2023)   carbidopa-levodopa (SINEMET)  MG per tablet 2 tablet (2 tablets Oral $Given 6/23/24 2058)       Procedures  Procedures     Discussion of Management  None    Social Determinants of Health adding to complexity of care  None    ED Course  ED Course as of 06/23/24 2131   Sun Jun 23, 2024 1838 I obtained history and examined the patient as noted above.    2125 I rechecked the patient and explained findings.      Medical Decision Making / Diagnosis   CMS Diagnoses: None    MIPS     None    [facility]  Cresencio Peguero is a 89 year old male presenting after a fall he seated position.  The patient had a syncopal episode while sitting, which resulted in him falling forward.  He endorses left rib pain.  On exam he has left lower rib pain, but also has left upper abdominal tenderness.  He is hemodynamically stable and has no focal neurodeficits.  No visible head  trauma and no neck pain.  He is not on blood thinners, and I have low concern for intracranial hemorrhage.  Chest x-ray is negative for fracture.  After identifying the abdominal tenderness, I ordered a CT scan.  This is negative for acute intra-abdominal pathology, and rib fracture.     Regarding his syncopal episode, EKG shows sinus rhythm with no ischemic changes.  Troponin is mildly elevated, but delta troponin decreases.  He denies any chest pain.  CBC and BMP are grossly unremarkable.    He is given Tylenol and a lidocaine patch.  He reports his pain is improving.  He is able to ambulate independently with a walker without any difficulty.  He feels well enough to go home.  His wife and son are in agreement that they feel safe with him discharging home at this time.  He is instructed to follow-up with his primary care provider regarding his syncopal event.  He reports he will do this.  Return precautions are given and he verbalizes understanding.  He is discharged home in stable condition with his wife and son.      Disposition  The patient was discharged.     ICD-10 Codes:    ICD-10-CM    1. Syncope and collapse  R55            Discharge Medications  New Prescriptions    No medications on file         Scribe Disclosure:  I, Adama Yun, am serving as a scribe at 7:15 PM on 6/23/2024 to document services personally performed by Jeovanny Salvador MD based on my observations and the provider's statements to me.       Jeovanny Salvador MD  06/24/24 0025

## 2024-06-23 NOTE — ED TRIAGE NOTES
Wife stated patient leaned forward and fell out of the chair, landed on his left side chest, denies hitting head. Wife states the floor is hardwood. Now complaining of left rib pain, thinking he broke some ribs. ABCs intact. VSS. Denies blood thinners.

## 2024-06-24 LAB
ATRIAL RATE - MUSE: 73 BPM
DIASTOLIC BLOOD PRESSURE - MUSE: NORMAL MMHG
INTERPRETATION ECG - MUSE: NORMAL
P AXIS - MUSE: 44 DEGREES
PR INTERVAL - MUSE: 190 MS
QRS DURATION - MUSE: 110 MS
QT - MUSE: 416 MS
QTC - MUSE: 458 MS
R AXIS - MUSE: 10 DEGREES
SYSTOLIC BLOOD PRESSURE - MUSE: NORMAL MMHG
T AXIS - MUSE: 24 DEGREES
VENTRICULAR RATE- MUSE: 73 BPM

## 2024-06-24 NOTE — DISCHARGE INSTRUCTIONS
You do not have a fractured rib and there is no internal abdominal bleeding or injury.  This does not mean that you will not have pain however.      We recommend that you take Tylenol for your pain, and also place lidocaine patches to your left abdomen at the same time that you are placing lidocaine patches on your back.    Please return the emergency department if you develop any new or concerning symptoms.

## 2024-06-27 ENCOUNTER — LAB REQUISITION (OUTPATIENT)
Dept: LAB | Facility: CLINIC | Age: 89
End: 2024-06-27
Payer: MEDICARE

## 2024-06-27 DIAGNOSIS — E11.40 TYPE 2 DIABETES MELLITUS WITH DIABETIC NEUROPATHY, UNSPECIFIED (H): ICD-10-CM

## 2024-06-27 LAB
ALBUMIN UR-MCNC: 20 MG/DL
APPEARANCE UR: CLEAR
BACTERIA #/AREA URNS HPF: ABNORMAL /HPF
BILIRUB UR QL STRIP: NEGATIVE
CAOX CRY #/AREA URNS HPF: ABNORMAL /HPF
COLOR UR AUTO: YELLOW
GLUCOSE UR STRIP-MCNC: NEGATIVE MG/DL
HGB UR QL STRIP: NEGATIVE
KETONES UR STRIP-MCNC: NEGATIVE MG/DL
LEUKOCYTE ESTERASE UR QL STRIP: NEGATIVE
MUCOUS THREADS #/AREA URNS LPF: PRESENT /LPF
NITRATE UR QL: NEGATIVE
PH UR STRIP: 6 [PH] (ref 5–7)
RBC URINE: 1 /HPF
SP GR UR STRIP: 1.02 (ref 1–1.03)
SQUAMOUS EPITHELIAL: 1 /HPF
UROBILINOGEN UR STRIP-MCNC: NORMAL MG/DL
WBC URINE: 1 /HPF

## 2024-06-27 PROCEDURE — 87086 URINE CULTURE/COLONY COUNT: CPT | Mod: ORL | Performed by: FAMILY MEDICINE

## 2024-06-27 PROCEDURE — 87186 SC STD MICRODIL/AGAR DIL: CPT | Mod: ORL | Performed by: FAMILY MEDICINE

## 2024-06-27 PROCEDURE — 81001 URINALYSIS AUTO W/SCOPE: CPT | Mod: ORL | Performed by: FAMILY MEDICINE

## 2024-06-30 LAB
BACTERIA UR CULT: ABNORMAL
BACTERIA UR CULT: ABNORMAL

## 2024-09-18 ENCOUNTER — LAB REQUISITION (OUTPATIENT)
Dept: LAB | Facility: CLINIC | Age: 89
End: 2024-09-18
Payer: MEDICARE

## 2024-09-18 DIAGNOSIS — E11.9 TYPE 2 DIABETES MELLITUS WITHOUT COMPLICATIONS (H): ICD-10-CM

## 2024-09-23 LAB
EST. AVERAGE GLUCOSE BLD GHB EST-MCNC: 200 MG/DL
HBA1C MFR BLD: 8.6 %

## 2024-09-23 PROCEDURE — P9603 ONE-WAY ALLOW PRORATED MILES: HCPCS | Mod: ORL

## 2024-09-23 PROCEDURE — 36415 COLL VENOUS BLD VENIPUNCTURE: CPT | Mod: ORL

## 2024-09-23 PROCEDURE — 83036 HEMOGLOBIN GLYCOSYLATED A1C: CPT | Mod: ORL

## 2024-10-17 ENCOUNTER — LAB REQUISITION (OUTPATIENT)
Dept: LAB | Facility: CLINIC | Age: 89
End: 2024-10-17
Payer: MEDICARE

## 2024-10-17 DIAGNOSIS — E11.9 TYPE 2 DIABETES MELLITUS WITHOUT COMPLICATIONS (H): ICD-10-CM

## 2024-10-21 LAB
EST. AVERAGE GLUCOSE BLD GHB EST-MCNC: 197 MG/DL
HBA1C MFR BLD: 8.5 %

## 2024-10-21 PROCEDURE — P9604 ONE-WAY ALLOW PRORATED TRIP: HCPCS | Mod: ORL

## 2024-10-21 PROCEDURE — 36415 COLL VENOUS BLD VENIPUNCTURE: CPT | Mod: ORL

## 2024-10-21 PROCEDURE — 83036 HEMOGLOBIN GLYCOSYLATED A1C: CPT | Mod: ORL

## 2024-11-02 ENCOUNTER — HOSPITAL ENCOUNTER (EMERGENCY)
Facility: CLINIC | Age: 89
Discharge: HOME | End: 2024-11-02
Attending: STUDENT IN AN ORGANIZED HEALTH CARE EDUCATION/TRAINING PROGRAM | Admitting: STUDENT IN AN ORGANIZED HEALTH CARE EDUCATION/TRAINING PROGRAM
Payer: MEDICARE

## 2024-11-02 VITALS
HEART RATE: 83 BPM | TEMPERATURE: 98.6 F | OXYGEN SATURATION: 99 % | DIASTOLIC BLOOD PRESSURE: 76 MMHG | RESPIRATION RATE: 18 BRPM | WEIGHT: 194.89 LBS | HEIGHT: 71 IN | BODY MASS INDEX: 27.28 KG/M2 | SYSTOLIC BLOOD PRESSURE: 145 MMHG

## 2024-11-02 DIAGNOSIS — K56.41 FECAL IMPACTION (H): ICD-10-CM

## 2024-11-02 PROCEDURE — 99283 EMERGENCY DEPT VISIT LOW MDM: CPT

## 2024-11-02 PROCEDURE — 250N000013 HC RX MED GY IP 250 OP 250 PS 637: Performed by: STUDENT IN AN ORGANIZED HEALTH CARE EDUCATION/TRAINING PROGRAM

## 2024-11-02 RX ORDER — POLYETHYLENE GLYCOL 3350 17 G/17G
1 POWDER, FOR SOLUTION ORAL DAILY
Qty: 527 G | Refills: 0 | Status: SHIPPED | OUTPATIENT
Start: 2024-11-02 | End: 2024-12-02

## 2024-11-02 RX ORDER — SENNOSIDES 8.6 MG
1 TABLET ORAL DAILY
Qty: 30 TABLET | Refills: 0 | Status: SHIPPED | OUTPATIENT
Start: 2024-11-02

## 2024-11-02 RX ADMIN — SIMETHICONE 226 ML: 125 CAPSULE, LIQUID FILLED ORAL at 16:59

## 2024-11-02 ASSESSMENT — ACTIVITIES OF DAILY LIVING (ADL)
ADLS_ACUITY_SCORE: 0

## 2024-11-02 NOTE — ED PROVIDER NOTES
"  Emergency Department Note      History of Present Illness   Chief Complaint   Constipation    HPI   Cresencio Peguero is a 89 year old male with a history of CAD, type 2 diabetes, hypertension, and SBO who presents for an evaluation of constipation. The patient stated feeling stool at his rectum a day ago and being unable to have a bowel movement. He added his last one was four days ago. He stated having lower abdominal pain. He stated he has always been constipated and doesn't drink much fluids. He denies fevers, nausea and vomiting. He also denies a history of colon cancer.  Per the wife, he does not ambulate very much and drinks very little water.    Independent Historian   None    Review of External Notes   None   Past Medical History   Medical History and Problem List   Anxiety   Arthritis  CAD  Type 2 diabetes  Hyperlipidemia  Hypertension  Neuropathy  Sensory ataxia  Stented coronary artery   Hernia  SBO  Hyperglycemia  Depression  Tobacco abuse     Medications   Gabapentin  Metformin  Midodrine  Carbidopa-levodopa  Omeprazole   Sertraline   Atorvastatin  Jardiance   Mirtazapine     Surgical History   Back surgery  CABG  EGD  Cataract  Corneal surgery  Herniorrhaphy inguinal  Rotator cuff repair  Tonsillectomy   Physical Exam   Patient Vitals for the past 24 hrs:   BP Temp Temp src Pulse Resp SpO2 Height Weight   11/02/24 1629 (!) 156/97 97.9  F (36.6  C) Oral 84 20 97 % 1.803 m (5' 11\") 88.4 kg (194 lb 14.2 oz)     Physical Exam  GENERAL: Patient well-appearing  HEAD: Atraumatic.  NECK: No rigidity  CV: RRR, no murmurs, rubs or gallops  PULM: CTAB with good aeration; no retractions, rales, rhonchi, or wheezing  ABD: Soft, generalized lower abdominal tenderness to palpation, nondistended, no guarding  DERM: No rash. Skin warm and dry  EXTREMITY: Moving all extremities without difficulty. No calf tenderness or peripheral edema  VASCULAR: Symmetric pulses bilaterally  Rectal: Chaperone present-stool impacted " in rectal vault.  Diagnostics   Lab Results   None     Imaging   None     EKG   None     Independent Interpretation   None  ED Course    Medications Administered   Medications   Enema Compound (docusate/mineral oil/NaPhos) NO MAG CIT PREMIX (226 mLs Rectal $Given 11/2/24 1659)     Procedures   None     Discussion of Management   None    ED Course   ED Course as of 11/02/24 1923   Sat Nov 02, 2024   1641 I obtained history and examined the patient as noted above.      Additional Documentation  None  Medical Decision Making / Diagnosis   CMS Diagnoses: None    MIPS       None    MDM   Cresencio Peguero is a 89 year old male     Symptoms concerning for fecal impaction.  Chronic conditions complicating - constipation  DDx considered obstruction, metabolic abnormalities, mass, among others.  Enema tried without success.    Rectal exam stool impacted in rectum.  No blood.  Performed manual disimpaction and removed a significant amount of zuleima textured stool.  Afterward, patient symptoms resolved and he felt well and wanted to go home.  He can tolerate p.o.  Repeat abdominal exam is benign.  Therefore do not think he requires labs or emergent imaging.  Discussed fiber intake, fluids, exercise, and prescribed MiraLAX and senna.  I have evaluated the patient for acute medical emergencies and have clinically decided no further acute medical interventions are required. Reassessed multiple times and improving. Patient stable for discharge. All questions answered. Given strict return precautions. Patient content with plan. The differential diagnosis and treatment modalities were discussed thoroughly with the patient. Recommended PCP follow-up routinely.        Disposition   The patient was discharged.     Diagnosis     ICD-10-CM    1. Fecal impaction (H)  K56.41          Discharge Medications   New Prescriptions    POLYETHYLENE GLYCOL (MIRALAX) 17 GM/DOSE POWDER    Take 17 g (1 Capful) by mouth daily.    SENNOSIDES (SENOKOT) 8.6  MG TABLET    Take 1 tablet by mouth daily.     Scribe Disclosure:  I, Kenya CHAVEZ Keila, am serving as a scribe at 5:49 PM on 11/2/2024 to document services personally performed by Jacek Lane MD based on my observations and the provider's statements to me.      Jacek Lane MD  11/02/24 1947

## 2024-11-02 NOTE — ED TRIAGE NOTES
Pt reports last BM was 4 days ago. Has large lump of BM stuck in rectum per pt, unable to push BM out. Pt here with wife. Pt has tried prune juice, MOM, lactulose, miralax with no relief. Lives at [facility] and wife drove pt here. Decrease in passing gas. Endorses lower abd pain and urgency with urination.

## 2024-11-03 NOTE — DISCHARGE INSTRUCTIONS
Return to the emergency department if symptoms are worsening, become concerning, or for any other concerns. Follow-up with your doctor routinely.     Discharge Instructions  Constipation  Constipation can cause severe cramping pain and your provider thinks this might be the cause of your abdominal pain (belly pain) today.  People usually recognize that they are constipated because they have difficulty having bowel movements, are not having bowel movements frequently enough, or are not having large enough bowel movements. Sometimes, especially in children or older people, you do not recognize that you are constipated until it becomes severe. The most common causes of constipation are a lack of exercise and not eating enough fruits, vegetables, and whole grains. Constipation can also be a side effect of medications, such as narcotics, or may be caused by a disease of the digestive system.    Generally, every Emergency Department visit should have a follow-up clinic visit with either a primary or a specialty clinic/provider. Please follow-up as instructed by your emergency provider today. Sometimes, chronic constipation requires further testing to determine the cause. If you are over 50 years old, you may need a colonoscopy if you have not had one before.     Return to the Emergency Department if:  Your abdominal pain worsens or does not improve after a bowel movement.  You become very weak.  You get a temperature above 102oF or as directed by your provider.  You have blood in your stools (bright red or black, tarry stools).  You keep vomiting (throwing up) or cannot drink liquids.  Your see blood when you vomit.  Your stomach gets bloated or bigger.  You have new symptoms or anything that worries you.    What can I do to help myself?  If your provider gave you a cathartic medication, like magnesium citrate or GoLytely  (polyethylene glycol), you can expect to have cramps and gas pains after taking it. You can expect  to have a number of bowel movements and even diarrhea (loose or watery stools) in the course of clearing your bowels.  You will know your bowels have been cleaned out after you pass clear liquid. The cramps and gas should let up after you have emptied your bowels. You may want to wait until morning to take this type of medication so you aren t up in the night.   Sometimes instead of cathartics, we recommend laxatives like milk of magnesia to move your bowels more slowly, or an enema to help the bowels to move. Read and follow the package directions, or follow your provider s instructions.  Once you have become very constipated, it takes time for your bowels to return to normal and you need to be very careful to prevent becoming constipated again. Take a laxative if you do not move your bowels at least every two days.     Eat foods that have a lot of fiber. Good choices are fruits, vegetables, prune juice, apple juice, and high fiber cereal. Limit dairy products such as milk and cheese, since these can make constipation worse.   Drink plenty of water.   When you feel the need to go to the bathroom, go to the bathroom. Do not hold it.  Miralax , Metamucil , Colace , Senna or fiber supplements can be used daily.  Miralax  daily is often the best choice for children.  If you were given a prescription for medicine here today, be sure to read all of the information (including the package insert) that comes with your prescription.  This will include important information about the medicine, its side effects, and any warnings that you need to know about.  The pharmacist who fills the prescription can provide more information and answer questions you may have about the medicine.  If you have questions or concerns that the pharmacist cannot address, please call or return to the Emergency Department.   Remember that you can always come back to the Emergency Department if you are not able to see your regular provider in the  amount of time listed above, if you get any new symptoms, or if there is anything that worries you.

## 2025-01-09 ENCOUNTER — TRANSFERRED RECORDS (OUTPATIENT)
Dept: HEALTH INFORMATION MANAGEMENT | Facility: CLINIC | Age: 89
End: 2025-01-09
Payer: MEDICARE

## 2025-01-09 RX ORDER — DULOXETIN HYDROCHLORIDE 30 MG/1
30 CAPSULE, DELAYED RELEASE ORAL DAILY
COMMUNITY

## 2025-01-09 RX ORDER — ATORVASTATIN CALCIUM 10 MG/1
10 TABLET, FILM COATED ORAL DAILY
COMMUNITY
Start: 2024-10-02

## 2025-01-09 RX ORDER — EMPAGLIFLOZIN 25 MG/1
25 TABLET, FILM COATED ORAL DAILY
COMMUNITY
Start: 2024-11-12 | End: 2025-11-06

## 2025-01-09 RX ORDER — MIRTAZAPINE 7.5 MG/1
7.5 TABLET, FILM COATED ORAL AT BEDTIME
COMMUNITY
Start: 2024-06-24

## 2025-01-09 RX ORDER — GABAPENTIN 300 MG/1
1200 CAPSULE ORAL EVERY EVENING
COMMUNITY

## 2025-01-09 RX ORDER — ONDANSETRON 4 MG/1
4 TABLET, ORALLY DISINTEGRATING ORAL EVERY 6 HOURS PRN
COMMUNITY
Start: 2024-08-27

## 2025-01-13 ENCOUNTER — TRANSFERRED RECORDS (OUTPATIENT)
Dept: HEALTH INFORMATION MANAGEMENT | Facility: CLINIC | Age: 89
End: 2025-01-13
Payer: MEDICARE

## 2025-01-16 ENCOUNTER — MEDICAL CORRESPONDENCE (OUTPATIENT)
Dept: SURGERY | Facility: CLINIC | Age: 89
End: 2025-01-16

## 2025-01-16 ENCOUNTER — ANESTHESIA (OUTPATIENT)
Dept: SURGERY | Facility: CLINIC | Age: 89
End: 2025-01-16
Payer: MEDICARE

## 2025-01-16 ENCOUNTER — HOSPITAL ENCOUNTER (OUTPATIENT)
Facility: CLINIC | Age: 89
Discharge: HOME | End: 2025-01-16
Attending: INTERNAL MEDICINE | Admitting: INTERNAL MEDICINE
Payer: MEDICARE

## 2025-01-16 ENCOUNTER — ANESTHESIA EVENT (OUTPATIENT)
Dept: SURGERY | Facility: CLINIC | Age: 89
End: 2025-01-16
Payer: MEDICARE

## 2025-01-16 VITALS
HEART RATE: 69 BPM | BODY MASS INDEX: 26.35 KG/M2 | RESPIRATION RATE: 16 BRPM | HEIGHT: 71 IN | TEMPERATURE: 97.3 F | OXYGEN SATURATION: 99 % | DIASTOLIC BLOOD PRESSURE: 82 MMHG | SYSTOLIC BLOOD PRESSURE: 176 MMHG | WEIGHT: 188.2 LBS

## 2025-01-16 LAB
GLUCOSE BLDC GLUCOMTR-MCNC: 106 MG/DL (ref 70–99)
GLUCOSE BLDC GLUCOMTR-MCNC: 124 MG/DL (ref 70–99)
UPPER GI ENDOSCOPY: NORMAL

## 2025-01-16 PROCEDURE — 250N000011 HC RX IP 250 OP 636

## 2025-01-16 PROCEDURE — 88342 IMHCHEM/IMCYTCHM 1ST ANTB: CPT | Mod: TC | Performed by: INTERNAL MEDICINE

## 2025-01-16 PROCEDURE — 360N000075 HC SURGERY LEVEL 2, PER MIN: Performed by: INTERNAL MEDICINE

## 2025-01-16 PROCEDURE — 88305 TISSUE EXAM BY PATHOLOGIST: CPT | Mod: 26

## 2025-01-16 PROCEDURE — 258N000003 HC RX IP 258 OP 636: Performed by: ANESTHESIOLOGY

## 2025-01-16 PROCEDURE — 88342 IMHCHEM/IMCYTCHM 1ST ANTB: CPT | Mod: 26

## 2025-01-16 PROCEDURE — 999N000141 HC STATISTIC PRE-PROCEDURE NURSING ASSESSMENT: Performed by: INTERNAL MEDICINE

## 2025-01-16 PROCEDURE — 370N000017 HC ANESTHESIA TECHNICAL FEE, PER MIN: Performed by: INTERNAL MEDICINE

## 2025-01-16 PROCEDURE — 250N000009 HC RX 250

## 2025-01-16 PROCEDURE — 272N000001 HC OR GENERAL SUPPLY STERILE: Performed by: INTERNAL MEDICINE

## 2025-01-16 PROCEDURE — 88312 SPECIAL STAINS GROUP 1: CPT | Mod: TC | Performed by: INTERNAL MEDICINE

## 2025-01-16 PROCEDURE — 82962 GLUCOSE BLOOD TEST: CPT

## 2025-01-16 PROCEDURE — 88312 SPECIAL STAINS GROUP 1: CPT | Mod: 26

## 2025-01-16 PROCEDURE — C1726 CATH, BAL DIL, NON-VASCULAR: HCPCS | Performed by: INTERNAL MEDICINE

## 2025-01-16 PROCEDURE — 710N000012 HC RECOVERY PHASE 2, PER MINUTE: Performed by: INTERNAL MEDICINE

## 2025-01-16 RX ORDER — HYDROMORPHONE HCL IN WATER/PF 6 MG/30 ML
0.4 PATIENT CONTROLLED ANALGESIA SYRINGE INTRAVENOUS EVERY 5 MIN PRN
Status: DISCONTINUED | OUTPATIENT
Start: 2025-01-16 | End: 2025-01-16 | Stop reason: HOSPADM

## 2025-01-16 RX ORDER — ONDANSETRON 4 MG/1
4 TABLET, ORALLY DISINTEGRATING ORAL EVERY 30 MIN PRN
Status: DISCONTINUED | OUTPATIENT
Start: 2025-01-16 | End: 2025-01-16 | Stop reason: HOSPADM

## 2025-01-16 RX ORDER — ONDANSETRON 2 MG/ML
4 INJECTION INTRAMUSCULAR; INTRAVENOUS EVERY 30 MIN PRN
Status: DISCONTINUED | OUTPATIENT
Start: 2025-01-16 | End: 2025-01-16 | Stop reason: HOSPADM

## 2025-01-16 RX ORDER — ONDANSETRON 4 MG/1
4 TABLET, ORALLY DISINTEGRATING ORAL EVERY 6 HOURS PRN
Status: DISCONTINUED | OUTPATIENT
Start: 2025-01-16 | End: 2025-01-16 | Stop reason: HOSPADM

## 2025-01-16 RX ORDER — DEXAMETHASONE SODIUM PHOSPHATE 4 MG/ML
4 INJECTION, SOLUTION INTRA-ARTICULAR; INTRALESIONAL; INTRAMUSCULAR; INTRAVENOUS; SOFT TISSUE
Status: DISCONTINUED | OUTPATIENT
Start: 2025-01-16 | End: 2025-01-16 | Stop reason: HOSPADM

## 2025-01-16 RX ORDER — SODIUM CHLORIDE, SODIUM LACTATE, POTASSIUM CHLORIDE, CALCIUM CHLORIDE 600; 310; 30; 20 MG/100ML; MG/100ML; MG/100ML; MG/100ML
INJECTION, SOLUTION INTRAVENOUS CONTINUOUS
Status: DISCONTINUED | OUTPATIENT
Start: 2025-01-16 | End: 2025-01-16 | Stop reason: HOSPADM

## 2025-01-16 RX ORDER — ONDANSETRON 2 MG/ML
INJECTION INTRAMUSCULAR; INTRAVENOUS PRN
Status: DISCONTINUED | OUTPATIENT
Start: 2025-01-16 | End: 2025-01-16

## 2025-01-16 RX ORDER — FENTANYL CITRATE 50 UG/ML
25 INJECTION, SOLUTION INTRAMUSCULAR; INTRAVENOUS EVERY 5 MIN PRN
Status: DISCONTINUED | OUTPATIENT
Start: 2025-01-16 | End: 2025-01-16 | Stop reason: HOSPADM

## 2025-01-16 RX ORDER — PROPOFOL 10 MG/ML
INJECTION, EMULSION INTRAVENOUS CONTINUOUS PRN
Status: DISCONTINUED | OUTPATIENT
Start: 2025-01-16 | End: 2025-01-16

## 2025-01-16 RX ORDER — LIDOCAINE HYDROCHLORIDE 20 MG/ML
INJECTION, SOLUTION INFILTRATION; PERINEURAL PRN
Status: DISCONTINUED | OUTPATIENT
Start: 2025-01-16 | End: 2025-01-16

## 2025-01-16 RX ORDER — ONDANSETRON 2 MG/ML
4 INJECTION INTRAMUSCULAR; INTRAVENOUS
Status: DISCONTINUED | OUTPATIENT
Start: 2025-01-16 | End: 2025-01-16 | Stop reason: HOSPADM

## 2025-01-16 RX ORDER — NALOXONE HYDROCHLORIDE 0.4 MG/ML
0.4 INJECTION, SOLUTION INTRAMUSCULAR; INTRAVENOUS; SUBCUTANEOUS
Status: DISCONTINUED | OUTPATIENT
Start: 2025-01-16 | End: 2025-01-16 | Stop reason: HOSPADM

## 2025-01-16 RX ORDER — NALOXONE HYDROCHLORIDE 0.4 MG/ML
0.1 INJECTION, SOLUTION INTRAMUSCULAR; INTRAVENOUS; SUBCUTANEOUS
Status: DISCONTINUED | OUTPATIENT
Start: 2025-01-16 | End: 2025-01-16 | Stop reason: HOSPADM

## 2025-01-16 RX ORDER — FENTANYL CITRATE 50 UG/ML
25 INJECTION, SOLUTION INTRAMUSCULAR; INTRAVENOUS
Status: DISCONTINUED | OUTPATIENT
Start: 2025-01-16 | End: 2025-01-16 | Stop reason: HOSPADM

## 2025-01-16 RX ORDER — LIDOCAINE 40 MG/G
CREAM TOPICAL
Status: DISCONTINUED | OUTPATIENT
Start: 2025-01-16 | End: 2025-01-16 | Stop reason: HOSPADM

## 2025-01-16 RX ORDER — PROPOFOL 10 MG/ML
INJECTION, EMULSION INTRAVENOUS PRN
Status: DISCONTINUED | OUTPATIENT
Start: 2025-01-16 | End: 2025-01-16

## 2025-01-16 RX ORDER — OXYCODONE HYDROCHLORIDE 5 MG/1
10 TABLET ORAL
Status: DISCONTINUED | OUTPATIENT
Start: 2025-01-16 | End: 2025-01-16

## 2025-01-16 RX ORDER — LABETALOL HYDROCHLORIDE 5 MG/ML
10 INJECTION, SOLUTION INTRAVENOUS
Status: DISCONTINUED | OUTPATIENT
Start: 2025-01-16 | End: 2025-01-16 | Stop reason: HOSPADM

## 2025-01-16 RX ORDER — NALOXONE HYDROCHLORIDE 0.4 MG/ML
0.2 INJECTION, SOLUTION INTRAMUSCULAR; INTRAVENOUS; SUBCUTANEOUS
Status: DISCONTINUED | OUTPATIENT
Start: 2025-01-16 | End: 2025-01-16 | Stop reason: HOSPADM

## 2025-01-16 RX ORDER — ONDANSETRON 2 MG/ML
4 INJECTION INTRAMUSCULAR; INTRAVENOUS EVERY 6 HOURS PRN
Status: DISCONTINUED | OUTPATIENT
Start: 2025-01-16 | End: 2025-01-16 | Stop reason: HOSPADM

## 2025-01-16 RX ORDER — FLUMAZENIL 0.1 MG/ML
0.2 INJECTION, SOLUTION INTRAVENOUS
Status: DISCONTINUED | OUTPATIENT
Start: 2025-01-16 | End: 2025-01-16 | Stop reason: HOSPADM

## 2025-01-16 RX ORDER — GLYCOPYRROLATE 0.2 MG/ML
INJECTION, SOLUTION INTRAMUSCULAR; INTRAVENOUS PRN
Status: DISCONTINUED | OUTPATIENT
Start: 2025-01-16 | End: 2025-01-16

## 2025-01-16 RX ORDER — PROCHLORPERAZINE MALEATE 5 MG/1
5 TABLET ORAL EVERY 6 HOURS PRN
Status: DISCONTINUED | OUTPATIENT
Start: 2025-01-16 | End: 2025-01-16 | Stop reason: HOSPADM

## 2025-01-16 RX ORDER — FENTANYL CITRATE 50 UG/ML
50 INJECTION, SOLUTION INTRAMUSCULAR; INTRAVENOUS EVERY 5 MIN PRN
Status: DISCONTINUED | OUTPATIENT
Start: 2025-01-16 | End: 2025-01-16 | Stop reason: HOSPADM

## 2025-01-16 RX ORDER — ALBUTEROL SULFATE 0.83 MG/ML
2.5 SOLUTION RESPIRATORY (INHALATION) EVERY 4 HOURS PRN
Status: DISCONTINUED | OUTPATIENT
Start: 2025-01-16 | End: 2025-01-16 | Stop reason: HOSPADM

## 2025-01-16 RX ORDER — MEPERIDINE HYDROCHLORIDE 25 MG/ML
12.5 INJECTION INTRAMUSCULAR; INTRAVENOUS; SUBCUTANEOUS EVERY 5 MIN PRN
Status: DISCONTINUED | OUTPATIENT
Start: 2025-01-16 | End: 2025-01-16 | Stop reason: HOSPADM

## 2025-01-16 RX ORDER — HYDRALAZINE HYDROCHLORIDE 20 MG/ML
2.5-5 INJECTION INTRAMUSCULAR; INTRAVENOUS EVERY 10 MIN PRN
Status: DISCONTINUED | OUTPATIENT
Start: 2025-01-16 | End: 2025-01-16 | Stop reason: HOSPADM

## 2025-01-16 RX ORDER — OXYCODONE HYDROCHLORIDE 5 MG/1
5 TABLET ORAL
Status: DISCONTINUED | OUTPATIENT
Start: 2025-01-16 | End: 2025-01-16

## 2025-01-16 RX ORDER — HYDROMORPHONE HCL IN WATER/PF 6 MG/30 ML
0.2 PATIENT CONTROLLED ANALGESIA SYRINGE INTRAVENOUS EVERY 5 MIN PRN
Status: DISCONTINUED | OUTPATIENT
Start: 2025-01-16 | End: 2025-01-16 | Stop reason: HOSPADM

## 2025-01-16 RX ADMIN — TOPICAL ANESTHETIC 1 SPRAY: 200 SPRAY DENTAL; PERIODONTAL at 10:08

## 2025-01-16 RX ADMIN — SODIUM CHLORIDE, POTASSIUM CHLORIDE, SODIUM LACTATE AND CALCIUM CHLORIDE: 600; 310; 30; 20 INJECTION, SOLUTION INTRAVENOUS at 10:10

## 2025-01-16 RX ADMIN — GLYCOPYRROLATE 0.2 MG: 0.2 INJECTION, SOLUTION INTRAMUSCULAR; INTRAVENOUS at 10:08

## 2025-01-16 RX ADMIN — PROPOFOL 30 MG: 10 INJECTION, EMULSION INTRAVENOUS at 10:12

## 2025-01-16 RX ADMIN — LIDOCAINE HYDROCHLORIDE 50 MG: 20 INJECTION, SOLUTION INFILTRATION; PERINEURAL at 10:08

## 2025-01-16 RX ADMIN — PROPOFOL 125 MCG/KG/MIN: 10 INJECTION, EMULSION INTRAVENOUS at 10:10

## 2025-01-16 RX ADMIN — ONDANSETRON 4 MG: 2 INJECTION INTRAMUSCULAR; INTRAVENOUS at 10:10

## 2025-01-16 RX ADMIN — PROPOFOL 50 MG: 10 INJECTION, EMULSION INTRAVENOUS at 10:10

## 2025-01-16 ASSESSMENT — ACTIVITIES OF DAILY LIVING (ADL)
ADLS_ACUITY_SCORE: 55
ADLS_ACUITY_SCORE: 51
ADLS_ACUITY_SCORE: 53

## 2025-01-16 NOTE — ANESTHESIA POSTPROCEDURE EVALUATION
Patient: Cresencio Peguero    Procedure: Procedure(s):  ESOPHAGOGASTRODUODENOSCOPY DILATATION and biopsies       Anesthesia Type:  MAC    Note:     Postop Pain Control: Uneventful            Sign Out: Well controlled pain   PONV: No   Neuro/Psych: Uneventful            Sign Out: Acceptable/Baseline neuro status   Airway/Respiratory: Uneventful            Sign Out: Acceptable/Baseline resp. status   CV/Hemodynamics: Uneventful            Sign Out: Acceptable CV status; No obvious hypovolemia; No obvious fluid overload   Other NRE:    DID A NON-ROUTINE EVENT OCCUR? No           Last vitals:  Vitals Value Taken Time   /67 01/16/25 1115   Temp 96.8  F (36  C) 01/16/25 1033   Pulse 68 01/16/25 1115   Resp 16 01/16/25 1045   SpO2 98 % 01/16/25 1124   Vitals shown include unfiled device data.    Electronically Signed By: Yelena Matos MD, MD  January 16, 2025  11:24 AM  
[facility]

## 2025-01-16 NOTE — ANESTHESIA PREPROCEDURE EVALUATION
Anesthesia Pre-Procedure Evaluation    Patient: Cresencio Peguero   MRN: [id number] : 1935        Procedure : Procedure(s):  ESOPHAGOGASTRODUODENOSCOPY DILATATION          Past Medical History:   Diagnosis Date    Anxiety     Arthritis     CAD (coronary artery disease)     HX of stent,  CABG x2    Diabetes (H)     Hearing loss     Hyperlipidemia     Hypertension     Neuropathy     Sensory ataxia     Stented coronary artery       Past Surgical History:   Procedure Laterality Date    BACK SURGERY      cervical laminectomy    BACK SURGERY      lumbar laminectomy    CARDIAC SURGERY      Stent,  CABGx2    ESOPHAGOSCOPY, GASTROSCOPY, DUODENOSCOPY (EGD), COMBINED N/A 2023    Procedure: Esophagoscopy, gastroscopy, duodenoscopy (EGD), combined;  Surgeon: Jeovanny Rizo MD;  Location:  GI    EYE SURGERY      cataract    EYE SURGERY      corneal surgery    HERNIORRHAPHY INGUINAL Right 8/15/2016    Procedure: HERNIORRHAPHY INGUINAL;  Surgeon: Wu Adam MD;  Location:  OR    LAPAROSCOPIC LYSIS ADHESIONS N/A 2017    Procedure: LAPAROSCOPIC LYSIS ADHESIONS;;  Surgeon: Sumeet Joiner MD;  Location:  OR    LAPAROSCOPY DIAGNOSTIC (GENERAL) N/A 2017    Procedure: LAPAROSCOPY DIAGNOSTIC (GENERAL);  DIAGNOSTIC LAPAROSCOPY WITH LYSIS OF ADHESIONS ;  Surgeon: Sumeet Joiner MD;  Location:  OR    ORTHOPEDIC SURGERY      rotator cuff repair,shoulder arthroplasty    REVERSE ARTHROPLASTY SHOULDER Right 2020    Procedure: RIGHT REVERSE TOTAL SHOULDER ARTHROPLASTY;  Surgeon: Adarsh Mello MD;  Location:  OR    TONSILLECTOMY        Allergies   Allergen Reactions    Hydrocodone Itching     Constipation    Aspirin Itching    Contrast Dye      LW CM1: >>> NO CONTRAST ADVERSE REACTION <<<     Reaction :    Food      LW FI1: nka LW FI2: nka    Lisinopril Other (See Comments) and GI Disturbance     constipation    Oxycodone Itching    Percodan [Oxycodone-Aspirin] Rash       Social History     Tobacco Use    Smoking status: Former     Types: Pipe     Quit date: 6/1/2021     Years since quitting: 3.6    Smokeless tobacco: Not on file   Substance Use Topics    Alcohol use: Yes     Alcohol/week: 0.0 standard drinks of alcohol     Comment: 7 glasses wine per week      Wt Readings from Last 1 Encounters:   01/16/25 85.4 kg (188 lb 3.2 oz)        Anesthesia Evaluation   Pt has had prior anesthetic.     No history of anesthetic complications       ROS/MED HX  ENT/Pulmonary: Comment: Dysphagia   (-) sleep apnea   Neurologic:    (-) no CVA   Cardiovascular:     (+) Dyslipidemia hypertension- -  CAD -  - -                                      METS/Exercise Tolerance:     Hematologic:       Musculoskeletal:       GI/Hepatic:    (-) GERD   Renal/Genitourinary:     (+)        BPH,      Endo:     (+)  type II DM,                    Psychiatric/Substance Use:     (+) psychiatric history depression       Infectious Disease:       Malignancy:       Other:            Physical Exam    Airway        Mallampati: II   TM distance: > 3 FB   Neck ROM: full   Mouth opening: > 3 cm    Respiratory Devices and Support         Dental  no notable dental history     (+) Edentulous and Removable bridges or other hardware      Cardiovascular   cardiovascular exam normal          Pulmonary   pulmonary exam normal                OUTSIDE LABS:  CBC:   Lab Results   Component Value Date    WBC 5.7 06/23/2024    WBC 5.9 06/12/2024    HGB 11.4 (L) 06/23/2024    HGB 11.3 (L) 06/12/2024    HCT 34.1 (L) 06/23/2024    HCT 34.2 (L) 06/12/2024     06/23/2024     06/12/2024     BMP:   Lab Results   Component Value Date     06/23/2024     06/12/2024    POTASSIUM 4.6 06/23/2024    POTASSIUM 4.9 06/12/2024    CHLORIDE 100 06/23/2024    CHLORIDE 102 06/12/2024    CO2 19 (L) 06/23/2024    CO2 25 06/12/2024    BUN 21.7 06/23/2024    BUN 24.2 (H) 06/12/2024    CR 0.99 06/23/2024    CR 1.08 06/12/2024      "(H) 01/16/2025     (H) 06/23/2024     COAGS:   Lab Results   Component Value Date    INR 1.08 08/14/2016     POC:   Lab Results   Component Value Date    BGM 92 06/30/2021     HEPATIC:   Lab Results   Component Value Date    ALBUMIN 3.8 05/19/2024    PROTTOTAL 6.9 05/19/2024    ALT <5 05/19/2024    AST 19 05/19/2024    ALKPHOS 131 05/19/2024    BILITOTAL 0.3 05/19/2024    TIAN 36 05/22/2024     OTHER:   Lab Results   Component Value Date    LACT 1.1 07/07/2017    A1C 8.5 (H) 10/21/2024    ISIS 9.8 06/23/2024    LIPASE 77 07/07/2017       Anesthesia Plan    ASA Status:  3    NPO Status:  NPO Appropriate    Anesthesia Type: MAC.     - Reason for MAC: straight local not clinically adequate              Consents    Anesthesia Plan(s) and associated risks, benefits, and realistic alternatives discussed. Questions answered and patient/representative(s) expressed understanding.     - Discussed:     - Discussed with:  Patient            Postoperative Care    Pain management: Multi-modal analgesia.   PONV prophylaxis: Ondansetron (or other 5HT-3), Background Propofol Infusion     Comments:               Yelena Matos MD, MD    I have reviewed the pertinent notes and labs in the chart from the past 30 days and (re)examined the patient.  Any updates or changes from those notes are reflected in this note.                        # DMII: A1C = 8.5 % (Ref range: <5.7 %) within past 6 months    # Overweight: Estimated body mass index is 26.26 kg/m  as calculated from the following:    Height as of this encounter: 1.803 m (5' 10.98\").    Weight as of this encounter: 85.4 kg (188 lb 3.2 oz).       # Financial/Environmental Concerns:          "

## 2025-01-16 NOTE — ANESTHESIA CARE TRANSFER NOTE
Patient: Cresencio Peguero    Procedure: Procedure(s):  ESOPHAGOGASTRODUODENOSCOPY DILATATION and biopsies       Diagnosis: Dysphagia [R13.10]  Loss of appetite [R63.0]  Diagnosis Additional Information: No value filed.    Anesthesia Type:   MAC     Note:    Oropharynx: oropharynx clear of all foreign objects  Level of Consciousness: drowsy  Oxygen Supplementation: room air    Independent Airway: airway patency satisfactory and stable    Vital Signs Stable: post-procedure vital signs reviewed and stable  Report to RN Given: handoff report given  Patient transferred to: Phase II    Handoff Report: Identifed the Patient, Identified the Reponsible Provider, Reviewed the pertinent medical history, Discussed the surgical course, Reviewed Intra-OP anesthesia mangement and issues during anesthesia, Set expectations for post-procedure period and Allowed opportunity for questions and acknowledgement of understanding      Vitals:  Vitals Value Taken Time   BP     Temp     Pulse     Resp     SpO2 100 % 01/16/25 1034   Vitals shown include unfiled device data.    Electronically Signed By: MAKAYLA Santiago CRNA  January 16, 2025  10:36 AM

## 2025-01-20 LAB
PATH REPORT.COMMENTS IMP SPEC: NORMAL
PATH REPORT.FINAL DX SPEC: NORMAL
PATH REPORT.GROSS SPEC: NORMAL
PATH REPORT.MICROSCOPIC SPEC OTHER STN: NORMAL
PATH REPORT.RELEVANT HX SPEC: NORMAL
PHOTO IMAGE: NORMAL

## 2025-02-10 ENCOUNTER — TELEPHONE (OUTPATIENT)
Dept: GASTROENTEROLOGY | Facility: CLINIC | Age: 89
End: 2025-02-10
Payer: MEDICARE

## 2025-02-10 NOTE — TELEPHONE ENCOUNTER
Left Voicemail (1st Attempt) for the patient to call back and schedule the following:    Appointment type:  New  Provider: Dr. Zhou or Dr. Osorio   Return date: 2/7 or next available   Specialty phone number: [phone]  Additional appointment(s) needed:   Additonal Notes:

## 2025-02-26 ENCOUNTER — TRANSFERRED RECORDS (OUTPATIENT)
Dept: HEALTH INFORMATION MANAGEMENT | Facility: CLINIC | Age: 89
End: 2025-02-26
Payer: MEDICARE

## 2025-02-28 ENCOUNTER — APPOINTMENT (OUTPATIENT)
Dept: CT IMAGING | Facility: CLINIC | Age: 89
DRG: 871 | End: 2025-02-28
Attending: EMERGENCY MEDICINE
Payer: MEDICARE

## 2025-02-28 ENCOUNTER — HOSPITAL ENCOUNTER (INPATIENT)
Facility: CLINIC | Age: 89
LOS: 4 days | Discharge: HOME HEALTH | DRG: 871 | End: 2025-03-04
Attending: EMERGENCY MEDICINE | Admitting: HOSPITALIST
Payer: MEDICARE

## 2025-02-28 ENCOUNTER — APPOINTMENT (OUTPATIENT)
Dept: GENERAL RADIOLOGY | Facility: CLINIC | Age: 89
DRG: 871 | End: 2025-02-28
Attending: EMERGENCY MEDICINE
Payer: MEDICARE

## 2025-02-28 DIAGNOSIS — J18.9 PNEUMONIA OF BOTH LUNGS DUE TO INFECTIOUS ORGANISM, UNSPECIFIED PART OF LUNG: ICD-10-CM

## 2025-02-28 DIAGNOSIS — A41.9 SEPSIS, DUE TO UNSPECIFIED ORGANISM, UNSPECIFIED WHETHER ACUTE ORGAN DYSFUNCTION PRESENT (H): ICD-10-CM

## 2025-02-28 DIAGNOSIS — I10 PRIMARY HYPERTENSION: Primary | ICD-10-CM

## 2025-02-28 LAB
ALBUMIN SERPL BCG-MCNC: 4.2 G/DL (ref 3.5–5.2)
ALBUMIN UR-MCNC: 10 MG/DL
ALP SERPL-CCNC: 125 U/L (ref 40–150)
ALT SERPL W P-5'-P-CCNC: 20 U/L (ref 0–70)
ANION GAP SERPL CALCULATED.3IONS-SCNC: 13 MMOL/L (ref 7–15)
ANION GAP SERPL CALCULATED.3IONS-SCNC: 20 MMOL/L (ref 7–15)
APPEARANCE UR: CLEAR
AST SERPL W P-5'-P-CCNC: 36 U/L (ref 0–45)
B-OH-BUTYR SERPL-SCNC: 0.53 MMOL/L
B-OH-BUTYR SERPL-SCNC: <0.18 MMOL/L
BASE EXCESS BLDV CALC-SCNC: 2 MMOL/L (ref -3–3)
BASOPHILS # BLD AUTO: 0 10E3/UL (ref 0–0.2)
BASOPHILS NFR BLD AUTO: 0 %
BILIRUB SERPL-MCNC: 0.4 MG/DL
BILIRUB UR QL STRIP: NEGATIVE
BUN SERPL-MCNC: 34.5 MG/DL (ref 8–23)
BUN SERPL-MCNC: 39.8 MG/DL (ref 8–23)
CALCIUM SERPL-MCNC: 10 MG/DL (ref 8.8–10.4)
CALCIUM SERPL-MCNC: 8.7 MG/DL (ref 8.8–10.4)
CHLORIDE SERPL-SCNC: 103 MMOL/L (ref 98–107)
CHLORIDE SERPL-SCNC: 97 MMOL/L (ref 98–107)
COLOR UR AUTO: ABNORMAL
CREAT SERPL-MCNC: 1.11 MG/DL (ref 0.67–1.17)
CREAT SERPL-MCNC: 1.14 MG/DL (ref 0.67–1.17)
CRP SERPL-MCNC: 113.56 MG/L
EGFRCR SERPLBLD CKD-EPI 2021: 61 ML/MIN/1.73M2
EGFRCR SERPLBLD CKD-EPI 2021: 63 ML/MIN/1.73M2
EOSINOPHIL # BLD AUTO: 0 10E3/UL (ref 0–0.7)
EOSINOPHIL NFR BLD AUTO: 0 %
ERYTHROCYTE [DISTWIDTH] IN BLOOD BY AUTOMATED COUNT: 15.2 % (ref 10–15)
EST. AVERAGE GLUCOSE BLD GHB EST-MCNC: 189 MG/DL
FLUAV RNA SPEC QL NAA+PROBE: NEGATIVE
FLUBV RNA RESP QL NAA+PROBE: NEGATIVE
GLUCOSE BLDC GLUCOMTR-MCNC: 138 MG/DL (ref 70–99)
GLUCOSE BLDC GLUCOMTR-MCNC: 226 MG/DL (ref 70–99)
GLUCOSE SERPL-MCNC: 242 MG/DL (ref 70–99)
GLUCOSE SERPL-MCNC: 266 MG/DL (ref 70–99)
GLUCOSE UR STRIP-MCNC: >=1000 MG/DL
HBA1C MFR BLD: 8.2 %
HCO3 BLDV-SCNC: 27 MMOL/L (ref 21–28)
HCO3 SERPL-SCNC: 20 MMOL/L (ref 22–29)
HCO3 SERPL-SCNC: 21 MMOL/L (ref 22–29)
HCT VFR BLD AUTO: 33.2 % (ref 40–53)
HGB BLD-MCNC: 10.8 G/DL (ref 13.3–17.7)
HGB UR QL STRIP: NEGATIVE
IMM GRANULOCYTES # BLD: 0 10E3/UL
IMM GRANULOCYTES NFR BLD: 0 %
KETONES UR STRIP-MCNC: NEGATIVE MG/DL
LACTATE BLD-SCNC: 4 MMOL/L
LACTATE SERPL-SCNC: 1.9 MMOL/L (ref 0.7–2)
LEUKOCYTE ESTERASE UR QL STRIP: NEGATIVE
LYMPHOCYTES # BLD AUTO: 0.3 10E3/UL (ref 0.8–5.3)
LYMPHOCYTES NFR BLD AUTO: 3 %
MCH RBC QN AUTO: 25.7 PG (ref 26.5–33)
MCHC RBC AUTO-ENTMCNC: 32.5 G/DL (ref 31.5–36.5)
MCV RBC AUTO: 79 FL (ref 78–100)
MONOCYTES # BLD AUTO: 0.3 10E3/UL (ref 0–1.3)
MONOCYTES NFR BLD AUTO: 4 %
MUCOUS THREADS #/AREA URNS LPF: PRESENT /LPF
NEUTROPHILS # BLD AUTO: 7.9 10E3/UL (ref 1.6–8.3)
NEUTROPHILS NFR BLD AUTO: 93 %
NITRATE UR QL: NEGATIVE
NRBC # BLD AUTO: 0 10E3/UL
NRBC BLD AUTO-RTO: 0 /100
NT-PROBNP SERPL-MCNC: 329 PG/ML (ref 0–1800)
PCO2 BLDV: 43 MM HG (ref 40–50)
PH BLDV: 7.41 [PH] (ref 7.32–7.43)
PH UR STRIP: 5 [PH] (ref 5–7)
PLATELET # BLD AUTO: 146 10E3/UL (ref 150–450)
PO2 BLDV: 20 MM HG (ref 25–47)
POTASSIUM SERPL-SCNC: 4.4 MMOL/L (ref 3.4–5.3)
POTASSIUM SERPL-SCNC: 4.9 MMOL/L (ref 3.4–5.3)
PROCALCITONIN SERPL IA-MCNC: 0.31 NG/ML
PROT SERPL-MCNC: 7.7 G/DL (ref 6.4–8.3)
RBC # BLD AUTO: 4.21 10E6/UL (ref 4.4–5.9)
RBC URINE: 0 /HPF
RSV RNA SPEC NAA+PROBE: NEGATIVE
SAO2 % BLDV: 33 % (ref 70–75)
SARS-COV-2 RNA RESP QL NAA+PROBE: NEGATIVE
SODIUM SERPL-SCNC: 137 MMOL/L (ref 135–145)
SODIUM SERPL-SCNC: 137 MMOL/L (ref 135–145)
SP GR UR STRIP: 1.02 (ref 1–1.03)
SQUAMOUS EPITHELIAL: <1 /HPF
TROPONIN T SERPL HS-MCNC: 66 NG/L
TROPONIN T SERPL HS-MCNC: 74 NG/L
UROBILINOGEN UR STRIP-MCNC: NORMAL MG/DL
WBC # BLD AUTO: 8.5 10E3/UL (ref 4–11)
WBC URINE: 0 /HPF

## 2025-02-28 PROCEDURE — 84484 ASSAY OF TROPONIN QUANT: CPT | Performed by: EMERGENCY MEDICINE

## 2025-02-28 PROCEDURE — 84145 PROCALCITONIN (PCT): CPT | Performed by: EMERGENCY MEDICINE

## 2025-02-28 PROCEDURE — 82010 KETONE BODYS QUAN: CPT | Performed by: EMERGENCY MEDICINE

## 2025-02-28 PROCEDURE — 250N000013 HC RX MED GY IP 250 OP 250 PS 637: Performed by: EMERGENCY MEDICINE

## 2025-02-28 PROCEDURE — 71046 X-RAY EXAM CHEST 2 VIEWS: CPT

## 2025-02-28 PROCEDURE — 36415 COLL VENOUS BLD VENIPUNCTURE: CPT

## 2025-02-28 PROCEDURE — 80053 COMPREHEN METABOLIC PANEL: CPT | Performed by: EMERGENCY MEDICINE

## 2025-02-28 PROCEDURE — 71275 CT ANGIOGRAPHY CHEST: CPT

## 2025-02-28 PROCEDURE — 258N000003 HC RX IP 258 OP 636: Performed by: EMERGENCY MEDICINE

## 2025-02-28 PROCEDURE — 87637 SARSCOV2&INF A&B&RSV AMP PRB: CPT | Performed by: EMERGENCY MEDICINE

## 2025-02-28 PROCEDURE — 82435 ASSAY OF BLOOD CHLORIDE: CPT | Performed by: EMERGENCY MEDICINE

## 2025-02-28 PROCEDURE — 250N000011 HC RX IP 250 OP 636: Performed by: EMERGENCY MEDICINE

## 2025-02-28 PROCEDURE — 99285 EMERGENCY DEPT VISIT HI MDM: CPT | Mod: 25

## 2025-02-28 PROCEDURE — 86140 C-REACTIVE PROTEIN: CPT | Performed by: EMERGENCY MEDICINE

## 2025-02-28 PROCEDURE — 250N000012 HC RX MED GY IP 250 OP 636 PS 637: Performed by: EMERGENCY MEDICINE

## 2025-02-28 PROCEDURE — 250N000013 HC RX MED GY IP 250 OP 250 PS 637: Performed by: HOSPITALIST

## 2025-02-28 PROCEDURE — 36415 COLL VENOUS BLD VENIPUNCTURE: CPT | Performed by: HOSPITALIST

## 2025-02-28 PROCEDURE — 83605 ASSAY OF LACTIC ACID: CPT

## 2025-02-28 PROCEDURE — 85014 HEMATOCRIT: CPT | Performed by: EMERGENCY MEDICINE

## 2025-02-28 PROCEDURE — 96365 THER/PROPH/DIAG IV INF INIT: CPT | Mod: 59

## 2025-02-28 PROCEDURE — 250N000009 HC RX 250: Performed by: EMERGENCY MEDICINE

## 2025-02-28 PROCEDURE — 99223 1ST HOSP IP/OBS HIGH 75: CPT | Mod: AI | Performed by: HOSPITALIST

## 2025-02-28 PROCEDURE — 83036 HEMOGLOBIN GLYCOSYLATED A1C: CPT | Performed by: HOSPITALIST

## 2025-02-28 PROCEDURE — 82947 ASSAY GLUCOSE BLOOD QUANT: CPT | Performed by: EMERGENCY MEDICINE

## 2025-02-28 PROCEDURE — 83880 ASSAY OF NATRIURETIC PEPTIDE: CPT | Performed by: EMERGENCY MEDICINE

## 2025-02-28 PROCEDURE — 87040 BLOOD CULTURE FOR BACTERIA: CPT | Performed by: EMERGENCY MEDICINE

## 2025-02-28 PROCEDURE — 82247 BILIRUBIN TOTAL: CPT | Performed by: EMERGENCY MEDICINE

## 2025-02-28 PROCEDURE — 120N000001 HC R&B MED SURG/OB

## 2025-02-28 PROCEDURE — 81001 URINALYSIS AUTO W/SCOPE: CPT | Performed by: EMERGENCY MEDICINE

## 2025-02-28 PROCEDURE — 82803 BLOOD GASES ANY COMBINATION: CPT

## 2025-02-28 PROCEDURE — 82010 KETONE BODYS QUAN: CPT | Performed by: HOSPITALIST

## 2025-02-28 PROCEDURE — 36415 COLL VENOUS BLD VENIPUNCTURE: CPT | Performed by: EMERGENCY MEDICINE

## 2025-02-28 PROCEDURE — 93005 ELECTROCARDIOGRAM TRACING: CPT

## 2025-02-28 PROCEDURE — 250N000011 HC RX IP 250 OP 636: Performed by: HOSPITALIST

## 2025-02-28 PROCEDURE — 258N000003 HC RX IP 258 OP 636: Performed by: HOSPITALIST

## 2025-02-28 PROCEDURE — 99358 PROLONG SERVICE W/O CONTACT: CPT | Performed by: PAIN MEDICINE

## 2025-02-28 PROCEDURE — 85025 COMPLETE CBC W/AUTO DIFF WBC: CPT | Performed by: EMERGENCY MEDICINE

## 2025-02-28 PROCEDURE — 96366 THER/PROPH/DIAG IV INF ADDON: CPT | Mod: 59

## 2025-02-28 RX ORDER — ONDANSETRON 4 MG/1
4 TABLET, ORALLY DISINTEGRATING ORAL EVERY 6 HOURS PRN
Status: DISCONTINUED | OUTPATIENT
Start: 2025-02-28 | End: 2025-03-04 | Stop reason: HOSPADM

## 2025-02-28 RX ORDER — AMOXICILLIN 250 MG
1 CAPSULE ORAL 2 TIMES DAILY PRN
Status: DISCONTINUED | OUTPATIENT
Start: 2025-02-28 | End: 2025-03-04 | Stop reason: HOSPADM

## 2025-02-28 RX ORDER — LIDOCAINE 4 G/G
1 PATCH TOPICAL EVERY 24 HOURS
Status: DISCONTINUED | OUTPATIENT
Start: 2025-02-28 | End: 2025-03-04 | Stop reason: HOSPADM

## 2025-02-28 RX ORDER — CARBOXYMETHYLCELLULOSE SODIUM 5 MG/ML
1 SOLUTION/ DROPS OPHTHALMIC 3 TIMES DAILY
Status: DISCONTINUED | OUTPATIENT
Start: 2025-02-28 | End: 2025-03-04 | Stop reason: HOSPADM

## 2025-02-28 RX ORDER — ACETAMINOPHEN 325 MG/1
650 TABLET ORAL ONCE
Status: COMPLETED | OUTPATIENT
Start: 2025-02-28 | End: 2025-02-28

## 2025-02-28 RX ORDER — NALOXONE HYDROCHLORIDE 0.4 MG/ML
0.2 INJECTION, SOLUTION INTRAMUSCULAR; INTRAVENOUS; SUBCUTANEOUS
Status: DISCONTINUED | OUTPATIENT
Start: 2025-02-28 | End: 2025-03-04 | Stop reason: HOSPADM

## 2025-02-28 RX ORDER — PANTOPRAZOLE SODIUM 40 MG/1
40 TABLET, DELAYED RELEASE ORAL EVERY MORNING
Status: DISCONTINUED | OUTPATIENT
Start: 2025-03-01 | End: 2025-03-04 | Stop reason: HOSPADM

## 2025-02-28 RX ORDER — MIRTAZAPINE 7.5 MG/1
7.5 TABLET, FILM COATED ORAL AT BEDTIME
Status: DISCONTINUED | OUTPATIENT
Start: 2025-02-28 | End: 2025-03-04 | Stop reason: HOSPADM

## 2025-02-28 RX ORDER — PROCHLORPERAZINE MALEATE 5 MG/1
5 TABLET ORAL EVERY 6 HOURS PRN
Status: DISCONTINUED | OUTPATIENT
Start: 2025-02-28 | End: 2025-02-28

## 2025-02-28 RX ORDER — PIPERACILLIN SODIUM, TAZOBACTAM SODIUM 4; .5 G/20ML; G/20ML
4.5 INJECTION, POWDER, LYOPHILIZED, FOR SOLUTION INTRAVENOUS ONCE
Status: COMPLETED | OUTPATIENT
Start: 2025-02-28 | End: 2025-02-28

## 2025-02-28 RX ORDER — LACTULOSE 10 G/15ML
20 SOLUTION ORAL 2 TIMES DAILY
Status: DISCONTINUED | OUTPATIENT
Start: 2025-02-28 | End: 2025-03-04 | Stop reason: HOSPADM

## 2025-02-28 RX ORDER — CARBIDOPA AND LEVODOPA 25; 100 MG/1; MG/1
2 TABLET ORAL 3 TIMES DAILY
Status: DISCONTINUED | OUTPATIENT
Start: 2025-02-28 | End: 2025-03-04 | Stop reason: HOSPADM

## 2025-02-28 RX ORDER — ATORVASTATIN CALCIUM 10 MG/1
10 TABLET, FILM COATED ORAL EVERY EVENING
Status: DISCONTINUED | OUTPATIENT
Start: 2025-02-28 | End: 2025-03-04 | Stop reason: HOSPADM

## 2025-02-28 RX ORDER — OMEPRAZOLE 40 MG/1
40 CAPSULE, DELAYED RELEASE ORAL EVERY MORNING
COMMUNITY

## 2025-02-28 RX ORDER — GABAPENTIN 300 MG/1
1200 CAPSULE ORAL EVERY EVENING
Status: DISCONTINUED | OUTPATIENT
Start: 2025-02-28 | End: 2025-03-04 | Stop reason: HOSPADM

## 2025-02-28 RX ORDER — IOPAMIDOL 755 MG/ML
70 INJECTION, SOLUTION INTRAVASCULAR ONCE
Status: DISCONTINUED | OUTPATIENT
Start: 2025-02-28 | End: 2025-02-28

## 2025-02-28 RX ORDER — NALOXONE HYDROCHLORIDE 0.4 MG/ML
0.4 INJECTION, SOLUTION INTRAMUSCULAR; INTRAVENOUS; SUBCUTANEOUS
Status: DISCONTINUED | OUTPATIENT
Start: 2025-02-28 | End: 2025-03-04 | Stop reason: HOSPADM

## 2025-02-28 RX ORDER — PIPERACILLIN SODIUM, TAZOBACTAM SODIUM 4; .5 G/20ML; G/20ML
4.5 INJECTION, POWDER, LYOPHILIZED, FOR SOLUTION INTRAVENOUS EVERY 6 HOURS
Status: DISCONTINUED | OUTPATIENT
Start: 2025-02-28 | End: 2025-03-03

## 2025-02-28 RX ORDER — ACETAMINOPHEN 325 MG/1
650 TABLET ORAL EVERY 4 HOURS PRN
Status: DISCONTINUED | OUTPATIENT
Start: 2025-02-28 | End: 2025-03-04 | Stop reason: HOSPADM

## 2025-02-28 RX ORDER — SERTRALINE HYDROCHLORIDE 100 MG/1
100 TABLET, FILM COATED ORAL EVERY MORNING
Status: DISCONTINUED | OUTPATIENT
Start: 2025-03-01 | End: 2025-03-04 | Stop reason: HOSPADM

## 2025-02-28 RX ORDER — LORAZEPAM 2 MG/ML
0.5 INJECTION INTRAMUSCULAR ONCE
Status: COMPLETED | OUTPATIENT
Start: 2025-02-28 | End: 2025-02-28

## 2025-02-28 RX ORDER — SENNOSIDES 8.6 MG
1 TABLET ORAL 2 TIMES DAILY
Status: DISCONTINUED | OUTPATIENT
Start: 2025-02-28 | End: 2025-03-04 | Stop reason: HOSPADM

## 2025-02-28 RX ORDER — AMOXICILLIN 250 MG
2 CAPSULE ORAL 2 TIMES DAILY PRN
Status: DISCONTINUED | OUTPATIENT
Start: 2025-02-28 | End: 2025-03-04 | Stop reason: HOSPADM

## 2025-02-28 RX ORDER — NICOTINE POLACRILEX 4 MG
15-30 LOZENGE BUCCAL
Status: DISCONTINUED | OUTPATIENT
Start: 2025-02-28 | End: 2025-03-04 | Stop reason: HOSPADM

## 2025-02-28 RX ORDER — SODIUM CHLORIDE 9 MG/ML
INJECTION, SOLUTION INTRAVENOUS CONTINUOUS
Status: DISCONTINUED | OUTPATIENT
Start: 2025-02-28 | End: 2025-03-02

## 2025-02-28 RX ORDER — GABAPENTIN 300 MG/1
600 CAPSULE ORAL EVERY MORNING
Status: DISCONTINUED | OUTPATIENT
Start: 2025-03-01 | End: 2025-03-04 | Stop reason: HOSPADM

## 2025-02-28 RX ORDER — ONDANSETRON 2 MG/ML
4 INJECTION INTRAMUSCULAR; INTRAVENOUS EVERY 6 HOURS PRN
Status: DISCONTINUED | OUTPATIENT
Start: 2025-02-28 | End: 2025-03-04 | Stop reason: HOSPADM

## 2025-02-28 RX ORDER — ACETAMINOPHEN 650 MG/1
650 SUPPOSITORY RECTAL EVERY 4 HOURS PRN
Status: DISCONTINUED | OUTPATIENT
Start: 2025-02-28 | End: 2025-03-04 | Stop reason: HOSPADM

## 2025-02-28 RX ORDER — CALCIUM CARBONATE 500 MG/1
1000 TABLET, CHEWABLE ORAL 4 TIMES DAILY PRN
Status: DISCONTINUED | OUTPATIENT
Start: 2025-02-28 | End: 2025-03-04 | Stop reason: HOSPADM

## 2025-02-28 RX ORDER — LIDOCAINE 40 MG/G
CREAM TOPICAL
Status: DISCONTINUED | OUTPATIENT
Start: 2025-02-28 | End: 2025-03-04 | Stop reason: HOSPADM

## 2025-02-28 RX ORDER — CARBOXYMETHYLCELLULOSE SODIUM 5 MG/ML
1 SOLUTION/ DROPS OPHTHALMIC 3 TIMES DAILY
COMMUNITY

## 2025-02-28 RX ORDER — DEXTROSE MONOHYDRATE 25 G/50ML
25-50 INJECTION, SOLUTION INTRAVENOUS
Status: DISCONTINUED | OUTPATIENT
Start: 2025-02-28 | End: 2025-03-04 | Stop reason: HOSPADM

## 2025-02-28 RX ORDER — DULOXETIN HYDROCHLORIDE 60 MG/1
60 CAPSULE, DELAYED RELEASE ORAL DAILY
Status: DISCONTINUED | OUTPATIENT
Start: 2025-03-01 | End: 2025-03-04 | Stop reason: HOSPADM

## 2025-02-28 RX ORDER — LACTULOSE 10 G/15ML
30 SOLUTION ORAL DAILY PRN
COMMUNITY

## 2025-02-28 RX ORDER — ACETAMINOPHEN 500 MG
1000 TABLET ORAL 3 TIMES DAILY PRN
COMMUNITY

## 2025-02-28 RX ORDER — IOPAMIDOL 755 MG/ML
70 INJECTION, SOLUTION INTRAVASCULAR ONCE
Status: COMPLETED | OUTPATIENT
Start: 2025-02-28 | End: 2025-02-28

## 2025-02-28 RX ADMIN — MIRTAZAPINE 7.5 MG: 7.5 TABLET, FILM COATED ORAL at 22:38

## 2025-02-28 RX ADMIN — SODIUM CHLORIDE 1000 ML: 0.9 INJECTION, SOLUTION INTRAVENOUS at 11:06

## 2025-02-28 RX ADMIN — DICLOFENAC 2 G: 10 GEL TOPICAL at 22:43

## 2025-02-28 RX ADMIN — GABAPENTIN 1200 MG: 300 CAPSULE ORAL at 20:24

## 2025-02-28 RX ADMIN — CARBIDOPA AND LEVODOPA 2 TABLET: 25; 100 TABLET ORAL at 20:25

## 2025-02-28 RX ADMIN — IOPAMIDOL 70 ML: 755 INJECTION, SOLUTION INTRAVENOUS at 14:11

## 2025-02-28 RX ADMIN — SODIUM CHLORIDE: 0.9 INJECTION, SOLUTION INTRAVENOUS at 18:09

## 2025-02-28 RX ADMIN — LIDOCAINE 1 PATCH: 4 PATCH TOPICAL at 18:15

## 2025-02-28 RX ADMIN — CARBIDOPA AND LEVODOPA 2 TABLET: 25; 100 TABLET ORAL at 15:51

## 2025-02-28 RX ADMIN — PIPERACILLIN AND TAZOBACTAM 4.5 G: 4; .5 INJECTION, POWDER, FOR SOLUTION INTRAVENOUS at 11:10

## 2025-02-28 RX ADMIN — ATORVASTATIN CALCIUM 10 MG: 10 TABLET, FILM COATED ORAL at 20:26

## 2025-02-28 RX ADMIN — PIPERACILLIN AND TAZOBACTAM 4.5 G: 4; .5 INJECTION, POWDER, FOR SOLUTION INTRAVENOUS at 18:12

## 2025-02-28 RX ADMIN — PSYLLIUM HUSK 1 PACKET: 3.4 POWDER ORAL at 18:17

## 2025-02-28 RX ADMIN — SENNOSIDES 1 TABLET: 8.6 TABLET ORAL at 20:25

## 2025-02-28 RX ADMIN — INSULIN ASPART 8 UNITS: 100 INJECTION, SOLUTION INTRAVENOUS; SUBCUTANEOUS at 14:32

## 2025-02-28 RX ADMIN — CARBOXYMETHYLCELLULOSE SODIUM 1 DROP: 5 SOLUTION/ DROPS OPHTHALMIC at 22:38

## 2025-02-28 RX ADMIN — SODIUM CHLORIDE 1000 ML: 0.9 INJECTION, SOLUTION INTRAVENOUS at 13:56

## 2025-02-28 RX ADMIN — CARBOXYMETHYLCELLULOSE SODIUM 1 DROP: 5 SOLUTION/ DROPS OPHTHALMIC at 18:31

## 2025-02-28 RX ADMIN — LORAZEPAM 0.5 MG: 2 INJECTION INTRAMUSCULAR; INTRAVENOUS at 13:42

## 2025-02-28 RX ADMIN — ACETAMINOPHEN 650 MG: 325 TABLET, FILM COATED ORAL at 11:10

## 2025-02-28 RX ADMIN — LACTULOSE 20 G: 20 SOLUTION ORAL at 20:24

## 2025-02-28 RX ADMIN — SODIUM CHLORIDE 94 ML: 9 INJECTION, SOLUTION INTRAVENOUS at 14:11

## 2025-02-28 RX ADMIN — SODIUM CHLORIDE 2562 ML: 9 INJECTION, SOLUTION INTRAVENOUS at 15:50

## 2025-02-28 ASSESSMENT — ACTIVITIES OF DAILY LIVING (ADL)
ADLS_ACUITY_SCORE: 54
ADLS_ACUITY_SCORE: 66
ADLS_ACUITY_SCORE: 54
ADLS_ACUITY_SCORE: 54
ADLS_ACUITY_SCORE: 66
ADLS_ACUITY_SCORE: 66
ADLS_ACUITY_SCORE: 54
ADLS_ACUITY_SCORE: 66
ADLS_ACUITY_SCORE: 54
ADLS_ACUITY_SCORE: 54
ADLS_ACUITY_SCORE: 66
ADLS_ACUITY_SCORE: 66
ADLS_ACUITY_SCORE: 54

## 2025-02-28 NOTE — CONSULTS
[facility] ACUTE INPATIENT PAIN SERVICE    [facility], [facility], [facility], [facility], [place]   PAIN Telemedicine Consult        ASSESSMENT/ PLAN:  Chronic neck pain with IT pump (recently filled), admitted with pneumonia and sepsis/hypoxia. Pain team will continue to follow.   Multimodal Medication Therapy:    Adjuvants:  suggest tylenol and lidocaine cream to neck, if pt is sedate -hold gabapentin   Opioids:  Do not suggest any additional system opioids other than pump  Intrathecal pump placed at [facility]  - doses confirmed via med rec pharmacist   Fentanyl - Conc: 625 mcg/mL - Total Dose / 24 hours: 425 mcg   Bupivacaine - Conc: 25 mg/mL - Total Dose / 24 hours: 17 mg   Morphine - Conc: 0.1 mg/mL - total dose / 24 hours: 0.068 mg   Non-medication interventions- Ice, PT  Constipation Prophylaxis- TBD   Diagnostics & Referrals: Will need IT pump interrogation (pump was filled 1 week ago & does not need to be refilled again until 3/20/2025)   Follow up /Discharge Recommendations - We recommend prescribing the following at the time of discharge:  With [facility] & if there are pump problems over the weekend you may call [employer] at [phone]      Subjective:    Ipad not working in the ED. Called [facility] to confirm. Left voicemail. Call placed to [employer] as well. Discussed with RN and Pain pharmacist Sherron. Call placed to patient. Discussed with son Adria. Pt is calm, sleeping, has O2 on. Does not wear O2 at home. Had a fever of 102.5 at home. General myalgias.     HPI:  Cresencio Peguero is a 89 year old male who was admitted on 2/28/2025.  I was asked by ED team to see the patient for pain with IT pump (cath tip at T7). Admitted for pneumonia. Imaging indicated multifocal pneumonia.  History of CAD, DM, HTN, HLD, tobacco use.    shows gabapentin.     Drug # 1: Fentanyl (Sublimaze) - Conc:625 mcg/mL - Total Dose / 24 hours: 376.54 mcg   Drug # 2: Bupivacaine (Marcaine)  - Conc:25 mg/mL - Total Dose / 24  hours: 15.062 mg   Drug #3:  Morphine - new drug for him as of 2/26  Outside Clinic & Provider: [facility] [phone]   Pump : [employer] Synchromed II pain pump         PDMP RESULTS:     Filled  Drug  QTY    02/06/2025 Gabapentin 300 Mg Capsule 2.00   01/31/2025 Gabapentin 300 Mg Capsule 168.00   01/29/2025 Gabapentin 300 Mg Capsule 168.00   01/12/2025 Gabapentin 300 Mg Capsule 2.00         Evie Venegas APRN, CNS-BC, CNP  Acute Care Pain Management Program   Hours of pain coverage Mon-Fri 8-1600, afterhours please call the house officer   Yibailin (WW, LATONYAs, SD, RH)   Page via Vocera text web console -Click for Tilt          Pain APRN review of chart and Prolonged evaluation and management service before and/or after direct patient care. Medical necessity due to complex pain history, and patient is with new pain consult. Content of the non-face-to-face  time spent included: chart review, discussion with pharmacist (Sherron), and medication review,  check, also discussed with son. Checked labs CrtCl. Reviewed consults from medicine MD.   Face to face on 3/2/2025    Total time: 45 minutes spent in review of patient chart.

## 2025-02-28 NOTE — ED NOTES
[facility]  ED Nurse Handoff Report    ED Chief complaint: Shortness of Breath      ED Diagnosis:   Final diagnoses:   Sepsis, due to unspecified organism, unspecified whether acute organ dysfunction present (H)   Possible Pneumonia of both lungs due to infectious organism, unspecified part of lung       Code Status: to be discussed    Allergies:   Allergies   Allergen Reactions    Hydrocodone Itching     Constipation    Aspirin Itching    Contrast Dye      LW CM1: >>> NO CONTRAST ADVERSE REACTION <<<     Reaction :    Food      LW FI1: nka LW FI2: nka    Lisinopril Other (See Comments) and GI Disturbance     constipation    Oxycodone Itching    Percodan [Oxycodone-Aspirin] Rash       Patient Story: Cresencio Peguero is an 89 year old male with a history of coronary artery disease, diabetes mellitus, hypertension, hyperlipidemia and tobacco use disorder who was brought in by EMS from home for evaluation of shortness of breath.  He started experiencing a cough, fevers, headache, body aches, shortness of breath and generalized weakness yesterday and into today.  His family called EMS today due to worsening symptoms. Upon EMS arrival, he was hypoxic at approximately 80% on RA. It was brought up to 95% on 4-6L of oxygen. Blood sugar was 170. Here in the ED, he complains of the above mentioned symptoms and reports some worsening of his chronic neck pain.  He also does have some pain radiating down his left arm as well as numbness of the extremities. He denies any fall or head injury. He denies any chest pain, abdominal pain or any bowel or bladder difficulty, and no new urinary symptoms. Family reports Cresencio had a morphine pump placed in his right lower back 10 days ago, and they are worried if he is having any allergic reaction.   Focused Assessment:  BUSH    Treatments and/or interventions provided:   Medications   sodium chloride 0.9% BOLUS 1,000 mL (1,000 mLs Intravenous $New Bag 2/28/25 4569)    acetaminophen (TYLENOL) tablet 650 mg (650 mg Oral $Given 2/28/25 1110)   piperacillin-tazobactam (ZOSYN) 4.5 g vial to attach to  mL bag (4.5 g Intravenous $New Bag 2/28/25 1110)      Abnormal Labs Resulted from Time of ED Arrival to Time of ED Departure   COMPREHENSIVE METABOLIC PANEL - Abnormal       Result Value    Sodium 137      Potassium 4.9      Carbon Dioxide (CO2) 20 (*)     Anion Gap 20 (*)     Urea Nitrogen 39.8 (*)     Creatinine 1.11      GFR Estimate 63      Calcium 10.0      Chloride 97 (*)     Glucose 266 (*)     Alkaline Phosphatase 125      AST 36      ALT 20      Protein Total 7.7      Albumin 4.2      Bilirubin Total 0.4     TROPONIN T, HIGH SENSITIVITY - Abnormal    Troponin T, High Sensitivity 66 (*)    ROUTINE UA WITH MICROSCOPIC REFLEX TO CULTURE - Abnormal    Color Urine Light Yellow      Appearance Urine Clear      Glucose Urine >=1000 (*)     Bilirubin Urine Negative      Ketones Urine Negative      Specific Gravity Urine 1.022      Blood Urine Negative      pH Urine 5.0      Protein Albumin Urine 10 (*)     Urobilinogen Urine Normal      Nitrite Urine Negative      Leukocyte Esterase Urine Negative      Mucus Urine Present (*)     RBC Urine 0      WBC Urine 0      Squamous Epithelials Urine <1     CRP INFLAMMATION - Abnormal    CRP Inflammation 113.56 (*)    CBC WITH PLATELETS AND DIFFERENTIAL - Abnormal    WBC Count 8.5      RBC Count 4.21 (*)     Hemoglobin 10.8 (*)     Hematocrit 33.2 (*)     MCV 79      MCH 25.7 (*)     MCHC 32.5      RDW 15.2 (*)     Platelet Count 146 (*)     % Neutrophils 93      % Lymphocytes 3      % Monocytes 4      % Eosinophils 0      % Basophils 0      % Immature Granulocytes 0      NRBCs per 100 WBC 0      Absolute Neutrophils 7.9      Absolute Lymphocytes 0.3 (*)     Absolute Monocytes 0.3      Absolute Eosinophils 0.0      Absolute Basophils 0.0      Absolute Immature Granulocytes 0.0      Absolute NRBCs 0.0     ISTAT GASES LACTATE VENOUS POCT -  Abnormal    Lactic Acid POCT 4.0 (*)     Bicarbonate Venous POCT 27      O2 Sat, Venous POCT 33 (*)     pCO2 Venous POCT 43      pH Venous POCT 7.41      pO2 Venous POCT 20 (*)     Base Excess/Deficit (+/-) POCT 2.0     KETONE BETA-HYDROXYBUTYRATE QUANTITATIVE, RAPID - Abnormal    Ketone (Beta-Hydroxybutyrate) Quantitative 0.53 (*)       Patient's response to treatments and/or interventions: good    To be done/followed up on inpatient unit:  na    Does this patient have any cognitive concerns?: Forgetful    Activity level - Baseline/Home:  Walker  Activity Level - Current:   Total Care    Patient's Preferred language: English   Needed?: No    Isolation: None  Infection: Not Applicable  Patient tested for COVID 19 prior to admission: YES  Bariatric?: No    Vital Signs:   Vitals:    02/28/25 1043 02/28/25 1051 02/28/25 1237   BP:  (!) 199/141    Pulse:  89 96   Resp:  18 27   Temp:  (!) 102.5  F (39.2  C)    TempSrc:  Axillary    SpO2: (!) 86% (!) 88% 93%       Cardiac Rhythm:     Was the PSS-3 completed:   Yes  What interventions are required if any?               Family Comments: na  OBS brochure/video discussed/provided to patient/family: Yes              Name of person given brochure if not patient: na              Relationship to patient:     For the majority of the shift this patient's behavior was Green.   Behavioral interventions performed were na.    ED NURSE PHONE NUMBER:

## 2025-02-28 NOTE — PROGRESS NOTES
Arrived from Emergency department.  Oriented to self and place, somnolent.  Oriented to room/unit.  Pt's son at the bedside.

## 2025-02-28 NOTE — H&P
[facility]    History and Physical - Hospitalist Service       Date of Admission:  2/28/2025    Assessment & Plan      Cresencio Peguero is a 89 year old male with a history of hypertension, hyperlipidemia, coronary artery disease, diabetes mellitus type 2, Parkinson's disease, neuropathy, sensory ataxia, orthostatic hypotension, chronic pain, GERD, and depression who presented to the ED for evaluation of shortness of breath, cough, fever, headache, body aches, and generalized weakness.  Evaluation in the ED was concerning for sepsis due to pneumonia.  He was given IV fluids and IV antibiotics.  The hospitalist service was contacted to admit him for further evaluation and management.    Community-acquired pneumonia  Sepsis  Lactic acidosis  Presented with fevers, cough, shortness of breath, headache, body aches and generalized weakness that began within the last 24 hours prior to admission.  Febrile with temp of 102.5.  WBC within normal limits.  Tachypneic.  Hypertensive initially, then briefly hypotensive which resolved with IV fluids.  CXR concerning for pneumonia.  CT chest negative for PE but did show multifocal pneumonia.  COVID/Influenza/RSV PCR testing was negative.  Hypoxic requiring 2 lpm of supplemental oxygen.  Admit to inpatient.  Started on Zosyn in the ED, continue the same for now.  Blood cultures obtained in the ED, follow up on results.  Continue supplemental oxygen, wean as tolerated.  Encourage incentive spirometry.  Lactic acidosis resolved with IV fluids.  Continue IV fluids.  Recheck labs in AM.    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  Troponin 66 --> 74.  Chest imaging as noted above.  No EKG has been done yet.  Suspect elevated troponin related to acute illness as noted above rather than acute cardiac process.  Obtain EKG.  Telemetry.  Repeat troponin.  Monitor for symptoms.  Continue PTA atorvastatin.  He was not on an  anti-platelet agent prior to admission.    Hypertension  History of orthostatic hypotension  Previously on midodrine, recently discontinued by his neurologist due to elevated blood pressures.  Blood pressure labile in the ED.  Monitor blood pressure with routine vitals, can check orthostatic vitals if needed.    Hyperlipidemia  Continue PTA atorvastatin.    Diabetes mellitus, type 2  Hemoglobin A1c 8.5% on 10/21/24.  HOLD PTA metformin and Jardiance.  Monitor blood sugars and use sliding scale NovoLog as indicated.  Moderate carbohydrate diet.  Monitor for hypoglycemia.    Parkinson's disease  Follows with [facility] of neurology as an outpatient.  Continue PTA Sinemet.    Neuropathy  Continue PTA gabapentin.    Chronic pain  Follows with [facility] as an outpatient.  Recently had intrathecal pain pump refilled and had medications adjusted with addition of morphine.  Continue pain pump per PTA orders.  Continue PTA gabapentin, lidocaine patch, and diclofenac gel.  Consult pain management service, appreciate their assistance.  Addendum:  Discussed with pain management service and [facility] pain clinic.  More drowsy now, working to coordinate decreased the dose from his pain pump.  Appreciate their assistance.    GERD  Continue PTA omeprazole.    Depression  Continue PTA duloxetine, sertraline, and mirtazapine.    Constipation  Continue PTA bowel regimen.          Diet:  Mod carb diet  DVT Prophylaxis: Pneumatic Compression Devices  Henderson Catheter: Not present  Lines: None     Cardiac Monitoring: None  Code Status:  DNR/DNI per discussion with patient and his son in the ED today    Clinically Significant Risk Factors Present on Admission          # Hypochloremia: Lowest Cl = 97 mmol/L in last 2 days, will monitor as appropriate     # Anion Gap Metabolic Acidosis: Highest Anion Gap = 20 mmol/L in last 2 days, will monitor and treat as appropriate           # Anemia: based on hgb <11      # DMII: A1C = N/A within past 6  months        # Financial/Environmental Concerns:           Disposition Plan     Medically Ready for Discharge: Anticipated in 2-4 Days           Eyal Berry MD  Hospitalist Service  [facility]  Securely message with [employer] (more info)  Text page via [employer] Paging/Directory     ______________________________________________________________________    Chief Complaint   Shortness of breath, cough, fever, body aches, generalized weakness    History is obtained from the patient and his son    History of Present Illness   Cresencio Peguero is a 89 year old male with a history of hypertension, hyperlipidemia, coronary artery disease, diabetes mellitus type 2, Parkinson's disease, neuropathy, sensory ataxia, orthostatic hypotension, chronic pain, GERD, and depression who presented to the ED for evaluation of shortness of breath, cough, fever, headache, body aches, and generalized weakness.  He is very hard of hearing but does answer questions appropriately.  His son was in the ED with him and helped provide some of the history.  This past Wednesday he had his pain pump refilled and morphine was added in addition to fentanyl and bupivacaine.  He felt pretty good yesterday during the day however this morning he developed a fever, shortness of breath, cough, headache, body aches, and generalized weakness.  Family called EMS and he was found to be hypoxic at 80% on room air.  He was brought into the ED for further evaluation.    In the ED he was evaluated by Dr. Jacob.  He was hypoxic, hypertensive, and febrile with a temperature of 102.5.  Labs were notable for a CRP of 113, glucose 266, lactic acid 4.0, procalcitonin 0.3, WBC 8.5, troponin 66.  UA was not suggestive of infection.  Blood cultures were obtained and are pending.  Chest x-ray showed bilateral patchy airspace and interstitial opacities.  CT chest was negative for pulmonary embolism but did show multifocal pneumonia.  He was  given IV fluids and IV Zosyn.  Blood pressure trended down and he was actually hypotensive for short period, improved with IV fluids.  Hospitalist service was contacted to admit him for further evaluation and management.    He has some chronic pain, mainly in his low back and lower extremities.  Follows with [facility] as an outpatient.  Pain pump was refilled and medications were adjusted on Wednesday as noted above.  He was previously on midodrine for orthostatic hypotension, this was discontinued by his neurologist about a week ago.  No other changes to his medications recently.  No recent sick contacts.  He denies any chest pain or palpitations.  No urinary symptoms.    Past Medical History    Past Medical History:   Diagnosis Date    Anxiety     Arthritis     CAD (coronary artery disease)     HX of stent,  CABG x2    Diabetes (H)     Hearing loss     Hyperlipidemia     Hypertension     Neuropathy     Sensory ataxia     Stented coronary artery      Past Surgical History   Past Surgical History:   Procedure Laterality Date    BACK SURGERY      cervical laminectomy    BACK SURGERY      lumbar laminectomy    CARDIAC SURGERY      Stent,  CABGx2    ESOPHAGOSCOPY, GASTROSCOPY, DUODENOSCOPY (EGD), COMBINED N/A 5/5/2023    Procedure: Esophagoscopy, gastroscopy, duodenoscopy (EGD), combined;  Surgeon: Jeovanny Rizo MD;  Location:  GI    ESOPHAGOSCOPY, GASTROSCOPY, DUODENOSCOPY (EGD), DILATATION, COMBINED N/A 1/16/2025    Procedure: ESOPHAGOGASTRODUODENOSCOPY DILATATION and biopsies;  Surgeon: Benedict Patricia MD;  Location:  OR    EYE SURGERY      cataract    EYE SURGERY      corneal surgery    HERNIORRHAPHY INGUINAL Right 8/15/2016    Procedure: HERNIORRHAPHY INGUINAL;  Surgeon: Wu Adam MD;  Location:  OR    LAPAROSCOPIC LYSIS ADHESIONS N/A 7/7/2017    Procedure: LAPAROSCOPIC LYSIS ADHESIONS;;  Surgeon: Sumeet Joiner MD;  Location:  OR    LAPAROSCOPY DIAGNOSTIC (GENERAL) N/A 7/7/2017     Procedure: LAPAROSCOPY DIAGNOSTIC (GENERAL);  DIAGNOSTIC LAPAROSCOPY WITH LYSIS OF ADHESIONS ;  Surgeon: Sumeet Joiner MD;  Location: [facility] OR    ORTHOPEDIC SURGERY      rotator cuff repair,shoulder arthroplasty    REVERSE ARTHROPLASTY SHOULDER Right 8/12/2020    Procedure: RIGHT REVERSE TOTAL SHOULDER ARTHROPLASTY;  Surgeon: Adarsh Mello MD;  Location: [facility] OR    TONSILLECTOMY       Prior to Admission Medications   Prior to Admission Medications   Prescriptions Last Dose Informant Patient Reported? Taking?   DULoxetine (CYMBALTA) 60 MG capsule 2/27/2025 at 11:20 AM  Yes Yes   Sig: Take 60 mg by mouth daily. Every morning after a meal   JARDIANCE 25 MG TABS tablet 2/27/2025 Morning  Yes Yes   Sig: Take 25 mg by mouth daily.   Lidocaine (LIDOCARE) 4 % Patch   No Yes   Sig: Place 1 patch onto the skin every 24 hours To prevent lidocaine toxicity, patient should be patch free for 12 hrs daily.   Patient taking differently: Place 1 patch onto the skin every 24 hours. Leave on for 8 hours (placed at 11:20 AM)    To prevent lidocaine toxicity, patient should be patch free for 12 hrs daily.   acetaminophen (TYLENOL) 500 MG tablet 2/27/2025 Evening  Yes Yes   Sig: Take 1,000 mg by mouth 3 times daily as needed for pain.   artificial saliva (BIOTENE MT) AERS spray   No Yes   Sig: Take 1 spray by mouth every 2 hours as needed for dry mouth   atorvastatin (LIPITOR) 10 MG tablet 2/27/2025 Evening  Yes Yes   Sig: Take 10 mg by mouth every evening.   carbidopa-levodopa (SINEMET)  MG tablet 2/27/2025 Evening  Yes Yes   Sig: Take 2 tablets by mouth 3 times daily. 11:20 AM, 3:20 PM, and 7:20 PM   carboxymethylcellulose (CARBOXYMETHYLCELLULOSE SODIUM) 0.5 % SOLN ophthalmic solution 2/27/2025 Evening  Yes Yes   Sig: Place 1 drop into both eyes 3 times daily.   diclofenac (VOLTAREN) 1 % topical gel 2/27/2025  No Yes   Sig: Apply 2 g topically 3 times daily   Patient taking differently: Apply topically 3 times  "daily. 2 g listed as dose but also \"apply 4 g topically\"  To lower back   gabapentin (NEURONTIN) 300 MG capsule 2/27/2025 at 11:20 AM  No Yes   Sig: Take 1 capsule (300 mg) by mouth at bedtime   Patient taking differently: Take 600 mg by mouth every morning.   gabapentin (NEURONTIN) 300 MG capsule 2/27/2025 Evening  Yes Yes   Sig: Take 1,200 mg by mouth every evening. At 7:30 PM   lactulose (CHRONULAC) 10 GM/15ML solution 2/27/2025 Evening  No Yes   Sig: Take 30 mLs (20 g) by mouth 2 times daily as needed for constipation   Patient taking differently: Take 20 g by mouth 2 times daily.   lactulose (CHRONULAC) 10 GM/15ML solution   Yes Yes   Sig: Take 30 mLs by mouth daily as needed for constipation.   linaclotide (LINZESS) 72 MCG capsule 2/27/2025 at 11:20 AM  Yes Yes   Sig: Take 72 mcg by mouth every morning.   magnesium hydroxide (MILK OF MAGNESIA) 400 MG/5ML suspension   Yes Yes   Sig: Take 30 mLs by mouth daily as needed for constipation.   medication given by implanted intrathecal pump   Yes Yes   Sig: continuously. Drug # 1: Fentanyl (Sublimaze) - Conc: 625 mcg/mL - Total Dose / 24 hours: 425 mcg    Drug # 2: Bupivacaine (Marcaine)  - Conc: 25 mg/mL - Total Dose / 24 hours: 17 mg    Drug # 3: morphine - Conc: 0.1 mg/mL - total dose / 24 hours: 0.068 mg    Complex continuous infusion of above    Flex Bolus dosing: 3 scheduled bolus doses per day containing fentanyl 39.94 mcg, bupivacaine 1.598 mg, and morphine 0.98397 mg at 1300, 1700 and 2100 daily.     Outside Clinic & Provider: [facility] [phone]  Last Refill Date: 2/26/25  Alarm Date: 3/24/25  Next Refill Date: 3/20/2025     Pump : Medtronic Synchromed II pump     Please consider consulting the pain team if the patient is admitted with an IT pump.   metFORMIN (GLUCOPHAGE) 500 MG tablet 2/27/2025  Yes Yes   Sig: Take 1,000 mg by mouth 2 times daily.   mirtazapine (REMERON) 7.5 MG tablet 2/27/2025 at  7:20 PM  Yes Yes   Sig: Take 7.5 mg " by mouth at bedtime.   omeprazole (PRILOSEC) 40 MG DR capsule 2/27/2025 at 11:20 AM  Yes Yes   Sig: Take 40 mg by mouth every morning. At least 30-60 minutes before a meal   ondansetron (ZOFRAN ODT) 4 MG ODT tab   Yes Yes   Sig: Take 4 mg by mouth every 8 hours as needed for nausea.   psyllium (METAMUCIL/KONSYL) Packet 2/27/2025 Morning  Yes Yes   Sig: Take 1 packet by mouth daily.   sennosides (SENOKOT) 8.6 MG tablet 2/27/2025 Evening  No Yes   Sig: Take 1 tablet by mouth daily.   Patient taking differently: Take 1 tablet by mouth 2 times daily.   sertraline (ZOLOFT) 100 MG tablet 2/27/2025 Morning Self Yes Yes   Sig: Take 100 mg by mouth every morning      Facility-Administered Medications: None        Review of Systems    The 10 point Review of Systems is negative other than noted in the HPI or here.    Social History   I have reviewed this patient's social history and updated it with pertinent information if needed.  Social History     Tobacco Use    Smoking status: Former     Types: Pipe     Quit date: 6/1/2021     Years since quitting: 3.7   Substance Use Topics    Alcohol use: Yes     Alcohol/week: 0.0 standard drinks of alcohol     Comment: 7 glasses wine per week    Drug use: Never     Allergies   Allergies   Allergen Reactions    Hydrocodone Itching     Constipation    Aspirin Itching    Contrast Dye      LW CM1: >>> NO CONTRAST ADVERSE REACTION <<<     Reaction :    Food      LW FI1: nka LW FI2: nka    Lisinopril Other (See Comments) and GI Disturbance     constipation    Oxycodone Itching    Percodan [Oxycodone-Aspirin] Rash        Physical Exam   Vital Signs: Temp: 101.3  F (38.5  C) Temp src: Oral BP: (!) 88/71 Pulse: 89   Resp: 13 SpO2: 94 % O2 Device: None (Room air)    Weight: 0 lbs 0 oz    Constitutional: awake, alert, cooperative, no apparent distress, laying in the hospital bed  Respiratory: no increased work of breathing, clear to auscultation bilaterally, no crackles or  wheezing  Cardiovascular: regular rate and rhythm, normal S1 and S2, no murmur noted  GI: normal bowel sounds, soft, non-distended, non-tender  Skin: warm, dry, pain pump in place in right lower back, incision appears to be well healed  Musculoskeletal: no lower extremity pitting edema present  Neurologic: awake, alert, hard of hearing, answers questions appropriately, moves all extremities    Medical Decision Making       80 MINUTES SPENT BY ME on the date of service doing chart review, history, exam, documentation & further activities per the note.      Data     I have personally reviewed the following data over the past 24 hrs:    8.5  \   10.8 (L)   / 146 (L)     137 97 (L) 39.8 (H) /  266 (H)   4.9 20 (L) 1.11 \     ALT: 20 AST: 36 AP: 125 TBILI: 0.4   ALB: 4.2 TOT PROTEIN: 7.7 LIPASE: N/A     Trop: 74 (H) BNP: 329     Procal: 0.31 CRP: 113.56 (H) Lactic Acid: 1.9         Imaging results reviewed over the past 24 hrs:   Recent Results (from the past 24 hours)   XR Chest 2 Views    Narrative    EXAM: XR CHEST 2 VIEWS  LOCATION: [facility]  DATE: 2/28/2025    INDICATION: fever, cough  COMPARISON: 5/19/2024      Impression    IMPRESSION: Enlarged cardiomediastinal silhouette. Vascular calcifications are seen within the thoracic aorta. Multiple surgical clips are seen overlying the mediastinum. Median sternotomy wires are also seen. Bilateral patchy airspace and interstitial   opacities are seen in the lungs suggestive of pulmonary edema or atypical pneumonia. Moderate degenerative changes are seen in the left shoulder. 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 Multilevel degenerative changes are present in the spine.   CT Chest Pulmonary Embolism w Contrast    Narrative    EXAM: CT CHEST PULMONARY EMBOLISM W CONTRAST  LOCATION: [facility]  DATE: 2/28/2025    INDICATION: sob, hypoxia, PE  COMPARISON: CT CAP 06/23/2024.  TECHNIQUE: CT chest pulmonary angiogram  during arterial phase injection of IV contrast. Multiplanar reformats and MIP reconstructions were performed. Dose reduction techniques were used.   CONTRAST: 70mL Isovue 370    FINDINGS:  ANGIOGRAM CHEST: There is severe respiratory motion artifact which degrades evaluation of the pulmonary arteries in the segmental and subsegmental vasculature cannot be adequately evaluated for filling defects. No evidence of a central pulmonary embolism   within these limitations. Thoracic aorta is negative for dissection within the limitations of cardiac motion artifact. Mild aortic valve calcification.     LUNGS AND PLEURA: Basilar predominant patchy airspace consolidation and groundglass opacities of both lungs, most confluent in the left lower lobe. Trace amount of pleural fluid bilaterally.    MEDIASTINUM/AXILLAE: No pericardial effusion. No thoracic adenopathy.    CORONARY ARTERY CALCIFICATION: Previous intervention (stents or CABG).    UPPER ABDOMEN: Normal.    MUSCULOSKELETAL: Right shoulder arthroplasty. Intact median sternotomy. Multilevel degenerative changes of the spine.      Impression    IMPRESSION:  1.  There is severe respiratory motion artifact which degrades evaluation of the pulmonary arteries. No evidence of a central pulmonary embolism within these limitations.   2.  Multifocal pneumonia.

## 2025-02-28 NOTE — PHARMACY-ADMISSION MEDICATION HISTORY
Pharmacist Admission Medication History    Admission medication history is complete. The information provided in this note is only as accurate as the sources available at the time of the update.    Information Source(s): Family member, Facility (U/NH/) medication list/MAR, and [facility] pain pump staff  via in-person and phone    Pertinent Information:   -patient's AM meds are given at 11:20 AM  -intrathecal pain pump settings verified with [facility] staff. Of note, morphine was added to pump on 2/26.    Changes made to PTA medication list:  Added: Linzess, psyllium, lactulose PRN, carboxymethylcellulose eye drops  Deleted: Refresh Plus, midodrine (per family, this was discontinued on 2/24), senna/docusate  Changed: acetaminophen (325 mg tab, 650 mg PRN --> 500 mg tab, 1000 mg PRN), atorvastatin (daily --> evening), Sinemet (added due times), diclofenac (updated directions, unclear if dose is 2 g or 4 g), duloxetine (30 mg capsule daily --> 60 mg capsule daily), gabapentin (daily with breakfast --> QAM), lactulose (30 mL daily PRN --> BID), lidocaine patch (updated that patch is on patient for 8 hours), milk of mag (updated PRN indication), intrathecal pump (updated directions, see note above), metformin (1000 mg tab, 2000 mg BID --> 500 mg tab, 1000 mg BID), omeprazole (20 mg cap daily --> 40 mg cap daily), senna (daily --> BID)    Allergies reviewed with patient and updates made in EHR: no, already reviewed. Of note, morphine is listed as an allergy on facility paperwork but family isn't sure that patient has an allergy and morphine was added to pain pump on 2/26.    Medication History Completed By: Elisabeth Green RPH 2/28/2025 3:03 PM    PTA Med List   Medication Sig Last Dose/Taking    acetaminophen (TYLENOL) 500 MG tablet Take 1,000 mg by mouth 3 times daily as needed for pain. 2/27/2025 Evening    artificial saliva (BIOTENE MT) AERS spray Take 1 spray by mouth every 2 hours as needed for dry mouth Taking As Needed  "   atorvastatin (LIPITOR) 10 MG tablet Take 10 mg by mouth every evening. 2/27/2025 Evening    carbidopa-levodopa (SINEMET)  MG tablet Take 2 tablets by mouth 3 times daily. 11:20 AM, 3:20 PM, and 7:20 PM 2/27/2025 Evening    carboxymethylcellulose (CARBOXYMETHYLCELLULOSE SODIUM) 0.5 % SOLN ophthalmic solution Place 1 drop into both eyes 3 times daily. 2/27/2025 Evening    diclofenac (VOLTAREN) 1 % topical gel Apply 2 g topically 3 times daily (Patient taking differently: Apply topically 3 times daily. 2 g listed as dose but also \"apply 4 g topically\"  To lower back) 2/27/2025    DULoxetine (CYMBALTA) 60 MG capsule Take 60 mg by mouth daily. Every morning after a meal 2/27/2025 at 11:20 AM    gabapentin (NEURONTIN) 300 MG capsule Take 1,200 mg by mouth every evening. At 7:30 PM 2/27/2025 Evening    gabapentin (NEURONTIN) 300 MG capsule Take 1 capsule (300 mg) by mouth at bedtime (Patient taking differently: Take 600 mg by mouth every morning.) 2/27/2025 at 11:20 AM    JARDIANCE 25 MG TABS tablet Take 25 mg by mouth daily. 2/27/2025 Morning    lactulose (CHRONULAC) 10 GM/15ML solution Take 30 mLs by mouth daily as needed for constipation. Taking As Needed    lactulose (CHRONULAC) 10 GM/15ML solution Take 30 mLs (20 g) by mouth 2 times daily as needed for constipation (Patient taking differently: Take 20 g by mouth 2 times daily.) 2/27/2025 Evening    Lidocaine (LIDOCARE) 4 % Patch Place 1 patch onto the skin every 24 hours To prevent lidocaine toxicity, patient should be patch free for 12 hrs daily. (Patient taking differently: Place 1 patch onto the skin every 24 hours. Leave on for 8 hours (placed at 11:20 AM)    To prevent lidocaine toxicity, patient should be patch free for 12 hrs daily.) Taking Differently    linaclotide (LINZESS) 72 MCG capsule Take 72 mcg by mouth every morning. 2/27/2025 at 11:20 AM    magnesium hydroxide (MILK OF MAGNESIA) 400 MG/5ML suspension Take 30 mLs by mouth daily as needed for " constipation. Taking As Needed    medication given by implanted intrathecal pump continuously. Drug # 1: Fentanyl (Sublimaze) - Conc: 625 mcg/mL - Total Dose / 24 hours: 425 mcg    Drug # 2: Bupivacaine (Marcaine)  - Conc: 25 mg/mL - Total Dose / 24 hours: 17 mg    Drug # 3: morphine - Conc: 0.1 mg/mL - total dose / 24 hours: 0.068 mg    Complex continuous infusion of above    Flex Bolus dosing: 3 scheduled bolus doses per day containing fentanyl 39.94 mcg, bupivacaine 1.598 mg, and morphine 0.97075 mg at 1300, 1700 and 2100 daily.     Outside Clinic & Provider: [facility] [phone]  Last Refill Date: 2/26/25  Alarm Date: 3/24/25  Next Refill Date: 3/20/2025     Pump : Attent[employer] Synchromed II pump     Please consider consulting the pain team if the patient is admitted with an IT pump. Taking    metFORMIN (GLUCOPHAGE) 500 MG tablet Take 1,000 mg by mouth 2 times daily. 2/27/2025    mirtazapine (REMERON) 7.5 MG tablet Take 7.5 mg by mouth at bedtime. 2/27/2025 at  7:20 PM    omeprazole (PRILOSEC) 40 MG DR capsule Take 40 mg by mouth every morning. At least 30-60 minutes before a meal 2/27/2025 at 11:20 AM    ondansetron (ZOFRAN ODT) 4 MG ODT tab Take 4 mg by mouth every 8 hours as needed for nausea. Taking As Needed    psyllium (METAMUCIL/KONSYL) Packet Take 1 packet by mouth daily. 2/27/2025 Morning    sennosides (SENOKOT) 8.6 MG tablet Take 1 tablet by mouth daily. (Patient taking differently: Take 1 tablet by mouth 2 times daily.) 2/27/2025 Evening    sertraline (ZOLOFT) 100 MG tablet Take 100 mg by mouth every morning 2/27/2025 Morning

## 2025-02-28 NOTE — ED PROVIDER NOTES
Emergency Department Note      History of Present Illness     Chief Complaint   Shortness of Breath      HPI   Cresencio Peguero is an 89 year old male with a history of coronary artery disease, diabetes mellitus, hypertension, hyperlipidemia and tobacco use disorder who was brought in by EMS from home for evaluation of shortness of breath.  He started experiencing a cough, fevers, headache, body aches, shortness of breath and generalized weakness yesterday and into today.  His family called EMS today due to worsening symptoms. Upon EMS arrival, he was hypoxic at approximately 80% on RA. It was brought up to 95% on 4-6L of oxygen. Blood sugar was 170. Here in the ED, he complains of the above mentioned symptoms and reports some worsening of his chronic neck pain.  He also does have some pain radiating down his left arm as well as numbness of the extremities. He denies any fall or head injury. He denies any chest pain, abdominal pain or any bowel or bladder difficulty, and no new urinary symptoms. Family reports Cresencio had a morphine pump placed in his right lower back 10 days ago, and they are worried if he is having any allergic reaction.     Independent Historian   EMS as detailed above.    Review of External Notes   I reviewed the patient's [facility].    Past Medical History     Medical History and Problem List   Anxiety   Arthritis  CAD  Type 1 diabetes  Hyperlipidemia  Hypertension  Neuropathy  Sensory ataxia  Stented coronary artery   Hernia  SBO  Hyperglycemia  Depression  Tobacco abuse      Medications   Gabapentin  Metformin  Midodrine  Carbidopa-levodopa  Omeprazole   Sertraline   Atorvastatin  Jardiance   Mirtazapine      Surgical History   Back surgery  CABG  EGD  Cataract  Corneal surgery  Herniorrhaphy inguinal  Rotator cuff repair  Tonsillectomy   Physical Exam     Patient Vitals for the past 24 hrs:   BP Temp Temp src Pulse Resp SpO2   02/28/25 1400 (!) 88/71 -- -- 89 13 94 %   02/28/25 1344 106/73 101.3   F (38.5  C) Oral 92 13 94 %   02/28/25 1300 -- -- -- 92 20 92 %   02/28/25 1237 -- -- -- 96 27 93 %   02/28/25 1200 (!) 159/72 -- -- 94 24 94 %   02/28/25 1051 (!) 199/141 (!) 102.5  F (39.2  C) Axillary 89 18 (!) 88 %   02/28/25 1043 -- -- -- -- -- (!) 86 %     Physical Exam  Nursing note and vitals reviewed.  Constitutional:  Oriented to person, place, and time. Cooperative.  Appears moderately ill and shaking.  HENT:   Nose:    Nose normal.   Mouth/Throat:   Mucous membranes are normal.   Eyes:    Conjunctivae normal and EOM are normal.      Pupils are equal, round, and reactive to light.   Neck:    Trachea normal.   Cardiovascular:  Normal rate, regular rhythm, normal heart sounds and normal pulses. No murmur heard.  Pulmonary/Chest:  Effort normal and breath sounds normal.   Abdominal:   Soft. Normal appearance and bowel sounds are normal.      There is no tenderness.      There is no rebound and no CVA tenderness.   Musculoskeletal:  Extremities atraumatic x 4.   Lymphadenopathy:  No cervical adenopathy.   Neurological:   Alert and oriented to person, place, and time. Normal strength.      No cranial nerve deficit or sensory deficit. GCS eye subscore is 4. GCS verbal subscore is 5. GCS motor subscore is 6.   Skin:    Pain pump in the right low back region with a well-healed overlying incision.  Skin is intact. No rash noted.   Psychiatric:   Normal mood and affect.     Diagnostics     Lab Results   Labs Ordered and Resulted from Time of ED Arrival to Time of ED Departure   COMPREHENSIVE METABOLIC PANEL - Abnormal       Result Value    Sodium 137      Potassium 4.9      Carbon Dioxide (CO2) 20 (*)     Anion Gap 20 (*)     Urea Nitrogen 39.8 (*)     Creatinine 1.11      GFR Estimate 63      Calcium 10.0      Chloride 97 (*)     Glucose 266 (*)     Alkaline Phosphatase 125      AST 36      ALT 20      Protein Total 7.7      Albumin 4.2      Bilirubin Total 0.4     TROPONIN T, HIGH SENSITIVITY - Abnormal     Troponin T, High Sensitivity 66 (*)    ROUTINE UA WITH MICROSCOPIC REFLEX TO CULTURE - Abnormal    Color Urine Light Yellow      Appearance Urine Clear      Glucose Urine >=1000 (*)     Bilirubin Urine Negative      Ketones Urine Negative      Specific Gravity Urine 1.022      Blood Urine Negative      pH Urine 5.0      Protein Albumin Urine 10 (*)     Urobilinogen Urine Normal      Nitrite Urine Negative      Leukocyte Esterase Urine Negative      Mucus Urine Present (*)     RBC Urine 0      WBC Urine 0      Squamous Epithelials Urine <1     CRP INFLAMMATION - Abnormal    CRP Inflammation 113.56 (*)    CBC WITH PLATELETS AND DIFFERENTIAL - Abnormal    WBC Count 8.5      RBC Count 4.21 (*)     Hemoglobin 10.8 (*)     Hematocrit 33.2 (*)     MCV 79      MCH 25.7 (*)     MCHC 32.5      RDW 15.2 (*)     Platelet Count 146 (*)     % Neutrophils 93      % Lymphocytes 3      % Monocytes 4      % Eosinophils 0      % Basophils 0      % Immature Granulocytes 0      NRBCs per 100 WBC 0      Absolute Neutrophils 7.9      Absolute Lymphocytes 0.3 (*)     Absolute Monocytes 0.3      Absolute Eosinophils 0.0      Absolute Basophils 0.0      Absolute Immature Granulocytes 0.0      Absolute NRBCs 0.0     ISTAT GASES LACTATE VENOUS POCT - Abnormal    Lactic Acid POCT 4.0 (*)     Bicarbonate Venous POCT 27      O2 Sat, Venous POCT 33 (*)     pCO2 Venous POCT 43      pH Venous POCT 7.41      pO2 Venous POCT 20 (*)     Base Excess/Deficit (+/-) POCT 2.0     KETONE BETA-HYDROXYBUTYRATE QUANTITATIVE, RAPID - Abnormal    Ketone (Beta-Hydroxybutyrate) Quantitative 0.53 (*)    TROPONIN T, HIGH SENSITIVITY - Abnormal    Troponin T, High Sensitivity 74 (*)    NT PROBNP INPATIENT - Normal    N terminal Pro BNP Inpatient 329     INFLUENZA A/B, RSV AND SARS-COV2 PCR - Normal    Influenza A PCR Negative      Influenza B PCR Negative      RSV PCR Negative      SARS CoV2 PCR Negative     PROCALCITONIN - Normal    Procalcitonin 0.31     LACTIC ACID  WHOLE BLOOD - Normal    Lactic Acid 1.9     BLOOD CULTURE   BLOOD CULTURE       Imaging   CT Chest Pulmonary Embolism w Contrast   Final Result   IMPRESSION:   1.  There is severe respiratory motion artifact which degrades evaluation of the pulmonary arteries. No evidence of a central pulmonary embolism within these limitations.    2.  Multifocal pneumonia.       XR Chest 2 Views   Final Result   IMPRESSION: Enlarged cardiomediastinal silhouette. Vascular calcifications are seen within the thoracic aorta. Multiple surgical clips are seen overlying the mediastinum. Median sternotomy wires are also seen. Bilateral patchy airspace and interstitial    opacities are seen in the lungs suggestive of pulmonary edema or atypical pneumonia. Moderate degenerative changes are seen in the left shoulder. 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 Multilevel degenerative changes are present in the spine.          Independent Interpretation   I independently interpreted the patient's chest x-ray and he appears to have pneumonia.    ED Course      Medications Administered   Medications   Saline Flush ( Intravenous Canceled Entry 2/28/25 1432)   sodium chloride 0.9% BOLUS 1,000 mL (0 mLs Intravenous Stopped 2/28/25 1336)   acetaminophen (TYLENOL) tablet 650 mg (650 mg Oral $Given 2/28/25 1110)   piperacillin-tazobactam (ZOSYN) 4.5 g vial to attach to  mL bag (0 g Intravenous Stopped 2/28/25 1336)   iopamidol (ISOVUE-370) solution 70 mL (70 mLs Intravenous $Given 2/28/25 1411)   insulin aspart (NovoLOG) injection (RAPID ACTING) (8 Units Subcutaneous $Given 2/28/25 1432)   LORazepam (ATIVAN) injection 0.5 mg (0.5 mg Intravenous $Given 2/28/25 1342)   sodium chloride 0.9% BOLUS 1,000 mL (1,000 mLs Intravenous $New Bag 2/28/25 1356)   Saline Flush (94 mLs Intravenous $Given 2/28/25 1411)       Procedures   Procedures     Discussion of Management   Admitting Hospitalist, Dr. Berry    ED Course   ED Course as of 02/28/25 1507   Fri  "Feb 28, 2025   1047 I obtained the history and examined the patient as above.    1051 His temperature was noted to be 102.5    1100 Dr. Jacob determined the patient to be in Sepsis    1305 I rechecked and updated the patient.         Additional Documentation  None    Medical Decision Making / Diagnosis     CMS Diagnoses: The patient has signs of sepsis   Sepsis ED evaluation   The patient has signs of sepsis as evidenced by:  1. Presence of 2 SIRS criteria, suspected infection, AND  2. Organ dysfunction: Lactic Acidosis with value >2.0 due to sepsis    Sepsis Care Initiation: Starting at  1100 AM on 02/28/25, until specified. Prior to this documentation, sepsis, severe sepsis, or septic shock was NOT thought to be a significant cause of illness. This order represents the first time infection was seriously considered to be affecting the patient.    Lactic Acid Results:  Recent Labs   Lab Test 02/28/25  1403 02/28/25  1105 07/07/17  0456   LACT 1.9 4.0* 1.1       3 Hour Bundle 6 Hour Bundle (Reassessment)   Blood Cultures before IV Antibiotics: Yes  Antibiotics given: see below  Prehospital fluid volume (mL):                     Total fluids given (ED +Pre-hospital):  The patient responded to a lesser volume of IV fluids. The initial volume ordered was 2000 mL.    Repeat Lactic Acid Level: Ordered by reflex for 2 hours after initial lactic acid collection.  Vasopressors: MAP>65 after initial IVF bolus, will continue to monitor fluid status and vital signs.  Repeat perfusion exam: I attest to having performed a repeat sepsis exam and assessment of perfusion at 3:10 PM .   BMI Readings from Last 1 Encounters:   01/16/25 26.26 kg/m        Anti-infectives (From admission through now)      Start     Dose/Rate Route Frequency Ordered Stop    02/28/25 1105  piperacillin-tazobactam (ZOSYN) 4.5 g vial to attach to  mL bag        Note to Pharmacy: For [facility], [facility] and [facility]: For Zosyn-naive patients, use the \"Zosyn initial " "dose + extended infusion\" order panel.    4.5 g  over 30 Minutes Intravenous ONCE 02/28/25 1100 02/28/25 1336                MIPS    CT for PE was ordered because the patient is high risk for pulmonary embolism.    DAHLIA Peguero is an 89 year old male who came in for further evaluation of shortness of breath.  Upon my evaluation when he first got here, I felt that he was likely septic.  He therefore had the above workup initiated, including the blood work, chest x-ray, and urine.  He was also provided IV fluids and IV Zosyn due to my concerns for sepsis.  His swab for COVID, RSV, and influenza was negative.  Given his hypoxia initially, I was also concerned for PE, and therefore he was sent for a CT scan of his chest.  He does appear to have multifocal pneumonia but no PE.  He did have a transient drop in his blood pressure, but that responded to repositioning and IV fluids.  I spoke with Dr. Berry, who will be admitting him.    Disposition   The patient was admitted to the hospital.     Diagnosis     ICD-10-CM    1. Sepsis, due to unspecified organism, unspecified whether acute organ dysfunction present (H)  A41.9       2. Pneumonia of both lungs due to infectious organism, unspecified part of lung  J18.9            Scribe Disclosure:  I, Julisa Sorto, am serving as a scribe at 10:42 AM on 2/28/2025 to document services personally performed by Elvis Jacob MD based on my observations and the provider's statements to me.        Elvis Jacob MD  02/28/25 1516    "

## 2025-02-28 NOTE — PROGRESS NOTES
RECEIVING UNIT ED HANDOFF REVIEW    ED Nurse Handoff Report was reviewed by: Diya Long RN on February 28, 2025 at 4:43 PM

## 2025-02-28 NOTE — ED TRIAGE NOTES
Patient BIBA from home with c/o body aches, fevers, SOB, fatigue. When EMS arrived patient was hypoxic on O2 mid 80's on RA, Patient placed on 4-6L of O2 by EMS O2 now 95%. . Patient also had a morphine pain pump placed on 2/18 and family is concerned that patient is having an allergic reaction.

## 2025-03-01 ENCOUNTER — APPOINTMENT (OUTPATIENT)
Dept: PHYSICAL THERAPY | Facility: CLINIC | Age: 89
DRG: 871 | End: 2025-03-01
Attending: HOSPITALIST
Payer: MEDICARE

## 2025-03-01 ENCOUNTER — APPOINTMENT (OUTPATIENT)
Dept: SPEECH THERAPY | Facility: CLINIC | Age: 89
DRG: 871 | End: 2025-03-01
Attending: HOSPITALIST
Payer: MEDICARE

## 2025-03-01 LAB
ANION GAP SERPL CALCULATED.3IONS-SCNC: 12 MMOL/L (ref 7–15)
BUN SERPL-MCNC: 26.7 MG/DL (ref 8–23)
CALCIUM SERPL-MCNC: 9.2 MG/DL (ref 8.8–10.4)
CHLORIDE SERPL-SCNC: 105 MMOL/L (ref 98–107)
CREAT SERPL-MCNC: 1.03 MG/DL (ref 0.67–1.17)
EGFRCR SERPLBLD CKD-EPI 2021: 69 ML/MIN/1.73M2
ERYTHROCYTE [DISTWIDTH] IN BLOOD BY AUTOMATED COUNT: 15.8 % (ref 10–15)
GLUCOSE BLDC GLUCOMTR-MCNC: 132 MG/DL (ref 70–99)
GLUCOSE BLDC GLUCOMTR-MCNC: 138 MG/DL (ref 70–99)
GLUCOSE BLDC GLUCOMTR-MCNC: 156 MG/DL (ref 70–99)
GLUCOSE BLDC GLUCOMTR-MCNC: 156 MG/DL (ref 70–99)
GLUCOSE SERPL-MCNC: 192 MG/DL (ref 70–99)
HCO3 SERPL-SCNC: 21 MMOL/L (ref 22–29)
HCT VFR BLD AUTO: 33.1 % (ref 40–53)
HGB BLD-MCNC: 10.4 G/DL (ref 13.3–17.7)
MCH RBC QN AUTO: 24.9 PG (ref 26.5–33)
MCHC RBC AUTO-ENTMCNC: 31.4 G/DL (ref 31.5–36.5)
MCV RBC AUTO: 79 FL (ref 78–100)
PLATELET # BLD AUTO: 161 10E3/UL (ref 150–450)
POTASSIUM SERPL-SCNC: 4.2 MMOL/L (ref 3.4–5.3)
RBC # BLD AUTO: 4.17 10E6/UL (ref 4.4–5.9)
SODIUM SERPL-SCNC: 138 MMOL/L (ref 135–145)
TROPONIN T SERPL HS-MCNC: 50 NG/L
WBC # BLD AUTO: 10 10E3/UL (ref 4–11)

## 2025-03-01 PROCEDURE — 97116 GAIT TRAINING THERAPY: CPT | Mod: GP | Performed by: PHYSICAL THERAPIST

## 2025-03-01 PROCEDURE — 92610 EVALUATE SWALLOWING FUNCTION: CPT | Mod: GN

## 2025-03-01 PROCEDURE — 97530 THERAPEUTIC ACTIVITIES: CPT | Mod: GP | Performed by: PHYSICAL THERAPIST

## 2025-03-01 PROCEDURE — 85014 HEMATOCRIT: CPT | Performed by: HOSPITALIST

## 2025-03-01 PROCEDURE — 99232 SBSQ HOSP IP/OBS MODERATE 35: CPT | Performed by: HOSPITALIST

## 2025-03-01 PROCEDURE — 250N000011 HC RX IP 250 OP 636: Performed by: HOSPITALIST

## 2025-03-01 PROCEDURE — 93005 ELECTROCARDIOGRAM TRACING: CPT

## 2025-03-01 PROCEDURE — 120N000001 HC R&B MED SURG/OB

## 2025-03-01 PROCEDURE — 92526 ORAL FUNCTION THERAPY: CPT | Mod: GN

## 2025-03-01 PROCEDURE — 250N000013 HC RX MED GY IP 250 OP 250 PS 637: Performed by: HOSPITALIST

## 2025-03-01 PROCEDURE — 80048 BASIC METABOLIC PNL TOTAL CA: CPT | Performed by: HOSPITALIST

## 2025-03-01 PROCEDURE — 97161 PT EVAL LOW COMPLEX 20 MIN: CPT | Mod: GP | Performed by: PHYSICAL THERAPIST

## 2025-03-01 PROCEDURE — 84484 ASSAY OF TROPONIN QUANT: CPT | Performed by: HOSPITALIST

## 2025-03-01 PROCEDURE — 258N000003 HC RX IP 258 OP 636: Performed by: HOSPITALIST

## 2025-03-01 PROCEDURE — 36415 COLL VENOUS BLD VENIPUNCTURE: CPT | Performed by: HOSPITALIST

## 2025-03-01 RX ORDER — ALBUTEROL SULFATE 0.83 MG/ML
2.5 SOLUTION RESPIRATORY (INHALATION) EVERY 4 HOURS PRN
Status: DISCONTINUED | OUTPATIENT
Start: 2025-03-01 | End: 2025-03-04 | Stop reason: HOSPADM

## 2025-03-01 RX ADMIN — ATORVASTATIN CALCIUM 10 MG: 10 TABLET, FILM COATED ORAL at 20:28

## 2025-03-01 RX ADMIN — PIPERACILLIN AND TAZOBACTAM 4.5 G: 4; .5 INJECTION, POWDER, FOR SOLUTION INTRAVENOUS at 06:27

## 2025-03-01 RX ADMIN — SODIUM CHLORIDE: 0.9 INJECTION, SOLUTION INTRAVENOUS at 07:24

## 2025-03-01 RX ADMIN — SENNOSIDES 1 TABLET: 8.6 TABLET ORAL at 09:38

## 2025-03-01 RX ADMIN — PIPERACILLIN AND TAZOBACTAM 4.5 G: 4; .5 INJECTION, POWDER, FOR SOLUTION INTRAVENOUS at 00:04

## 2025-03-01 RX ADMIN — LINACLOTIDE 72 MCG: 72 CAPSULE, GELATIN COATED ORAL at 09:45

## 2025-03-01 RX ADMIN — GABAPENTIN 1200 MG: 300 CAPSULE ORAL at 20:27

## 2025-03-01 RX ADMIN — SERTRALINE HYDROCHLORIDE 100 MG: 100 TABLET ORAL at 09:38

## 2025-03-01 RX ADMIN — CARBOXYMETHYLCELLULOSE SODIUM 1 DROP: 5 SOLUTION/ DROPS OPHTHALMIC at 22:06

## 2025-03-01 RX ADMIN — GABAPENTIN 600 MG: 300 CAPSULE ORAL at 09:39

## 2025-03-01 RX ADMIN — CARBOXYMETHYLCELLULOSE SODIUM 1 DROP: 5 SOLUTION/ DROPS OPHTHALMIC at 09:39

## 2025-03-01 RX ADMIN — CARBIDOPA AND LEVODOPA 2 TABLET: 25; 100 TABLET ORAL at 15:42

## 2025-03-01 RX ADMIN — CARBIDOPA AND LEVODOPA 2 TABLET: 25; 100 TABLET ORAL at 20:28

## 2025-03-01 RX ADMIN — DICLOFENAC 2 G: 10 GEL TOPICAL at 22:11

## 2025-03-01 RX ADMIN — LACTULOSE 20 G: 20 SOLUTION ORAL at 20:27

## 2025-03-01 RX ADMIN — DICLOFENAC 2 G: 10 GEL TOPICAL at 09:48

## 2025-03-01 RX ADMIN — CARBIDOPA AND LEVODOPA 2 TABLET: 25; 100 TABLET ORAL at 09:41

## 2025-03-01 RX ADMIN — PIPERACILLIN AND TAZOBACTAM 4.5 G: 4; .5 INJECTION, POWDER, FOR SOLUTION INTRAVENOUS at 18:01

## 2025-03-01 RX ADMIN — CARBOXYMETHYLCELLULOSE SODIUM 1 DROP: 5 SOLUTION/ DROPS OPHTHALMIC at 15:42

## 2025-03-01 RX ADMIN — MIRTAZAPINE 7.5 MG: 7.5 TABLET, FILM COATED ORAL at 22:06

## 2025-03-01 RX ADMIN — SENNOSIDES 1 TABLET: 8.6 TABLET ORAL at 20:28

## 2025-03-01 RX ADMIN — PANTOPRAZOLE SODIUM 40 MG: 40 TABLET, DELAYED RELEASE ORAL at 09:39

## 2025-03-01 RX ADMIN — INSULIN ASPART 1 UNITS: 100 INJECTION, SOLUTION INTRAVENOUS; SUBCUTANEOUS at 17:55

## 2025-03-01 RX ADMIN — LACTULOSE 20 G: 20 SOLUTION ORAL at 09:39

## 2025-03-01 RX ADMIN — DULOXETINE HYDROCHLORIDE 60 MG: 60 CAPSULE, DELAYED RELEASE ORAL at 09:39

## 2025-03-01 RX ADMIN — DICLOFENAC 2 G: 10 GEL TOPICAL at 15:42

## 2025-03-01 RX ADMIN — PSYLLIUM HUSK 1 PACKET: 3.4 POWDER ORAL at 09:39

## 2025-03-01 RX ADMIN — LIDOCAINE 1 PATCH: 4 PATCH TOPICAL at 17:55

## 2025-03-01 RX ADMIN — PIPERACILLIN AND TAZOBACTAM 4.5 G: 4; .5 INJECTION, POWDER, FOR SOLUTION INTRAVENOUS at 13:41

## 2025-03-01 RX ADMIN — SODIUM CHLORIDE: 0.9 INJECTION, SOLUTION INTRAVENOUS at 22:26

## 2025-03-01 ASSESSMENT — ACTIVITIES OF DAILY LIVING (ADL)
ADLS_ACUITY_SCORE: 68
ADLS_ACUITY_SCORE: 66
ADLS_ACUITY_SCORE: 66
ADLS_ACUITY_SCORE: 68
ADLS_ACUITY_SCORE: 66
ADLS_ACUITY_SCORE: 68
ADLS_ACUITY_SCORE: 66
ADLS_ACUITY_SCORE: 68
ADLS_ACUITY_SCORE: 66
ADLS_ACUITY_SCORE: 68
ADLS_ACUITY_SCORE: 66

## 2025-03-01 NOTE — PLAN OF CARE
Trauma/Ortho/Medical (Choose one) : Medical    Diagnosis: Pneumonia  Mental Status: Oriented x2, somnolent  Activity:not OOB yet, turned and repositioned  Diet: Mod CHO, prefers soft, easy to chew/swallow food (son helped order meal)  Pain: intrathecal pain pump  Voiding: incontinent, able to use urinal w/ help  Tele/Restraints/Iso:Tele:   02/LDA: 94% at 4L O2 via nasal canula, IV fluid infusing  D/C Date: TBD  Other Info: intermittent IV antibiotic, blood culture pending

## 2025-03-01 NOTE — PROGRESS NOTES
[facility]    Medicine Progress Note - Hospitalist Service    Date of Admission:  2/28/2025    Assessment & Plan   Cresencio Peguero is a 89 year old male with a history of hypertension, hyperlipidemia, coronary artery disease, diabetes mellitus type 2, Parkinson's disease, neuropathy, sensory ataxia, orthostatic hypotension, chronic pain, GERD, and depression who presented to the ED for evaluation of shortness of breath, cough, fever, headache, body aches, and generalized weakness.  Evaluation in the ED was concerning for sepsis due to pneumonia.  He was given IV fluids and IV antibiotics.  The hospitalist service was contacted to admit him for further evaluation and management.    Community-acquired pneumonia  Sepsis  Lactic acidosis  Presented with fevers, cough, shortness of breath, headache, body aches and generalized weakness that began within the last 24 hours prior to admission.  Febrile with temp of 102.5.  WBC within normal limits.  Tachypneic.  Hypertensive initially, then briefly hypotensive which resolved with IV fluids.  CXR concerning for pneumonia.  CT chest negative for PE but did show multifocal pneumonia.  COVID/Influenza/RSV PCR testing was negative.  Hypoxic requiring 2 lpm of supplemental oxygen.  Admit to inpatient.  Started on Zosyn in the ED, continue the same for now.  Blood cultures obtained in the ED, negative to date, follow up on results.  Weaned off supplemental oxygen as of 3/1/25.  Encourage incentive spirometry.  Lactic acidosis resolved with IV fluids.  Continue IV fluids.  Recheck labs in AM.    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  Troponin 66 --> 74.  Chest imaging as noted above.  No EKG has been done yet.  Suspect elevated troponin related to acute illness as noted above rather than acute cardiac process.  EKG still pending.  Telemetry.  Repeat troponin trending down at 50.  Monitor for symptoms.  Continue PTA  atorvastatin.  He was not on an anti-platelet agent prior to admission.    Hypertension  History of orthostatic hypotension  Previously on midodrine, recently discontinued by his neurologist due to elevated blood pressures.  Blood pressure labile in the ED.  Monitor blood pressure with routine vitals, can check orthostatic vitals if needed.    Hyperlipidemia  Continue PTA atorvastatin.    Diabetes mellitus, type 2  Hemoglobin A1c 8.5% on 10/21/24.  HOLD PTA metformin and Jardiance.  Monitor blood sugars and use sliding scale NovoLog as indicated.  Moderate carbohydrate diet.  Monitor for hypoglycemia.  Blood sugars well controlled.    Parkinson's disease  Follows with [facility] of neurology as an outpatient.  Continue PTA Sinemet.    Neuropathy  Continue PTA gabapentin.    Chronic pain  Follows with [facility] as an outpatient.  Recently had intrathecal pain pump refilled and had medications adjusted with addition of morphine.  Consult pain management service, appreciate their assistance.  Continue PTA gabapentin, lidocaine patch, and diclofenac gel.  Pain pump adjusted on 2/28/25, dose reduced by 50% following discussion with pain management service and [facility].    GERD  Continue PTA omeprazole.    Depression  Continue PTA duloxetine, sertraline, and mirtazapine.    Constipation  Continue PTA bowel regimen.          Diet: Combination Diet Moderate Consistent Carb (60 g CHO per Meal) Diet; Pureed Diet (level 4); Thin Liquids (level 0) (upright, slow rate, single bites/sips, alternate bites/sips)    DVT Prophylaxis: Pneumatic Compression Devices  Henderson Catheter: Not present  Lines: None     Cardiac Monitoring: ACTIVE order. Indication: Chest pain/ ACS rule out (24 hours)  Code Status: No CPR- Do NOT Intubate      Clinically Significant Risk Factors          # Hypochloremia: Lowest Cl = 97 mmol/L in last 2 days, will monitor as appropriate     # Anion Gap Metabolic Acidosis: Highest Anion Gap = 20 mmol/L  in last 2 days, will monitor and treat as appropriate                # DMII: A1C = 8.2 % (Ref range: <5.7 %) within past 6 months, PRESENT ON ADMISSION      # Financial/Environmental Concerns:           Social Drivers of Health    Tobacco Use: Medium Risk (1/16/2025)    Patient History     Smoking Tobacco Use: Former     Smokeless Tobacco Use: Unknown   Alcohol Use: Unknown (1/11/2021)    [facility]    AUDIT-[facility]     Frequency of Alcohol Consumption: 4 or more times a week    [facility]    Social Connections          Disposition Plan     Medically Ready for Discharge: Anticipated in 2-4 Days             Eyal Berry MD  Hospitalist Service  [facility]  Securely message with [employer] (more info)  Text page via [employer] Paging/Directory   ______________________________________________________________________    Interval History   Cresencio Peguero was seen today.  He is feeling better today.  Generalized pain improved.  Shortness of breath improved. Occasional cough.  Denies fevers/chills/sweats.  No chest pain or nausea.  Plan of care discussed with bedside nurse.    Physical Exam   Vital Signs: Temp: 97.6  F (36.4  C) Temp src: Oral BP: 133/57 Pulse: 60   Resp: 16 SpO2: 96 % O2 Device: None (Room air) Oxygen Delivery: 2 LPM  Weight: 0 lbs 0 oz    Constitutional: awake, alert, cooperative, no apparent distress, sitting in a recliner with his feet elevated  Respiratory: no increased work of breathing, clear to auscultation bilaterally, no crackles or wheezing  Cardiovascular: regular rate and rhythm, normal S1 and S2, no murmur noted  GI: normal bowel sounds, soft, non-distended, non-tender  Skin: warm, dry  Musculoskeletal: no lower extremity pitting edema present  Neurologic: awake, alert, hard of hearing, answers questions appropriately, moves all  extremities    Medical Decision Making       40 MINUTES SPENT BY ME on the date of service doing chart review, history, exam, documentation & further activities per the note.      Data     I have personally reviewed the following data over the past 24 hrs:    10.0  \   10.4 (L)   / 161     138 105 26.7 (H) /  156 (H)   4.2 21 (L) 1.03 \     Trop: 50 (H) BNP: N/A     TSH: N/A T4: N/A A1C: N/A

## 2025-03-01 NOTE — PROGRESS NOTES
"Clinical Swallow Evaluation (CSE):     03/01/25 1013   Appointment Info   Signing Clinician's Name / Credentials (SLP) Linda Bernal MS CCC-SLP   General Information   Onset of Illness/Injury or Date of Surgery 02/28/25   Referring Physician Dr. Berry   Patient/Family Therapy Goal Statement (SLP) To eat   Pertinent History of Current Problem   Per provider \"Cresencio Peguero is a 89 year old male with a history of hypertension, hyperlipidemia, coronary artery disease, diabetes mellitus type 2, Parkinson's disease, neuropathy, sensory ataxia, orthostatic hypotension, chronic pain, GERD, and depression who presented to the ED for evaluation of shortness of breath, cough, fever, headache, body aches, and generalized weakness.  Evaluation in the ED was concerning for sepsis due to pneumonia.\" SLP for swallow eval.      General Observations Pt alert, upright in bed with RN present for medication administration   Pain Assessment   Patient Currently in Pain No   Type of Evaluation   Type of Evaluation Swallow Evaluation   Oral Motor   Oral Musculature generally intact   Structural Abnormalities none present   Mucosal Quality good   Dentition (Oral Motor)   Comment, Dentition (Oral Motor) dentures not present - per RN, son said he would bring in   Dentition (Oral Motor) edentulous   General Swallowing Observations   Past History of Dysphagia   Hx dysphagia:     - Video swallow study completed at OSH in 2023 indicating no aspiration but with cervical esophageal stasis above an osteophyte.      - Upper GI endo in 5/2023 after video swallow study reiterated the same concern, noting food impaction above an area of known osteophyte impingement.  No other stricture or stenosis noted in upper 2/3 esophagus.     - SLP swallow eval during 5/2024 admission: soft/bite sized w tih thin lqiuids, strategies.     - Most recent EGD 1/16/2025: \"1. Multiple benign-appearing widely patent esophageal stenoses in distal esophagus. Balloon dilation " "to 18 mm. Biopsied from mid and distal  esophagus. \"    Today, pt reports mostly drinking protein shakes for nutrition, limited overall food and difficulty with meats/red meat.     Respiratory Support nasal cannula  (4L)   Current Diet/Method of Nutritional Intake (General Swallowing Observations, NIS) regular diet;thin liquids (level 0)   Swallowing Evaluation Clinical swallow evaluation   Clinical Swallow Evaluation   Feeding Assistance set up only required   Clinical Swallow Evaluation Textures Trialed thin liquids;mildly thick liquids;pureed;soft & bite-sized   Clinical Swallow Eval: Thin Liquid Texture Trial   Mode of Presentation, Thin Liquids spoon;cup;straw   Volume of Liquid or Food Presented 6 oz   Oral Phase of Swallow WFL   Pharyngeal Phase of Swallow coughing/choking   Diagnostic Statement periodic delayed cough, c/w cough heard prior to PO/known PNA   Clinical Swallow Eval: Mildly Thick Liquids   Mode of Presentation cup   Volume Presented 2 sips   Oral Phase WFL   Pharyngeal Phase intact   Diagnostic Statement pt disliked, too thick; no overt clinical signs/sx aspiration noted   Clinical Swallow Evaluation: Puree Solid Texture Trial   Mode of Presentation, Puree spoon   Volume of Puree Presented 3 oz puree with meds whole; 2 oz cream of wheat   Oral Phase, Puree WFL   Pharyngeal Phase, Puree feeling of something stuck in throat;repeated swallows;coughing/choking   Diagnostic Statement pills sticking x1 with larger pill, extra swallows with chin tuck + puree cleared; periodic delayed cough, c/w cough heard prior to PO/known PNA   Clinical Swallow Eval: Soft & Bite Sized   Diagnostic Statement pt declined scrambled eggs on tray, reported even they would be too hard without dentures; pt requests pureed food   Esophageal Phase of Swallow   Patient reports or presents with symptoms of esophageal dysphagia Yes   Esophageal comments see above re: hx   Swallowing Recommendations   Diet Consistency " Recommendations pureed (level 4);thin liquids (level 0)   Medication Administration Recommendations, Swallowing (SLP) small pills and capsules whole with puree; larger pills: break in half with puree. Crush if needed, but pt would like to avoid that   Instrumental Assessment Recommendations instrumental evaluation not recommended at this time  (potential updated VFSS pending clinical progression, need)   General Therapy Interventions   Planned Therapy Interventions Dysphagia Treatment   Dysphagia treatment Oropharyngeal exercise training;Modified diet education;Instruction of safe swallow strategies   Clinical Impression   Criteria for Skilled Therapeutic Interventions Met (SLP Eval) Yes, treatment indicated   SLP Diagnosis acute on chronic pharyngoesophageal dysphagia   Risks & Benefits of therapy have been explained evaluation/treatment results reviewed;care plan/treatment goals reviewed;risks/benefits reviewed;current/potential barriers reviewed;participants voiced agreement with care plan;participants included;patient   Clinical Impression Comments   Clinical swallow evaluation completed with thin liquids, mildly thick liquids, puree solids. Dentures not present - pt declined scrambled eggs on tray, reported even they would be too hard without dentures. Pt currently presents with acute on chronic pharyngoesophageal dysphagia. WFL oromotor exam, labial seal, oral containment and oral clearance. No percievable oral holding or delay in swallow. Notable laryngeal elevation to palpation. Intermittent sticking sensation with puree/meds, double swallows and chin tuck were effective in clearing sticking sensation. Periodic delayed cough across PO, c/w cough heard prior to PO/known PNA.     SLP Total Evaluation Time   Eval: oral/pharyngeal swallow function, clinical swallow Minutes (08089) 18   SLP Goals   Therapy Frequency (SLP Eval) 5 times/week   SLP Predicted Duration/Target Date for Goal Attainment 03/08/25   SLP  Goals Swallow   SLP: Safely tolerate diet without signs/symptoms of aspiration Soft & bite sized diet;Thin liquids;With use of swallow precautions;Independently   Interventions   Interventions Quick Adds Swallowing Dysfunction   Swallowing Intervention   Treatment of Swallowing Dysfunction &/or Oral Function for Feeding Minutes (59690) 10   Symptoms Noted During/After Treatment None   Treatment Detail/Skilled Intervention Trained pt in pharyngoesophageal clearance strategies needed based on prior assessments. Min verbal cues to utilize across PO.   SLP Discharge Planning   SLP Plan PO tolerance/strategies; trial MM5/SB6 when dentures present   SLP Discharge Recommendation home with home health   SLP Rationale for DC Rec Pt slightly below baseline, consider  SLP for PO tolerance, strategies at discharge   SLP Brief overview of current status    Recommend pureed diet with thin liquids when fully upright, slow rate, single bites/sips, alternate bites/sips. Double swallows and chin tuck positioning of food/pills sticking.     Medications: small pills and capsules whole with puree; larger pills: break in half with puree. Crush if needed, but pt would like to avoid that d/t dislike of taste     SLP Time and Intention   Total Session Time (sum of timed and untimed services) 28

## 2025-03-01 NOTE — PLAN OF CARE
Date/Time: 2/28/25 1610-0406    Trauma/Ortho/Medical (Choose one) : Medical     Diagnosis: Pneumonia  Mental Status: Oriented x2,pt is confused   Activity:not OOB yet, turned and repositioned  Diet: Mod CHO, prefers soft, easy to chew/swallow food (son helped order meal)  Pain: intrathecal pain pump;  Voiding: incontinent, able to use urinal w/ help  Tele/Restraints/Iso:Tele:   02/LDA: 94% at 2 L O2 via nasal canula, IV fluid infusing  D/C Date: TBD  Other Info: intermittent IV antibiotic, blood culture no growth after 12 hours. Taylor from [employer] came and reduced the dose to 50% of IT pain pump.Pt has parkinson's disease and difficult to swallow. Wrote sticky to the physician.

## 2025-03-01 NOTE — PROGRESS NOTES
"   03/01/25 1000   Appointment Info   Signing Clinician's Name / Credentials (PT) Tong West DPT       Present no   Living Environment   People in Home spouse   Current Living Arrangements assisted living   Home Accessibility no concerns   Transportation Anticipated family or friend will provide   Living Environment Comments Pt reports living in an NEFTALY with his spouse. No stairs. Nursing staff present to assist pt.   Self-Care   Usual Activity Tolerance moderate   Current Activity Tolerance moderate   Regular Exercise No   Equipment Currently Used at Home walker, rolling;wheelchair, manual   Fall history within last six months no   Activity/Exercise/Self-Care Comment Pt reports getting assist with ADLs at baseline. Pt has a WC and FWW, normally uses FWW for ambulation.   General Information   Onset of Illness/Injury or Date of Surgery 03/01/25   Referring Physician Eyal Berry MD   Patient/Family Therapy Goals Statement (PT) \"To go home\"   Pertinent History of Current Problem (include personal factors and/or comorbidities that impact the POC) Per Chart: Cresencio Peguero is a 89 year old male with a history of hypertension, hyperlipidemia, coronary artery disease, diabetes mellitus type 2, Parkinson's disease, neuropathy, sensory ataxia, orthostatic hypotension, chronic pain, GERD, and depression who presented to the ED for evaluation of shortness of breath, cough, fever, headache, body aches, and generalized weakness.  Evaluation in the ED was concerning for sepsis due to pneumonia.  He was given IV fluids and IV antibiotics.  The hospitalist service was contacted to admit him for further evaluation and management.   Existing Precautions/Restrictions fall   Weight-Bearing Status - LLE full weight-bearing   Weight-Bearing Status - RLE full weight-bearing   Cognition   Cognitive Status Comments Pt oriented to place and month, unable to state year. Pt follows commands " appropriately   Pain Assessment   Patient Currently in Pain No   Integumentary/Edema   Integumentary/Edema no deficits were identifed   Posture    Posture Forward head position;Protracted shoulders   Range of Motion (ROM)   Range of Motion ROM is WFL   Strength (Manual Muscle Testing)   Strength (Manual Muscle Testing) Deficits observed during functional mobility;Able to perform L SLR;Able to perform R SLR   Strength Comments BLE Hip Flexion: 3+/5   Bed Mobility   Comment, (Bed Mobility) Supine>sit w/ CGA   Transfers   Comment, (Transfers) Sit>Stand w/ FWW and CGA   Gait/Stairs (Locomotion)   [place] Level (Gait) contact guard   Assistive Device (Gait) walker, front-wheeled   Distance in Feet (Gait) 5'   Balance   Balance Comments Adequate static sitting balance; pt ambulates w/ a FWW   Sensory Examination   Sensory Perception patient reports no sensory changes   Clinical Impression   Criteria for Skilled Therapeutic Intervention Yes, treatment indicated   PT Diagnosis (PT) Impaired gait   Influenced by the following impairments Decreased activity tolerance; decreased balance; decreased strength   Functional limitations due to impairments Impaired functional mobility   Clinical Presentation (PT Evaluation Complexity) stable   Clinical Presentation Rationale Clinical judgement   Clinical Decision Making (Complexity) low complexity   Planned Therapy Interventions (PT) balance training;bed mobility training;gait training;patient/family education;strengthening;progressive activity/exercise;transfer training   Risk & Benefits of therapy have been explained evaluation/treatment results reviewed;care plan/treatment goals reviewed;risks/benefits reviewed;current/potential barriers reviewed;participants voiced agreement with care plan;participants included;patient   PT Total Evaluation Time   PT Eval, Low Complexity Minutes (68988) 10   Physical Therapy Goals   PT Frequency 5x/week   PT Predicted Duration/Target Date  for Goal Attainment 03/07/25   PT Goals Bed Mobility;Transfers;Gait   PT: Bed Mobility Supervision/stand-by assist;Supine to/from sit   PT: Transfers Supervision/stand-by assist;Sit to/from stand;Assistive device   PT: Gait Supervision/stand-by assist;Assistive device;100 feet   Interventions   Interventions Quick Adds Gait Training;Therapeutic Activity   Therapeutic Activity   Therapeutic Activities: dynamic activities to improve functional performance Minutes (86201) 13   Symptoms Noted During/After Treatment Fatigue   Treatment Detail/Skilled Intervention Greeted pt supine in bed, agreed to PT. Pt on RA, sating at ~94% SpO2. Pt with mild confusion, unable to state month or year, but able to follow commands appropriately. Pt perfomred supine>sit w/ CGA. Once in sitting, pt able to scoot self to EOB and sit unsupported without LOB. Pt performed sit>stand x 3 w/ FWW and CGA, cues for hand placement. After ambulation, pt returned to chair w/ FWW and CGA, cues to descend in a slow, controlled motion. Pt ended session sitting in chair, with all needs met and call light within reach.   Gait Training   Gait Training Minutes (90986) 9   Symptoms Noted During/After Treatment (Gait Training) fatigue   Treatment Detail/Skilled Intervention Pt ambulated w/ FWW and CGA. Pt ambulated with decreased gait speed, downward gaze, decreased step length, FWW out too far in front of pt, and heavy reliance on FWW. Verbal cues for upright gaze and posture, FWW proximity, to increase step length, and to reduce reliance on FWW. Repeated cues required as pt often reverts back to original gait pattern. Mild unsteadiness with turns but no LOB noted. Limited by fatigue.   Distance in Feet 50'   PT Discharge Planning   PT Plan Repeat sit>stands; progress gait w/ FWW; LE strengthening   PT Discharge Recommendation (DC Rec) home with home care physical therapy   PT Rationale for DC Rec Pt is below baseline. Pt currently requiring CGA for all  functional mobility. Pt presents with deficits in activity tolerance, balance, and strength. Due to these deficits, pt would benefit from continued skilled PT services via HHPT to address deficits and improve IND with safety and functional mobility. Pt reports having access to assist as needed from nursing staff at [facility].   PT Brief overview of current status Goals of therapy will be to address safe mobility and make recs for d/c to next level of care. Pt and RN will continue to follow all falls risk precautions as documented by RN staff while hospitalized. CGA w/ FWW   PT Total Distance Amb During Session (feet) 50   Physical Therapy Time and Intention   Timed Code Treatment Minutes 22   Total Session Time (sum of timed and untimed services) 32

## 2025-03-02 LAB
GLUCOSE BLDC GLUCOMTR-MCNC: 116 MG/DL (ref 70–99)
GLUCOSE BLDC GLUCOMTR-MCNC: 135 MG/DL (ref 70–99)
GLUCOSE BLDC GLUCOMTR-MCNC: 146 MG/DL (ref 70–99)
GLUCOSE BLDC GLUCOMTR-MCNC: 157 MG/DL (ref 70–99)
GLUCOSE BLDC GLUCOMTR-MCNC: 160 MG/DL (ref 70–99)

## 2025-03-02 PROCEDURE — 250N000011 HC RX IP 250 OP 636: Performed by: HOSPITALIST

## 2025-03-02 PROCEDURE — 99232 SBSQ HOSP IP/OBS MODERATE 35: CPT | Performed by: HOSPITALIST

## 2025-03-02 PROCEDURE — 258N000003 HC RX IP 258 OP 636: Performed by: HOSPITALIST

## 2025-03-02 PROCEDURE — 250N000013 HC RX MED GY IP 250 OP 250 PS 637: Performed by: HOSPITALIST

## 2025-03-02 PROCEDURE — 120N000001 HC R&B MED SURG/OB

## 2025-03-02 RX ORDER — HYDRALAZINE HYDROCHLORIDE 20 MG/ML
10 INJECTION INTRAMUSCULAR; INTRAVENOUS EVERY 4 HOURS PRN
Status: DISCONTINUED | OUTPATIENT
Start: 2025-03-02 | End: 2025-03-04 | Stop reason: HOSPADM

## 2025-03-02 RX ADMIN — CARBIDOPA AND LEVODOPA 2 TABLET: 25; 100 TABLET ORAL at 09:21

## 2025-03-02 RX ADMIN — LACTULOSE 20 G: 20 SOLUTION ORAL at 20:49

## 2025-03-02 RX ADMIN — SERTRALINE HYDROCHLORIDE 100 MG: 100 TABLET ORAL at 09:03

## 2025-03-02 RX ADMIN — LIDOCAINE 1 PATCH: 4 PATCH TOPICAL at 17:28

## 2025-03-02 RX ADMIN — DICLOFENAC 2 G: 10 GEL TOPICAL at 16:12

## 2025-03-02 RX ADMIN — DICLOFENAC 2 G: 10 GEL TOPICAL at 09:02

## 2025-03-02 RX ADMIN — ATORVASTATIN CALCIUM 10 MG: 10 TABLET, FILM COATED ORAL at 20:41

## 2025-03-02 RX ADMIN — GABAPENTIN 600 MG: 300 CAPSULE ORAL at 09:03

## 2025-03-02 RX ADMIN — HYDRALAZINE HYDROCHLORIDE 10 MG: 20 INJECTION INTRAMUSCULAR; INTRAVENOUS at 17:28

## 2025-03-02 RX ADMIN — INSULIN ASPART 1 UNITS: 100 INJECTION, SOLUTION INTRAVENOUS; SUBCUTANEOUS at 18:15

## 2025-03-02 RX ADMIN — SENNOSIDES 1 TABLET: 8.6 TABLET ORAL at 20:42

## 2025-03-02 RX ADMIN — PANTOPRAZOLE SODIUM 40 MG: 40 TABLET, DELAYED RELEASE ORAL at 09:03

## 2025-03-02 RX ADMIN — PIPERACILLIN AND TAZOBACTAM 4.5 G: 4; .5 INJECTION, POWDER, FOR SOLUTION INTRAVENOUS at 05:50

## 2025-03-02 RX ADMIN — SODIUM CHLORIDE: 0.9 INJECTION, SOLUTION INTRAVENOUS at 21:06

## 2025-03-02 RX ADMIN — GABAPENTIN 1200 MG: 300 CAPSULE ORAL at 20:47

## 2025-03-02 RX ADMIN — CARBOXYMETHYLCELLULOSE SODIUM 1 DROP: 5 SOLUTION/ DROPS OPHTHALMIC at 16:12

## 2025-03-02 RX ADMIN — DULOXETINE HYDROCHLORIDE 60 MG: 60 CAPSULE, DELAYED RELEASE ORAL at 09:03

## 2025-03-02 RX ADMIN — CARBOXYMETHYLCELLULOSE SODIUM 1 DROP: 5 SOLUTION/ DROPS OPHTHALMIC at 22:01

## 2025-03-02 RX ADMIN — CARBOXYMETHYLCELLULOSE SODIUM 1 DROP: 5 SOLUTION/ DROPS OPHTHALMIC at 09:03

## 2025-03-02 RX ADMIN — CARBIDOPA AND LEVODOPA 2 TABLET: 25; 100 TABLET ORAL at 16:12

## 2025-03-02 RX ADMIN — LACTULOSE 20 G: 20 SOLUTION ORAL at 09:02

## 2025-03-02 RX ADMIN — LINACLOTIDE 72 MCG: 72 CAPSULE, GELATIN COATED ORAL at 09:03

## 2025-03-02 RX ADMIN — DICLOFENAC 2 G: 10 GEL TOPICAL at 22:02

## 2025-03-02 RX ADMIN — PIPERACILLIN AND TAZOBACTAM 4.5 G: 4; .5 INJECTION, POWDER, FOR SOLUTION INTRAVENOUS at 00:44

## 2025-03-02 RX ADMIN — CARBIDOPA AND LEVODOPA 2 TABLET: 25; 100 TABLET ORAL at 20:42

## 2025-03-02 RX ADMIN — SODIUM CHLORIDE: 0.9 INJECTION, SOLUTION INTRAVENOUS at 09:34

## 2025-03-02 RX ADMIN — SENNOSIDES 1 TABLET: 8.6 TABLET ORAL at 09:03

## 2025-03-02 RX ADMIN — MIRTAZAPINE 7.5 MG: 7.5 TABLET, FILM COATED ORAL at 22:00

## 2025-03-02 RX ADMIN — PIPERACILLIN AND TAZOBACTAM 4.5 G: 4; .5 INJECTION, POWDER, FOR SOLUTION INTRAVENOUS at 11:44

## 2025-03-02 RX ADMIN — PSYLLIUM HUSK 1 PACKET: 3.4 POWDER ORAL at 09:02

## 2025-03-02 RX ADMIN — PIPERACILLIN AND TAZOBACTAM 4.5 G: 4; .5 INJECTION, POWDER, FOR SOLUTION INTRAVENOUS at 18:11

## 2025-03-02 RX ADMIN — INSULIN ASPART 1 UNITS: 100 INJECTION, SOLUTION INTRAVENOUS; SUBCUTANEOUS at 13:40

## 2025-03-02 ASSESSMENT — ACTIVITIES OF DAILY LIVING (ADL)
ADLS_ACUITY_SCORE: 66
ADLS_ACUITY_SCORE: 67
ADLS_ACUITY_SCORE: 66
ADLS_ACUITY_SCORE: 67
ADLS_ACUITY_SCORE: 66
ADLS_ACUITY_SCORE: 69
ADLS_ACUITY_SCORE: 66
ADLS_ACUITY_SCORE: 67
ADLS_ACUITY_SCORE: 69
ADLS_ACUITY_SCORE: 67
ADLS_ACUITY_SCORE: 66
ADLS_ACUITY_SCORE: 66
ADLS_ACUITY_SCORE: 69
ADLS_ACUITY_SCORE: 67
ADLS_ACUITY_SCORE: 69
ADLS_ACUITY_SCORE: 67
ADLS_ACUITY_SCORE: 69
ADLS_ACUITY_SCORE: 69
ADLS_ACUITY_SCORE: 67
DEPENDENT_IADLS:: CLEANING;COOKING;LAUNDRY;SHOPPING;MEAL PREPARATION;MEDICATION MANAGEMENT;MONEY MANAGEMENT;TRANSPORTATION
ADLS_ACUITY_SCORE: 66
ADLS_ACUITY_SCORE: 67

## 2025-03-02 NOTE — PROGRESS NOTES
[facility]    Medicine Progress Note - Hospitalist Service    Date of Admission:  2/28/2025    Assessment & Plan   Cresencio Peguero is a 89 year old male with a history of hypertension, hyperlipidemia, coronary artery disease, diabetes mellitus type 2, Parkinson's disease, neuropathy, sensory ataxia, orthostatic hypotension, chronic pain, GERD, and depression who presented to the ED for evaluation of shortness of breath, cough, fever, headache, body aches, and generalized weakness.  Evaluation in the ED was concerning for sepsis due to pneumonia.  He was given IV fluids and IV antibiotics.  The hospitalist service was contacted to admit him for further evaluation and management.    Community-acquired pneumonia  Sepsis  Lactic acidosis  Presented with fevers, cough, shortness of breath, headache, body aches and generalized weakness that began within the last 24 hours prior to admission.  Febrile with temp of 102.5.  WBC within normal limits.  Tachypneic.  Hypertensive initially, then briefly hypotensive which resolved with IV fluids.  CXR concerning for pneumonia.  CT chest negative for PE but did show multifocal pneumonia.  COVID/Influenza/RSV PCR testing was negative.  Hypoxic requiring 2 lpm of supplemental oxygen.  Admit to inpatient.  Started on Zosyn in the ED, continue the same for now, likely transition to Augmentin upon discharge to complete a 5 to 7 day course of antibiotics.  Blood cultures obtained in the ED, negative to date, follow up on results.  Weaned off supplemental oxygen as of 3/1/25.  Encourage incentive spirometry.  Lactic acidosis resolved with IV fluids.  Discontinue IV fluids.  Recheck labs in AM.  Possible discharge back to assisted living facility tomorrow.    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  Troponin 66 --> 74.  Chest imaging as noted above.  No EKG has been done yet.  Suspect elevated troponin related to acute  illness as noted above rather than acute cardiac process.  EKG showed sinus rhythm, no acute ischemic changes.  Telemetry.  Repeat troponin trending down at 50.  Monitor for symptoms.  Continue PTA atorvastatin.  He was not on an anti-platelet agent prior to admission.    Hypertension  History of orthostatic hypotension  Previously on midodrine, recently discontinued by his neurologist due to elevated blood pressures.  Blood pressure labile in the ED.  Monitor blood pressure with routine vitals, can check orthostatic vitals if needed.    Hyperlipidemia  Continue PTA atorvastatin.    Diabetes mellitus, type 2  Hemoglobin A1c 8.5% on 10/21/24.  HOLD PTA metformin and Jardiance.  Monitor blood sugars and use sliding scale NovoLog as indicated.  Moderate carbohydrate diet.  Monitor for hypoglycemia.  Blood sugars well controlled.    Parkinson's disease  Follows with [facility] of neurology as an outpatient.  Continue PTA Sinemet.    Neuropathy  Continue PTA gabapentin.    Chronic pain  Follows with [facility] as an outpatient.  Recently had intrathecal pain pump refilled and had medications adjusted with addition of morphine.  Consult pain management service, appreciate their assistance.  Continue PTA gabapentin, lidocaine patch, and diclofenac gel.  Pain pump adjusted on 2/28/25, dose reduced by 50% following discussion with pain management service and [facility].    GERD  Continue PTA omeprazole.    Depression  Continue PTA duloxetine, sertraline, and mirtazapine.    Constipation  Continue PTA bowel regimen.          Diet: Combination Diet Moderate Consistent Carb (60 g CHO per Meal) Diet; Pureed Diet (level 4); Thin Liquids (level 0) (upright, slow rate, single bites/sips, alternate bites/sips)    DVT Prophylaxis: Pneumatic Compression Devices  Henderson Catheter: Not present  Lines: None     Cardiac Monitoring: ACTIVE order. Indication: Chest pain/ ACS rule out (24 hours)  Code Status: No CPR- Do NOT Intubate       Clinically Significant Risk Factors                             # DMII: A1C = 8.2 % (Ref range: <5.7 %) within past 6 months, PRESENT ON ADMISSION      # Financial/Environmental Concerns: none         Social Drivers of Health    Tobacco Use: Medium Risk (1/16/2025)    Patient History     Smoking Tobacco Use: Former     Smokeless Tobacco Use: Unknown   Alcohol Use: Unknown (1/11/2021)    Received from [employer]    AUDIT-C     Frequency of Alcohol Consumption: 4 or more times a week    [facility] from [employer] [facility], [employer] [facility]    Social Connections          Disposition Plan     Medically Ready for Discharge: Anticipated Tomorrow             Eyal Berry MD  Hospitalist Service  [facility]  Securely message with [employer] (more info)  Text page via [facility] Paging/Directory   ______________________________________________________________________    Interval History   Cresencio Peguero was seen today.  He feels okay.  Main complaint is feeling generally tired.  Still has an occasional cough.  Denies fevers, chest pain, shortness of breath at rest, nausea, abdominal pain.  Plan of care discussed with bedside nurse.  Updated his son Adria by phone today.    Physical Exam   Vital Signs: Temp: 98.6  F (37  C) Temp src: Oral BP: (!) 152/61 Pulse: 64   Resp: 16 SpO2: 94 % O2 Device: None (Room air)    Weight: 195 lbs 15.82 oz    Constitutional: awake, alert, cooperative, no apparent distress, laying in the hospital bed  Respiratory: no increased work of breathing, clear to auscultation bilaterally, no crackles or wheezing  Cardiovascular: regular rate and rhythm, normal S1 and S2, no murmur noted  GI: normal bowel sounds, soft, non-distended, non-tender  Skin: warm, dry  Musculoskeletal: no lower extremity pitting edema present  Neurologic: awake, alert, hard of hearing, answers questions appropriately, moves all  extremities    Medical Decision Making       40 MINUTES SPENT BY ME on the date of service doing chart review, history, exam, documentation & further activities per the note.      Data   NOTE: Data reviewed over the past 24 hrs contributes toward MDM complexity

## 2025-03-02 NOTE — PLAN OF CARE
Trauma/Ortho/Medical (Choose one) : Medical     Diagnosis: Pneumonia  Mental Status: A&O x2  Activity:  A1, walker and gait belt  Diet: Mod CHO, pureed diet for now per Speech Eval  Pain: intrathecal pain pump, Lidocaine patch, Voltaren gel  Voiding: incontinent, stands up at bedside to use urinal   Tele/Restraints/Iso: Tele: NSR  02/LDA: room air, IV fluid infusing  D/C Date: TBD  Other Info: intermittent IV antibiotic, blood culture pending

## 2025-03-02 NOTE — CONSULTS
Care Management Initial Consult    General Information  Assessment completed with: Children, son Adria  Type of CM/SW Visit: Initial Assessment    Primary Care Provider verified and updated as needed: Yes   Readmission within the last 30 days: no previous admission in last 30 days      Reason for Consult: discharge planning  Advance Care Planning:            Communication Assessment  Patient's communication style: spoken language (English or Bilingual)             Cognitive  Cognitive/Neuro/Behavioral: .WDL except  Level of Consciousness: intermittent confusion  Arousal Level: opens eyes spontaneously, arouses to voice  Orientation: disoriented to, situation, time     Best Language: 0 - No aphasia       Living Environment:   People in home: facility resident  Lamar wife  Current living Arrangements: assisted living  Name of Facility: [facility]   Able to return to prior arrangements: yes       Family/Social Support:  Care provided by: spouse/significant other (facility staff)  Provides care for: no one, unable/limited ability to care for self  Marital Status:   Support system: Children, Wife, Facility resident(s)/Staff          Description of Support System: Supportive, Involved         Current Resources:   Patient receiving home care services: Yes  Skilled Home Care Services: Physical Therapy     Community Resources: None  Equipment currently used at home: walker, rolling, wheelchair, manual, grab bar, tub/shower, grab bar, toilet, glucometer, raised toilet seat, shower chair  Supplies currently used at home: Diabetic Supplies, Incontinence Supplies    Employment/Financial:  Employment Status: retired        Financial Concerns: none   Referral to Financial Worker: No       Does the patient's insurance plan have a 3 day qualifying hospital stay waiver?  No    Lifestyle & Psychosocial Needs:  Social Drivers of Health     Food Insecurity: Not on file   Depression: Not at risk (7/12/2019)     Received from [email or web address]    PHQ-2     PHQ-2 Score: 0   Housing Stability: Not on file   Tobacco Use: Medium Risk (1/16/2025)    Patient History     Smoking Tobacco Use: Former     Smokeless Tobacco Use: Unknown     Passive Exposure: Not on file   Financial Resource Strain: Not on file   Alcohol Use: Unknown (1/11/2021)    Received from [employer] [facility]    AUDIT-C     Frequency of Alcohol Consumption: 4 or more times a week     Average Number of Drinks: Not on file     Frequency of Binge Drinking: Not on file   Transportation Needs: Not on file   Physical Activity: Not on file   Interpersonal Safety: Low Risk  (1/16/2025)    Interpersonal Safety     Do you feel physically and emotionally safe where you currently live?: Yes     Within the past 12 months, have you been hit, slapped, kicked or otherwise physically hurt by someone?: No     Within the past 12 months, have you been humiliated or emotionally abused in other ways by your partner or ex-partner?: No   Stress: Not on file   Social Connections: Unknown (4/11/2023)    [facility]    Social Connections     Frequency of Communication with Friends and Family: Not on file   Health Literacy: Not on file       Functional Status:  Prior to admission patient needed assistance:   Dependent ADLs:: Ambulation-walker, Wheelchair-independent  Dependent IADLs:: Cleaning, Cooking, Laundry, Shopping, Meal Preparation, Medication Management, Money Management, Transportation       Mental Health Status:  Mental Health Status: No Current Concerns       Chemical Dependency Status:  Chemical Dependency Status: No Current Concerns             Values/Beliefs:  Spiritual, Cultural Beliefs, Sabianist Practices, Values that affect care: no               Discussed  Partnership in Safe Discharge Planning  document with patient/family: No    Additional  Information:  Writer spoke with patient's son Adria via phone and Adria supplied some information regarding patient's living situation.  Writer also attempted to contact nursing staff at patient's [facility]: [facility] and had to leave voice message for them to call writer back.  Per Adria, patient lives at [facility] with his wife Lamar.  Lamar is independent with all A/IADLs, but patient receives the following: med management, assist with bathing and dressing, all meals.  Patient primarily uses a manual w/c which he is able to self propel, but at times will use a rolling walker.  Adria states that patient was supposed to start some PT just prior to coming into the hospital.  Writer mentioned that home PT/OT is being recommended.  Writer will ask [facility] about therapies and agency that was supposed to start before making another referral to homecare hub.       Next Steps: Care coordination team will continue to follow and assist with safe discharge plan.      Addendum:  2:36 PM  Writer received call from Ashley Vazquez RN at patient's [facility] and she confirmed the above services and that patient could get OP PT/OT services on sight.  She would just need an order for OP PT/OT.  Per Ashley, patient can return any day of the week and prefer back by 5pm.  Meds should be filled at [facility] and writer updated that in EPIC.  Ashley to call writer back with fax number of facility.    Fax number is: [phone]  Director of nursing is: Kerri Mckay [phone]    Alexia Barrientos RN Care Coordinator  [facility]  [phone]

## 2025-03-02 NOTE — PLAN OF CARE
Goal Outcome Evaluation:    Date/Time: 2/28/25 [id number]    Trauma/Ortho/Medical (Choose one) : Medical     Diagnosis: Pneumonia  Mental Status: Oriented x2,pt is confused at times.  Activity:A1, walker and GB.  Diet: Mod CHO, prefers soft, and puree diet per speech evaluation.  Easy to chew/swallow food (son helped order meal)  Pain: intrathecal pain pump;   Voiding: incontinent, able to go to the bathroom  Tele/Restraints/Iso:Tele: NSR  02/LDA: RA, IV fluid infusing continuous  D/C Date: TBD  Other Info: intermittent IV antibiotic.

## 2025-03-03 ENCOUNTER — APPOINTMENT (OUTPATIENT)
Dept: SPEECH THERAPY | Facility: CLINIC | Age: 89
DRG: 871 | End: 2025-03-03
Payer: MEDICARE

## 2025-03-03 ENCOUNTER — APPOINTMENT (OUTPATIENT)
Dept: PHYSICAL THERAPY | Facility: CLINIC | Age: 89
DRG: 871 | End: 2025-03-03
Payer: MEDICARE

## 2025-03-03 ENCOUNTER — DOCUMENTATION ONLY (OUTPATIENT)
Dept: GASTROENTEROLOGY | Facility: CLINIC | Age: 89
End: 2025-03-03
Payer: MEDICARE

## 2025-03-03 LAB
ANION GAP SERPL CALCULATED.3IONS-SCNC: 11 MMOL/L (ref 7–15)
ATRIAL RATE - MUSE: 60 BPM
BUN SERPL-MCNC: 14.1 MG/DL (ref 8–23)
CALCIUM SERPL-MCNC: 8.8 MG/DL (ref 8.8–10.4)
CHLORIDE SERPL-SCNC: 108 MMOL/L (ref 98–107)
CREAT SERPL-MCNC: 1.05 MG/DL (ref 0.67–1.17)
DIASTOLIC BLOOD PRESSURE - MUSE: NORMAL MMHG
EGFRCR SERPLBLD CKD-EPI 2021: 68 ML/MIN/1.73M2
ERYTHROCYTE [DISTWIDTH] IN BLOOD BY AUTOMATED COUNT: 15.7 % (ref 10–15)
GLUCOSE BLDC GLUCOMTR-MCNC: 108 MG/DL (ref 70–99)
GLUCOSE BLDC GLUCOMTR-MCNC: 143 MG/DL (ref 70–99)
GLUCOSE BLDC GLUCOMTR-MCNC: 169 MG/DL (ref 70–99)
GLUCOSE BLDC GLUCOMTR-MCNC: 184 MG/DL (ref 70–99)
GLUCOSE BLDC GLUCOMTR-MCNC: 197 MG/DL (ref 70–99)
GLUCOSE SERPL-MCNC: 114 MG/DL (ref 70–99)
HCO3 SERPL-SCNC: 22 MMOL/L (ref 22–29)
HCT VFR BLD AUTO: 33 % (ref 40–53)
HGB BLD-MCNC: 10.6 G/DL (ref 13.3–17.7)
INTERPRETATION ECG - MUSE: NORMAL
MCH RBC QN AUTO: 25.1 PG (ref 26.5–33)
MCHC RBC AUTO-ENTMCNC: 32.1 G/DL (ref 31.5–36.5)
MCV RBC AUTO: 78 FL (ref 78–100)
P AXIS - MUSE: 29 DEGREES
PLATELET # BLD AUTO: 188 10E3/UL (ref 150–450)
POTASSIUM SERPL-SCNC: 3.5 MMOL/L (ref 3.4–5.3)
PR INTERVAL - MUSE: 208 MS
QRS DURATION - MUSE: 106 MS
QT - MUSE: 436 MS
QTC - MUSE: 436 MS
R AXIS - MUSE: 45 DEGREES
RBC # BLD AUTO: 4.22 10E6/UL (ref 4.4–5.9)
SODIUM SERPL-SCNC: 141 MMOL/L (ref 135–145)
SYSTOLIC BLOOD PRESSURE - MUSE: NORMAL MMHG
T AXIS - MUSE: 61 DEGREES
VENTRICULAR RATE- MUSE: 60 BPM
WBC # BLD AUTO: 5 10E3/UL (ref 4–11)

## 2025-03-03 PROCEDURE — 250N000013 HC RX MED GY IP 250 OP 250 PS 637: Performed by: HOSPITALIST

## 2025-03-03 PROCEDURE — 120N000001 HC R&B MED SURG/OB

## 2025-03-03 PROCEDURE — 97116 GAIT TRAINING THERAPY: CPT | Mod: GP

## 2025-03-03 PROCEDURE — 85027 COMPLETE CBC AUTOMATED: CPT | Performed by: HOSPITALIST

## 2025-03-03 PROCEDURE — 99233 SBSQ HOSP IP/OBS HIGH 50: CPT | Performed by: HOSPITALIST

## 2025-03-03 PROCEDURE — 97530 THERAPEUTIC ACTIVITIES: CPT | Mod: GP

## 2025-03-03 PROCEDURE — 36415 COLL VENOUS BLD VENIPUNCTURE: CPT | Performed by: HOSPITALIST

## 2025-03-03 PROCEDURE — 92526 ORAL FUNCTION THERAPY: CPT | Mod: GN

## 2025-03-03 PROCEDURE — 99232 SBSQ HOSP IP/OBS MODERATE 35: CPT | Mod: 25

## 2025-03-03 PROCEDURE — 250N000012 HC RX MED GY IP 250 OP 636 PS 637: Performed by: HOSPITALIST

## 2025-03-03 PROCEDURE — 80048 BASIC METABOLIC PNL TOTAL CA: CPT | Performed by: HOSPITALIST

## 2025-03-03 PROCEDURE — 250N000011 HC RX IP 250 OP 636: Performed by: HOSPITALIST

## 2025-03-03 PROCEDURE — 62367 ANALYZE SPINE INFUS PUMP: CPT

## 2025-03-03 RX ORDER — MORPHINE SULFATE 2 MG/ML
2 INJECTION, SOLUTION INTRAMUSCULAR; INTRAVENOUS
Status: DISCONTINUED | OUTPATIENT
Start: 2025-03-03 | End: 2025-03-04 | Stop reason: HOSPADM

## 2025-03-03 RX ADMIN — DICLOFENAC 2 G: 10 GEL TOPICAL at 09:05

## 2025-03-03 RX ADMIN — CARBIDOPA AND LEVODOPA 2 TABLET: 25; 100 TABLET ORAL at 15:42

## 2025-03-03 RX ADMIN — PSYLLIUM HUSK 1 PACKET: 3.4 POWDER ORAL at 09:00

## 2025-03-03 RX ADMIN — CARBOXYMETHYLCELLULOSE SODIUM 1 DROP: 5 SOLUTION/ DROPS OPHTHALMIC at 08:59

## 2025-03-03 RX ADMIN — LACTULOSE 20 G: 20 SOLUTION ORAL at 09:00

## 2025-03-03 RX ADMIN — LIDOCAINE 1 PATCH: 4 PATCH TOPICAL at 17:03

## 2025-03-03 RX ADMIN — PIPERACILLIN AND TAZOBACTAM 4.5 G: 4; .5 INJECTION, POWDER, FOR SOLUTION INTRAVENOUS at 12:38

## 2025-03-03 RX ADMIN — GABAPENTIN 600 MG: 300 CAPSULE ORAL at 09:00

## 2025-03-03 RX ADMIN — DICLOFENAC 2 G: 10 GEL TOPICAL at 22:07

## 2025-03-03 RX ADMIN — PIPERACILLIN AND TAZOBACTAM 4.5 G: 4; .5 INJECTION, POWDER, FOR SOLUTION INTRAVENOUS at 00:14

## 2025-03-03 RX ADMIN — INSULIN ASPART 2 UNITS: 100 INJECTION, SOLUTION INTRAVENOUS; SUBCUTANEOUS at 17:58

## 2025-03-03 RX ADMIN — CARBIDOPA AND LEVODOPA 2 TABLET: 25; 100 TABLET ORAL at 12:38

## 2025-03-03 RX ADMIN — ATORVASTATIN CALCIUM 10 MG: 10 TABLET, FILM COATED ORAL at 21:02

## 2025-03-03 RX ADMIN — LINACLOTIDE 72 MCG: 72 CAPSULE, GELATIN COATED ORAL at 09:05

## 2025-03-03 RX ADMIN — INSULIN ASPART 1 UNITS: 100 INJECTION, SOLUTION INTRAVENOUS; SUBCUTANEOUS at 14:14

## 2025-03-03 RX ADMIN — DULOXETINE HYDROCHLORIDE 60 MG: 60 CAPSULE, DELAYED RELEASE ORAL at 08:59

## 2025-03-03 RX ADMIN — LACTULOSE 20 G: 20 SOLUTION ORAL at 21:11

## 2025-03-03 RX ADMIN — MIRTAZAPINE 7.5 MG: 7.5 TABLET, FILM COATED ORAL at 22:07

## 2025-03-03 RX ADMIN — GABAPENTIN 1200 MG: 300 CAPSULE ORAL at 20:55

## 2025-03-03 RX ADMIN — DICLOFENAC 2 G: 10 GEL TOPICAL at 15:49

## 2025-03-03 RX ADMIN — PIPERACILLIN AND TAZOBACTAM 4.5 G: 4; .5 INJECTION, POWDER, FOR SOLUTION INTRAVENOUS at 05:41

## 2025-03-03 RX ADMIN — SERTRALINE HYDROCHLORIDE 100 MG: 100 TABLET ORAL at 09:01

## 2025-03-03 RX ADMIN — AMOXICILLIN AND CLAVULANATE POTASSIUM 1 TABLET: 875; 125 TABLET, FILM COATED ORAL at 21:04

## 2025-03-03 RX ADMIN — PANTOPRAZOLE SODIUM 40 MG: 40 TABLET, DELAYED RELEASE ORAL at 08:59

## 2025-03-03 RX ADMIN — SENNOSIDES 1 TABLET: 8.6 TABLET ORAL at 21:02

## 2025-03-03 RX ADMIN — SENNOSIDES 1 TABLET: 8.6 TABLET ORAL at 08:59

## 2025-03-03 RX ADMIN — CARBOXYMETHYLCELLULOSE SODIUM 1 DROP: 5 SOLUTION/ DROPS OPHTHALMIC at 15:42

## 2025-03-03 RX ADMIN — CARBOXYMETHYLCELLULOSE SODIUM 1 DROP: 5 SOLUTION/ DROPS OPHTHALMIC at 22:06

## 2025-03-03 RX ADMIN — CARBIDOPA AND LEVODOPA 2 TABLET: 25; 100 TABLET ORAL at 21:01

## 2025-03-03 ASSESSMENT — ACTIVITIES OF DAILY LIVING (ADL)
ADLS_ACUITY_SCORE: 67
ADLS_ACUITY_SCORE: 71
ADLS_ACUITY_SCORE: 67

## 2025-03-03 NOTE — PROGRESS NOTES
Care Management Follow Up    Length of Stay (days): 3    Expected Discharge Date: 03/03/2025     Concerns to be Addressed: discharge planning     Patient plan of care discussed at interdisciplinary rounds: Yes    Anticipated Discharge Disposition: Assisted Living, Home Care              Anticipated Discharge Services: None  Anticipated Discharge DME: None    Patient/family educated on Medicare website which has current facility and service quality ratings:    Education Provided on the Discharge Plan: Yes  Patient/Family in Agreement with the Plan: yes    Referrals Placed by CM/SW:    Private pay costs discussed: Not applicable    Discussed  Partnership in Safe Discharge Planning  document with patient/family: No     Handoff Completed: No, handoff not indicated or clinically appropriate    Additional Information:  Call received from Erika at [facility], [phone]. She requested a call back with an update. Writer returned call and Erika said that patient had not been open to home care prior to admission but because he lives in at [facility], they would be able to open with him for PT and OT. Writer noted that per notes it appears he'll be ready for discharge tomorrow. She would like a referral sent today and they can open with him right away following discharge. Referral sent.     Next Steps: follow for discharge planning.    RONNIE Juarez

## 2025-03-03 NOTE — PROGRESS NOTES
[facility]    Medicine Progress Note - Hospitalist Service    Date of Admission:  2/28/2025    Assessment & Plan   Cresencio Peguero is a 89 year old male with a history of hypertension, hyperlipidemia, coronary artery disease, diabetes mellitus type 2, Parkinson's disease, neuropathy, sensory ataxia, orthostatic hypotension, chronic pain, GERD, and depression who presented to the ED for evaluation of shortness of breath, cough, fever, headache, body aches, and generalized weakness.  Evaluation in the ED was concerning for sepsis due to pneumonia.  He was given IV fluids and IV antibiotics.  The hospitalist service was contacted to admit him for further evaluation and management.    Community-acquired pneumonia  Sepsis  Lactic acidosis  Presented with fevers, cough, shortness of breath, headache, body aches and generalized weakness that began within the last 24 hours prior to admission.  Febrile with temp of 102.5.  WBC within normal limits.  Tachypneic.  Hypertensive initially, then briefly hypotensive which resolved with IV fluids.  CXR concerning for pneumonia.  CT chest negative for PE but did show multifocal pneumonia.  COVID/Influenza/RSV PCR testing was negative.  Hypoxic requiring 2 lpm of supplemental oxygen.  Admit to inpatient.  Started on Zosyn in the ED, continued the same in the hospital, transitioned to Augmentin on evening of 3/3/25 with plan to complete 7 day course of antibiotics on evening of 3/6/25.  Blood cultures obtained in the ED, negative to date, follow up on results.  Weaned off supplemental oxygen as of 3/1/25.  Encourage incentive spirometry.  Lactic acidosis resolved with IV fluids.  Discontinued IV fluids on 3/2/25.  Labs OK on 3/3/25.  Patient feels tired, has increased pain (although rates it the same as he consistently has over the past year per discussion with his son), and has elevated BP today.  Plan to continue inpatient care today, will adjust pain  medications and potentially add low dose amlodipine.  Possible discharge back to assisted living facility tomorrow.    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  Troponin 66 --> 74.  Chest imaging as noted above.  No EKG has been done yet.  Suspect elevated troponin related to acute illness as noted above rather than acute cardiac process.  EKG showed sinus rhythm, no acute ischemic changes.  Telemetry.  Repeat troponin trending down at 50.  Monitor for symptoms.  Continue PTA atorvastatin.  He was not on an anti-platelet agent prior to admission.    Hypertension  History of orthostatic hypotension  Previously on midodrine, recently discontinued by his neurologist due to elevated blood pressures.  Blood pressure labile in the ED.  Monitor blood pressure with routine vitals, can check orthostatic vitals if needed.  Blood pressure significantly elevated on 3/3/25.  Possibly related to uncontrolled pain, addressed as noted below.  Consider adding low dose amlodipine if BP does not improve with pain control.  Note prior history of labile blood pressures, try to avoid hypotension.    Hyperlipidemia  Continue PTA atorvastatin.    Diabetes mellitus, type 2  Hemoglobin A1c 8.5% on 10/21/24.  HOLD PTA metformin and Jardiance.  Monitor blood sugars and use sliding scale NovoLog as indicated.  Moderate carbohydrate diet.  Monitor for hypoglycemia.  Blood sugars well controlled.    Parkinson's disease  Follows with [facility] as an outpatient.  Continue PTA Sinemet.    Neuropathy  Continue PTA gabapentin.    Chronic pain  Follows with [facility] as an outpatient.  Recently had intrathecal pain pump refilled and had medications adjusted with addition of morphine.  Consult pain management service, appreciate their assistance.  Continue PTA gabapentin, lidocaine patch, and diclofenac gel.  Pain pump adjusted on 2/28/25, dose reduced by 50% following discussion with pain management  service and [facility].  Patient complains of increased pain on 3/3/25, however pain rating is similar to what family has noted over the past year.  Will add PRN IV morphine today for severe pain.  Defer further adjustments to pain pump to [facility] in the outpatient setting.    GERD  Continue PTA omeprazole.    Depression  Continue PTA duloxetine, sertraline, and mirtazapine.    Constipation  Continue PTA bowel regimen.          Diet: Combination Diet Moderate Consistent Carb (60 g CHO per Meal) Diet; Pureed Diet (level 4); Thin Liquids (level 0) (upright, slow rate, single bites/sips, alternate bites/sips)    DVT Prophylaxis: Pneumatic Compression Devices  Henderson Catheter: Not present  Lines: None     Cardiac Monitoring: ACTIVE order. Indication: Chest pain/ ACS rule out (24 hours)  Code Status: No CPR- Do NOT Intubate      Clinically Significant Risk Factors          # Hyperchloremia: Highest Cl = 108 mmol/L in last 2 days, will monitor as appropriate                        # DMII: A1C = 8.2 % (Ref range: <5.7 %) within past 6 months, PRESENT ON ADMISSION      # Financial/Environmental Concerns: none         Social Drivers of Health    Tobacco Use: Medium Risk (1/16/2025)    Patient History     Smoking Tobacco Use: Former     Smokeless Tobacco Use: Unknown   Alcohol Use: Unknown (1/11/2021)    Received from [employer]    AUDIT-C     Frequency of Alcohol Consumption: 4 or more times a week    [facility]    Social Connections          Disposition Plan     Medically Ready for Discharge: Anticipated Tomorrow             Eyal Berry MD  Hospitalist Service  [facility]  Securely message with [employer] (more info)  Text page via [facility] Paging/Directory   ______________________________________________________________________    Interval History   Cresencio Peguero was  seen today.  He doesn't feel very good.  Feels tired.  Back pain seems to be worse today.  Denies fevers, chest pain, shortness of breath, nausea, abdominal pain.  Plan of care discussed with bedside nurse and pain management service.  Updated his son Adria by phone today.  Per Adria, patient has had chronic pain for quite some time, baseline seem to be around 6/10 in severity.  He also has been quite tired for several months.    Physical Exam   Vital Signs: Temp: 99  F (37.2  C) Temp src: Oral BP: (!) 184/93 Pulse: 74   Resp: 16 SpO2: 96 % O2 Device: None (Room air)    Weight: 197 lbs 15.57 oz    Constitutional: awake, alert, cooperative, no apparent distress, laying in the hospital bed  Respiratory: no increased work of breathing, clear to auscultation bilaterally, no crackles or wheezing  Cardiovascular: regular rate and rhythm, normal S1 and S2, no murmur noted  GI: normal bowel sounds, soft, non-distended, non-tender  Skin: warm, dry  Musculoskeletal: no lower extremity pitting edema present  Neurologic: awake, alert, hard of hearing, answers questions appropriately, moves all extremities    Medical Decision Making       40 MINUTES SPENT BY ME on the date of service doing chart review, history, exam, documentation & further activities per the note.      Data     I have personally reviewed the following data over the past 24 hrs:    5.0  \   10.6 (L)   / 188     141 108 (H) 14.1 /  184 (H)   3.5 22 1.05 \

## 2025-03-03 NOTE — PROGRESS NOTES
"[facility] ACUTE INPATIENT PAIN SERVICE    [facility], [facility], [facility], [facility], [place]   PAIN PROGRESS NOTE    Assessment/Plan:  Cresencio Peguero is a 89 year old male who was admitted on 2/28/2025.  Pain team was asked to see the patient for pain with IT pump (cath tup at T7. Admitted for pneumonia. History of CAD, DM, HTN, HLD, tobacco use.    shows routine fills of gabapentin. Describes pain as 5/10 and \"an ache in his mid back\". Pain is the same as his baseline chronic pain.  Follows with Dr. Hanna with [facility].     PLAN:  Chronic pain with intrathecal pain pump (Recently filled). Admitted with pneumonia and sepsis/hypoxia. Advise against additional opioids at this time.     Multimodal Medication Therapy:   Adjuvants:   Acetaminophen 650mg q4h prn   Voltaren tid   Gabapentin 600mg in am, 1200mg at hs - hold if sedated  Lidocaine patch x1  Opioids: Intrathecal pain pump infusing fentanyl, morphine, bupivicaine. See procedure note from 3/3/25 for more detail. Pain pump decreased 2/28/25 by [employer] with order from pain clinic.   Do not recommend any additional opioids   Non-medication interventions- Ice, heat prn   Constipation Prophylaxis- scheduled senna, prn senna   Follow up /Discharge Recommendations - We recommend prescribing the following at the time of discharge:  Follow up with [facility]   Continue voltaren, lidocaine, gabapentin        Subjective:  Patient is seen resting in bed and appears comfortable. Currently endorsing 5/10 pain to the mid back he describes as \"aching\". Reports his chronic pain is at baseline. Denies any additional pain at this time.  Denies nausea, vomiting, diarrhea, constipation, chest pain, shortness of breath.     Medication regimen and adjustments made reviewed. Patient verbalized understanding and is in agreement with the plan. All questions answered.     Discussed plan with hospitalist, continue current regimen and advised close follow up with [facility]    "     History   Drug Use Unknown         Tobacco Use      Smoking status: Former        Types: Pipe        Quit date: 6/1/2021        Years since quitting: 3.7      Smokeless tobacco: None        Objective:  Vital signs in last 24 hours:  B/P: 158/73, T: 98.4, P: 72, R: 17   Blood pressure (!) 158/73, pulse 72, temperature 98.4  F (36.9  C), temperature source Oral, resp. rate 17, weight 88.9 kg (195 lb 15.8 oz), SpO2 97%.        Review of Systems:   As per subjective, all others negative.    Physical Exam  General: in no apparent distress and non-toxic, resting in bed   HEENT: Head normocephalic atraumatic, oral mucosa moist. Sclerae anicteric  Resp: Respirations unlabored   Skin: No rashes or lesions to visualized skin   Extremities: Able to move all four extremities spontaneously   Psych: Normal affect, pleasant  Neuro: Alert and oriented         Imaging:  Personally Reviewed.    Results for orders placed or performed during the hospital encounter of 02/28/25   XR Chest 2 Views    Impression    IMPRESSION: Enlarged cardiomediastinal silhouette. Vascular calcifications are seen within the thoracic aorta. Multiple surgical clips are seen overlying the mediastinum. Median sternotomy wires are also seen. Bilateral patchy airspace and interstitial   opacities are seen in the lungs suggestive of pulmonary edema or atypical pneumonia. Moderate degenerative changes are seen in the left shoulder. 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 Multilevel degenerative changes are present in the spine.   CT Chest Pulmonary Embolism w Contrast    Impression    IMPRESSION:  1.  There is severe respiratory motion artifact which degrades evaluation of the pulmonary arteries. No evidence of a central pulmonary embolism within these limitations.   2.  Multifocal pneumonia.         Lab Results:  Personally Reviewed.   Last Comprehensive Metabolic Panel:  Sodium   Date Value Ref Range Status   03/01/2025 138 135 - 145 mmol/L Final  "  06/30/2021 139 133 - 144 mmol/L Final     Potassium   Date Value Ref Range Status   03/01/2025 4.2 3.4 - 5.3 mmol/L Final   06/30/2021 3.9 3.4 - 5.3 mmol/L Final     Chloride   Date Value Ref Range Status   03/01/2025 105 98 - 107 mmol/L Final   06/30/2021 108 94 - 109 mmol/L Final     Carbon Dioxide   Date Value Ref Range Status   06/30/2021 29 20 - 32 mmol/L Final     Carbon Dioxide (CO2)   Date Value Ref Range Status   03/01/2025 21 (L) 22 - 29 mmol/L Final     Anion Gap   Date Value Ref Range Status   03/01/2025 12 7 - 15 mmol/L Final   06/30/2021 2 (L) 3 - 14 mmol/L Final     Glucose   Date Value Ref Range Status   06/30/2021 86 70 - 99 mg/dL Final     GLUCOSE BY METER POCT   Date Value Ref Range Status   03/03/2025 108 (H) 70 - 99 mg/dL Final     Urea Nitrogen   Date Value Ref Range Status   03/01/2025 26.7 (H) 8.0 - 23.0 mg/dL Final   06/30/2021 20 7 - 30 mg/dL Final     Creatinine   Date Value Ref Range Status   03/01/2025 1.03 0.67 - 1.17 mg/dL Final   06/30/2021 0.88 0.66 - 1.25 mg/dL Final     GFR Estimate   Date Value Ref Range Status   03/01/2025 69 >60 mL/min/1.73m2 Final     Comment:     eGFR calculated using 2021 CKD-EPI equation.   06/30/2021 78 >60 mL/min/[1.73_m2] Final     Comment:     Non  GFR Calc  Starting 12/18/2018, serum creatinine based estimated GFR (eGFR) will be   calculated using the Chronic Kidney Disease Epidemiology Collaboration   (CKD-EPI) equation.       Calcium   Date Value Ref Range Status   03/01/2025 9.2 8.8 - 10.4 mg/dL Final   06/30/2021 8.4 (L) 8.5 - 10.1 mg/dL Final        UA: No results found for: \"UAMP\", \"UBARB\", \"BENZODIAZEUR\", \"UCANN\", \"UCOC\", \"OPIT\", \"UPCP\"           Please see A&P for additional details of medical decision making.  MANAGEMENT DISCUSSED with the following over the past 24 hours:   -Hospitalist    NOTE(S)/MEDICAL RECORDS REVIEWED over the past 24 hours:   -Pain: reviewed adjustments made to plan   -Hospitalist: reviewed PMH, HPI "   Tests personally interpreted in the past 24 hours:  - CHEST CT showing no evidence of emolism, multifocal pneumonia   Tests REVIEWED in the past 24 hours:  - BMP  - CBC  Medical complexity over the past 24 hours:  - Prescription DRUG MANAGEMENT performed        JEAN PIERRE Banks-BC  Acute Care Pain Management Program   Hours of pain coverage Mon-Fri [phone], afterhours please call the primary team  [facility] (VINI, [place], [place], [place])   Page via Epic Messaging or [employer] web console -Click for [place]

## 2025-03-03 NOTE — PROCEDURES
"Procedure Note - Intrathecal Pump Interrogation       Procedure:  Pump Check     Pre-Operative Diagnosis: presence of intrathecal pain pump     Post-Operative Diagnosis: Same     Indication: presents of intrathecal pain pump     Procedure Details: The intended procedure, risks, and alternatives were discussed with the patient and informedconsent was obtained. \"Pause for the cause\" was utilized to identify correct patient and intended procedure. The pump was interrogated.      Findings: The patient's pump is filled with   Fentanyl concentration is 625.0 mcg/ML and is delivering 211.67 mcg/day   Morphine with a concentration of 0.1 mg/ML delivering 0.39171 mg/day  Bupivacaine concentration is 25.0 mg/mL and delivering 8.467 mg/day.     The patient is not due to have an alarm run off until 4/18/25.    Pain Clinic notified: Dr. Jacques Perkins     Pump fill:  (Not needed)     Patient tolerated the procedure well.    Kenya BENAVIDES, FNP-BC  Acute Care Pain Management Program   Hours of pain coverage Mon-Fri [phone], afterhours please call the primary team  [facility] (VINI, [place], [place], RH)   Page via Epic Messaging or [facility] web console -Click for [place]    "

## 2025-03-03 NOTE — PLAN OF CARE
Trauma/Ortho/Medical (Choose one) : Medical     Diagnosis: Pneumonia  Mental Status: A&O x2-3  Activity:  A1, walker and gait belt  Diet: Mod CHO, pureed diet for now per Speech Eval  Pain: intrathecal pain pump, Lidocaine patch, Voltaren gel  Voiding: incontinent, urinal   Tele/Restraints/Iso: Tele: NSR  02/LDA: room air, IV fluid infusing  D/C Date: TBD  Other Info: intermittent IV antibiotic, blood culture pending                    Elevated BP, managed w/ prn IV med

## 2025-03-03 NOTE — PLAN OF CARE
Goal Outcome Evaluation:    Diagnosis: Community-acquired pneumonia, sepsis and lactic acidosis.   Mental Status: A&O x3; disorientated to time.   Activity/dangle: Assist x1 w/ gb and walker.   Diet: Tolerating a mod carb diet.   [place]/Voiding: Voiding in the urinal.   Pain: Intrathecal pain pump; PRN meds available if needed as well.   Tele/Restraints/Iso: Tele- SR  02/LDA: On room air, PIV SL.   D/C Date: TBD  Other Info: Intermittent IV antibiotics.

## 2025-03-03 NOTE — PROGRESS NOTES
Called to remind patient of their upcoming appointment with our GI clinic, on March 10th @ 2:40 a.m with Dr. Osorio This appointment is scheduled as an in-person appt. Please arrive 15 minutes early to check in for your appointment. ,     Cielo Toribio MA

## 2025-03-03 NOTE — PROGRESS NOTES
Antimicrobial Stewardship Team Note    Antimicrobial Stewardship Program - A joint venture between [facility] and [facility] Consultant [employer] to optimize antibiotic management.     Patient: Cresencio Peguero  MRN: [id number]  Allergies: Hydrocodone, Aspirin, Contrast dye, Food, Lisinopril, Oxycodone, and Percodan [oxycodone-aspirin]    Brief Summary: Cresencio Peguero is a 89 year old male admitted on 2/28/25 with shortness of breath, cough, fever, headache and body aches, and generalized weakness. PMH significant for HTN, HLD, CAD, T2DM, among others. He was started on Zosyn for sepsis with suspicion for pneumonia.    Fever 102.5F on admission, afebrile since then, normal WBC count. Negative procalcitonin at 0.31, stable on room air since 3/2. CT chest 2/28 with multifocal pneumonia. Blood cultures and COVID/influenza/RSV negative.         Active Anti-infective Medications   (From admission, onward)                 Start     Stop    02/28/25 1800  piperacillin-tazobactam  4.5 g,   Intravenous,   EVERY 6 HOURS        Community Acquired Pneumonia, Sepsis       --                  Assessment: Community acquired pneumonia  Patient presented with shortness of breath, fever, body aches, and cough, found to have imaging consistent with multifocal pneumonia. He was started on Zosyn, currently on day 4 of broad spectrum antibiotics. He does not have a history of multidrug resistant organism growth and does not warrant ongoing Pseudomonal coverage. Recommend narrowing to Augmentin to complete 5-7 day course.    Recommendations:  Narrow Zosyn to Augmentin 875mg BID to complete 5-7 day course (3/4-3/6).     Discussed with ID Staff MD Coni Garcia, PharmD, BCIDP    Vital Signs/Clinical Features:  Vitals         03/01 0700  03/02 0659 03/02 0700  03/03 0659 03/03 0700  03/03 1322   Most Recent      Temp ( F) 97.6 -  98.7    98.4 -  98.6      99     99 (37.2) 03/03 0839    Pulse 60 -  89    64  -  77      74     74 03/03 0839    Resp 16 -  17    16 -  17      16     16 03/03 0839    /57 -  160/72    152/61 -  195/99      184/93     184/93 03/03 0839    SpO2 (%) 94 -  98    94 -  97      96     96 03/03 0839            Labs  Estimated Creatinine Clearance: 60.6 mL/min (based on SCr of 1.05 mg/dL).  Recent Labs   Lab Test 06/12/24  1517 06/23/24  1834 02/28/25  1101 02/28/25 2026 03/01/25  1330 03/03/25  0825   CR 1.08 0.99 1.11 1.14 1.03 1.05       Recent Labs   Lab Test 07/07/17 0456 07/07/17  1610 06/29/21 1953 06/30/21  0530 05/21/24  1125 05/22/24  1044 06/12/24  1517 06/23/24 1834 02/28/25  1217 03/01/25  1330 03/03/25  0825   WBC 6.9   < > 5.4   < > 6.2  --  5.9 5.7 8.5 10.0 5.0   ANEU 5.1  --  3.3  --   --   --   --   --   --   --   --    ALYM 1.2  --  1.6  --   --   --   --   --   --   --   --    ADNIELA 0.4  --  0.4  --   --   --   --   --   --   --   --    AEOS 0.1  --  0.1  --   --   --   --   --   --   --   --    HGB 13.4   < > 13.8   < > 10.5*  --  11.3* 11.4* 10.8* 10.4* 10.6*   HCT 39.0*   < > 40.8   < > 30.8*  --  34.2* 34.1* 33.2* 33.1* 33.0*   MCV 91   < > 94   < > 84  --  87 86 79 79 78      < > 150   < > 136*   < > 163 170 146* 161 188    < > = values in this interval not displayed.       Recent Labs   Lab Test 07/07/17 0456 05/19/24 1816 02/28/25  1101   BILITOTAL 0.5 0.3 0.4   ALKPHOS 111 131 125   ALBUMIN 4.2 3.8 4.2   AST 20 19 36   ALT 22 <5 20       Recent Labs   Lab Test 07/07/17  0456 05/19/24  1816 02/28/25  1101 02/28/25  1105 02/28/25  1403   PCAL  --  0.17 0.31  --   --    LACT 1.1  --   --  4.0* 1.9   CRPI  --   --  113.56*  --   --              Culture Results:  7-Day Micro Results       Procedure Component Value Units Date/Time    Blood Culture Peripheral Blood [[id number]]  (Normal) Collected: 02/28/25 1101    Order Status: Completed Lab Status: Preliminary result Updated: 03/02/25 1331    Specimen: Peripheral Blood      Culture No growth after 2 days     Blood Culture Peripheral Blood [[id number]]  (Normal) Collected: 02/28/25 1101    Order Status: Completed Lab Status: Preliminary result Updated: 03/02/25 1331    Specimen: Peripheral Blood      Culture No growth after 2 days            Recent Labs   Lab Test 01/24/24  2046 05/19/24  1931 06/12/24  1543 06/27/24  1600 02/28/25  1107   URINEPH 6.0 5.5 6.5 6.0 5.0   NITRITE Negative Negative Negative Negative Negative   LEUKEST Trace* Negative Small* Negative Negative   WBCU 1 <1 15* 1 0                         Imaging: CT Chest Pulmonary Embolism w Contrast    Result Date: 2/28/2025  EXAM: CT CHEST PULMONARY EMBOLISM W CONTRAST LOCATION: [facility] DATE: 2/28/2025 INDICATION: sob, hypoxia, PE COMPARISON: CT CAP 06/23/2024. TECHNIQUE: CT chest pulmonary angiogram during arterial phase injection of IV contrast. Multiplanar reformats and MIP reconstructions were performed. Dose reduction techniques were used. CONTRAST: 70mL Isovue 370 FINDINGS: ANGIOGRAM CHEST: There is severe respiratory motion artifact which degrades evaluation of the pulmonary arteries in the segmental and subsegmental vasculature cannot be adequately evaluated for filling defects. No evidence of a central pulmonary embolism  within these limitations. Thoracic aorta is negative for dissection within the limitations of cardiac motion artifact. Mild aortic valve calcification. LUNGS AND PLEURA: Basilar predominant patchy airspace consolidation and groundglass opacities of both lungs, most confluent in the left lower lobe. Trace amount of pleural fluid bilaterally. MEDIASTINUM/AXILLAE: No pericardial effusion. No thoracic adenopathy. CORONARY ARTERY CALCIFICATION: Previous intervention (stents or CABG). UPPER ABDOMEN: Normal. MUSCULOSKELETAL: Right shoulder arthroplasty. Intact median sternotomy. Multilevel degenerative changes of the spine.     IMPRESSION: 1.  There is severe respiratory motion artifact which degrades  evaluation of the pulmonary arteries. No evidence of a central pulmonary embolism within these limitations. 2.  Multifocal pneumonia.     XR Chest 2 Views    Result Date: 2/28/2025  EXAM: XR CHEST 2 VIEWS LOCATION: [facility] DATE: 2/28/2025 INDICATION: fever, cough COMPARISON: 5/19/2024     IMPRESSION: Enlarged cardiomediastinal silhouette. Vascular calcifications are seen within the thoracic aorta. Multiple surgical clips are seen overlying the mediastinum. Median sternotomy wires are also seen. Bilateral patchy airspace and interstitial opacities are seen in the lungs suggestive of pulmonary edema or atypical pneumonia. Moderate degenerative changes are seen in the left shoulder. 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 Multilevel degenerative changes are present in the spine.

## 2025-03-03 NOTE — PLAN OF CARE
Goal Outcome Evaluation:    Date/Time: 3/2/25 [id number]    Trauma/Ortho/Medical (Choose one) : Medical     Diagnosis: Pneumonia  Mental Status: Oriented x3. pt intermittently confused   Activity:A1, walker and GB.  Diet: Mod CHO, prefers soft, and puree diet per speech evaluation.  Easy to chew/swallow food (son helped order meal)  Pain: intrathecal pain pump;   Voiding: incontinent, able to use urinal w/ help  Tele/Restraints/Iso:Tele: NSR  02/LDA: RA, IV fluid discontinued at 2200  D/C Date: TBD  Other Info: intermittent IV antibiotic.

## 2025-03-04 VITALS
BODY MASS INDEX: 26.18 KG/M2 | TEMPERATURE: 98.7 F | SYSTOLIC BLOOD PRESSURE: 158 MMHG | DIASTOLIC BLOOD PRESSURE: 74 MMHG | OXYGEN SATURATION: 96 % | WEIGHT: 187.6 LBS | RESPIRATION RATE: 16 BRPM | HEART RATE: 75 BPM

## 2025-03-04 LAB
GLUCOSE BLDC GLUCOMTR-MCNC: 118 MG/DL (ref 70–99)
GLUCOSE BLDC GLUCOMTR-MCNC: 145 MG/DL (ref 70–99)
GLUCOSE BLDC GLUCOMTR-MCNC: 164 MG/DL (ref 70–99)

## 2025-03-04 PROCEDURE — 250N000013 HC RX MED GY IP 250 OP 250 PS 637: Performed by: HOSPITALIST

## 2025-03-04 PROCEDURE — 99239 HOSP IP/OBS DSCHRG MGMT >30: CPT | Performed by: INTERNAL MEDICINE

## 2025-03-04 PROCEDURE — 99232 SBSQ HOSP IP/OBS MODERATE 35: CPT

## 2025-03-04 PROCEDURE — 250N000013 HC RX MED GY IP 250 OP 250 PS 637: Performed by: INTERNAL MEDICINE

## 2025-03-04 RX ORDER — AMLODIPINE BESYLATE 5 MG/1
5 TABLET ORAL DAILY
Qty: 30 TABLET | Refills: 0 | Status: SHIPPED | OUTPATIENT
Start: 2025-03-05

## 2025-03-04 RX ORDER — AMLODIPINE BESYLATE 5 MG/1
5 TABLET ORAL DAILY
Qty: 30 TABLET | Refills: 0 | Status: SHIPPED | OUTPATIENT
Start: 2025-03-05 | End: 2025-03-04

## 2025-03-04 RX ORDER — AMLODIPINE BESYLATE 5 MG/1
5 TABLET ORAL DAILY
Status: DISCONTINUED | OUTPATIENT
Start: 2025-03-04 | End: 2025-03-04 | Stop reason: HOSPADM

## 2025-03-04 RX ADMIN — AMLODIPINE BESYLATE 5 MG: 5 TABLET ORAL at 09:55

## 2025-03-04 RX ADMIN — AMOXICILLIN AND CLAVULANATE POTASSIUM 1 TABLET: 875; 125 TABLET, FILM COATED ORAL at 08:34

## 2025-03-04 RX ADMIN — SERTRALINE HYDROCHLORIDE 100 MG: 100 TABLET ORAL at 08:34

## 2025-03-04 RX ADMIN — DICLOFENAC 2 G: 10 GEL TOPICAL at 08:35

## 2025-03-04 RX ADMIN — LACTULOSE 20 G: 20 SOLUTION ORAL at 08:34

## 2025-03-04 RX ADMIN — CARBIDOPA AND LEVODOPA 2 TABLET: 25; 100 TABLET ORAL at 15:23

## 2025-03-04 RX ADMIN — LINACLOTIDE 72 MCG: 72 CAPSULE, GELATIN COATED ORAL at 08:34

## 2025-03-04 RX ADMIN — PANTOPRAZOLE SODIUM 40 MG: 40 TABLET, DELAYED RELEASE ORAL at 08:34

## 2025-03-04 RX ADMIN — DULOXETINE HYDROCHLORIDE 60 MG: 60 CAPSULE, DELAYED RELEASE ORAL at 08:34

## 2025-03-04 RX ADMIN — GABAPENTIN 600 MG: 300 CAPSULE ORAL at 08:33

## 2025-03-04 RX ADMIN — CARBOXYMETHYLCELLULOSE SODIUM 1 DROP: 5 SOLUTION/ DROPS OPHTHALMIC at 08:34

## 2025-03-04 RX ADMIN — CARBIDOPA AND LEVODOPA 2 TABLET: 25; 100 TABLET ORAL at 10:00

## 2025-03-04 ASSESSMENT — ACTIVITIES OF DAILY LIVING (ADL)
ADLS_ACUITY_SCORE: 69
ADLS_ACUITY_SCORE: 71
ADLS_ACUITY_SCORE: 69
ADLS_ACUITY_SCORE: 71
ADLS_ACUITY_SCORE: 69
ADLS_ACUITY_SCORE: 71
ADLS_ACUITY_SCORE: 69
ADLS_ACUITY_SCORE: 71
ADLS_ACUITY_SCORE: 71
ADLS_ACUITY_SCORE: 69

## 2025-03-04 NOTE — CARE PLAN
Physical Therapy Discharge Summary    Reason for therapy discharge:    Discharged to home with home therapy.    Progress towards therapy goal(s). See goals on Care Plan in [facility] electronic health record for goal details.  Goals partially met.  Barriers to achieving goals:   discharge from facility.    Therapy recommendation(s):    Continued therapy is recommended.  Rationale/Recommendations:  to maximize functional mob I, safety and kimmy to allow return to PLOF.

## 2025-03-04 NOTE — PLAN OF CARE
Goal Outcome Evaluation:  Date/Time: 3/3/25 [id number]    Trauma/Ortho/Medical (Choose one) : Medical     Diagnosis: Pneumonia  Mental Status: Oriented x3. pt intermittently confused   Activity:A1, walker and GB.  Diet: Mod CHO, prefers soft, and puree diet per speech evaluation.  Easy to chew/swallow food (son helped order meal)  Pain: intrathecal pain pump;   Voiding: incontinent, able to use urinal w/ help  Tele/Restraints/Iso:Tele: NSR  02/LDA: RA, IV SL  D/C Date: possible tomorrow   Other Info: intermittent IV antibiotic. Pt had a hard time to swallow big tablets. Applesauce and vanilla pudding can help. Make sure the tabs are buried in the pudding or applesauce

## 2025-03-04 NOTE — DISCHARGE SUMMARY
[facility]    Discharge Summary  Hospitalist    Date of Admission:  2/28/2025  Date of Discharge:  3/4/2025  Discharging Provider: Jennifer Alcocer MD    Discharge Diagnoses   Multi focal pneumonia    History of Present Illness   Please review admission history and physical.    Hospital Course   Cresencio Peguero was admitted on 2/28/2025.  The following problems were addressed during his hospitalization:    Active Problems:    Pneumonia of both lungs due to infectious organism, unspecified part of lung    Sepsis, due to unspecified organism, unspecified whether acute organ dysfunction present (H)  Cresencio Peguero is a 89 year old male with a history of hypertension, hyperlipidemia, coronary artery disease, diabetes mellitus type 2, Parkinson's disease, neuropathy, sensory ataxia, orthostatic hypotension, chronic pain, GERD, and depression who presented to the ED for evaluation of shortness of breath, cough, fever, headache, body aches, and generalized weakness.  Evaluation in the ED was concerning for sepsis due to pneumonia.  He was given IV fluids and IV antibiotics.  The hospitalist service was contacted to admit him for further evaluation and management.     Community-acquired pneumonia  Sepsis  Lactic acidosis  Presented with fevers, cough, shortness of breath, headache, body aches and generalized weakness that began within the last 24 hours prior to admission.  Febrile with temp of 102.5.  WBC within normal limits.  Tachypneic.  Hypertensive initially, then briefly hypotensive which resolved with IV fluids.  CXR concerning for pneumonia.  CT chest negative for PE but did show multifocal pneumonia.  COVID/Influenza/RSV PCR testing was negative.  Hypoxic requiring 2 lpm of supplemental oxygen.  Started on Zosyn in the ED, continued the same in the hospital, transitioned to Augmentin on evening of 3/3/25 with plan to complete 7 day course of antibiotics on evening of 3/6/25.  Blood  cultures obtained in the ED, negative to date, oxygen was weaned off on 3/1.  IV fluids discontinued 3/2.       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  Troponin 66 --> 74.  Chest imaging as noted above.  No EKG has been done yet.  Suspect elevated troponin related to acute illness as noted above rather than acute cardiac process.  EKG showed sinus rhythm, no acute ischemic changes.  Repeat troponin trended down.  Continue PTA atorvastatin.  He was not on an anti-platelet agent prior to admission.     Hypertension  History of orthostatic hypotension  Previously on midodrine, recently discontinued by his neurologist due to elevated blood pressures.  Blood pressure labile in the ED.  Blood pressures were consistently elevated even after good pain control, started on Norvasc 5 mg daily, parameters to hold Norvasc if blood pressures continue to go down in the facility.     Hyperlipidemia  Continue PTA atorvastatin.     Diabetes mellitus, type 2  Hemoglobin A1c 8.5% on 10/21/24.  PTA metformin and Jardiance was on hold, restarted on discharge.  Blood sugars are controlled while here.     Parkinson's disease  Follows with [place] [facility] as an outpatient.  Continue PTA Sinemet.     Neuropathy  Continue PTA gabapentin.     Chronic pain  Follows with [facility] as an outpatient.  Recently had intrathecal pain pump refilled and had medications adjusted with addition of morphine.  Consult pain management service, appreciate their assistance.  Continue PTA gabapentin, lidocaine patch, and diclofenac gel.  Pain pump adjusted on 2/28/25, dose reduced by 50% following discussion with pain management service and [facility].  Patient complains of increased pain on 3/3/25, however pain rating is similar to what family has noted over the past year.  The pump was readjusted 3/3.     GERD  Continue PTA omeprazole.     Depression  Continue PTA duloxetine, sertraline, and  mirtazapine.     Constipation  Continue PTA bowel regimen.    Jennifer Alcocer MD    Significant Results and Procedures       Pending Results     Unresulted Labs Ordered in the Past 30 Days of this Admission       Date and Time Order Name Status Description    2/28/2025 10:57 AM Blood Culture Peripheral Blood Preliminary     2/28/2025 10:57 AM Blood Culture Peripheral Blood Preliminary             Code Status   DNR / DNI       Primary Care Physician   [facility]    Physical Exam   Temp: 98.7  F (37.1  C) Temp src: Oral BP: (!) 158/74 Pulse: 75   Resp: 16 SpO2: 96 % O2 Device: None (Room air)    Vitals:    03/01/25 1645 03/03/25 0844 03/04/25 0600   Weight: 88.9 kg (195 lb 15.8 oz) 89.8 kg (197 lb 15.6 oz) 85.1 kg (187 lb 9.6 oz)     Vital Signs with Ranges  Temp:  [98.3  F (36.8  C)-98.7  F (37.1  C)] 98.7  F (37.1  C)  Pulse:  [67-76] 75  Resp:  [16-18] 16  BP: (139-167)/(64-91) 158/74  SpO2:  [93 %-96 %] 96 %  I/O last 3 completed shifts:  In: -   Out: 950 [Urine:950]    The patient was examined on the day of discharge.    Discharge Disposition   Discharged to assisted living  Condition at discharge: Stable    Consultations This Hospital Stay   PAIN MANAGEMENT ADULT IP CONSULT  PHYSICAL THERAPY ADULT IP CONSULT  CARE MANAGEMENT / SOCIAL WORK IP CONSULT  SPEECH LANGUAGE PATH ADULT IP CONSULT    Time Spent on this Encounter   I, Jennifer Alcocer MD, personally saw the patient today and spent greater than 30 minutes discharging this patient.    Discharge Orders      Home Care Referral      Reason for your hospital stay    Respiratory distress     Activity    Your activity upon discharge: activity as tolerated     Diet    Follow this diet upon discharge: Current Diet:Orders Placed This Encounter      Combination Diet Moderate Consistent Carb (60 g CHO per Meal) Diet; Easy to Chew (level 7); Thin Liquids (level 0) (upright, slow rate, single bites/sips, alternate bites/sips)     Hospital Follow-up with  Existing Primary Care Provider (PCP)    Please see details below          Discharge Medications   Current Discharge Medication List        START taking these medications    Details   amLODIPine (NORVASC) 5 MG tablet Take 1 tablet (5 mg) by mouth daily.  Qty: 30 tablet, Refills: 0    Associated Diagnoses: Primary hypertension      amoxicillin-clavulanate (AUGMENTIN) 875-125 MG tablet Take 1 tablet by mouth every 12 hours for 5 doses.  Qty: 5 tablet, Refills: 0    Associated Diagnoses: Pneumonia of both lungs due to infectious organism, unspecified part of lung           CONTINUE these medications which have NOT CHANGED    Details   acetaminophen (TYLENOL) 500 MG tablet Take 1,000 mg by mouth 3 times daily as needed for pain.      artificial saliva (BIOTENE MT) AERS spray Take 1 spray by mouth every 2 hours as needed for dry mouth    Associated Diagnoses: Dysphagia, unspecified type      atorvastatin (LIPITOR) 10 MG tablet Take 10 mg by mouth every evening.      carbidopa-levodopa (SINEMET)  MG tablet Take 2 tablets by mouth 3 times daily. 11:20 AM, 3:20 PM, and 7:20 PM      carboxymethylcellulose (CARBOXYMETHYLCELLULOSE SODIUM) 0.5 % SOLN ophthalmic solution Place 1 drop into both eyes 3 times daily.      diclofenac (VOLTAREN) 1 % topical gel Apply 2 g topically 3 times daily    Associated Diagnoses: Pain      DULoxetine (CYMBALTA) 60 MG capsule Take 60 mg by mouth daily. Every morning after a meal      !! gabapentin (NEURONTIN) 300 MG capsule Take 1,200 mg by mouth every evening. At 7:30 PM      !! gabapentin (NEURONTIN) 300 MG capsule Take 1 capsule (300 mg) by mouth at bedtime    Associated Diagnoses: Neuropathy      JARDIANCE 25 MG TABS tablet Take 25 mg by mouth daily.      !! lactulose (CHRONULAC) 10 GM/15ML solution Take 30 mLs by mouth daily as needed for constipation.      !! lactulose (CHRONULAC) 10 GM/15ML solution Take 30 mLs (20 g) by mouth 2 times daily as needed for constipation    Associated  Diagnoses: Constipation, unspecified constipation type      Lidocaine (LIDOCARE) 4 % Patch Place 1 patch onto the skin every 24 hours To prevent lidocaine toxicity, patient should be patch free for 12 hrs daily.    Associated Diagnoses: Pain      linaclotide (LINZESS) 72 MCG capsule Take 72 mcg by mouth every morning.      magnesium hydroxide (MILK OF MAGNESIA) 400 MG/5ML suspension Take 30 mLs by mouth daily as needed for constipation.      medication given by implanted intrathecal pump continuously. Drug # 1: Fentanyl (Sublimaze) - Conc: 625 mcg/mL - Total Dose / 24 hours: 425 mcg    Drug # 2: Bupivacaine (Marcaine)  - Conc: 25 mg/mL - Total Dose / 24 hours: 17 mg    Drug # 3: morphine - Conc: 0.1 mg/mL - total dose / 24 hours: 0.068 mg    Complex continuous infusion of above    Flex Bolus dosing: 3 scheduled bolus doses per day containing fentanyl 39.94 mcg, bupivacaine 1.598 mg, and morphine 0.52248 mg at 1300, 1700 and 2100 daily.     Outside Clinic & Provider: [facility] [phone]  Last Refill Date: 2/26/25  Alarm Date: 3/24/25  Next Refill Date: 3/20/2025     Pump : Medtronic Synchromed II pump     Please consider consulting the pain team if the patient is admitted with an IT pump.      metFORMIN (GLUCOPHAGE) 500 MG tablet Take 1,000 mg by mouth 2 times daily.      mirtazapine (REMERON) 7.5 MG tablet Take 7.5 mg by mouth at bedtime.      omeprazole (PRILOSEC) 40 MG DR capsule Take 40 mg by mouth every morning. At least 30-60 minutes before a meal      ondansetron (ZOFRAN ODT) 4 MG ODT tab Take 4 mg by mouth every 8 hours as needed for nausea.      psyllium (METAMUCIL/KONSYL) Packet Take 1 packet by mouth daily.      sennosides (SENOKOT) 8.6 MG tablet Take 1 tablet by mouth daily.  Qty: 30 tablet, Refills: 0      sertraline (ZOLOFT) 100 MG tablet Take 100 mg by mouth every morning       !! - Potential duplicate medications found. Please discuss with provider.        Allergies   Allergies    Allergen Reactions    Hydrocodone Itching     Constipation    Aspirin Itching    Contrast Dye      LW CM1: >>> NO CONTRAST ADVERSE REACTION <<<     Reaction :    Food      LW FI1: nka LW FI2: nka    Lisinopril Other (See Comments) and GI Disturbance     constipation    Oxycodone Itching    Percodan [Oxycodone-Aspirin] Rash     Data   Most Recent 3 CBC's:  Recent Labs   Lab Test 03/03/25  0825 03/01/25  1330 02/28/25  1217   WBC 5.0 10.0 8.5   HGB 10.6* 10.4* 10.8*   MCV 78 79 79    161 146*      Most Recent 3 BMP's:  Recent Labs   Lab Test 03/04/25  0858 03/04/25  0158 03/03/25  2059 03/03/25  1157 03/03/25  0825 03/01/25  1656 03/01/25  1330 02/28/25  2212 02/28/25 2026   NA  --   --   --   --  141  --  138  --  137   POTASSIUM  --   --   --   --  3.5  --  4.2  --  4.4   CHLORIDE  --   --   --   --  108*  --  105  --  103   CO2  --   --   --   --  22  --  21*  --  21*   BUN  --   --   --   --  14.1  --  26.7*  --  34.5*   CR  --   --   --   --  1.05  --  1.03  --  1.14   ANIONGAP  --   --   --   --  11  --  12  --  13   ISIS  --   --   --   --  8.8  --  9.2  --  8.7*   * 145* 169*   < > 114*   < > 192*   < > 242*    < > = values in this interval not displayed.     Most Recent 2 LFT's:  Recent Labs   Lab Test 02/28/25  1101 05/19/24  1816   AST 36 19   ALT 20 <5   ALKPHOS 125 131   BILITOTAL 0.4 0.3     Most Recent INR's and Anticoagulation Dosing History:  Anticoagulation Dose History          Latest Ref Rng & Units 8/14/2016   Recent Dosing and Labs   INR 0.86 - 1.14 1.08      Most Recent 3 Troponin's:No lab results found.  Most Recent Cholesterol Panel:  Recent Labs   Lab Test 05/03/24  0912   CHOL 143   LDL 82   HDL 29*   TRIG 158*     Most Recent 6 Bacteria Isolates From Any Culture (See EPIC Reports for Culture Details):  Recent Labs   Lab Test 07/07/17  1038   CULT No anaerobes isolated  No growth     Most Recent TSH, T4 and A1c Labs:  Recent Labs   Lab Test 02/28/25  1217   A1C 8.2*      Results for orders placed or performed during the hospital encounter of 02/28/25   XR Chest 2 Views    Narrative    EXAM: XR CHEST 2 VIEWS  LOCATION: [facility]  DATE: 2/28/2025    INDICATION: fever, cough  COMPARISON: 5/19/2024      Impression    IMPRESSION: Enlarged cardiomediastinal silhouette. Vascular calcifications are seen within the thoracic aorta. Multiple surgical clips are seen overlying the mediastinum. Median sternotomy wires are also seen. Bilateral patchy airspace and interstitial   opacities are seen in the lungs suggestive of pulmonary edema or atypical pneumonia. Moderate degenerative changes are seen in the left shoulder. 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 Multilevel degenerative changes are present in the spine.   CT Chest Pulmonary Embolism w Contrast    Narrative    EXAM: CT CHEST PULMONARY EMBOLISM W CONTRAST  LOCATION: [facility]  DATE: 2/28/2025    INDICATION: sob, hypoxia, PE  COMPARISON: CT CAP 06/23/2024.  TECHNIQUE: CT chest pulmonary angiogram during arterial phase injection of IV contrast. Multiplanar reformats and MIP reconstructions were performed. Dose reduction techniques were used.   CONTRAST: 70mL Isovue 370    FINDINGS:  ANGIOGRAM CHEST: There is severe respiratory motion artifact which degrades evaluation of the pulmonary arteries in the segmental and subsegmental vasculature cannot be adequately evaluated for filling defects. No evidence of a central pulmonary embolism   within these limitations. Thoracic aorta is negative for dissection within the limitations of cardiac motion artifact. Mild aortic valve calcification.     LUNGS AND PLEURA: Basilar predominant patchy airspace consolidation and groundglass opacities of both lungs, most confluent in the left lower lobe. Trace amount of pleural fluid bilaterally.    MEDIASTINUM/AXILLAE: No pericardial effusion. No thoracic adenopathy.    CORONARY ARTERY  CALCIFICATION: Previous intervention (stents or CABG).    UPPER ABDOMEN: Normal.    MUSCULOSKELETAL: Right shoulder arthroplasty. Intact median sternotomy. Multilevel degenerative changes of the spine.      Impression    IMPRESSION:  1.  There is severe respiratory motion artifact which degrades evaluation of the pulmonary arteries. No evidence of a central pulmonary embolism within these limitations.   2.  Multifocal pneumonia.

## 2025-03-04 NOTE — PLAN OF CARE
Goal Outcome Evaluation:       Diagnosis: Pneumonia, sepsis   Orientation/Cognitive: A&OX3  Mobility Level: Ax1 GB/W  Pain Management: denies  Tele/VS/O2: VSS@RA, on Tele-SR  Diet: Mod carb, pureed diet  Bowel/Bladder: incontinent/ urinal  Drains/Devices: PIV SL  D/ C date: care home/HC pending

## 2025-03-04 NOTE — PROGRESS NOTES
Care Management Discharge Note    Discharge Date: 03/04/2025       Discharge Disposition: Assisted Living, Home Care    Discharge Services: HHC PT     Discharge DME: None    Discharge Transportation: son    Private pay costs discussed: Not applicable    Does the patient's insurance plan have a 3 day qualifying hospital stay waiver?  No    PAS Confirmation Code:    Patient/family educated on Medicare website which has current facility and service quality ratings:      Education Provided on the Discharge Plan: Yes  Persons Notified of Discharge Plans: Pt, son and bedside RN  Patient/Family in Agreement with the Plan: yes    Handoff Referral Completed: No, handoff not indicated or clinically appropriate    Additional Information:  Met with patient and son to review discharge plan. They were informed that patient will discharge with PT/RN HHC via [facility].  Pt and son informed that [facility] will reach out to them once patient is discharged.  Message left on VM for [facility] that patient will return to [facility] today and discharge orders/MAR orders were faxed to [facility] communication tab.  Called placed to [facility] and spoke with Ivanna with intake that patient will discharge today.  Homecare discharge orders were faxed via communication tab. Patient's son will give him a ride home to [facility] upon discharge.              Alexia Merida, Care Management RN, [facility] - FLOAT   Contact: via [employer]

## 2025-03-04 NOTE — PLAN OF CARE
A/OX4,VSS,Pain well controlled today. Up with 1/walker. Voiding well in urinal.Pt had BM today.Not much appetite today. Blood glucose checks done with meals. Easy to chew diet. Pt is discharging back to home with home care.Son is transporting pt to the facility. All the paper printed and given to the pt.

## 2025-03-04 NOTE — PROGRESS NOTES
"[facility] ACUTE INPATIENT PAIN SERVICE    [facility], [facility], [facility], [facility], [place]   PAIN PROGRESS NOTE    Assessment/Plan:  Cresencio Peguero is a 89 year old male who was admitted on 2/28/2025.  Pain team was asked to see the patient for pain with IT pump (cath tup at T7. Admitted for pneumonia. History of CAD, DM, HTN, HLD, tobacco use.    shows routine fills of gabapentin. Describes pain as 5/10 and \"an ache in his mid back\". Pain is the same as his baseline chronic pain.  Follows with Dr. Hanna with [facility] pain [facility].     In the last 24 hours, patient has received: no additional opioids.      PLAN:  Chronic pain with intrathecal pain pump (Recently filled). Admitted with pneumonia and sepsis/hypoxia. Advise against additional opioids at this time. Pain is stable, at his baseline.    Multimodal Medication Therapy:   Adjuvants:   Acetaminophen 650mg q4h prn   Voltaren tid   Gabapentin 600mg in am, 1200mg at hs - hold if sedated  Lidocaine patch x1  Opioids: Intrathecal pain pump infusing fentanyl, morphine, bupivicaine. See procedure note from 3/3/25 for more detail. Pain pump decreased 2/28/25 by medtronic with order from pain clinic.   Do not recommend any additional opioids   Non-medication interventions- Ice, heat prn   Constipation Prophylaxis- scheduled senna, prn senna   Follow up /Discharge Recommendations - We recommend prescribing the following at the time of discharge:  Follow up with Gregorio   Continue voltaren, lidocaine, gabapentin      Subjective:  Patient is seen resting in bed and appears comfortable. Currently endorsing no pain. He says he is feeling \"warm and cozy\" this morning.  Denies nausea, vomiting, diarrhea, constipation, chest pain, shortness of breath.     Medication regimen and adjustments made reviewed. Patient verbalized understanding and is in agreement with the plan. All questions answered.        History   Drug Use Unknown         Tobacco Use      Smoking status: " Former        Types: Pipe        Quit date: 6/1/2021        Years since quitting: 3.7      Smokeless tobacco: None        Objective:  Vital signs in last 24 hours:  B/P: 139/64, T: 98.6, P: 71, R: 16   Blood pressure 139/64, pulse 71, temperature 98.6  F (37  C), temperature source Oral, resp. rate 16, weight 85.1 kg (187 lb 9.6 oz), SpO2 94%.        Review of Systems:   As per subjective, all others negative.    Physical Exam  General: in no apparent distress and non-toxic, resting in bed and appears comfortable  HEENT: Head normocephalic atraumatic, oral mucosa moist. Sclerae anicteric  Resp: Respirations unlabored  Skin: No rashes or lesions to visualized skin   Extremities: No peripheral edema, able to move all four extremities spontaneously   Psych: Normal affect, pleasant  Neuro: Answering all questions appropriately, alert and oriented          Imaging:  Personally Reviewed.    Results for orders placed or performed during the hospital encounter of 02/28/25   XR Chest 2 Views    Impression    IMPRESSION: Enlarged cardiomediastinal silhouette. Vascular calcifications are seen within the thoracic aorta. Multiple surgical clips are seen overlying the mediastinum. Median sternotomy wires are also seen. Bilateral patchy airspace and interstitial   opacities are seen in the lungs suggestive of pulmonary edema or atypical pneumonia. Moderate degenerative changes are seen in the left shoulder. 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 Multilevel degenerative changes are present in the spine.   CT Chest Pulmonary Embolism w Contrast    Impression    IMPRESSION:  1.  There is severe respiratory motion artifact which degrades evaluation of the pulmonary arteries. No evidence of a central pulmonary embolism within these limitations.   2.  Multifocal pneumonia.         Lab Results:  Personally Reviewed.   Last Comprehensive Metabolic Panel:  Sodium   Date Value Ref Range Status   03/03/2025 141 135 - 145 mmol/L Final  "  06/30/2021 139 133 - 144 mmol/L Final     Potassium   Date Value Ref Range Status   03/03/2025 3.5 3.4 - 5.3 mmol/L Final   06/30/2021 3.9 3.4 - 5.3 mmol/L Final     Chloride   Date Value Ref Range Status   03/03/2025 108 (H) 98 - 107 mmol/L Final   06/30/2021 108 94 - 109 mmol/L Final     Carbon Dioxide   Date Value Ref Range Status   06/30/2021 29 20 - 32 mmol/L Final     Carbon Dioxide (CO2)   Date Value Ref Range Status   03/03/2025 22 22 - 29 mmol/L Final     Anion Gap   Date Value Ref Range Status   03/03/2025 11 7 - 15 mmol/L Final   06/30/2021 2 (L) 3 - 14 mmol/L Final     Glucose   Date Value Ref Range Status   06/30/2021 86 70 - 99 mg/dL Final     GLUCOSE BY METER POCT   Date Value Ref Range Status   03/04/2025 145 (H) 70 - 99 mg/dL Final     Urea Nitrogen   Date Value Ref Range Status   03/03/2025 14.1 8.0 - 23.0 mg/dL Final   06/30/2021 20 7 - 30 mg/dL Final     Creatinine   Date Value Ref Range Status   03/03/2025 1.05 0.67 - 1.17 mg/dL Final   06/30/2021 0.88 0.66 - 1.25 mg/dL Final     GFR Estimate   Date Value Ref Range Status   03/03/2025 68 >60 mL/min/1.73m2 Final     Comment:     eGFR calculated using 2021 CKD-EPI equation.   06/30/2021 78 >60 mL/min/[1.73_m2] Final     Comment:     Non  GFR Calc  Starting 12/18/2018, serum creatinine based estimated GFR (eGFR) will be   calculated using the Chronic Kidney Disease Epidemiology Collaboration   (CKD-EPI) equation.       Calcium   Date Value Ref Range Status   03/03/2025 8.8 8.8 - 10.4 mg/dL Final   06/30/2021 8.4 (L) 8.5 - 10.1 mg/dL Final        UA: No results found for: \"UAMP\", \"UBARB\", \"BENZODIAZEUR\", \"UCANN\", \"UCOC\", \"OPIT\", \"UPCP\"         Please see A&P for additional details of medical decision making.  MANAGEMENT DISCUSSED with the following over the past 24 hours:   -Hospitalist updated   NOTE(S)/MEDICAL RECORDS REVIEWED over the past 24 hours:   -Hospitalist interval history reviewed  Tests REVIEWED in the past 24 " hours:  - Glucose  Medical complexity over the past 24 hours:  - Prescription DRUG MANAGEMENT performed      JEAN PIERRE Banks-BC  Acute Care Pain Management Program   Hours of pain coverage Mon-Fri [id number], afterhours please call the primary team  [facility] Abigail (VINI, Carlotta, SD, RH)   Page via [employer] Messaging or [employer] web console -Click for [place]

## 2025-03-05 ENCOUNTER — PATIENT OUTREACH (OUTPATIENT)
Dept: CARE COORDINATION | Facility: CLINIC | Age: 89
End: 2025-03-05
Payer: MEDICARE

## 2025-03-05 LAB
BACTERIA BLD CULT: NO GROWTH
BACTERIA BLD CULT: NO GROWTH

## 2025-03-05 NOTE — PROGRESS NOTES
Connected Care Resource [place]    Background: Transitional Care Management program identified per system criteria and reviewed by [facility] team for possible outreach.    Assessment: Upon chart review, [facility] Team member will not proceed with patient outreach related to this episode of Transitional Care Management program due to reason below:    Patient has discharged to a Memory Care, Long-term Care, Assisted Living or Group Home where patient is receiving on-site support with their daily cares, including support with hospital follow up plan.    Plan: Transitional Care Management episode addressed appropriately per reason noted above.      SEEMA Frye  Connected Care Resource [facility]    *Connected Care Resource Team does NOT follow patient ongoing. Referrals are identified based on internal discharge reports and the outreach is to ensure patient has an understanding of their discharge instructions.

## 2025-03-06 ENCOUNTER — LAB REQUISITION (OUTPATIENT)
Dept: LAB | Facility: CLINIC | Age: 89
End: 2025-03-06
Payer: MEDICARE

## 2025-03-06 DIAGNOSIS — K59.00 CONSTIPATION, UNSPECIFIED: ICD-10-CM

## 2025-03-06 DIAGNOSIS — J18.9 PNEUMONIA, UNSPECIFIED ORGANISM: ICD-10-CM

## 2025-03-06 DIAGNOSIS — I10 ESSENTIAL (PRIMARY) HYPERTENSION: ICD-10-CM

## 2025-03-07 LAB
EST. AVERAGE GLUCOSE BLD GHB EST-MCNC: 192 MG/DL
HBA1C MFR BLD: 8.3 %

## 2025-03-07 PROCEDURE — 83036 HEMOGLOBIN GLYCOSYLATED A1C: CPT | Mod: ORL

## 2025-03-07 PROCEDURE — 36415 COLL VENOUS BLD VENIPUNCTURE: CPT | Mod: ORL

## 2025-03-07 PROCEDURE — P9604 ONE-WAY ALLOW PRORATED TRIP: HCPCS | Mod: ORL

## 2025-03-10 ENCOUNTER — DOCUMENTATION ONLY (OUTPATIENT)
Dept: OTHER | Facility: CLINIC | Age: 89
End: 2025-03-10

## 2025-03-10 ENCOUNTER — OFFICE VISIT (OUTPATIENT)
Dept: GASTROENTEROLOGY | Facility: CLINIC | Age: 89
End: 2025-03-10
Attending: SOCIAL WORKER
Payer: MEDICARE

## 2025-03-10 VITALS
BODY MASS INDEX: 26.04 KG/M2 | WEIGHT: 186 LBS | OXYGEN SATURATION: 95 % | SYSTOLIC BLOOD PRESSURE: 121 MMHG | DIASTOLIC BLOOD PRESSURE: 67 MMHG | HEART RATE: 70 BPM | HEIGHT: 71 IN

## 2025-03-10 DIAGNOSIS — R13.10 DYSPHAGIA, UNSPECIFIED TYPE: ICD-10-CM

## 2025-03-10 PROCEDURE — 3078F DIAST BP <80 MM HG: CPT | Performed by: STUDENT IN AN ORGANIZED HEALTH CARE EDUCATION/TRAINING PROGRAM

## 2025-03-10 PROCEDURE — 99215 OFFICE O/P EST HI 40 MIN: CPT | Performed by: STUDENT IN AN ORGANIZED HEALTH CARE EDUCATION/TRAINING PROGRAM

## 2025-03-10 PROCEDURE — 1126F AMNT PAIN NOTED NONE PRSNT: CPT | Performed by: STUDENT IN AN ORGANIZED HEALTH CARE EDUCATION/TRAINING PROGRAM

## 2025-03-10 PROCEDURE — 3074F SYST BP LT 130 MM HG: CPT | Performed by: STUDENT IN AN ORGANIZED HEALTH CARE EDUCATION/TRAINING PROGRAM

## 2025-03-10 ASSESSMENT — PAIN SCALES - GENERAL: PAINLEVEL_OUTOF10: NO PAIN (0)

## 2025-03-10 NOTE — NURSING NOTE
"Do you have a history of colon cancer in your immediate family? NO    If yes who: negative     And what age  were they diagnosed: n/a      Chief Complaint   Patient presents with    New Patient       Vitals:    03/10/25 1444   BP: 121/67   Pulse: 70   SpO2: 95%   Weight: 84.4 kg (186 lb)   Height: 1.791 m (5' 10.5\")       Body mass index is 26.31 kg/m .    Sloane Geiger MA      "

## 2025-03-10 NOTE — LETTER
"3/10/2025      Cresencio Peguero  [address]  [place] [address]      Dear Colleague,    Thank you for referring your patient, Cresencio Peguero, to the [facility] [place]. Please see a copy of my visit note below.    Gastroenterology Visit for: Cresencio Peguero 1935   MRN: [id number]     Reason for Visit:  chief complaint dysphagia    Referred by: Camilo Chavez  / [facility] [address] [place] / [place]  Patient Care Team:  Jossue Dueñas Physician as PCP - Juancho Watkins DO as Physician (Gastroenterology)    History of Present Illness:   Cresencio Peguero is a delightful 89 year old male who is presenting as a new patient in consultation at the request of Dr. Camilo Chavez with a chief complaint of dysphagia.    The patient reports several years history of trouble with swallowing that has worsened in the past 2 years. He reports having trouble with swallowing solids and pills at the level of the neck that happens 1-2 times/day and its associated with choking gagging coughing and sometimes regurgitation. He reports that he has already choked twice today.  He has poor dentition, has full dentures and reports having trouble with chewing meats and bread. He did have a VFSS early 2023 that showed \"Narrowing in the upper esophagus that is below the osteophyte.  The whole barium tablet lodged in the upper esophagus when swallowed in a spoonful of puree.  The pill did not clear even after an additional bite of puree and sip of thin liquid were given.  Radiologist questions if there is an esophageal web and recommends upper GI endoscopy.\" This was followed by an EGD 5/2023 that showed food (small volume) was found in the proximal esophagus immediately distal to the upper esophageal sphincter. The food was easily passed into the stomach with air insufflation and gentle pressure. There was no obvious stricture present in the proximal esophagus. " "One benign-appearing, intrinsic mild stenosis was found in the distal esophagus at the gastroesophageal junction. This stenosis measured 1.6 cm (inner diameter).\", He states that he did have dilation with what it appears to be Savary which resulted in significant improvement for few months. He then had another EGD on 1/16/2025 that demonstrated multiple benign-appearing widely patent esophageal stenosis in the distal esophagus that was dilated to 18.  Proximal and distal esophageal biopsies showed mild active esophagitis with basal cell hyperplasia.  Negative for other pathology.        No reported history of stroke or neurological issues. No nasal regurgitation.     Vfss osh 2023  Therapist Impression: No aspiration.  With thin liquid by larger sip via open cup there is penetration that fully clears the laryngeal vestibule either immediately after the swallow or after an automatic dry swallow.  No penetration with thin liquid by small sip via open cup.  Good mastication with the cracker.  No pharyngeal retention.  Narrowing in the upper esophagus that is below the osteophyte.  The whole barium tablet lodged in the upper esophagus when swallowed in a spoonful of puree.  The pill did not clear even after an additional bite of puree and sip of thin liquid were given.  Radiologist questions if there is an esophageal web and recommends upper GI endoscopy.  Recommend a regular texture diet with liquids by small single sip.  As allowed, crush pills and take in a spoonful of puree.  Further speech therapy is not indicated.           Wt Readings from Last 5 Encounters:   03/10/25 84.4 kg (186 lb)   03/04/25 85.1 kg (187 lb 9.6 oz)   01/16/25 85.4 kg (188 lb 3.2 oz)   11/02/24 88.4 kg (194 lb 14.2 oz)   06/05/24 84.7 kg (186 lb 12.8 oz)         STUDIES & PROCEDURES:       Prior medical records were reviewed including, but not limited to, notes from referring providers, lab work, radiographic tests, and other diagnostic " tests. Pertinent results were summarized above.     History     Past Medical History:   Diagnosis Date     Anxiety      Arthritis      CAD (coronary artery disease)     HX of stent,  CABG x2     Diabetes (H)      Hearing loss      Hyperlipidemia      Hypertension      Neuropathy      Sensory ataxia      Stented coronary artery        Past Surgical History:   Procedure Laterality Date     BACK SURGERY      cervical laminectomy     BACK SURGERY      lumbar laminectomy     CARDIAC SURGERY      Stent,  CABGx2     ESOPHAGOSCOPY, GASTROSCOPY, DUODENOSCOPY (EGD), COMBINED N/A 5/5/2023    Procedure: Esophagoscopy, gastroscopy, duodenoscopy (EGD), combined;  Surgeon: Jeovanny Rizo MD;  Location:  GI     ESOPHAGOSCOPY, GASTROSCOPY, DUODENOSCOPY (EGD), DILATATION, COMBINED N/A 1/16/2025    Procedure: ESOPHAGOGASTRODUODENOSCOPY DILATATION and biopsies;  Surgeon: Benedict Patricia MD;  Location:  OR     EYE SURGERY      cataract     EYE SURGERY      corneal surgery     HERNIORRHAPHY INGUINAL Right 8/15/2016    Procedure: HERNIORRHAPHY INGUINAL;  Surgeon: Wu Adam MD;  Location:  OR     LAPAROSCOPIC LYSIS ADHESIONS N/A 7/7/2017    Procedure: LAPAROSCOPIC LYSIS ADHESIONS;;  Surgeon: Sumeet Joiner MD;  Location:  OR     LAPAROSCOPY DIAGNOSTIC (GENERAL) N/A 7/7/2017    Procedure: LAPAROSCOPY DIAGNOSTIC (GENERAL);  DIAGNOSTIC LAPAROSCOPY WITH LYSIS OF ADHESIONS ;  Surgeon: Sumeet Joiner MD;  Location:  OR     ORTHOPEDIC SURGERY      rotator cuff repair,shoulder arthroplasty     REVERSE ARTHROPLASTY SHOULDER Right 8/12/2020    Procedure: RIGHT REVERSE TOTAL SHOULDER ARTHROPLASTY;  Surgeon: Adarsh Mello MD;  Location:  OR     TONSILLECTOMY         Social History     Socioeconomic History     Marital status:      Spouse name: Not on file     Number of children: Not on file     Years of education: Not on file     Highest education level: Not on file   Occupational  History     Not on file   Tobacco Use     Smoking status: Former     Types: Pipe     Quit date: 6/1/2021     Years since quitting: 3.7     Smokeless tobacco: Not on file   Substance and Sexual Activity     Alcohol use: Yes     Alcohol/week: 0.0 standard drinks of alcohol     Comment: 7 glasses wine per week     Drug use: Never     Sexual activity: Not on file   Other Topics Concern     Not on file   Social History Narrative     Not on file     Social Drivers of Health     Financial Resource Strain: Not on file   Food Insecurity: Not on file   Transportation Needs: Not on file   Physical Activity: Not on file   Stress: Not on file   Social Connections: Unknown (4/11/2023)    [facility]    Social Connections      Frequency of Communication with Friends and Family: Not on file   Interpersonal Safety: Low Risk  (1/16/2025)    Interpersonal Safety      Do you feel physically and emotionally safe where you currently live?: Yes      Within the past 12 months, have you been hit, slapped, kicked or otherwise physically hurt by someone?: No      Within the past 12 months, have you been humiliated or emotionally abused in other ways by your partner or ex-partner?: No   Housing Stability: Not on file       No family history on file.  Family history reviewed and edited as appropriate    Medications and Allergies:     Outpatient Encounter Medications as of 3/10/2025   Medication Sig Dispense Refill     acetaminophen (TYLENOL) 500 MG tablet Take 1,000 mg by mouth 3 times daily as needed for pain.       amLODIPine (NORVASC) 5 MG tablet Take 1 tablet (5 mg) by mouth daily. 30 tablet 0     artificial saliva (BIOTENE MT) AERS spray Take 1 spray by mouth every 2 hours as needed for dry mouth       atorvastatin (LIPITOR) 10 MG tablet Take 10 mg by mouth every evening.       carbidopa-levodopa (SINEMET)  MG tablet Take 2 tablets by mouth 3  "times daily. 11:20 AM, 3:20 PM, and 7:20 PM       carboxymethylcellulose (CARBOXYMETHYLCELLULOSE SODIUM) 0.5 % SOLN ophthalmic solution Place 1 drop into both eyes 3 times daily.       diclofenac (VOLTAREN) 1 % topical gel Apply 2 g topically 3 times daily (Patient taking differently: Apply topically 3 times daily. 2 g listed as dose but also \"apply 4 g topically\"  To lower back)       DULoxetine (CYMBALTA) 60 MG capsule Take 60 mg by mouth daily. Every morning after a meal       gabapentin (NEURONTIN) 300 MG capsule Take 1,200 mg by mouth every evening. At 7:30 PM       gabapentin (NEURONTIN) 300 MG capsule Take 1 capsule (300 mg) by mouth at bedtime (Patient taking differently: Take 600 mg by mouth every morning.)       JARDIANCE 25 MG TABS tablet Take 25 mg by mouth daily.       lactulose (CHRONULAC) 10 GM/15ML solution Take 30 mLs by mouth daily as needed for constipation.       lactulose (CHRONULAC) 10 GM/15ML solution Take 30 mLs (20 g) by mouth 2 times daily as needed for constipation (Patient taking differently: Take 20 g by mouth 2 times daily.)       Lidocaine (LIDOCARE) 4 % Patch Place 1 patch onto the skin every 24 hours To prevent lidocaine toxicity, patient should be patch free for 12 hrs daily. (Patient taking differently: Place 1 patch onto the skin every 24 hours. Leave on for 8 hours (placed at 11:20 AM)    To prevent lidocaine toxicity, patient should be patch free for 12 hrs daily.)       linaclotide (LINZESS) 72 MCG capsule Take 72 mcg by mouth every morning.       magnesium hydroxide (MILK OF MAGNESIA) 400 MG/5ML suspension Take 30 mLs by mouth daily as needed for constipation.       medication given by implanted intrathecal pump continuously. Drug # 1: Fentanyl (Sublimaze) - Conc: 625 mcg/mL - Total Dose / 24 hours: 425 mcg    Drug # 2: Bupivacaine (Marcaine)  - Conc: 25 mg/mL - Total Dose / 24 hours: 17 mg    Drug # 3: morphine - Conc: 0.1 mg/mL - total dose / 24 hours: 0.068 mg    Complex " "continuous infusion of above    Flex Bolus dosing: 3 scheduled bolus doses per day containing fentanyl 39.94 mcg, bupivacaine 1.598 mg, and morphine 0.05669 mg at 1300, 1700 and 2100 daily.     Outside Clinic & Provider: [facility] [phone]  Last Refill Date: 2/26/25  Alarm Date: 3/24/25  Next Refill Date: 3/20/2025     Pump : Medtronic Synchromed II pump     Please consider consulting the pain team if the patient is admitted with an IT pump.       metFORMIN (GLUCOPHAGE) 500 MG tablet Take 1,000 mg by mouth 2 times daily.       mirtazapine (REMERON) 7.5 MG tablet Take 7.5 mg by mouth at bedtime.       omeprazole (PRILOSEC) 40 MG DR capsule Take 40 mg by mouth every morning. At least 30-60 minutes before a meal       ondansetron (ZOFRAN ODT) 4 MG ODT tab Take 4 mg by mouth every 8 hours as needed for nausea.       psyllium (METAMUCIL/KONSYL) Packet Take 1 packet by mouth daily.       sennosides (SENOKOT) 8.6 MG tablet Take 1 tablet by mouth daily. (Patient taking differently: Take 1 tablet by mouth 2 times daily.) 30 tablet 0     sertraline (ZOLOFT) 100 MG tablet Take 100 mg by mouth every morning       No facility-administered encounter medications on file as of 3/10/2025.        Allergies   Allergen Reactions     Hydrocodone Itching     Constipation     Aspirin Itching     Contrast Dye      LW CM1: >>> NO CONTRAST ADVERSE REACTION <<<     Reaction :     Food      LW FI1: nka LW FI2: nka     Lisinopril Other (See Comments) and GI Disturbance     constipation     Oxycodone Itching     Percodan [Oxycodone-Aspirin] Rash        Review of systems:  A full 10 point review of systems was obtained and was negative except for the pertinent positives and negatives stated within the HPI.    Objective Findings:   Physical Exam:    Constitutional: /67   Pulse 70   Ht 1.791 m (5' 10.5\")   Wt 84.4 kg (186 lb)   SpO2 95%   BMI 26.31 kg/m    General: Alert, cooperative, no distress, " well-appearing  Head: Atraumatic, normocephalic, no obvious abnormalities   Eyes: EOMI, Sclera anicteric  Nose: Nares normal, no obvious malformation, no obvious rhinorrhea   Cardiovascular: RRR  Gastrointestinal: Soft, non-tender, non-distended,   Skin: No jaundice, no obvious rash  Neurologic: AAOx3, no obvious neurologic abnormality  Psychiatric: Normal Affect, appropriate mood  Extremities: No obvious edema, no obvious malformation     Labs, Radiology, Pathology     Lab Results   Component Value Date    WBC 5.0 03/03/2025    WBC 10.0 03/01/2025    WBC 8.5 02/28/2025    HGB 10.6 (L) 03/03/2025    HGB 10.4 (L) 03/01/2025    HGB 10.8 (L) 02/28/2025     03/03/2025     03/01/2025     (L) 02/28/2025    CHOL 143 05/03/2024    TRIG 158 (H) 05/03/2024    HDL 29 (L) 05/03/2024    ALT 20 02/28/2025    ALT <5 05/19/2024    ALT 22 07/07/2017    AST 36 02/28/2025    AST 19 05/19/2024    AST 20 07/07/2017     03/03/2025     03/01/2025     02/28/2025    BUN 14.1 03/03/2025    BUN 26.7 (H) 03/01/2025    BUN 34.5 (H) 02/28/2025    CO2 22 03/03/2025    CO2 21 (L) 03/01/2025    CO2 21 (L) 02/28/2025    INR 1.08 08/14/2016        Liver Function Studies -   Recent Labs   Lab Test 02/28/25  1101   PROTTOTAL 7.7   ALBUMIN 4.2   BILITOTAL 0.4   ALKPHOS 125   AST 36   ALT 20          Patient Active Problem List    Diagnosis Date Noted     Pneumonia of both lungs due to infectious organism, unspecified part of lung 02/28/2025     Priority: Medium     Sepsis, due to unspecified organism, unspecified whether acute organ dysfunction present (H) 02/28/2025     Priority: Medium     Type 2 diabetes mellitus with diabetic polyneuropathy, without long-term current use of insulin (H) 05/28/2024     Priority: Medium     Hyperglycemia 05/21/2024     Priority: Medium     Ambulatory dysfunction 05/21/2024     Priority: Medium     Generalized weakness 05/19/2024     Priority: Medium     Elevated blood pressure  "reading without diagnosis of hypertension 06/29/2021     Priority: Medium     Acute bilateral low back pain without sciatica 06/29/2021     Priority: Medium     Rotator cuff tear arthropathy, right 08/12/2020     Priority: Medium     SBO (small bowel obstruction) (H) 07/07/2017     Priority: Medium     Inguinal hernia 08/14/2016     Priority: Medium      Assessment and Plan   Assessment/Plan:    Cresencio Peguero is a delightful 89 year old male who is presenting as a new patient in consultation at the request of Dr. Camilo Chavez for several years history of dysphagia with solids and pills at the level of the neck that happens 1-2 times/day associated with choking gagging coughing and sometimes regurgitation with VFSS in early 2023 that showed \"Narrowing in the upper esophagus that is below the osteophyte with tablet lodged in the upper esophagus which did not clear even after an additional bite of puree and sip. This was followed by an EGD 5/2023 with bougie dilation with reported significant improvement for few months. He then had another EGD on 1/16/2025 that demonstrated multiple benign-appearing widely patent esophageal stenosis in the distal esophagus that was dilated to 18.  Proximal and distal esophageal biopsies showed mild active esophagitis with basal cell hyperplasia.  Negative for other pathology.      We discussed the symptom of dysphagia merits evaluation first to rule out a structural lesion that may be causing obstruction and narrowing of the esophageal lumen and thus, causing the food to get stuck or progress slowly.  The lesions include Schatzki ring that usually cause intermittent solid food dysphagia (the  steakhouse syndrome ) and occasionally lead to visits to the ED for endoscopic disimpaction; eosinophilic esophagitis which is an allergic condition leading to inflammation and several rings; esophagitis due to acid reflux that eventually may lead to peptic stricture and of course cancer.  Other " times the problem is in the oropharynx, for example in patients with neurologic conditions, pockets such as Zenker diverticulum or, cricopharyngeal hypertrophy.  When these structural lesions are ruled out we need to proceed with tests for the esophageal function (esophageal motility) which can show a weak peristalsis (ineffective), lack of peristalsis like in scleroderma or achalasia and or tight lower sphincter with failure to relax (like in achalasia).    In his case given previous abnormality noted in the proximal esophagus as well as improvement with bougie dilation I will proceed with repeating his video fluoroscopy swallow evaluation followed by an upper endoscopy with dilation with bougie dilator.  I will see the patient back in clinic after testing which is ordered.    Follow up plan:       The risks and benefits of my recommendations, as well as other treatment options were discussed with the patient and any available family today. All questions were answered.     Follow up: As planned above. Today, I personally spent 45 minutes spent on the date of the encounter doing chart review, history and exam, documentation and further activities per the note.    The patient verbalized understanding of the plan and was appreciative for the time spent and information provided during the office visit.     Author:   Alfredo Osorio MD    Director of Digital Health and [employer]  Co-Director of Esophageal Disorders Program   of Medicine  Division of Gastroenterology, Hepatology and Nutrition    [facility] [address]  [address] [place] [address]    Dr. Osorio speaks for [employer] regarding vonoprazan. He receives income for these activities. When discussing the medication, patients were informed of Dr. Osorio's role and potential conflict of interest. All questions and/or concerns were answered during the  encounter.     Documentation assisted by voice recognition and documentation system.       Again, thank you for allowing me to participate in the care of your patient.        Sincerely,        Alfredo Osorio MD    Electronically signed

## 2025-03-10 NOTE — PROGRESS NOTES
"Gastroenterology Visit for: Cresencio Peguero 1935   MRN: [id number]     Reason for Visit:  chief complaint dysphagia    Referred by: Camilo Chavez  / [facility] [address] [place] / [place]*  Patient Care Team:  Jossue Dueñas Physician as PCP - Juancho Watkins DO as Physician (Gastroenterology)    History of Present Illness:   Cresencio Peguero is a delightful 89 year old male who is presenting as a new patient in consultation at the request of Dr. Camilo Chavez with a chief complaint of dysphagia.    The patient reports several years history of trouble with swallowing that has worsened in the past 2 years. He reports having trouble with swallowing solids and pills at the level of the neck that happens 1-2 times/day and its associated with choking gagging coughing and sometimes regurgitation. He reports that he has already choked twice today.  He has poor dentition, has full dentures and reports having trouble with chewing meats and bread. He did have a VFSS early 2023 that showed \"Narrowing in the upper esophagus that is below the osteophyte.  The whole barium tablet lodged in the upper esophagus when swallowed in a spoonful of puree.  The pill did not clear even after an additional bite of puree and sip of thin liquid were given.  Radiologist questions if there is an esophageal web and recommends upper GI endoscopy.\" This was followed by an EGD 5/2023 that showed food (small volume) was found in the proximal esophagus immediately distal to the upper esophageal sphincter. The food was easily passed into the stomach with air insufflation and gentle pressure. There was no obvious stricture present in the proximal esophagus. One benign-appearing, intrinsic mild stenosis was found in the distal esophagus at the gastroesophageal junction. This stenosis measured 1.6 cm (inner diameter).\", He states that he did have dilation with what it appears to be Savary which resulted in significant " improvement for few months. He then had another EGD on 1/16/2025 that demonstrated multiple benign-appearing widely patent esophageal stenosis in the distal esophagus that was dilated to 18.  Proximal and distal esophageal biopsies showed mild active esophagitis with basal cell hyperplasia.  Negative for other pathology.        No reported history of stroke or neurological issues. No nasal regurgitation.     Vfss osh 2023  Therapist Impression: No aspiration.  With thin liquid by larger sip via open cup there is penetration that fully clears the laryngeal vestibule either immediately after the swallow or after an automatic dry swallow.  No penetration with thin liquid by small sip via open cup.  Good mastication with the cracker.  No pharyngeal retention.  Narrowing in the upper esophagus that is below the osteophyte.  The whole barium tablet lodged in the upper esophagus when swallowed in a spoonful of puree.  The pill did not clear even after an additional bite of puree and sip of thin liquid were given.  Radiologist questions if there is an esophageal web and recommends upper GI endoscopy.  Recommend a regular texture diet with liquids by small single sip.  As allowed, crush pills and take in a spoonful of puree.  Further speech therapy is not indicated.           Wt Readings from Last 5 Encounters:   03/10/25 84.4 kg (186 lb)   03/04/25 85.1 kg (187 lb 9.6 oz)   01/16/25 85.4 kg (188 lb 3.2 oz)   11/02/24 88.4 kg (194 lb 14.2 oz)   06/05/24 84.7 kg (186 lb 12.8 oz)         STUDIES & PROCEDURES:       Prior medical records were reviewed including, but not limited to, notes from referring providers, lab work, radiographic tests, and other diagnostic tests. Pertinent results were summarized above.     History     Past Medical History:   Diagnosis Date    Anxiety     Arthritis     CAD (coronary artery disease)     HX of stent,  CABG x2    Diabetes (H)     Hearing loss     Hyperlipidemia     Hypertension      Neuropathy     Sensory ataxia     Stented coronary artery        Past Surgical History:   Procedure Laterality Date    BACK SURGERY      cervical laminectomy    BACK SURGERY      lumbar laminectomy    CARDIAC SURGERY      Stent,  CABGx2    ESOPHAGOSCOPY, GASTROSCOPY, DUODENOSCOPY (EGD), COMBINED N/A 5/5/2023    Procedure: Esophagoscopy, gastroscopy, duodenoscopy (EGD), combined;  Surgeon: Jeovanny Rizo MD;  Location:  GI    ESOPHAGOSCOPY, GASTROSCOPY, DUODENOSCOPY (EGD), DILATATION, COMBINED N/A 1/16/2025    Procedure: ESOPHAGOGASTRODUODENOSCOPY DILATATION and biopsies;  Surgeon: Benedict Patricia MD;  Location: [facility] OR    EYE SURGERY      cataract    EYE SURGERY      corneal surgery    HERNIORRHAPHY INGUINAL Right 8/15/2016    Procedure: HERNIORRHAPHY INGUINAL;  Surgeon: Wu Adam MD;  Location: [facility] OR    LAPAROSCOPIC LYSIS ADHESIONS N/A 7/7/2017    Procedure: LAPAROSCOPIC LYSIS ADHESIONS;;  Surgeon: Sumeet Joiner MD;  Location:  OR    LAPAROSCOPY DIAGNOSTIC (GENERAL) N/A 7/7/2017    Procedure: LAPAROSCOPY DIAGNOSTIC (GENERAL);  DIAGNOSTIC LAPAROSCOPY WITH LYSIS OF ADHESIONS ;  Surgeon: Sumeet Joiner MD;  Location: [facility] OR    ORTHOPEDIC SURGERY      rotator cuff repair,shoulder arthroplasty    REVERSE ARTHROPLASTY SHOULDER Right 8/12/2020    Procedure: RIGHT REVERSE TOTAL SHOULDER ARTHROPLASTY;  Surgeon: Adarsh Mello MD;  Location:  OR    TONSILLECTOMY         Social History     Socioeconomic History    Marital status:      Spouse name: Not on file    Number of children: Not on file    Years of education: Not on file    Highest education level: Not on file   Occupational History    Not on file   Tobacco Use    Smoking status: Former     Types: Pipe     Quit date: 6/1/2021     Years since quitting: 3.7    Smokeless tobacco: Not on file   Substance and Sexual Activity    Alcohol use: Yes     Alcohol/week: 0.0 standard drinks of alcohol     Comment: 7 glasses  wine per week    Drug use: Never    Sexual activity: Not on file   Other Topics Concern    Not on file   Social History Narrative    Not on file     Social Drivers of Health     Financial Resource Strain: Not on file   Food Insecurity: Not on file   Transportation Needs: Not on file   Physical Activity: Not on file   Stress: Not on file   Social Connections: Unknown (4/11/2023)    Received from [facility], [facility]    Social Connections     Frequency of Communication with Friends and Family: Not on file   Interpersonal Safety: Low Risk  (1/16/2025)    Interpersonal Safety     Do you feel physically and emotionally safe where you currently live?: Yes     Within the past 12 months, have you been hit, slapped, kicked or otherwise physically hurt by someone?: No     Within the past 12 months, have you been humiliated or emotionally abused in other ways by your partner or ex-partner?: No   Housing Stability: Not on file       No family history on file.  Family history reviewed and edited as appropriate    Medications and Allergies:     Outpatient Encounter Medications as of 3/10/2025   Medication Sig Dispense Refill    acetaminophen (TYLENOL) 500 MG tablet Take 1,000 mg by mouth 3 times daily as needed for pain.      amLODIPine (NORVASC) 5 MG tablet Take 1 tablet (5 mg) by mouth daily. 30 tablet 0    artificial saliva (BIOTENE MT) AERS spray Take 1 spray by mouth every 2 hours as needed for dry mouth      atorvastatin (LIPITOR) 10 MG tablet Take 10 mg by mouth every evening.      carbidopa-levodopa (SINEMET)  MG tablet Take 2 tablets by mouth 3 times daily. 11:20 AM, 3:20 PM, and 7:20 PM      carboxymethylcellulose (CARBOXYMETHYLCELLULOSE SODIUM) 0.5 % SOLN ophthalmic solution Place 1 drop into both eyes 3 times daily.      diclofenac (VOLTAREN) 1 % topical gel Apply 2 g topically 3 times daily (Patient taking differently: Apply topically 3 times  "daily. 2 g listed as dose but also \"apply 4 g topically\"  To lower back)      DULoxetine (CYMBALTA) 60 MG capsule Take 60 mg by mouth daily. Every morning after a meal      gabapentin (NEURONTIN) 300 MG capsule Take 1,200 mg by mouth every evening. At 7:30 PM      gabapentin (NEURONTIN) 300 MG capsule Take 1 capsule (300 mg) by mouth at bedtime (Patient taking differently: Take 600 mg by mouth every morning.)      JARDIANCE 25 MG TABS tablet Take 25 mg by mouth daily.      lactulose (CHRONULAC) 10 GM/15ML solution Take 30 mLs by mouth daily as needed for constipation.      lactulose (CHRONULAC) 10 GM/15ML solution Take 30 mLs (20 g) by mouth 2 times daily as needed for constipation (Patient taking differently: Take 20 g by mouth 2 times daily.)      Lidocaine (LIDOCARE) 4 % Patch Place 1 patch onto the skin every 24 hours To prevent lidocaine toxicity, patient should be patch free for 12 hrs daily. (Patient taking differently: Place 1 patch onto the skin every 24 hours. Leave on for 8 hours (placed at 11:20 AM)    To prevent lidocaine toxicity, patient should be patch free for 12 hrs daily.)      linaclotide (LINZESS) 72 MCG capsule Take 72 mcg by mouth every morning.      magnesium hydroxide (MILK OF MAGNESIA) 400 MG/5ML suspension Take 30 mLs by mouth daily as needed for constipation.      medication given by implanted intrathecal pump continuously. Drug # 1: Fentanyl (Sublimaze) - Conc: 625 mcg/mL - Total Dose / 24 hours: 425 mcg    Drug # 2: Bupivacaine (Marcaine)  - Conc: 25 mg/mL - Total Dose / 24 hours: 17 mg    Drug # 3: morphine - Conc: 0.1 mg/mL - total dose / 24 hours: 0.068 mg    Complex continuous infusion of above    Flex Bolus dosing: 3 scheduled bolus doses per day containing fentanyl 39.94 mcg, bupivacaine 1.598 mg, and morphine 0.99208 mg at 1300, 1700 and 2100 daily.     Outside Clinic & Provider: [facility] [phone]  Last Refill Date: 2/26/25  Alarm Date: 3/24/25  Next Refill Date: " "3/20/2025     Pump : Medtronic Synchromed II pump     Please consider consulting the pain team if the patient is admitted with an IT pump.      metFORMIN (GLUCOPHAGE) 500 MG tablet Take 1,000 mg by mouth 2 times daily.      mirtazapine (REMERON) 7.5 MG tablet Take 7.5 mg by mouth at bedtime.      omeprazole (PRILOSEC) 40 MG DR capsule Take 40 mg by mouth every morning. At least 30-60 minutes before a meal      ondansetron (ZOFRAN ODT) 4 MG ODT tab Take 4 mg by mouth every 8 hours as needed for nausea.      psyllium (METAMUCIL/KONSYL) Packet Take 1 packet by mouth daily.      sennosides (SENOKOT) 8.6 MG tablet Take 1 tablet by mouth daily. (Patient taking differently: Take 1 tablet by mouth 2 times daily.) 30 tablet 0    sertraline (ZOLOFT) 100 MG tablet Take 100 mg by mouth every morning       No facility-administered encounter medications on file as of 3/10/2025.        Allergies   Allergen Reactions    Hydrocodone Itching     Constipation    Aspirin Itching    Contrast Dye      LW CM1: >>> NO CONTRAST ADVERSE REACTION <<<     Reaction :    Food      LW FI1: nka LW FI2: nka    Lisinopril Other (See Comments) and GI Disturbance     constipation    Oxycodone Itching    Percodan [Oxycodone-Aspirin] Rash        Review of systems:  A full 10 point review of systems was obtained and was negative except for the pertinent positives and negatives stated within the HPI.    Objective Findings:   Physical Exam:    Constitutional: /67   Pulse 70   Ht 1.791 m (5' 10.5\")   Wt 84.4 kg (186 lb)   SpO2 95%   BMI 26.31 kg/m    General: Alert, cooperative, no distress, well-appearing  Head: Atraumatic, normocephalic, no obvious abnormalities   Eyes: EOMI, Sclera anicteric  Nose: Nares normal, no obvious malformation, no obvious rhinorrhea   Cardiovascular: RRR  Gastrointestinal: Soft, non-tender, non-distended,   Skin: No jaundice, no obvious rash  Neurologic: AAOx3, no obvious neurologic " abnormality  Psychiatric: Normal Affect, appropriate mood  Extremities: No obvious edema, no obvious malformation     Labs, Radiology, Pathology     Lab Results   Component Value Date    WBC 5.0 03/03/2025    WBC 10.0 03/01/2025    WBC 8.5 02/28/2025    HGB 10.6 (L) 03/03/2025    HGB 10.4 (L) 03/01/2025    HGB 10.8 (L) 02/28/2025     03/03/2025     03/01/2025     (L) 02/28/2025    CHOL 143 05/03/2024    TRIG 158 (H) 05/03/2024    HDL 29 (L) 05/03/2024    ALT 20 02/28/2025    ALT <5 05/19/2024    ALT 22 07/07/2017    AST 36 02/28/2025    AST 19 05/19/2024    AST 20 07/07/2017     03/03/2025     03/01/2025     02/28/2025    BUN 14.1 03/03/2025    BUN 26.7 (H) 03/01/2025    BUN 34.5 (H) 02/28/2025    CO2 22 03/03/2025    CO2 21 (L) 03/01/2025    CO2 21 (L) 02/28/2025    INR 1.08 08/14/2016        Liver Function Studies -   Recent Labs   Lab Test 02/28/25  1101   PROTTOTAL 7.7   ALBUMIN 4.2   BILITOTAL 0.4   ALKPHOS 125   AST 36   ALT 20          Patient Active Problem List    Diagnosis Date Noted    Pneumonia of both lungs due to infectious organism, unspecified part of lung 02/28/2025     Priority: Medium    Sepsis, due to unspecified organism, unspecified whether acute organ dysfunction present (H) 02/28/2025     Priority: Medium    Type 2 diabetes mellitus with diabetic polyneuropathy, without long-term current use of insulin (H) 05/28/2024     Priority: Medium    Hyperglycemia 05/21/2024     Priority: Medium    Ambulatory dysfunction 05/21/2024     Priority: Medium    Generalized weakness 05/19/2024     Priority: Medium    Elevated blood pressure reading without diagnosis of hypertension 06/29/2021     Priority: Medium    Acute bilateral low back pain without sciatica 06/29/2021     Priority: Medium    Rotator cuff tear arthropathy, right 08/12/2020     Priority: Medium    SBO (small bowel obstruction) (H) 07/07/2017     Priority: Medium    Inguinal hernia 08/14/2016      "Priority: Medium      Assessment and Plan   Assessment/Plan:    Cresencio Peguero is a delightful 89 year old male who is presenting as a new patient in consultation at the request of Dr. Camilo Chavez for several years history of dysphagia with solids and pills at the level of the neck that happens 1-2 times/day associated with choking gagging coughing and sometimes regurgitation with VFSS in early 2023 that showed \"Narrowing in the upper esophagus that is below the osteophyte with tablet lodged in the upper esophagus which did not clear even after an additional bite of puree and sip. This was followed by an EGD 5/2023 with bougie dilation with reported significant improvement for few months. He then had another EGD on 1/16/2025 that demonstrated multiple benign-appearing widely patent esophageal stenosis in the distal esophagus that was dilated to 18.  Proximal and distal esophageal biopsies showed mild active esophagitis with basal cell hyperplasia.  Negative for other pathology.      We discussed the symptom of dysphagia merits evaluation first to rule out a structural lesion that may be causing obstruction and narrowing of the esophageal lumen and thus, causing the food to get stuck or progress slowly.  The lesions include Schatzki ring that usually cause intermittent solid food dysphagia (the  steakhouse syndrome ) and occasionally lead to visits to the ED for endoscopic disimpaction; eosinophilic esophagitis which is an allergic condition leading to inflammation and several rings; esophagitis due to acid reflux that eventually may lead to peptic stricture and of course cancer.  Other times the problem is in the oropharynx, for example in patients with neurologic conditions, pockets such as Zenker diverticulum or, cricopharyngeal hypertrophy.  When these structural lesions are ruled out we need to proceed with tests for the esophageal function (esophageal motility) which can show a weak peristalsis (ineffective), " lack of peristalsis like in scleroderma or achalasia and or tight lower sphincter with failure to relax (like in achalasia).    In his case given previous abnormality noted in the proximal esophagus as well as improvement with bougie dilation I will proceed with repeating his video fluoroscopy swallow evaluation followed by an upper endoscopy with dilation with bougie dilator.  I will see the patient back in clinic after testing which is ordered.    Follow up plan:       The risks and benefits of my recommendations, as well as other treatment options were discussed with the patient and any available family today. All questions were answered.     Follow up: As planned above. Today, I personally spent 45 minutes spent on the date of the encounter doing chart review, history and exam, documentation and further activities per the note.    The patient verbalized understanding of the plan and was appreciative for the time spent and information provided during the office visit.     Author:   Alfredo Osorio MD    Director of Digital Health and [place]  Co-Director of Esophageal Disorders Program   of Medicine  Division of Gastroenterology, Hepatology and Nutrition    [facility] [id number]  [address] [place] [address]    Dr. Osorio speaks for [facility] regarding vonoprazan. He receives income for these activities. When discussing the medication, patients were informed of Dr. Osorio's role and potential conflict of interest. All questions and/or concerns were answered during the encounter.     Documentation assisted by voice recognition and documentation system.

## 2025-03-12 ENCOUNTER — TELEPHONE (OUTPATIENT)
Dept: GASTROENTEROLOGY | Facility: CLINIC | Age: 89
End: 2025-03-12
Payer: MEDICARE

## 2025-03-12 NOTE — TELEPHONE ENCOUNTER
Left Voicemail (1st Attempt) for the patient to call back and schedule the following:    Appointment Type: Return ESOPHAGEAL  in person or virtual  Provider: Dr Osorio   Appt date: 9/2025  Specialty phone number: [phone]   Additional appointment(s) needed:   Additional Notes: 6 month follow up

## 2025-03-13 NOTE — TELEPHONE ENCOUNTER
Left Voicemail (1st Attempt) for the patient to call back and schedule the following:     Appointment Type: Return ESOPHAGEAL  in person or virtual  Provider: Dr Osorio   Appt date: 9/2025  Specialty phone number: [phone]   Additional appointment(s) needed:   Additional Notes: 6 month follow up

## 2025-03-25 ENCOUNTER — LAB REQUISITION (OUTPATIENT)
Dept: LAB | Facility: CLINIC | Age: 89
End: 2025-03-25
Payer: MEDICARE

## 2025-03-25 DIAGNOSIS — E11.9 TYPE 2 DIABETES MELLITUS WITHOUT COMPLICATIONS (H): ICD-10-CM

## 2025-03-25 DIAGNOSIS — K59.00 CONSTIPATION, UNSPECIFIED: ICD-10-CM

## 2025-03-25 DIAGNOSIS — J18.9 PNEUMONIA, UNSPECIFIED ORGANISM: ICD-10-CM

## 2025-03-28 LAB
ANION GAP SERPL CALCULATED.3IONS-SCNC: 14 MMOL/L (ref 7–15)
BUN SERPL-MCNC: 32.8 MG/DL (ref 8–23)
CALCIUM SERPL-MCNC: 9.6 MG/DL (ref 8.8–10.4)
CHLORIDE SERPL-SCNC: 101 MMOL/L (ref 98–107)
CREAT SERPL-MCNC: 0.96 MG/DL (ref 0.67–1.17)
EGFRCR SERPLBLD CKD-EPI 2021: 76 ML/MIN/1.73M2
ERYTHROCYTE [DISTWIDTH] IN BLOOD BY AUTOMATED COUNT: 17 % (ref 10–15)
GLUCOSE SERPL-MCNC: 158 MG/DL (ref 70–99)
HCO3 SERPL-SCNC: 24 MMOL/L (ref 22–29)
HCT VFR BLD AUTO: 36.2 % (ref 40–53)
HGB BLD-MCNC: 11.2 G/DL (ref 13.3–17.7)
MCH RBC QN AUTO: 24.6 PG (ref 26.5–33)
MCHC RBC AUTO-ENTMCNC: 30.9 G/DL (ref 31.5–36.5)
MCV RBC AUTO: 79 FL (ref 78–100)
PLATELET # BLD AUTO: 177 10E3/UL (ref 150–450)
POTASSIUM SERPL-SCNC: 3.9 MMOL/L (ref 3.4–5.3)
RBC # BLD AUTO: 4.56 10E6/UL (ref 4.4–5.9)
SODIUM SERPL-SCNC: 139 MMOL/L (ref 135–145)
WBC # BLD AUTO: 7.6 10E3/UL (ref 4–11)

## 2025-03-28 PROCEDURE — 80048 BASIC METABOLIC PNL TOTAL CA: CPT | Mod: ORL

## 2025-03-28 PROCEDURE — 85027 COMPLETE CBC AUTOMATED: CPT | Mod: ORL

## 2025-03-28 PROCEDURE — P9604 ONE-WAY ALLOW PRORATED TRIP: HCPCS | Mod: ORL

## 2025-03-28 PROCEDURE — 36415 COLL VENOUS BLD VENIPUNCTURE: CPT | Mod: ORL

## 2025-04-22 ENCOUNTER — THERAPY VISIT (OUTPATIENT)
Dept: SPEECH THERAPY | Facility: CLINIC | Age: 89
End: 2025-04-22
Attending: STUDENT IN AN ORGANIZED HEALTH CARE EDUCATION/TRAINING PROGRAM
Payer: MEDICARE

## 2025-04-22 ENCOUNTER — ANCILLARY PROCEDURE (OUTPATIENT)
Dept: GENERAL RADIOLOGY | Facility: CLINIC | Age: 89
End: 2025-04-22
Attending: STUDENT IN AN ORGANIZED HEALTH CARE EDUCATION/TRAINING PROGRAM
Payer: MEDICARE

## 2025-04-22 DIAGNOSIS — R13.10 DYSPHAGIA, UNSPECIFIED TYPE: ICD-10-CM

## 2025-04-22 DIAGNOSIS — R13.12 DYSPHAGIA, OROPHARYNGEAL PHASE: Primary | ICD-10-CM

## 2025-04-22 PROCEDURE — 74230 X-RAY XM SWLNG FUNCJ C+: CPT | Mod: GC | Performed by: RADIOLOGY

## 2025-04-22 RX ORDER — BARIUM SULFATE 400 MG/ML
SUSPENSION ORAL ONCE
Status: COMPLETED | OUTPATIENT
Start: 2025-04-22 | End: 2025-04-22

## 2025-04-22 RX ADMIN — BARIUM SULFATE: 400 SUSPENSION ORAL at 09:09

## 2025-04-22 NOTE — PROGRESS NOTES
SPEECH LANGUAGE PATHOLOGY EVALUATION       Fall Risk Screen:  Have you fallen 2 or more times in the past year?: No  Have you fallen and had an injury in the past year?: No  Is patient receiving Physical Therapy Services?: No    Subjective        Presenting condition or subjective complaint:   Pt is an 89 year old male that was seen by speech therapy for a VFSS today. Pt was accompanied by his son.   Date of onset: 04/22/25    Relevant medical history:     Past Medical History:   Diagnosis Date    Anxiety     Arthritis     CAD (coronary artery disease)     HX of stent,  CABG x2    Diabetes (H)     Hearing loss     Hyperlipidemia     Hypertension     Neuropathy     Sensory ataxia     Stented coronary artery        Dates & types of surgery:    Past Surgical History:   Procedure Laterality Date    BACK SURGERY      cervical laminectomy    BACK SURGERY      lumbar laminectomy    CARDIAC SURGERY      Stent,  CABGx2    ESOPHAGOSCOPY, GASTROSCOPY, DUODENOSCOPY (EGD), COMBINED N/A 5/5/2023    Procedure: Esophagoscopy, gastroscopy, duodenoscopy (EGD), combined;  Surgeon: Jeovanny Rizo MD;  Location:  GI    ESOPHAGOSCOPY, GASTROSCOPY, DUODENOSCOPY (EGD), DILATATION, COMBINED N/A 1/16/2025    Procedure: ESOPHAGOGASTRODUODENOSCOPY DILATATION and biopsies;  Surgeon: Benedict Patricia MD;  Location:  OR    EYE SURGERY      cataract    EYE SURGERY      corneal surgery    HERNIORRHAPHY INGUINAL Right 8/15/2016    Procedure: HERNIORRHAPHY INGUINAL;  Surgeon: Wu Adam MD;  Location:  OR    LAPAROSCOPIC LYSIS ADHESIONS N/A 7/7/2017    Procedure: LAPAROSCOPIC LYSIS ADHESIONS;;  Surgeon: Sumeet Joiner MD;  Location:  OR    LAPAROSCOPY DIAGNOSTIC (GENERAL) N/A 7/7/2017    Procedure: LAPAROSCOPY DIAGNOSTIC (GENERAL);  DIAGNOSTIC LAPAROSCOPY WITH LYSIS OF ADHESIONS ;  Surgeon: Sumeet Joiner MD;  Location:  OR    ORTHOPEDIC SURGERY      rotator cuff repair,shoulder arthroplasty    REVERSE  ARTHROPLASTY SHOULDER Right 8/12/2020    Procedure: RIGHT REVERSE TOTAL SHOULDER ARTHROPLASTY;  Surgeon: Adarsh Mello MD;  Location: [facility] OR    TONSILLECTOMY           Prior diagnostic imaging/testing results:       Prior therapy history for the same diagnosis, illness or injury:        Living Environment  Social support:     Help at home:    Equipment owned:       Employment:      Hobbies/Interests:      Patient goals for therapy:      Pain assessment: Pain denied     Objective     SWALLOW EVALUTION  Dysphagia history: Pt has a history of dysphagia that continues to worsen over the last year. Pt reports that he has most difficulty with solids and pills. Pt reports that he has had EGD with dilation, most recently in January 2025. Repeat VFSS today to assess narrowing at upper esophagus  Current Diet/Method of Nutritional Intake: thin liquids (level 0), pureed (level 4)        CLINICAL SWALLOW EVALUATION  Oral Motor Function: Dentition: upper dentures, lower dentures  Secretion management: WFL  Mucosal quality: good  Mandibular function: intact  Oral labial function: WFL  Lingual function: WFL  Velar function: intact   Buccal function: intact  Laryngeal function: cough, throat clear     Level of assist required for feeding: set up only required   Textures Trialed:   Clinical Swallow Eval: Thin Liquids  Mode of presentation: cup, self-fed   Volume presented: 4oz  Preparatory Phase: WFL  Oral Phase: WFL  Pharyngeal phase of swallow: intact   Strategies trialed during procedure: ESA SLP CLINICAL EVAL STRATEGIES: none    Diagnostic statement: No overt signs of aspiration     ADDITIONAL EVAL COMPLETED TODAY : yes; rationale: to further assess pharyngeal phase of swallow    VIDEOFLUOROSCOPIC SWALLOW STUDY  Radiologist: Resident  Views Taken: left lateral, A/P   Physical location of procedure: [facility]  Patient sitting upright on chair/stool     VFSS textures trialed:   VFSS Eval: Thin Liquids  Mode of  Presentation: cup   Order of Presentation: 1, 2, 3, 10, 12, 13, 14 (AP)  Preparatory Phase: poor bolus control  Oral Phase: premature pharyngeal entry  Bolus Location When Swallow Initiated: valleculae  Pharyngeal Phase: residue in valleculae, residue in pyriform sinus  Rosenbeck's Penetration Aspiration Scale: 6 - contrast passes glottis, no subglottic residue remains (aspiration)  Response to Aspiration: absent response  Strategies and Compensations: not applicable  Diagnostic Statement: premature spillage, delayed to the pyriforms, penetration consistently with thin. Deep penetration and thin liquids below the level of the VF x1 with cup sip of thin and when barium tablet was suspended in the PE segment. Decreased bolus flow through the UES. Prominent osteophyte at C6 that could be contributing to the decreased bolus flow. However, it also appears UES is tight. Residue in oral cavity and at BOT    VFSS Eval: Mildly Thick Liquids  Mode of Presentation: cup   Order of Presentation: 4, 5  Preparatory Phase: poor bolus control  Oral Phase: premature pharyngeal entry  Bolus Location When Swallow Initiated: valleculae  Pharyngeal Phase: residue in valleculae, residue in pyriform sinus  Rosenbeck's Penetration Aspiration Scale: 1 - no aspiration, contrast does not enter airway  Response to Aspiration: absent response  Strategies and Compensations: not applicable  Diagnostic Statement: No penetration, no aspiration. Trace residue in the pyriforms and vallecula.     VFSS Eval: Purees  Mode of Presentation: spoon, self-fed   Order of Presentation: 6, 7, 15(AP)  Preparatory Phase: poor bolus control  Oral Phase: residue in oral cavity, premature pharyngeal entry  Bolus Location When Swallow Initiated: valleculae  Pharyngeal Phase: residue in valleculae, residue in pyriform sinus  Rosenbeck s Penetration Aspiration Scale: 1 - no aspiration, contrast does not enter airway  Response to Aspiration: absent response  Strategies  and Compensations: not applicable  Diagnostic Statement: No penetration no aspiration. Decreased AP transit. No pharyngeal residue.     VFSS Eval: Solids  Mode of Presentation: self-fed   Order of Presentation: 8, 9  Preparatory Phase: poor bolus control  Oral Phase: residue in oral cavity, premature pharyngeal entry  Bolus Location When Swallow Initiated: valleculae  Pharyngeal Phase: residue in valleculae, residue in pyriform sinus   Rosenbeck s Penetration Aspiration Scale: 1 - no aspiration, contrast does not enter airway  Response to Aspiration: absent response  Strategies and Compensations: not applicable  Diagnostic Statement: Prolonged mastication of solids. Decreased AP transit No penetration, no aspiration. No pharyngeal residue.     VFSS Eval: Barium Tablet  Mode of Presentation: self-fed   Order of Presentation: 11  Preparatory Phase: poor bolus control  Oral Phase: premature pharyngeal entry  Bolus Location When Swallow Initiated: valleculae  Pharyngeal Phase: WFL  Rosenbeck s Penetration Aspiration Scale: 1 - no aspiration, contrast does not enter airway  Response to Aspiration: absent response  Strategies and Compensations: not applicable  Diagnostic Statement: barium tablet was suspended in the UES, moving above and below CP. But also appeared suspended at level of osteophyte at C6     ESOPHAGEAL PHASE OF SWALLOW  patient presents with symptoms of esophageal dysphagia  please refer to radiologist's report for details     SWALLOW ASSESSMENT CLINICAL IMPRESSIONS AND RATIONALE  Diet Consistency Recommendations: thin liquids (level 0), minced & moist (level 5)    Recommended Feeding/Eating Techniques: small bolus size, alternate food and liquid intake   Medication Administration Recommendations: pills crushed with puree or cut in half with puree  Instrumental Assessment Recommendations:  none      Assessment & Plan   CLINICAL IMPRESSIONS   Medical Diagnosis:      Treatment Diagnosis:      Impression/Assessment: Pt is a 89 year old male with swallowing complaints. The following significant findings have been identified: impaired swallowing, characterized by decreased bolus flow through the UES and penetration consistently with thin liquids. Identified deficits interfere with their ability to consume an oral diet and maintain nutrition as compared to previous level of function. Discussed anatomy and the results of todays VFSS. Discussed narrowing at UES that is contributing to penetration and also increased discomfort when eating dense solids or taking pills. On AP view, it does appear that UES is tight. Recommend EGD with dilation. There could be a contributing factor of the maurilio prominence at C6, however pt does appear to have increased tension at CP. Pt and son were educated on recommendations to follow up with GI and agree with the plan of care.     PLAN OF CARE  Treatment Interventions:  Eval only    Recommended Referrals to Other Professionals:  none  Education Assessment:   Learner/Method: Patient;Listening;Reading;Demonstration;Pictures/Video;No Barriers to Learning    Risks and benefits of evaluation/treatment have been explained.   Patient/Family/caregiver agrees with Plan of Care.     Evaluation Time:    SLP Eval: oral/pharyngeal swallow function, clinical minutes (11232): 15  SLP Eval: VideoFluoroscopic Swallow function Minutes (77972): 45    Evaluation Only     Signing Clinician: Reba DURAN [facility]                                                                                   OUTPATIENT SPEECH LANGUAGE PATHOLOGY

## 2025-05-03 ENCOUNTER — APPOINTMENT (OUTPATIENT)
Dept: GENERAL RADIOLOGY | Facility: CLINIC | Age: 89
DRG: 178 | End: 2025-05-03
Attending: EMERGENCY MEDICINE
Payer: MEDICARE

## 2025-05-03 ENCOUNTER — APPOINTMENT (OUTPATIENT)
Dept: CT IMAGING | Facility: CLINIC | Age: 89
DRG: 178 | End: 2025-05-03
Attending: EMERGENCY MEDICINE
Payer: MEDICARE

## 2025-05-03 ENCOUNTER — HOSPITAL ENCOUNTER (INPATIENT)
Facility: CLINIC | Age: 89
LOS: 4 days | Discharge: HOME HEALTH | DRG: 178 | End: 2025-05-07
Attending: EMERGENCY MEDICINE | Admitting: HOSPITALIST
Payer: MEDICARE

## 2025-05-03 DIAGNOSIS — R13.10 DYSPHAGIA, UNSPECIFIED TYPE: ICD-10-CM

## 2025-05-03 DIAGNOSIS — G61.0 GUILLAIN-BARRE SYNDROME: Primary | ICD-10-CM

## 2025-05-03 DIAGNOSIS — J18.9 PNEUMONIA DUE TO INFECTIOUS ORGANISM, UNSPECIFIED LATERALITY, UNSPECIFIED PART OF LUNG: ICD-10-CM

## 2025-05-03 DIAGNOSIS — R09.02 HYPOXIA: ICD-10-CM

## 2025-05-03 DIAGNOSIS — R79.89 ELEVATED TROPONIN: ICD-10-CM

## 2025-05-03 DIAGNOSIS — R74.02 ELEVATED SERUM LACTATE DEHYDROGENASE: ICD-10-CM

## 2025-05-03 DIAGNOSIS — R05.1 ACUTE COUGH: ICD-10-CM

## 2025-05-03 DIAGNOSIS — E86.0 DEHYDRATION: ICD-10-CM

## 2025-05-03 LAB
ALBUMIN SERPL BCG-MCNC: 4.2 G/DL (ref 3.5–5.2)
ALP SERPL-CCNC: 109 U/L (ref 40–150)
ALT SERPL W P-5'-P-CCNC: 5 U/L (ref 0–70)
ANION GAP SERPL CALCULATED.3IONS-SCNC: 16 MMOL/L (ref 7–15)
AST SERPL W P-5'-P-CCNC: 17 U/L (ref 0–45)
BASOPHILS # BLD AUTO: 0 10E3/UL (ref 0–0.2)
BASOPHILS NFR BLD AUTO: 0 %
BILIRUB SERPL-MCNC: 0.3 MG/DL
BUN SERPL-MCNC: 37.9 MG/DL (ref 8–23)
CALCIUM SERPL-MCNC: 9.6 MG/DL (ref 8.8–10.4)
CHLORIDE SERPL-SCNC: 102 MMOL/L (ref 98–107)
CREAT SERPL-MCNC: 0.95 MG/DL (ref 0.67–1.17)
EGFRCR SERPLBLD CKD-EPI 2021: 77 ML/MIN/1.73M2
EOSINOPHIL # BLD AUTO: 0.1 10E3/UL (ref 0–0.7)
EOSINOPHIL NFR BLD AUTO: 1 %
ERYTHROCYTE [DISTWIDTH] IN BLOOD BY AUTOMATED COUNT: 16.9 % (ref 10–15)
FLUAV RNA SPEC QL NAA+PROBE: NEGATIVE
FLUBV RNA RESP QL NAA+PROBE: NEGATIVE
GLUCOSE BLDC GLUCOMTR-MCNC: 164 MG/DL (ref 70–99)
GLUCOSE SERPL-MCNC: 158 MG/DL (ref 70–99)
HCO3 SERPL-SCNC: 20 MMOL/L (ref 22–29)
HCT VFR BLD AUTO: 31.9 % (ref 40–53)
HGB BLD-MCNC: 10.4 G/DL (ref 13.3–17.7)
HOLD SPECIMEN: NORMAL
HOLD SPECIMEN: NORMAL
IMM GRANULOCYTES # BLD: 0 10E3/UL
IMM GRANULOCYTES NFR BLD: 0 %
LACTATE SERPL-SCNC: 6.1 MMOL/L (ref 0.7–2)
LYMPHOCYTES # BLD AUTO: 1.3 10E3/UL (ref 0.8–5.3)
LYMPHOCYTES NFR BLD AUTO: 13 %
MCH RBC QN AUTO: 24.8 PG (ref 26.5–33)
MCHC RBC AUTO-ENTMCNC: 32.6 G/DL (ref 31.5–36.5)
MCV RBC AUTO: 76 FL (ref 78–100)
MONOCYTES # BLD AUTO: 0.8 10E3/UL (ref 0–1.3)
MONOCYTES NFR BLD AUTO: 8 %
NEUTROPHILS # BLD AUTO: 7.9 10E3/UL (ref 1.6–8.3)
NEUTROPHILS NFR BLD AUTO: 78 %
NRBC # BLD AUTO: 0 10E3/UL
NRBC BLD AUTO-RTO: 0 /100
NT-PROBNP SERPL-MCNC: 655 PG/ML (ref 0–1800)
PLATELET # BLD AUTO: 173 10E3/UL (ref 150–450)
POTASSIUM SERPL-SCNC: 4 MMOL/L (ref 3.4–5.3)
PROCALCITONIN SERPL IA-MCNC: 0.27 NG/ML
PROT SERPL-MCNC: 7 G/DL (ref 6.4–8.3)
RBC # BLD AUTO: 4.19 10E6/UL (ref 4.4–5.9)
RSV RNA SPEC NAA+PROBE: NEGATIVE
SARS-COV-2 RNA RESP QL NAA+PROBE: NEGATIVE
SODIUM SERPL-SCNC: 138 MMOL/L (ref 135–145)
TROPONIN T SERPL HS-MCNC: 26 NG/L
TROPONIN T SERPL HS-MCNC: 27 NG/L
WBC # BLD AUTO: 10.2 10E3/UL (ref 4–11)

## 2025-05-03 PROCEDURE — 83605 ASSAY OF LACTIC ACID: CPT | Performed by: EMERGENCY MEDICINE

## 2025-05-03 PROCEDURE — 83880 ASSAY OF NATRIURETIC PEPTIDE: CPT | Performed by: EMERGENCY MEDICINE

## 2025-05-03 PROCEDURE — 85025 COMPLETE CBC W/AUTO DIFF WBC: CPT | Performed by: EMERGENCY MEDICINE

## 2025-05-03 PROCEDURE — 84484 ASSAY OF TROPONIN QUANT: CPT | Performed by: EMERGENCY MEDICINE

## 2025-05-03 PROCEDURE — 120N000001 HC R&B MED SURG/OB

## 2025-05-03 PROCEDURE — 258N000003 HC RX IP 258 OP 636: Performed by: EMERGENCY MEDICINE

## 2025-05-03 PROCEDURE — 93005 ELECTROCARDIOGRAM TRACING: CPT

## 2025-05-03 PROCEDURE — 99285 EMERGENCY DEPT VISIT HI MDM: CPT | Mod: 25

## 2025-05-03 PROCEDURE — 71046 X-RAY EXAM CHEST 2 VIEWS: CPT

## 2025-05-03 PROCEDURE — 36415 COLL VENOUS BLD VENIPUNCTURE: CPT | Performed by: EMERGENCY MEDICINE

## 2025-05-03 PROCEDURE — 87637 SARSCOV2&INF A&B&RSV AMP PRB: CPT | Performed by: EMERGENCY MEDICINE

## 2025-05-03 PROCEDURE — 74176 CT ABD & PELVIS W/O CONTRAST: CPT

## 2025-05-03 PROCEDURE — 87040 BLOOD CULTURE FOR BACTERIA: CPT | Performed by: EMERGENCY MEDICINE

## 2025-05-03 PROCEDURE — 99222 1ST HOSP IP/OBS MODERATE 55: CPT | Mod: AI | Performed by: HOSPITALIST

## 2025-05-03 PROCEDURE — 84145 PROCALCITONIN (PCT): CPT | Performed by: EMERGENCY MEDICINE

## 2025-05-03 PROCEDURE — 82310 ASSAY OF CALCIUM: CPT | Performed by: EMERGENCY MEDICINE

## 2025-05-03 PROCEDURE — 82962 GLUCOSE BLOOD TEST: CPT

## 2025-05-03 PROCEDURE — 250N000011 HC RX IP 250 OP 636: Performed by: EMERGENCY MEDICINE

## 2025-05-03 PROCEDURE — 250N000009 HC RX 250: Performed by: EMERGENCY MEDICINE

## 2025-05-03 RX ORDER — CEFTRIAXONE 2 G/1
2 INJECTION, POWDER, FOR SOLUTION INTRAMUSCULAR; INTRAVENOUS ONCE
Status: COMPLETED | OUTPATIENT
Start: 2025-05-03 | End: 2025-05-04

## 2025-05-03 RX ORDER — AZITHROMYCIN 500 MG/5ML
500 INJECTION, POWDER, LYOPHILIZED, FOR SOLUTION INTRAVENOUS ONCE
Status: COMPLETED | OUTPATIENT
Start: 2025-05-03 | End: 2025-05-04

## 2025-05-03 RX ORDER — ALBUTEROL SULFATE 0.83 MG/ML
2.5 SOLUTION RESPIRATORY (INHALATION)
Status: COMPLETED | OUTPATIENT
Start: 2025-05-03 | End: 2025-05-03

## 2025-05-03 RX ADMIN — CEFTRIAXONE 2 G: 2 INJECTION, POWDER, FOR SOLUTION INTRAMUSCULAR; INTRAVENOUS at 23:55

## 2025-05-03 RX ADMIN — ALBUTEROL SULFATE 2.5 MG: 2.5 SOLUTION RESPIRATORY (INHALATION) at 21:59

## 2025-05-03 RX ADMIN — SODIUM CHLORIDE 500 ML: 0.9 INJECTION, SOLUTION INTRAVENOUS at 21:40

## 2025-05-03 ASSESSMENT — COLUMBIA-SUICIDE SEVERITY RATING SCALE - C-SSRS
2. HAVE YOU ACTUALLY HAD ANY THOUGHTS OF KILLING YOURSELF IN THE PAST MONTH?: NO
6. HAVE YOU EVER DONE ANYTHING, STARTED TO DO ANYTHING, OR PREPARED TO DO ANYTHING TO END YOUR LIFE?: NO
1. IN THE PAST MONTH, HAVE YOU WISHED YOU WERE DEAD OR WISHED YOU COULD GO TO SLEEP AND NOT WAKE UP?: NO

## 2025-05-03 ASSESSMENT — ACTIVITIES OF DAILY LIVING (ADL)
ADLS_ACUITY_SCORE: 56

## 2025-05-04 LAB
ANION GAP SERPL CALCULATED.3IONS-SCNC: 10 MMOL/L (ref 7–15)
BUN SERPL-MCNC: 33.2 MG/DL (ref 8–23)
CALCIUM SERPL-MCNC: 8.9 MG/DL (ref 8.8–10.4)
CHLORIDE SERPL-SCNC: 105 MMOL/L (ref 98–107)
CREAT SERPL-MCNC: 0.91 MG/DL (ref 0.67–1.17)
EGFRCR SERPLBLD CKD-EPI 2021: 81 ML/MIN/1.73M2
ERYTHROCYTE [DISTWIDTH] IN BLOOD BY AUTOMATED COUNT: 16.9 % (ref 10–15)
GLUCOSE BLDC GLUCOMTR-MCNC: 120 MG/DL (ref 70–99)
GLUCOSE BLDC GLUCOMTR-MCNC: 126 MG/DL (ref 70–99)
GLUCOSE BLDC GLUCOMTR-MCNC: 161 MG/DL (ref 70–99)
GLUCOSE BLDC GLUCOMTR-MCNC: 165 MG/DL (ref 70–99)
GLUCOSE BLDC GLUCOMTR-MCNC: 174 MG/DL (ref 70–99)
GLUCOSE SERPL-MCNC: 114 MG/DL (ref 70–99)
HCO3 SERPL-SCNC: 24 MMOL/L (ref 22–29)
HCT VFR BLD AUTO: 28.7 % (ref 40–53)
HGB BLD-MCNC: 9.4 G/DL (ref 13.3–17.7)
LACTATE SERPL-SCNC: 1.1 MMOL/L (ref 0.7–2)
LACTATE SERPL-SCNC: 3.4 MMOL/L (ref 0.7–2)
MCH RBC QN AUTO: 24.7 PG (ref 26.5–33)
MCHC RBC AUTO-ENTMCNC: 32.8 G/DL (ref 31.5–36.5)
MCV RBC AUTO: 75 FL (ref 78–100)
PLATELET # BLD AUTO: 139 10E3/UL (ref 150–450)
POTASSIUM SERPL-SCNC: 3.8 MMOL/L (ref 3.4–5.3)
RBC # BLD AUTO: 3.81 10E6/UL (ref 4.4–5.9)
SODIUM SERPL-SCNC: 139 MMOL/L (ref 135–145)
WBC # BLD AUTO: 6.5 10E3/UL (ref 4–11)

## 2025-05-04 PROCEDURE — 120N000001 HC R&B MED SURG/OB

## 2025-05-04 PROCEDURE — 250N000013 HC RX MED GY IP 250 OP 250 PS 637: Performed by: HOSPITALIST

## 2025-05-04 PROCEDURE — 250N000011 HC RX IP 250 OP 636: Performed by: EMERGENCY MEDICINE

## 2025-05-04 PROCEDURE — 258N000003 HC RX IP 258 OP 636: Performed by: HOSPITALIST

## 2025-05-04 PROCEDURE — 36415 COLL VENOUS BLD VENIPUNCTURE: CPT | Performed by: HOSPITALIST

## 2025-05-04 PROCEDURE — 83605 ASSAY OF LACTIC ACID: CPT | Performed by: HOSPITALIST

## 2025-05-04 PROCEDURE — 85014 HEMATOCRIT: CPT | Performed by: HOSPITALIST

## 2025-05-04 PROCEDURE — 80048 BASIC METABOLIC PNL TOTAL CA: CPT | Performed by: HOSPITALIST

## 2025-05-04 PROCEDURE — 258N000003 HC RX IP 258 OP 636: Performed by: EMERGENCY MEDICINE

## 2025-05-04 PROCEDURE — 99232 SBSQ HOSP IP/OBS MODERATE 35: CPT | Performed by: INTERNAL MEDICINE

## 2025-05-04 PROCEDURE — 250N000011 HC RX IP 250 OP 636: Performed by: HOSPITALIST

## 2025-05-04 RX ORDER — CARBIDOPA AND LEVODOPA 25; 100 MG/1; MG/1
2 TABLET ORAL 3 TIMES DAILY
Status: DISCONTINUED | OUTPATIENT
Start: 2025-05-04 | End: 2025-05-07 | Stop reason: HOSPADM

## 2025-05-04 RX ORDER — LACTULOSE 10 G/15ML
30 SOLUTION ORAL DAILY PRN
Status: DISCONTINUED | OUTPATIENT
Start: 2025-05-04 | End: 2025-05-07 | Stop reason: HOSPADM

## 2025-05-04 RX ORDER — PANTOPRAZOLE SODIUM 40 MG/1
40 TABLET, DELAYED RELEASE ORAL 2 TIMES DAILY
Status: DISCONTINUED | OUTPATIENT
Start: 2025-05-04 | End: 2025-05-05 | Stop reason: ALTCHOICE

## 2025-05-04 RX ORDER — GABAPENTIN 300 MG/1
300 CAPSULE ORAL AT BEDTIME
Status: DISCONTINUED | OUTPATIENT
Start: 2025-05-04 | End: 2025-05-04

## 2025-05-04 RX ORDER — LIDOCAINE 40 MG/G
CREAM TOPICAL
Status: DISCONTINUED | OUTPATIENT
Start: 2025-05-04 | End: 2025-05-07 | Stop reason: HOSPADM

## 2025-05-04 RX ORDER — DULOXETIN HYDROCHLORIDE 30 MG/1
60 CAPSULE, DELAYED RELEASE ORAL DAILY
Status: DISCONTINUED | OUTPATIENT
Start: 2025-05-04 | End: 2025-05-05 | Stop reason: ALTCHOICE

## 2025-05-04 RX ORDER — SENNOSIDES 8.6 MG
1 TABLET ORAL 2 TIMES DAILY
COMMUNITY

## 2025-05-04 RX ORDER — MIRTAZAPINE 7.5 MG/1
7.5 TABLET, FILM COATED ORAL AT BEDTIME
Status: DISCONTINUED | OUTPATIENT
Start: 2025-05-04 | End: 2025-05-07 | Stop reason: HOSPADM

## 2025-05-04 RX ORDER — LIDOCAINE 4 G/G
1 PATCH TOPICAL EVERY 24 HOURS
Status: DISCONTINUED | OUTPATIENT
Start: 2025-05-04 | End: 2025-05-04

## 2025-05-04 RX ORDER — ATORVASTATIN CALCIUM 10 MG/1
10 TABLET, FILM COATED ORAL EVERY EVENING
Status: DISCONTINUED | OUTPATIENT
Start: 2025-05-04 | End: 2025-05-07 | Stop reason: HOSPADM

## 2025-05-04 RX ORDER — LACTULOSE 10 G/15ML
20 SOLUTION ORAL 2 TIMES DAILY
COMMUNITY

## 2025-05-04 RX ORDER — GABAPENTIN 300 MG/1
600 CAPSULE ORAL EVERY MORNING
Status: DISCONTINUED | OUTPATIENT
Start: 2025-05-04 | End: 2025-05-05 | Stop reason: ALTCHOICE

## 2025-05-04 RX ORDER — AMLODIPINE BESYLATE 5 MG/1
5 TABLET ORAL DAILY
Status: DISCONTINUED | OUTPATIENT
Start: 2025-05-04 | End: 2025-05-07 | Stop reason: HOSPADM

## 2025-05-04 RX ORDER — LIDOCAINE 4 G/G
1 PATCH TOPICAL EVERY 24 HOURS
Status: DISCONTINUED | OUTPATIENT
Start: 2025-05-04 | End: 2025-05-07 | Stop reason: HOSPADM

## 2025-05-04 RX ORDER — SODIUM CHLORIDE 9 MG/ML
INJECTION, SOLUTION INTRAVENOUS CONTINUOUS
Status: DISCONTINUED | OUTPATIENT
Start: 2025-05-04 | End: 2025-05-07 | Stop reason: HOSPADM

## 2025-05-04 RX ORDER — SENNOSIDES 8.6 MG
1 TABLET ORAL 2 TIMES DAILY
Status: DISCONTINUED | OUTPATIENT
Start: 2025-05-04 | End: 2025-05-05 | Stop reason: ALTCHOICE

## 2025-05-04 RX ORDER — SERTRALINE HYDROCHLORIDE 100 MG/1
100 TABLET, FILM COATED ORAL EVERY MORNING
Status: DISCONTINUED | OUTPATIENT
Start: 2025-05-04 | End: 2025-05-05 | Stop reason: ALTCHOICE

## 2025-05-04 RX ORDER — LIDOCAINE 4 G/G
1 PATCH TOPICAL EVERY 24 HOURS
COMMUNITY

## 2025-05-04 RX ORDER — CEFTRIAXONE 1 G/1
1 INJECTION, POWDER, FOR SOLUTION INTRAMUSCULAR; INTRAVENOUS EVERY 24 HOURS
Status: DISCONTINUED | OUTPATIENT
Start: 2025-05-05 | End: 2025-05-06

## 2025-05-04 RX ORDER — ACETAMINOPHEN 500 MG
1000 TABLET ORAL 3 TIMES DAILY PRN
Status: DISCONTINUED | OUTPATIENT
Start: 2025-05-04 | End: 2025-05-05 | Stop reason: ALTCHOICE

## 2025-05-04 RX ORDER — LACTULOSE 10 G/15ML
20 SOLUTION ORAL 2 TIMES DAILY
Status: DISCONTINUED | OUTPATIENT
Start: 2025-05-04 | End: 2025-05-07 | Stop reason: HOSPADM

## 2025-05-04 RX ORDER — GABAPENTIN 400 MG/1
1200 CAPSULE ORAL EVERY EVENING
Status: DISCONTINUED | OUTPATIENT
Start: 2025-05-04 | End: 2025-05-05 | Stop reason: ALTCHOICE

## 2025-05-04 RX ORDER — DEXTROSE MONOHYDRATE 25 G/50ML
25-50 INJECTION, SOLUTION INTRAVENOUS
Status: DISCONTINUED | OUTPATIENT
Start: 2025-05-04 | End: 2025-05-07 | Stop reason: HOSPADM

## 2025-05-04 RX ORDER — ONDANSETRON 4 MG/1
4 TABLET, ORALLY DISINTEGRATING ORAL EVERY 8 HOURS PRN
Status: DISCONTINUED | OUTPATIENT
Start: 2025-05-04 | End: 2025-05-07 | Stop reason: HOSPADM

## 2025-05-04 RX ORDER — NICOTINE POLACRILEX 4 MG
15-30 LOZENGE BUCCAL
Status: DISCONTINUED | OUTPATIENT
Start: 2025-05-04 | End: 2025-05-07 | Stop reason: HOSPADM

## 2025-05-04 RX ORDER — GABAPENTIN 300 MG/1
600 CAPSULE ORAL EVERY MORNING
COMMUNITY

## 2025-05-04 RX ORDER — CARBOXYMETHYLCELLULOSE SODIUM 5 MG/ML
1 SOLUTION/ DROPS OPHTHALMIC 3 TIMES DAILY
Status: DISCONTINUED | OUTPATIENT
Start: 2025-05-04 | End: 2025-05-07 | Stop reason: HOSPADM

## 2025-05-04 RX ADMIN — CARBIDOPA AND LEVODOPA 2 TABLET: 25; 100 TABLET ORAL at 20:45

## 2025-05-04 RX ADMIN — SERTRALINE HYDROCHLORIDE 100 MG: 100 TABLET ORAL at 11:33

## 2025-05-04 RX ADMIN — SODIUM CHLORIDE: 0.9 INJECTION, SOLUTION INTRAVENOUS at 01:19

## 2025-05-04 RX ADMIN — CARBIDOPA AND LEVODOPA 2 TABLET: 25; 100 TABLET ORAL at 16:48

## 2025-05-04 RX ADMIN — MIRTAZAPINE 7.5 MG: 7.5 TABLET, FILM COATED ORAL at 22:20

## 2025-05-04 RX ADMIN — DICLOFENAC SODIUM 4 G: 10 GEL TOPICAL at 20:57

## 2025-05-04 RX ADMIN — SENNOSIDES 1 TABLET: 8.6 TABLET, FILM COATED ORAL at 11:33

## 2025-05-04 RX ADMIN — LACTULOSE 20 G: 20 SOLUTION ORAL at 20:44

## 2025-05-04 RX ADMIN — PANTOPRAZOLE SODIUM 40 MG: 40 TABLET, DELAYED RELEASE ORAL at 20:45

## 2025-05-04 RX ADMIN — CARBOXYMETHYLCELLULOSE SODIUM 1 DROP: 5 SOLUTION/ DROPS OPHTHALMIC at 20:45

## 2025-05-04 RX ADMIN — LACTULOSE 20 G: 20 SOLUTION ORAL at 11:33

## 2025-05-04 RX ADMIN — LINACLOTIDE 72 MCG: 72 CAPSULE, GELATIN COATED ORAL at 11:33

## 2025-05-04 RX ADMIN — CEFTRIAXONE 1 G: 1 INJECTION, POWDER, FOR SOLUTION INTRAMUSCULAR; INTRAVENOUS at 23:29

## 2025-05-04 RX ADMIN — DULOXETINE 60 MG: 30 CAPSULE, DELAYED RELEASE ORAL at 11:08

## 2025-05-04 RX ADMIN — AZITHROMYCIN MONOHYDRATE 500 MG: 500 INJECTION, POWDER, LYOPHILIZED, FOR SOLUTION INTRAVENOUS at 00:07

## 2025-05-04 RX ADMIN — PSYLLIUM HUSK 1 PACKET: 3.4 POWDER ORAL at 11:08

## 2025-05-04 RX ADMIN — AMLODIPINE BESYLATE 5 MG: 5 TABLET ORAL at 11:08

## 2025-05-04 RX ADMIN — SENNOSIDES 1 TABLET: 8.6 TABLET, FILM COATED ORAL at 20:45

## 2025-05-04 RX ADMIN — ATORVASTATIN CALCIUM 10 MG: 10 TABLET, FILM COATED ORAL at 20:45

## 2025-05-04 RX ADMIN — CARBIDOPA AND LEVODOPA 2 TABLET: 25; 100 TABLET ORAL at 11:07

## 2025-05-04 RX ADMIN — GABAPENTIN 1200 MG: 400 CAPSULE ORAL at 20:44

## 2025-05-04 RX ADMIN — GABAPENTIN 600 MG: 300 CAPSULE ORAL at 11:33

## 2025-05-04 RX ADMIN — LIDOCAINE 1 PATCH: 4 PATCH TOPICAL at 11:08

## 2025-05-04 RX ADMIN — ONDANSETRON 4 MG: 4 TABLET, ORALLY DISINTEGRATING ORAL at 16:51

## 2025-05-04 ASSESSMENT — ACTIVITIES OF DAILY LIVING (ADL)
ADLS_ACUITY_SCORE: 63
ADLS_ACUITY_SCORE: 60
ADLS_ACUITY_SCORE: 54
ADLS_ACUITY_SCORE: 63
ADLS_ACUITY_SCORE: 60
ADLS_ACUITY_SCORE: 63
ADLS_ACUITY_SCORE: 67
ADLS_ACUITY_SCORE: 63
DEPENDENT_IADLS:: CLEANING;COOKING;LAUNDRY;SHOPPING;MEAL PREPARATION;MEDICATION MANAGEMENT;MONEY MANAGEMENT;TRANSPORTATION
ADLS_ACUITY_SCORE: 63
ADLS_ACUITY_SCORE: 56
ADLS_ACUITY_SCORE: 63
ADLS_ACUITY_SCORE: 63
ADLS_ACUITY_SCORE: 60
ADLS_ACUITY_SCORE: 63
ADLS_ACUITY_SCORE: 60
ADLS_ACUITY_SCORE: 63
ADLS_ACUITY_SCORE: 63
ADLS_ACUITY_SCORE: 56
ADLS_ACUITY_SCORE: 63

## 2025-05-04 NOTE — CONSULTS
"Gastroenterology Consultation    Patient: Cresencio Peguero   1935  Date of Consult:  5/4/2025  Admission Date/Time: 5/3/2025  7:58 PM  Primary Care Provider:  Jossue Dueñas Physician    I was asked to see this patient in consultation by Dr. Suresh Tobar for evaluation of dysphagia/aspiration pneumonia.    HPI:  Cresencio Peguero is a 89 year old male with PMH CAD s/p stent, CABG, parkinson's disease, T2DM, neuropathy, chronic pain with intrathecal pump, HTN, who is admitted with pneumonia and \"all over\" pain.     Patient tells me that the reason he came to the hospital was pain which is all over his body. He also reported a cough and has imaging findings of pneumonia. He has had chronic dysphagia symptoms for some time - he says that when he swallows pills he feels they get stuck in the neck (points to neck). He is unsure that the most recent dilation was helpful - he says maybe for a day or two.     Dysphagia history per SLP note:   - Video swallow study completed at [facility] in 2023 indicating no aspiration but with cervical esophageal stasis above an osteophyte.       - Upper GI endo in 5/2023 after video swallow study reiterated the same concern, noting food impaction above an area of known osteophyte impingement.  No other stricture or stenosis noted in upper 2/3 esophagus.      - SLP swallow eval during 5/2024 admission: soft/bite sized w tih thin lqiuids, strategies.      - Most recent EGD 1/16/2025: \"1. Multiple benign-appearing widely patent esophageal stenoses in distal esophagus. Balloon dilation to 18 mm. Biopsied from mid and distal  esophagus. \"    Workup:  VFSS 4/22/25  There is laryngeal penetration with thin liquid barium. No aspiration  with liquid barium. No evidence for aspiration with nectar thickened  barium, pudding consistency, or barium coated cracker. At the level of  C5-6 there is a large anterior osteophyte. 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 Multiple dry swallows  and an attempt with liquid barium were unsuccessful to allow passage  of the barium tablet, which remained fixed in this location. The exam  was ended at this point.  Impression:  1. Laryngeal penetration with liquid barium. No aspiration with any  consistency.   2. Focal narrowing of the esophagus just inferior to a large C5-6  osteophyte, suspicious for an esophageal web. Barium tablet was unable  to pass through this stenotic area. Consider endoscopy for further  evaluation.  3. Please see the speech pathologist report for further details.    CT A/P:  MPRESSION:   1.  Left lower lobe pneumonia.  2.  Trabeculation of the urinary bladder wall without significant change. No hydronephrosis.  3.  No bowel obstruction or inflammation.    EGD 1/2025:  Impression:               1. Multiple benign-appearing widely patent                             esophageal stenoses in distal esophagus. Balloon                             dilation to 18 mm. Biopsied from mid and distal                             esophagus.                             2. Diffuse erythematous mucosa in the stomach.                             Biopsied.                             3. Normal examined duodenum.     PAST MEDICAL HISTORY:  Past Medical History:   Diagnosis Date    Anxiety     Arthritis     CAD (coronary artery disease)     HX of stent,  CABG x2    Diabetes (H)     Hearing loss     Hyperlipidemia     Hypertension     Neuropathy     Sensory ataxia     Stented coronary artery        PAST SURGICAL HISTORY:  Past Surgical History:   Procedure Laterality Date    BACK SURGERY      cervical laminectomy    BACK SURGERY      lumbar laminectomy    CARDIAC SURGERY      Stent,  CABGx2    ESOPHAGOSCOPY, GASTROSCOPY, DUODENOSCOPY (EGD), COMBINED N/A 5/5/2023    Procedure: Esophagoscopy, gastroscopy,  duodenoscopy (EGD), combined;  Surgeon: Jeovanny Rizo MD;  Location:  GI    ESOPHAGOSCOPY, GASTROSCOPY, DUODENOSCOPY (EGD), DILATATION, COMBINED N/A 1/16/2025    Procedure: ESOPHAGOGASTRODUODENOSCOPY DILATATION and biopsies;  Surgeon: Benedict Patricia MD;  Location:  OR    EYE SURGERY      cataract    EYE SURGERY      corneal surgery    HERNIORRHAPHY INGUINAL Right 8/15/2016    Procedure: HERNIORRHAPHY INGUINAL;  Surgeon: Wu Adam MD;  Location: [facility] OR    LAPAROSCOPIC LYSIS ADHESIONS N/A 7/7/2017    Procedure: LAPAROSCOPIC LYSIS ADHESIONS;;  Surgeon: Sumeet Joiner MD;  Location: [facility] OR    LAPAROSCOPY DIAGNOSTIC (GENERAL) N/A 7/7/2017    Procedure: LAPAROSCOPY DIAGNOSTIC (GENERAL);  DIAGNOSTIC LAPAROSCOPY WITH LYSIS OF ADHESIONS ;  Surgeon: Sumeet Joiner MD;  Location:  OR    ORTHOPEDIC SURGERY      rotator cuff repair,shoulder arthroplasty    REVERSE ARTHROPLASTY SHOULDER Right 8/12/2020    Procedure: RIGHT REVERSE TOTAL SHOULDER ARTHROPLASTY;  Surgeon: Adarsh Mello MD;  Location:  OR    TONSILLECTOMY         MEDICATIONS:   :   Prior to Admission Medications   Prescriptions Last Dose Informant Patient Reported? Taking?   DULoxetine (CYMBALTA) 60 MG capsule   Yes No   Sig: Take 60 mg by mouth daily. Every morning after a meal   JARDIANCE 25 MG TABS tablet   Yes No   Sig: Take 25 mg by mouth daily.   Lidocaine (LIDOCARE) 4 % Patch   No No   Sig: Place 1 patch onto the skin every 24 hours To prevent lidocaine toxicity, patient should be patch free for 12 hrs daily.   Patient taking differently: Place 1 patch onto the skin every 24 hours. Leave on for 8 hours (placed at 11:20 AM)    To prevent lidocaine toxicity, patient should be patch free for 12 hrs daily.   acetaminophen (TYLENOL) 500 MG tablet   Yes No   Sig: Take 1,000 mg by mouth 3 times daily as needed for pain.   amLODIPine (NORVASC) 5 MG tablet   No No   Sig: Take 1 tablet (5 mg) by mouth daily.  "  artificial saliva (BIOTENE MT) AERS spray   No No   Sig: Take 1 spray by mouth every 2 hours as needed for dry mouth   atorvastatin (LIPITOR) 10 MG tablet   Yes No   Sig: Take 10 mg by mouth every evening.   carbidopa-levodopa (SINEMET)  MG tablet   Yes No   Sig: Take 2 tablets by mouth 3 times daily. 11:20 AM, 3:20 PM, and 7:20 PM   carboxymethylcellulose (CARBOXYMETHYLCELLULOSE SODIUM) 0.5 % SOLN ophthalmic solution   Yes No   Sig: Place 1 drop into both eyes 3 times daily.   diclofenac (VOLTAREN) 1 % topical gel   No No   Sig: Apply 2 g topically 3 times daily   Patient taking differently: Apply topically 3 times daily. 2 g listed as dose but also \"apply 4 g topically\"  To lower back   gabapentin (NEURONTIN) 300 MG capsule   No No   Sig: Take 1 capsule (300 mg) by mouth at bedtime   Patient taking differently: Take 600 mg by mouth every morning.   gabapentin (NEURONTIN) 300 MG capsule   Yes No   Sig: Take 1,200 mg by mouth every evening. At 7:30 PM   lactulose (CHRONULAC) 10 GM/15ML solution   No No   Sig: Take 30 mLs (20 g) by mouth 2 times daily as needed for constipation   Patient taking differently: Take 20 g by mouth 2 times daily.   lactulose (CHRONULAC) 10 GM/15ML solution   Yes No   Sig: Take 30 mLs by mouth daily as needed for constipation.   linaclotide (LINZESS) 72 MCG capsule   Yes No   Sig: Take 72 mcg by mouth every morning.   magnesium hydroxide (MILK OF MAGNESIA) 400 MG/5ML suspension   Yes No   Sig: Take 30 mLs by mouth daily as needed for constipation.   medication given by implanted intrathecal pump   Yes No   Sig: continuously. Drug # 1: Fentanyl (Sublimaze) - Conc: 625 mcg/mL - Total Dose / 24 hours: 425 mcg    Drug # 2: Bupivacaine (Marcaine)  - Conc: 25 mg/mL - Total Dose / 24 hours: 17 mg    Drug # 3: morphine - Conc: 0.1 mg/mL - total dose / 24 hours: 0.068 mg    Complex continuous infusion of above    Flex Bolus dosing: 3 scheduled bolus doses per day containing fentanyl 39.94 " mcg, bupivacaine 1.598 mg, and morphine 0.58824 mg at 1300, 1700 and 2100 daily.     Outside Clinic & Provider: [facility] [phone]  Last Refill Date: 2/26/25  Alarm Date: 3/24/25  Next Refill Date: 3/20/2025     Pump : Medtronic Synchromed II pump     Please consider consulting the pain team if the patient is admitted with an IT pump.   metFORMIN (GLUCOPHAGE) 500 MG tablet   Yes No   Sig: Take 1,000 mg by mouth 2 times daily.   mirtazapine (REMERON) 7.5 MG tablet   Yes No   Sig: Take 7.5 mg by mouth at bedtime.   omeprazole (PRILOSEC) 40 MG DR capsule   Yes No   Sig: Take 40 mg by mouth every morning. At least 30-60 minutes before a meal   ondansetron (ZOFRAN ODT) 4 MG ODT tab   Yes No   Sig: Take 4 mg by mouth every 8 hours as needed for nausea.   psyllium (METAMUCIL/KONSYL) Packet   Yes No   Sig: Take 1 packet by mouth daily.   sennosides (SENOKOT) 8.6 MG tablet   No No   Sig: Take 1 tablet by mouth daily.   Patient taking differently: Take 1 tablet by mouth 2 times daily.   sertraline (ZOLOFT) 100 MG tablet  Self Yes No   Sig: Take 100 mg by mouth every morning      Facility-Administered Medications: None       ALLERGIES:   :   Allergies   Allergen Reactions    Hydrocodone Itching     Constipation    Aspirin Itching    Contrast Dye      LW CM1: >>> NO CONTRAST ADVERSE REACTION <<<     Reaction :    Food      LW FI1: nka LW FI2: nka    Lisinopril Other (See Comments) and GI Disturbance     constipation    Oxycodone Itching    Percodan [Oxycodone-Aspirin] Rash       SOCIAL HISTORY:  Social History     Tobacco Use    Smoking status: Former     Types: Pipe     Quit date: 6/1/2021     Years since quitting: 3.9   Substance Use Topics    Alcohol use: Yes     Alcohol/week: 0.0 standard drinks of alcohol     Comment: 7 glasses wine per week    Drug use: Never       FAMILY HISTORY:  No first degree relative with colon cancer, gastric cancer, crohn's disease, ulcerative colitis, or liver disease.      "EXAM:  General Appearance: Alert, oriented x3, no acute distress.  BP (!) 153/64 (BP Location: Left arm)   Pulse 72   Temp 98.6  F (37  C) (Oral)   Resp 20   Ht 1.803 m (5' 11\")   Wt 82.1 kg (180 lb 14.4 oz)   SpO2 (!) 91%   BMI 25.23 kg/m    Eye: sclera anicteric.  ENT: oropharynx clear, no thrush.  CV: RRR.  Resp: CTA bilaterally.  GI: Soft, NABS, NT/ND.  Musculoskeletal: no joint swelling, erythema, or warmth.  Neuro: no asterixis.      Labs and imaging reviewed    Recent Labs   Lab Test 05/04/25  0736 05/03/25 2035 03/28/25  1111   WBC 6.5 10.2 7.6   HGB 9.4* 10.4* 11.2*   MCV 75* 76* 79   * 173 177     Recent Labs   Lab Test 05/03/25 2035 03/28/25  1111 03/03/25  0825   POTASSIUM 4.0 3.9 3.5   CHLORIDE 102 101 108*   CO2 20* 24 22   BUN 37.9* 32.8* 14.1   ANIONGAP 16* 14 11     Recent Labs   Lab Test 05/03/25 2035 02/28/25  1107 02/28/25  1101 06/27/24  1600 06/12/24  1543 05/19/24  1931 05/19/24  1816 07/07/17  0504 07/07/17  0456   ALBUMIN 4.2  --  4.2  --   --   --  3.8  --  4.2   BILITOTAL 0.3  --  0.4  --   --   --  0.3  --  0.5   ALT 5  --  20  --   --   --  <5  --  22   AST 17  --  36  --   --   --  19  --  20   PROTEIN  --  10*  --  20* Negative   < >  --    < >  --    LIPASE  --   --   --   --   --   --   --   --  77    < > = values in this interval not displayed.       ASSESSMENT:  Cresencio Peguero is a 89 year old male with PMH CAD s/p stent, CABG, parkinson's disease, T2DM, neuropathy, chronic pain with intrathecal pump, HTN, who is admitted with pneumonia and \"all over\" pain. We are consulted regarding dysphagia/aspiration pneumonia.    Dysphagia - I suspect that the anterior osteophyte as well as oropharyngeal dysphagia generally are the source of his symptoms.. Aspiration is more commonly due to oropharyngeal dysphagia or proximal esophageal problems than a mild lower esophageal stricture. Unfortunately I would be unable to help with osteophyte problem -- we could consider a " Savary dilation though not at this hospital.   Lower esophageal stenoses - he recently had the lower esophagus balloon dilated to 18mm which is the high we typically go.. given recent dilation and no major improvement I doubt that dilating this again is going to be helpful   Pneumonia - See above regarding  Notably he is 91% on RA today with a LLL pnuemonia on CT    RECOMMENDATIONS:  - I discussed all the above with the patient today - I don't think the risk vs benefit profile is favorable right now. Based on how his eating goes and recovery from pneumonia we can consider endoscopy though it may not be as helpful  - We can also consider setting him up for an EGD with a Savary dilation at another facility in the future to try to address any upper esophageal stenoses      45 minutes of total time was spent providing patient care, including patient evaluation, reviewing documentation/test results, and .          Adarsh Hobson MD  Thank you for the opportunity to participate in the care of this patient.   Please feel free to call with any questions or concerns.  [phone].       CC: [facility], [facility] Physician

## 2025-05-04 NOTE — ED NOTES
Bed: ED36  Expected date: 5/3/25  Expected time: 7:55 PM  Means of arrival: Ambulance  Comments:  Tashi 525 89M SOB

## 2025-05-04 NOTE — PLAN OF CARE
"A&Ox4, VSS on RA, up Ax1 w/ walker GB. C/o moderate pain to feet/legs but declined med intervention. Denies nausea or sob. On clears. PIV infusing NS @100. BG was 126. Bear hugger on for warmth. GI consulted.    Problem: Adult Inpatient Plan of Care  Goal: Plan of Care Review  Description: The Plan of Care Review/Shift note should be completed every shift.  The Outcome Evaluation is a brief statement about your assessment that the patient is improving, declining, or no change.  This information will be displayed automatically on your shiftnote.  Outcome: Progressing  Flowsheets (Taken 5/4/2025 0537)  Outcome Evaluation: Admitted to unit, IVF & IV abx, VSS on RA,   Plan of Care Reviewed With: patient  Overall Patient Progress: no change  Goal: Patient-Specific Goal (Individualized)  Description: You can add care plan individualizations to a care plan. Examples of Individualization might be:  \"Parent requests to be called daily at 9am for status\", \"I have a hard time hearing out of my right ear\", or \"Do not touch me to wake me up as it startlesme\".  Outcome: Progressing  Goal: Absence of Hospital-Acquired Illness or Injury  Outcome: Progressing  Intervention: Identify and Manage Fall Risk  Recent Flowsheet Documentation  Taken 5/4/2025 0044 by Karma Haider RN  Safety Promotion/Fall Prevention:   safety round/check completed   activity supervised   assistive device/personal items within reach   clutter free environment maintained   increased rounding and observation   lighting adjusted   nonskid shoes/slippers when out of bed   patient and family education   room near nurse's station  Intervention: Prevent and Manage VTE (Venous Thromboembolism) Risk  Recent Flowsheet Documentation  Taken 5/4/2025 0044 by Karma Haider RN  VTE Prevention/Management: SCDs off (sequential compression devices)  Intervention: Prevent Infection  Recent Flowsheet Documentation  Taken 5/4/2025 0044 by Karma Haider, RN  Infection " Prevention:   cohorting utilized   rest/sleep promoted   single patient room provided  Goal: Optimal Comfort and Wellbeing  Outcome: Progressing  Intervention: Monitor Pain and Promote Comfort  Recent Flowsheet Documentation  Taken 5/4/2025 0044 by Karma Haider, RN  Pain Management Interventions:   rest   medication offered but refused  Intervention: Provide Person-Centered Care  Recent Flowsheet Documentation  Taken 5/4/2025 0044 by Karma Haider, RN  Trust Relationship/Rapport:   care explained   questions answered  Goal: Readiness for Transition of Care  Outcome: Progressing  Intervention: Mutually Develop Transition Plan  Recent Flowsheet Documentation  Taken 5/4/2025 0100 by Karma Haider, RN  Equipment Currently Used at Home:   walker, rolling   wheelchair, manual   Goal Outcome Evaluation:      Plan of Care Reviewed With: patient    Overall Patient Progress: no changeOverall Patient Progress: no change    Outcome Evaluation: Admitted to unit, IVF & IV abx, VSS on RA,

## 2025-05-04 NOTE — ED PROVIDER NOTES
History     Chief Complaint:  Shortness of Breath       HPI   Cresencio Peguero is a 89 year old male in assisted living here with son cough of 1 week decreased oral intake no fever was concerned because a home O2 pulse ox read 85% it was 96 to 97% here.  He feels aches all over his body.  No chest pain does feel some shortness of breath and phlegm.  No abdominal pain nausea vomiting intermittent chronic diarrhea no blood or bile.  No leg swelling or edema      Independent Historian:    Son    Review of External Notes:         Expand All Collapse [facility]     Discharge Summary  Hospitalist     Date of Admission:  2/28/2025  Date of Discharge:  3/4/2025  Discharging Provider: Jennifer Alcocer MD        Discharge Diagnoses  Multi focal pneumonia        History of Present Illness  Please review admission history and physical.        Hospital Course  Cresencio Peguero was admitted on 2/28/2025.  The following problems were addressed during his hospitalization:     Active Problems:    Pneumonia of both lungs due to infectious organism, unspecified part of lung    Sepsis, due to unspecified organism, unspecified whether acute organ dysfunction present (H)  Cresencio Peguero is a 89 year old male with a history of hypertension, hyperlipidemia, coronary artery disease, diabetes mellitus type 2, Parkinson's disease, neuropathy, sensory ataxia, orthostatic hypotension, chronic pain, GERD, and depression who presented to the ED for evaluation of shortness of breath, cough, fever, headache, body aches, and generalized weakness.  Evaluation in the ED was concerning for sepsis due to pneumonia.  He was given IV fluids and IV antibiotics.  The hospitalist service was contacted to admit him for further evaluation and management.     Community-acquired pneumonia  Sepsis  Lactic acidosis  Presented with fevers, cough, shortness of breath, headache, body aches and generalized weakness that began  within the last 24 hours prior to admission.  Febrile with temp of 102.5.  WBC within normal limits.  Tachypneic.  Hypertensive initially, then briefly hypotensive which resolved with IV fluids.  CXR concerning for pneumonia.  CT chest negative for PE but did show multifocal pneumonia.  COVID/Influenza/RSV PCR testing was negative.  Hypoxic requiring 2 lpm of supplemental oxygen.  Started on Zosyn in the ED, continued the same in the hospital, transitioned to Augmentin on evening of 3/3/25 with plan to complete 7 day course of antibiotics on evening of 3/6/25.  Blood cultures obtained in the ED, negative to date, oxygen was weaned off on 3/1.  IV fluids discontinued 3/2.        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  Troponin 66 --> 74.  Chest imaging as noted above.  No EKG has been done yet.  Suspect elevated troponin related to acute illness as noted above rather than acute cardiac process.  EKG showed sinus rhythm, no acute ischemic changes.  Repeat troponin trended down.  Continue PTA atorvastatin.  He was not on an anti-platelet agent prior to admission.     Hypertension  History of orthostatic hypotension  Previously on midodrine, recently discontinued by his neurologist due to elevated blood pressures.  Blood pressure labile in the ED.  Blood pressures were consistently elevated even after good pain control, started on Norvasc 5 mg daily, parameters to hold Norvasc if blood pressures continue to go down in the facility.     Hyperlipidemia  Continue PTA atorvastatin.     Diabetes mellitus, type 2  Hemoglobin A1c 8.5% on 10/21/24.  PTA metformin and Jardiance was on hold, restarted on discharge.  Blood sugars are controlled while here.     Parkinson's disease  Follows with [facility] as an outpatient.  Continue PTA Sinemet.     Neuropathy  Continue PTA gabapentin.     Chronic pain  Follows with [facility] as an outpatient.  Recently had intrathecal pain  pump refilled and had medications adjusted with addition of morphine.  Consult pain management service, appreciate their assistance.  Continue PTA gabapentin, lidocaine patch, and diclofenac gel.  Pain pump adjusted on 2/28/25, dose reduced by 50% following discussion with pain management service and [facility].  Patient complains of increased pain on 3/3/25, however pain rating is similar to what family has noted over the past year.  The pump was readjusted 3/3.     GERD  Continue PTA omeprazole.     Depression  Continue PTA duloxetine, sertraline, and mirtazapine.     Constipation  Continue PTA bowel regimen.     Jennifer Alcocer MD          Medications:    acetaminophen (TYLENOL) 500 MG tablet  amLODIPine (NORVASC) 5 MG tablet  artificial saliva (BIOTENE MT) AERS spray  atorvastatin (LIPITOR) 10 MG tablet  carbidopa-levodopa (SINEMET)  MG tablet  carboxymethylcellulose (CARBOXYMETHYLCELLULOSE SODIUM) 0.5 % SOLN ophthalmic solution  diclofenac (VOLTAREN) 1 % topical gel  DULoxetine (CYMBALTA) 60 MG capsule  gabapentin (NEURONTIN) 300 MG capsule  gabapentin (NEURONTIN) 300 MG capsule  JARDIANCE 25 MG TABS tablet  lactulose (CHRONULAC) 10 GM/15ML solution  lactulose (CHRONULAC) 10 GM/15ML solution  Lidocaine (LIDOCARE) 4 % Patch  linaclotide (LINZESS) 72 MCG capsule  magnesium hydroxide (MILK OF MAGNESIA) 400 MG/5ML suspension  medication given by implanted intrathecal pump  metFORMIN (GLUCOPHAGE) 500 MG tablet  mirtazapine (REMERON) 7.5 MG tablet  omeprazole (PRILOSEC) 40 MG DR capsule  ondansetron (ZOFRAN ODT) 4 MG ODT tab  psyllium (METAMUCIL/KONSYL) Packet  sennosides (SENOKOT) 8.6 MG tablet  sertraline (ZOLOFT) 100 MG tablet        Past Medical History:    Past Medical History:   Diagnosis Date    Anxiety     Arthritis     CAD (coronary artery disease)     Diabetes (H)     Hearing loss     Hyperlipidemia     Hypertension     Neuropathy     Sensory ataxia     Stented coronary artery        Past  "Surgical History:    Past Surgical History:   Procedure Laterality Date    BACK SURGERY      cervical laminectomy    BACK SURGERY      lumbar laminectomy    CARDIAC SURGERY      Stent,  CABGx2    ESOPHAGOSCOPY, GASTROSCOPY, DUODENOSCOPY (EGD), COMBINED N/A 5/5/2023    Procedure: Esophagoscopy, gastroscopy, duodenoscopy (EGD), combined;  Surgeon: Jeovanny Rizo MD;  Location:  GI    ESOPHAGOSCOPY, GASTROSCOPY, DUODENOSCOPY (EGD), DILATATION, COMBINED N/A 1/16/2025    Procedure: ESOPHAGOGASTRODUODENOSCOPY DILATATION and biopsies;  Surgeon: Benedict Patricia MD;  Location:  OR    EYE SURGERY      cataract    EYE SURGERY      corneal surgery    HERNIORRHAPHY INGUINAL Right 8/15/2016    Procedure: HERNIORRHAPHY INGUINAL;  Surgeon: Wu Adam MD;  Location:  OR    LAPAROSCOPIC LYSIS ADHESIONS N/A 7/7/2017    Procedure: LAPAROSCOPIC LYSIS ADHESIONS;;  Surgeon: Sumeet Joiner MD;  Location:  OR    LAPAROSCOPY DIAGNOSTIC (GENERAL) N/A 7/7/2017    Procedure: LAPAROSCOPY DIAGNOSTIC (GENERAL);  DIAGNOSTIC LAPAROSCOPY WITH LYSIS OF ADHESIONS ;  Surgeon: Sumeet Joiner MD;  Location:  OR    ORTHOPEDIC SURGERY      rotator cuff repair,shoulder arthroplasty    REVERSE ARTHROPLASTY SHOULDER Right 8/12/2020    Procedure: RIGHT REVERSE TOTAL SHOULDER ARTHROPLASTY;  Surgeon: Adarsh Mello MD;  Location:  OR    TONSILLECTOMY            Physical Exam   Patient Vitals for the past 24 hrs:   BP Temp Temp src Pulse Resp SpO2 Height Weight   05/03/25 2200 (!) 169/73 -- -- 78 -- 100 % -- --   05/03/25 2146 (!) 161/74 -- -- 76 -- 98 % -- --   05/03/25 2008 (!) 150/74 -- -- 82 -- 98 % -- --   05/03/25 2007 -- 98.5  F (36.9  C) Oral -- 22 -- 1.803 m (5' 11\") 81.6 kg (180 lb)        Physical Exam  General: Patient is well appearing. No distress.  Head: Atraumatic.  Eyes: Conjunctivae and EOM are normal. No scleral icterus.  Neck: Normal range of motion. Neck supple.   Cardiovascular: Normal " rate, regular rhythm, normal heart sounds and intact distal pulses.   Pulmonary/Chest: No respiratory distress bronchovesicular breath sounds with upper airway coarse phlegm sounds in the right otherwise no rales rhonchi or wheezes  Abdominal: Soft. Bowel sounds are normal. No distension. No tenderness. No rebound or guarding.   Musculoskeletal: Normal range of motion.  Skin: Warm and dry. No rash noted. Not diaphoretic.      Emergency Department Course   ECG  Normal sinus rhythm heart rate 84 incomplete right bundle branch block unchanged compared to June 2024  Imaging:  CT Abdomen Pelvis w/o Contrast   Final Result   IMPRESSION:    1.  Left lower lobe pneumonia.   2.  Trabeculation of the urinary bladder wall without significant change. No hydronephrosis.   3.  No bowel obstruction or inflammation.         XR Chest 2 Views   Final Result   IMPRESSION:       Cardiac silhouette is normal in size. Previous median sternotomy and coronary revascularization. Unchanged atheromatous calcifications of the thoracic aorta but normal vascular pedicle.      There is a angular band of atelectasis in the lateral right base and mild elevation of the lateral left hemidiaphragm. The right lung is well-expanded and clear. No peribronchial fluid cuffs around the alma or peripheral septal thickening.      No pleural effusion or pneumothorax.      Disc space narrowing and diffuse thoracic spine degenerative osteophytes. Reverse right shoulder arthroplasty.          Laboratory:  Labs Ordered and Resulted from Time of ED Arrival to Time of ED Departure   GLUCOSE BY METER - Abnormal       Result Value    GLUCOSE BY METER POCT 164 (*)    COMPREHENSIVE METABOLIC PANEL - Abnormal    Sodium 138      Potassium 4.0      Carbon Dioxide (CO2) 20 (*)     Anion Gap 16 (*)     Urea Nitrogen 37.9 (*)     Creatinine 0.95      GFR Estimate 77      Calcium 9.6      Chloride 102      Glucose 158 (*)     Alkaline Phosphatase 109      AST 17      ALT 5       Protein Total 7.0      Albumin 4.2      Bilirubin Total 0.3     TROPONIN T, HIGH SENSITIVITY - Abnormal    Troponin T, High Sensitivity 27 (*)    CBC WITH PLATELETS AND DIFFERENTIAL - Abnormal    WBC Count 10.2      RBC Count 4.19 (*)     Hemoglobin 10.4 (*)     Hematocrit 31.9 (*)     MCV 76 (*)     MCH 24.8 (*)     MCHC 32.6      RDW 16.9 (*)     Platelet Count 173      % Neutrophils 78      % Lymphocytes 13      % Monocytes 8      % Eosinophils 1      % Basophils 0      % Immature Granulocytes 0      NRBCs per 100 WBC 0      Absolute Neutrophils 7.9      Absolute Lymphocytes 1.3      Absolute Monocytes 0.8      Absolute Eosinophils 0.1      Absolute Basophils 0.0      Absolute Immature Granulocytes 0.0      Absolute NRBCs 0.0     LACTIC ACID WHOLE BLOOD WITH 1X REPEAT IN 2 HR WHEN >2 - Abnormal    Lactic Acid, Initial 6.1 (*)    TROPONIN T, HIGH SENSITIVITY - Abnormal    Troponin T, High Sensitivity 26 (*)    PROCALCITONIN - Normal    Procalcitonin 0.27     NT PROBNP INPATIENT - Normal    N terminal Pro BNP Inpatient 655     INFLUENZA A/B, RSV AND SARS-COV2 PCR - Normal    Influenza A PCR Negative      Influenza B PCR Negative      RSV PCR Negative      SARS CoV2 PCR Negative     LACTIC ACID WHOLE BLOOD   BLOOD CULTURE   BLOOD CULTURE        Procedures       Emergency Department Course & Assessments:    Interventions:  Medications   cefTRIAXone (ROCEPHIN) 2 g vial to attach to  ml bag for ADULTS or NS 50 ml bag for PEDS (2 g Intravenous Incomplete 5/3/25 2350)   azithromycin (ZITHROMAX) 500 mg in  mL intermittent infusion (has no administration in time range)   sodium chloride 0.9% BOLUS 500 mL (500 mLs Intravenous $New Bag 5/3/25 2140)   albuterol (PROVENTIL) neb solution 2.5 mg (2.5 mg Nebulization $Given 5/3/25 2159)        Assessments:      Independent Interpretation (X-rays, CTs, rhythm strip):  CXR bowel gas pattern left upper quadrant hemidiaphragm on the left is elevated right shoulder  plasty intact no large effusions no large consolidations.    CT abdomen no free fluid no free air no large obstructions no large masses there is appreciable pneumonia on lower lungs of the CAT scan although not identified on the chest x-ray  Consultations/Discussion of Management or Tests:  Hospitalist         Social Drivers of Health affecting care:       Disposition:  Admit    Impression & Plan           Medical Decision Making:  Very pleasant nice 89-year-old male here with productive cough and coarse upper respiratory sounds on O2 monitor entire stay no hypoxia however with ambulatory trial dropped down to 86 to 87%.  Although the chest x-ray does not show focal consolidation and procalcitonin is normal as his white blood cell count this appears clinically to be consistent with bronchitis or pneumonia.  He has no red flags on his abdomen with left Tim elevation and elevated lactate noncontrast CT of the abdomen for screening to look and otherwise blood work other than maybe some mild dehydration is rather reassuring he has a history of coronary artery disease always has a slightly bumped elevated troponin BNP as well as not severely elevated she does not appear to be fluid overloaded considering age and risk empiric antibiotics started and will admit for further workup and management.    Diagnosis:    ICD-10-CM    1. Acute cough  R05.1       2. Hypoxia  R09.02     exertional      3. Dehydration  E86.0       4. Elevated troponin  R79.89     chronic      5. Elevated serum lactate dehydrogenase  R74.02       6. Pneumonia due to infectious organism, unspecified laterality, unspecified part of lung  J18.9            Discharge Medications:  New Prescriptions    No medications on file                 Raul Hernandez MD  05/04/25 0001

## 2025-05-04 NOTE — ED NOTES
"[facility]  ED Nurse Handoff Report    ED Chief complaint: Shortness of Breath  . ED Diagnosis:   Final diagnoses:   Acute cough   Hypoxia - exertional       Allergies:   Allergies   Allergen Reactions    Hydrocodone Itching     Constipation    Aspirin Itching    Contrast Dye      LW CM1: >>> NO CONTRAST ADVERSE REACTION <<<     Reaction :    Food      LW FI1: nka LW FI2: nka    Lisinopril Other (See Comments) and GI Disturbance     constipation    Oxycodone Itching    Percodan [Oxycodone-Aspirin] Rash       Code Status: Full Code    Activity level - Baseline/Home:  assist of 1 and walker.  Activity Level - Current:   assist of 1 and walker.   Lift room needed: No.   Bariatric: No   Needed: No   Isolation: No.   Infection: Not Applicable.     Respiratory status: Room air    Vital Signs (within 30 minutes):   Vitals:    05/03/25 2007 05/03/25 2008 05/03/25 2146   BP:  (!) 150/74 (!) 161/74   Pulse:  82 76   Resp: 22     Temp: 98.5  F (36.9  C)     TempSrc: Oral     SpO2:  98% 98%   Weight: 81.6 kg (180 lb)     Height: 1.803 m (5' 11\")         Cardiac Rhythm:  ,      Pain level:    Patient confused: No.   Patient Falls Risk: nonskid shoes/slippers when out of bed and assistive device/personal items within reach.   Elimination Status: Has voided     Patient Report - Initial Complaint: weakness/sob.   Focused Assessment: tx for pneumonia     Abnormal Results:   Labs Ordered and Resulted from Time of ED Arrival to Time of ED Departure   GLUCOSE BY METER - Abnormal       Result Value    GLUCOSE BY METER POCT 164 (*)    COMPREHENSIVE METABOLIC PANEL - Abnormal    Sodium 138      Potassium 4.0      Carbon Dioxide (CO2) 20 (*)     Anion Gap 16 (*)     Urea Nitrogen 37.9 (*)     Creatinine 0.95      GFR Estimate 77      Calcium 9.6      Chloride 102      Glucose 158 (*)     Alkaline Phosphatase 109      AST 17      ALT 5      Protein Total 7.0      Albumin 4.2      Bilirubin Total 0.3   "   TROPONIN T, HIGH SENSITIVITY - Abnormal    Troponin T, High Sensitivity 27 (*)    CBC WITH PLATELETS AND DIFFERENTIAL - Abnormal    WBC Count 10.2      RBC Count 4.19 (*)     Hemoglobin 10.4 (*)     Hematocrit 31.9 (*)     MCV 76 (*)     MCH 24.8 (*)     MCHC 32.6      RDW 16.9 (*)     Platelet Count 173      % Neutrophils 78      % Lymphocytes 13      % Monocytes 8      % Eosinophils 1      % Basophils 0      % Immature Granulocytes 0      NRBCs per 100 WBC 0      Absolute Neutrophils 7.9      Absolute Lymphocytes 1.3      Absolute Monocytes 0.8      Absolute Eosinophils 0.1      Absolute Basophils 0.0      Absolute Immature Granulocytes 0.0      Absolute NRBCs 0.0     PROCALCITONIN - Normal    Procalcitonin 0.27     NT PROBNP INPATIENT - Normal    N terminal Pro BNP Inpatient 655     INFLUENZA A/B, RSV AND SARS-COV2 PCR - Normal    Influenza A PCR Negative      Influenza B PCR Negative      RSV PCR Negative      SARS CoV2 PCR Negative     LACTIC ACID WHOLE BLOOD WITH 1X REPEAT IN 2 HR WHEN >2   TROPONIN T, HIGH SENSITIVITY        XR Chest 2 Views   Final Result   IMPRESSION:       Cardiac silhouette is normal in size. Previous median sternotomy and coronary revascularization. Unchanged atheromatous calcifications of the thoracic aorta but normal vascular pedicle.      There is a angular band of atelectasis in the lateral right base and mild elevation of the lateral left hemidiaphragm. The right lung is well-expanded and clear. No peribronchial fluid cuffs around the alma or peripheral septal thickening.      No pleural effusion or pneumothorax.      Disc space narrowing and diffuse thoracic spine degenerative osteophytes. Reverse right shoulder arthroplasty.          Treatments provided: labs, imaging, monitoring, medications.  Family Comments: at bedside  OBS brochure/video discussed/provided to patient:  No  ED Medications:   Medications   cefTRIAXone (ROCEPHIN) 2 g vial to attach to  ml bag for ADULTS  or NS 50 ml bag for PEDS (has no administration in time range)   azithromycin (ZITHROMAX) 500 mg in  mL intermittent infusion (has no administration in time range)   sodium chloride 0.9% BOLUS 500 mL (500 mLs Intravenous $New Bag 5/3/25 2140)   albuterol (PROVENTIL) neb solution 2.5 mg (2.5 mg Nebulization $Given 5/3/25 2159)       Drips infusing:  No  For the majority of the shift this patient was Green.   Interventions performed were NA.    Sepsis treatment initiated: No    Cares/treatment/interventions/medications to be completed following ED care: continued work up    ED Nurse Name: Mayra Lanza RN  10:44 PM    RECEIVING UNIT ED HANDOFF REVIEW    Above ED Nurse Handoff Report was reviewed: Yes  Reviewed by: Karma Haider RN on May 4, 2025 at 12:26 AM

## 2025-05-04 NOTE — CONSULTS
Care Management Initial Consult    General Information  Assessment completed with: Patient, Care Team Member, [facility] RN  Type of CM/SW Visit: Initial Assessment    Primary Care Provider verified and updated as needed: Yes   Readmission within the last 30 days: no previous admission in last 30 days      Reason for Consult: other (see comments) (Elevated readmission risk level)  Advance Care Planning: Advance Care Planning Reviewed: present on chart          Communication Assessment  Patient's communication style: spoken language (English or Bilingual)    Hearing Difficulty or Deaf: yes   Wear Glasses or Blind: no    Cognitive  Cognitive/Neuro/Behavioral: WDL, all  Level of Consciousness: alert  Arousal Level: opens eyes spontaneously  Orientation: oriented x 4     Best Language: 0 - No aphasia  Speech: clear, spontaneous, logical    Living Environment:   People in home: facility resident     Current living Arrangements: assisted living  [facility] of Facility: [facility]   Able to return to prior arrangements: yes       Family/Social Support:  Care provided by: spouse/significant other  Provides care for: no one, unable/limited ability to care for self  Marital Status:   Support system: Wife, Children, Facility resident(s)/Staff  [place]       Description of Support System: Supportive, Involved    Support Assessment: Adequate family and caregiver support    Current Resources:   Patient receiving home care services: Yes  Skilled Home Care Services: Physical Therapy     Community Resources: None  Equipment currently used at home: walker, rolling, wheelchair, manual  Supplies currently used at home: Diabetic Supplies, Incontinence Supplies    Employment/Financial:  Employment Status: retired        Financial Concerns: none           Does the patient's insurance plan have a 3 day qualifying hospital stay waiver?  No    Lifestyle & Psychosocial Needs:  Social Drivers of Health     Food Insecurity: Low Risk   (5/4/2025)    Food Insecurity     Within the past 12 months, did you worry that your food would run out before you got money to buy more?: No     Within the past 12 months, did the food you bought just not last and you didn t have money to get more?: No   Depression: Not at risk (3/10/2025)    PHQ-2     PHQ-2 Score: 0   Housing Stability: Low Risk  (5/4/2025)    Housing Stability     Do you have housing? : Yes     Are you worried about losing your housing?: No   Tobacco Use: Medium Risk (3/10/2025)    Patient History     Smoking Tobacco Use: Former     Smokeless Tobacco Use: Unknown     Passive Exposure: Not on file   Financial Resource Strain: Low Risk  (5/4/2025)    Financial Resource Strain     Within the past 12 months, have you or your family members you live with been unable to get utilities (heat, electricity) when it was really needed?: No   Alcohol Use: Unknown (1/11/2021)    [facility]     Frequency of Alcohol Consumption: 4 or more times a week     Average Number of Drinks: Not on file     Frequency of Binge Drinking: Not on file   Transportation Needs: High Risk (5/4/2025)    Transportation Needs     Within the past 12 months, has lack of transportation kept you from medical appointments, getting your medicines, non-medical meetings or appointments, work, or from getting things that you need?: Yes   Physical Activity: Not on file   Interpersonal Safety: Low Risk  (5/4/2025)    Interpersonal Safety     Do you feel physically and emotionally safe where you currently live?: Yes     Within the past 12 months, have you been hit, slapped, kicked or otherwise physically hurt by someone?: No     Within the past 12 months, have you been humiliated or emotionally abused in other ways by your partner or ex-partner?: No   Stress: Not on file   Social Connections: Unknown (4/11/2023)    [facility] [employer] [facility]    Social Connections     Frequency of Communication with Friends and Family: Not on file   Health Literacy: Not on file       Functional Status:  Prior to admission patient needed assistance:   Dependent ADLs:: Ambulation-walker, Wheelchair-independent  Dependent IADLs:: Cleaning, Cooking, Laundry, Shopping, Meal Preparation, Medication Management, Money Management, Transportation  Assesssment of Functional Status: Not at baseline with ADL Functioning    Mental Health Status:          Chemical Dependency Status:                Values/Beliefs:  Spiritual, Cultural Beliefs, Episcopal Practices, Values that affect care: no               Discussed  Partnership in Safe Discharge Planning  document with patient/family: No    Additional Information:  SW consulted for an elevated readmission risk level of 21%. Pt was alert and orientated sitting on the bed. Pt engaged appropriately with conversation. Pt resides at [facility]. Pt stated that he receives support with medication management, showers, blood sugar checks, laundry and daily dressing. Pt shared that he does receive PT once a week at the facility from [facility]. Pt gave verbal permission to speak with the [facility].    BAILEY spoke with facility RN ([phone]), who confirmed services pt receives. Pharmacy is [facility] (Fax #: [phone]). Facility Fax#: [phone]. RN confirmed that pt could return on weekend if medically ready.     Pt stated that his family would be able to transport him at time of discharge.     Next Steps: BAILEY to follow for discharge.     KWADWO Vasquez, LGSW  BAILEY Care Coordinator/ Med Surg [address] [place] [address]  [phone]

## 2025-05-04 NOTE — PHARMACY-ADMISSION MEDICATION HISTORY
Pharmacist Admission Medication History    Admission medication history is complete. The information provided in this note is only as accurate as the sources available at the time of the update.    Information Source(s): Clinic records and Facility (U/NH/) medication list/MAR via phone, [facility] [phone], [facility] [phone]    Pertinent Information:      Changes made to PTA medication list:  Added: None  Deleted: None  Changed: IT pain pump to current settings    Allergies reviewed with patient and updates made in EHR: unable to assess    Medication History Completed By: Temitope BrunsonD 5/4/2025 11:21 AM    PTA Med List   Medication Sig Last Dose/Taking    acetaminophen (TYLENOL) 500 MG tablet Take 1,000 mg by mouth 3 times daily as needed for pain. Taking As Needed    amLODIPine (NORVASC) 5 MG tablet Take 1 tablet (5 mg) by mouth daily. 5/3/2025 Morning    artificial saliva (BIOTENE MT) AERS spray Take 1 spray by mouth every 2 hours as needed for dry mouth Taking As Needed    atorvastatin (LIPITOR) 10 MG tablet Take 10 mg by mouth every evening. 5/2/2025 Evening    carbidopa-levodopa (SINEMET)  MG tablet Take 2 tablets by mouth 3 times daily. 11:20 AM, 3:20 PM, and 7:20 PM 5/3/2025 at  3:20 PM    carboxymethylcellulose (CARBOXYMETHYLCELLULOSE SODIUM) 0.5 % SOLN ophthalmic solution Place 1 drop into both eyes 3 times daily. 5/3/2025 Noon    diclofenac (VOLTAREN) 1 % topical gel Apply 4 g topically 3 times daily. 5/3/2025 Noon    DULoxetine (CYMBALTA) 60 MG capsule Take 60 mg by mouth daily. Every morning after a meal 5/3/2025 Morning    gabapentin (NEURONTIN) 300 MG capsule Take 600 mg by mouth every morning. 5/3/2025 Morning    gabapentin (NEURONTIN) 300 MG capsule Take 1,200 mg by mouth every evening. At 7:30 PM 5/2/2025 Evening    JARDIANCE 25 MG TABS tablet Take 25 mg by mouth daily. 5/3/2025 Morning    lactulose (CHRONULAC) 10 GM/15ML solution Take 20 g by mouth 2 times  daily. 5/3/2025 Morning    lactulose (CHRONULAC) 10 GM/15ML solution Take 30 mLs by mouth daily as needed for constipation. Taking As Needed    Lidocaine (LIDOCARE) 4 % Patch Place 1 patch onto the skin every 24 hours. Leave on for 8 hours (placed at 11:20 AM)    To prevent lidocaine toxicity, patient should be patch free for 12 hrs daily. 5/3/2025 Morning    linaclotide (LINZESS) 72 MCG capsule Take 72 mcg by mouth every morning. 5/3/2025 Morning    magnesium hydroxide (MILK OF MAGNESIA) 400 MG/5ML suspension Take 30 mLs by mouth daily as needed for constipation. Taking As Needed    metFORMIN (GLUCOPHAGE) 500 MG tablet Take 1,000 mg by mouth 2 times daily. 5/3/2025 Morning    omeprazole (PRILOSEC) 40 MG DR capsule Take 40 mg by mouth every morning. At least 30-60 minutes before a meal 5/3/2025 Morning    ondansetron (ZOFRAN ODT) 4 MG ODT tab Take 4 mg by mouth every 8 hours as needed for nausea. Taking As Needed    psyllium (METAMUCIL/KONSYL) Packet Take 1 packet by mouth daily. 5/3/2025 Morning    sennosides (SENOKOT) 8.6 MG tablet Take 1 tablet by mouth 2 times daily. 5/3/2025 Morning    sertraline (ZOLOFT) 100 MG tablet Take 100 mg by mouth every morning 5/3/2025 Morning

## 2025-05-04 NOTE — PROGRESS NOTES
[facility]    Medicine Progress Note - Hospitalist Service    Date of Admission:  5/3/2025    Assessment & Plan   Cresencio Peguero is an 89-year-old male with medical history significant for CAD s/p stent placement and CABG, Parkinson disease, type 2 diabetes mellitus, neuropathy, chronic pain syndrome with intrathecal pump placement, hypertension, dyslipidemia, 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  He presented to the ED with complaints of cough, dysphagia, and shortness of breath.  He is admitted for probable aspiration pneumonia.    # Probable aspiration pneumonia: Patient with history of esophageal stricture/web with dysphagia.  He underwent esophageal dilatation and follow-up 2024.  Was able to tolerate pills and solid meals.  However, reports that he has been having difficulties with medications and solid foods over the past few weeks.  Has been only consuming liquid foods for quite some time now.  Underwent speech therapy evaluation on 4/23/2025 and they recommended EGD.  Continue azithromycin and Zosyn    # Known esophageal stricture/possible dysphagia:  GI consulted, appreciate input  Per GI, and ultimately patient may not be very useful to patient.  However, they want to assess overnight and see how he does.  Options could include feeding tube placement  Dietitian consult    # Lactic acidosis: Patient with elevated lactic acid of 6.1 on presentation.  Likely multifactorial with dehydration and pneumonia contributing.  Has improved to 1.1 with IV hydration  Continue gentle hydration    # Chronic pain syndrome:  # Neuropathy:  Has intrathecal pump in place  Pain management consult  Continue gabapentin    # Mood disorder  Continue Cymbalta and mirtazapine    # Parkinson's disease  Continue Sinemet    # CAD s/p stent placement and CABG  # Hypertension  # Hyperlipidemia  Continue Norvasc and Lipitor as    # GERD:  Continue  "PPI    # Type 2 diabetes mellitus  Blood glucose at goal  Continue correctional scale insulin  Hypoglycemia protocol            Diet: Combination Diet Clear Liquid    DVT Prophylaxis: Pneumatic Compression Devices  Henderson Catheter: Not present  Lines: None     Cardiac Monitoring: None  Code Status: No CPR- Do NOT Intubate      Clinically Significant Risk Factors Present on Admission                        # Anemia: based on hgb <11      # DMII: A1C = 8.3 % (Ref range: <5.7 %) within past 6 months    # Overweight: Estimated body mass index is 25.23 kg/m  as calculated from the following:    Height as of this encounter: 1.803 m (5' 11\").    Weight as of this encounter: 82.1 kg (180 lb 14.4 oz).       # Financial/Environmental Concerns: none         Social Drivers of Health    Tobacco Use: Medium Risk (3/10/2025)    Patient History     Smoking Tobacco Use: Former     Smokeless Tobacco Use: Unknown   Alcohol Use: Unknown (1/11/2021)    [facility] [employer] [facility]    AUDIT-C     Frequency of Alcohol Consumption: 4 or more times a week   Transportation Needs: High Risk (5/4/2025)    Transportation Needs     Within the past 12 months, has lack of transportation kept you from medical appointments, getting your medicines, non-medical meetings or appointments, work, or from getting things that you need?: Yes    [facility], [facility]    Social Connections          Disposition Plan     Medically Ready for Discharge: Anticipated Tomorrow             Hang Castañeda MD  Hospitalist Service  [facility]  Securely message with [employer] (more info)  Text page via [employer] Paging/Directory   ______________________________________________________________________    Interval History   No acute events overnight.  Reports that he continues to have difficulty with swallowing solid foods and also his pills. Denies any chest " pain, shortness of breath, fevers, chills, nausea, vomiting, diarrhea, constipation, or any other new symptoms.    Physical Exam   Vital Signs: Temp: 98.6  F (37  C) Temp src: Oral BP: (!) 153/64 Pulse: 72   Resp: 20 SpO2: (!) 91 % O2 Device: None (Room air)    Weight: 180 lbs 14.4 oz    General: AAOx3, NAD, pleasant.  HEENT: NCAT, pupils are equal and round, anicteric, oral mucosa is moist.  Neck: Supple,   Cardiac: RRR.  No murmur. No rub or gallop.  Respiratory: Bilateral coarse breath sounds worse in the right lower lung.  No wheezes, rales, or rhonchi  Abdomen: Soft, NT, ND, + bowel sounds  Extremity: No LE edema, DP pulses palpable.   Neuro: AAO x3,   Psych: Calm and cooperative.       Medical Decision Making       >35 MINUTES SPENT BY ME on the date of service doing chart review, history, exam, documentation & further activities per the note.      Data     I have personally reviewed the following data over the past 24 hrs:    6.5  \   9.4 (L)   / 139 (L)     139 105 33.2 (H) /  120 (H)   3.8 24 0.91 \     ALT: 5 AST: 17 AP: 109 TBILI: 0.3   ALB: 4.2 TOT PROTEIN: 7.0 LIPASE: N/A     Trop: 26 (H) BNP: 655     Procal: 0.27 CRP: N/A Lactic Acid: 1.1         Imaging results reviewed over the past 24 hrs:   Recent Results (from the past 24 hours)   XR Chest 2 Views    Narrative    EXAM: XR CHEST 2 VIEWS  LOCATION: [facility]  DATE: 5/3/2025    INDICATION: Shortness of breath  COMPARISON: CT pulmonary angiogram 2/28/2025      Impression    IMPRESSION:     Cardiac silhouette is normal in size. Previous median sternotomy and coronary revascularization. Unchanged atheromatous calcifications of the thoracic aorta but normal vascular pedicle.    There is a angular band of atelectasis in the lateral right base and mild elevation of the lateral left hemidiaphragm. The right lung is well-expanded and clear. No peribronchial fluid cuffs around the alma or peripheral septal thickening.    No pleural  effusion or pneumothorax.    Disc space narrowing and diffuse thoracic spine degenerative osteophytes. Reverse right shoulder arthroplasty.   CT Abdomen Pelvis w/o Contrast    Narrative    EXAM: CT ABDOMEN PELVIS W/O CONTRAST  LOCATION: [facility]  DATE: 5/3/2025    INDICATION: elev lactate left el diaphragm elevation generally pain everywhere  COMPARISON: 6/23/2024.  TECHNIQUE: CT scan of the abdomen and pelvis was performed without IV contrast. Multiplanar reformats were obtained. Dose reduction techniques were used.  CONTRAST: None.    FINDINGS:   LOWER CHEST: Left lower lobe infiltrate. Mild bilateral lower lobe bronchiectasis.    HEPATOBILIARY: Normal.    PANCREAS: Stable calcification in the uncinate process of the pancreas. The pancreas is otherwise unremarkable.    SPLEEN: Normal.    ADRENAL GLANDS: Normal.    KIDNEYS/BLADDER: There is no urinary stone or hydronephrosis. Mild right renal atrophy. Left renal cyst. Urinary bladder is distended and there is trabeculation of the wall.    BOWEL: There is no bowel obstruction or inflammation. The appendix is normal. No free intraperitoneal gas or fluid.    LYMPH NODES: Normal.    VASCULATURE: Atherosclerotic calcification of the aorta and its branches. No aneurysm.    PELVIC ORGANS: Normal.    MUSCULOSKELETAL: Degenerative disease throughout the spine and in the hips. Spinal stimulator. Postoperative change in the lumbar spine.      Impression    IMPRESSION:   1.  Left lower lobe pneumonia.  2.  Trabeculation of the urinary bladder wall without significant change. No hydronephrosis.  3.  No bowel obstruction or inflammation.

## 2025-05-04 NOTE — ED TRIAGE NOTES
Brought in by ambulance from nursing home. Recent hx of pneumonia, here with increased weakness and SOB. Wheezing noted for EMS. Afebrile, 98% on RA.      Triage Assessment (Adult)       Row Name 05/03/25 2009          Triage Assessment    Airway WDL WDL     Additional Documentation Breath Sounds (Group)        Respiratory WDL    Respiratory WDL X;rhythm/pattern;cough     Rhythm/Pattern, Respiratory shortness of breath     Cough Frequency frequent     Cough Type productive        Breath Sounds    Breath Sounds All Fields     All Lung Fields Breath Sounds wheezes, inspiratory;wheezes, expiratory        Skin Circulation/Temperature WDL    Skin Circulation/Temperature WDL WDL        Cardiac WDL    Cardiac WDL WDL        Peripheral/Neurovascular WDL    Peripheral Neurovascular WDL WDL        Cognitive/Neuro/Behavioral WDL    Cognitive/Neuro/Behavioral WDL WDL

## 2025-05-04 NOTE — PLAN OF CARE
Goal Outcome Evaluation:      Plan of Care Reviewed With: patient    Overall Patient Progress: no changeOverall Patient Progress: no change    Outcome Evaluation: Anticipating discharge to [facility]    KWADWO Vasquez, CANDIDA  SW Care Coordinator/ Med Surg [address] [place] [address]  [phone]

## 2025-05-04 NOTE — H&P
[facility]    History and Physical - Hospitalist Service       Date of Admission:  5/3/2025    Assessment & Plan     Cresencio Peguero is a 89 year old male w/PMH anxiety, CAD s/p stent and CABG, parkinsons disease, DMT2, neuropathy, chronic pain with intrathecal pump, HTN, DLD, esophageal strictures/webbs requiring dilatation, dysphagia with recurrent aspiration pneumonia who presents from home with cough, dysphagia and suspected to have aspiration, possible pneumonia    Known Esophageal strictures/webbs with dysphagia  Aspiration with possible pneumonia  -has hx of needing esophageal dilatation most recently was last fall but more recently has been unable to tolerate solids or pills and has been surviving on liquids. Was admitted in march for pneumonia, thought to be CAP but suspect aspiration related  -evaluated by ST 4/22 and recommended he have EGD for dilatation, not set up yet  -in meantime appears to have had recurrent aspiration event(s)  -on admission has diffuse ronchi w/CXR showing atelectasis and elevated diaphragm  -no leukocytosis, procal normal, not hypoxic  -started on Rocephin and azithro for possible aspiration pneumonia, at this point doesn't appears that he has that but is high risk so will continue for now  -will continue clear liquids, unclear if ST would have additional input since eval 2 weeks ago as issue seems to be esophageal  -consult GI for possible dilatation this admission    Elevated diaphragm  -noted on CXR, unclear significance  -ED provider has ordered CT abdomen to further evaluate    Lactic acidosis  -has been recurrent issue on previous admissions, likely due to his metformin as doesn't appear to be septic  -lactic 6, given IV fluids in ED and will continue  -hold metformin, rpt in am    Detectable troponin  -likely demands from aspiration, minimally elevated and no CP, EKG reassuring  -no further workup indicated    Chronic pain w/intrathecal pump  Mood  disorder, parkinsons disease, neuropathy  -resume home gabapentin, cymbalta, sinemet, mirtazapine  -if still here on Monday consider pain team eval for his pump    Hx anxiety, CAD s/p stent and CABG, DMT2, HTN, DLD, constipation, GERD  -holding home metformin and jardiance, ISS  -resume home norvasc, lipitor, bowel meds, omeprazole       Diet:  clear liquids  DVT Prophylaxis: Pneumatic Compression Devices  Henderson Catheter: Not present  Lines: None     Cardiac Monitoring: None  Code Status:  DNR/DNI, confirmed with patient and son      Disposition Plan     Medically Ready for Discharge: Anticipated Tomorrow           Tong Tobar DO  Hospitalist Service  [facility]  Securely message with [employer] (more info)  Text page via [employer]. Paging/Directory     ______________________________________________________________________    Chief Complaint   Cough, dysphagia    History of Present Illness   Cresencio Peguero is a 89 year old male w/PMH anxiety, CAD s/p stent and CABG, DMT2, neuropathy, chronic pain with intrathecal pump, HTN, DLD, esophageal strictures/webbs requiring dilatation, dysphagia with recurrent aspiration pneumonia who presents from home with cough, hypoxia. He had esophagus dilated last fall and had been doing ok since then. Was seen by speech therapy 2 weeks ago and they felt that he needs another dilatation done as he has significant dysphagia especially with pills and solids and has been drinking liquids for his nutrition. He has been unable to schedule his EGD however and nothing is currently set up. In the past week has had persistent cough, mostly non productive and has had chills but no fever. Deneis any CP, sob, but today was more weak and family brought him into ED.    In ED was noted to be ronchorous but no leukocytosis, lacic 6.1, procal negative. Was givne small bolus and started on antibiotics      Past Medical History    Past Medical History:   Diagnosis Date    Anxiety      Arthritis     CAD (coronary artery disease)     HX of stent,  CABG x2    Diabetes (H)     Hearing loss     Hyperlipidemia     Hypertension     Neuropathy     Sensory ataxia     Stented coronary artery        Past Surgical History   Past Surgical History:   Procedure Laterality Date    BACK SURGERY      cervical laminectomy    BACK SURGERY      lumbar laminectomy    CARDIAC SURGERY      Stent,  CABGx2    ESOPHAGOSCOPY, GASTROSCOPY, DUODENOSCOPY (EGD), COMBINED N/A 5/5/2023    Procedure: Esophagoscopy, gastroscopy, duodenoscopy (EGD), combined;  Surgeon: Jeovanny Rizo MD;  Location:  GI    ESOPHAGOSCOPY, GASTROSCOPY, DUODENOSCOPY (EGD), DILATATION, COMBINED N/A 1/16/2025    Procedure: ESOPHAGOGASTRODUODENOSCOPY DILATATION and biopsies;  Surgeon: Benedict Patricia MD;  Location: [facility] OR    EYE SURGERY      cataract    EYE SURGERY      corneal surgery    HERNIORRHAPHY INGUINAL Right 8/15/2016    Procedure: HERNIORRHAPHY INGUINAL;  Surgeon: Wu Adam MD;  Location:  OR    LAPAROSCOPIC LYSIS ADHESIONS N/A 7/7/2017    Procedure: LAPAROSCOPIC LYSIS ADHESIONS;;  Surgeon: Sumeet Joiner MD;  Location:  OR    LAPAROSCOPY DIAGNOSTIC (GENERAL) N/A 7/7/2017    Procedure: LAPAROSCOPY DIAGNOSTIC (GENERAL);  DIAGNOSTIC LAPAROSCOPY WITH LYSIS OF ADHESIONS ;  Surgeon: Sumeet Joiner MD;  Location: [facility] OR    ORTHOPEDIC SURGERY      rotator cuff repair,shoulder arthroplasty    REVERSE ARTHROPLASTY SHOULDER Right 8/12/2020    Procedure: RIGHT REVERSE TOTAL SHOULDER ARTHROPLASTY;  Surgeon: Adarsh Mello MD;  Location: [facility] OR    TONSILLECTOMY         Prior to Admission Medications   Prior to Admission Medications   Prescriptions Last Dose Informant Patient Reported? Taking?   DULoxetine (CYMBALTA) 60 MG capsule   Yes No   Sig: Take 60 mg by mouth daily. Every morning after a meal   JARDIANCE 25 MG TABS tablet   Yes No   Sig: Take 25 mg by mouth daily.   Lidocaine (LIDOCARE) 4 % Patch   No  "No   Sig: Place 1 patch onto the skin every 24 hours To prevent lidocaine toxicity, patient should be patch free for 12 hrs daily.   Patient taking differently: Place 1 patch onto the skin every 24 hours. Leave on for 8 hours (placed at 11:20 AM)    To prevent lidocaine toxicity, patient should be patch free for 12 hrs daily.   acetaminophen (TYLENOL) 500 MG tablet   Yes No   Sig: Take 1,000 mg by mouth 3 times daily as needed for pain.   amLODIPine (NORVASC) 5 MG tablet   No No   Sig: Take 1 tablet (5 mg) by mouth daily.   artificial saliva (BIOTENE MT) AERS spray   No No   Sig: Take 1 spray by mouth every 2 hours as needed for dry mouth   atorvastatin (LIPITOR) 10 MG tablet   Yes No   Sig: Take 10 mg by mouth every evening.   carbidopa-levodopa (SINEMET)  MG tablet   Yes No   Sig: Take 2 tablets by mouth 3 times daily. 11:20 AM, 3:20 PM, and 7:20 PM   carboxymethylcellulose (CARBOXYMETHYLCELLULOSE SODIUM) 0.5 % SOLN ophthalmic solution   Yes No   Sig: Place 1 drop into both eyes 3 times daily.   diclofenac (VOLTAREN) 1 % topical gel   No No   Sig: Apply 2 g topically 3 times daily   Patient taking differently: Apply topically 3 times daily. 2 g listed as dose but also \"apply 4 g topically\"  To lower back   gabapentin (NEURONTIN) 300 MG capsule   No No   Sig: Take 1 capsule (300 mg) by mouth at bedtime   Patient taking differently: Take 600 mg by mouth every morning.   gabapentin (NEURONTIN) 300 MG capsule   Yes No   Sig: Take 1,200 mg by mouth every evening. At 7:30 PM   lactulose (CHRONULAC) 10 GM/15ML solution   No No   Sig: Take 30 mLs (20 g) by mouth 2 times daily as needed for constipation   Patient taking differently: Take 20 g by mouth 2 times daily.   lactulose (CHRONULAC) 10 GM/15ML solution   Yes No   Sig: Take 30 mLs by mouth daily as needed for constipation.   linaclotide (LINZESS) 72 MCG capsule   Yes No   Sig: Take 72 mcg by mouth every morning.   magnesium hydroxide (MILK OF MAGNESIA) 400 " MG/5ML suspension   Yes No   Sig: Take 30 mLs by mouth daily as needed for constipation.   medication given by implanted intrathecal pump   Yes No   Sig: continuously. Drug # 1: Fentanyl (Sublimaze) - Conc: 625 mcg/mL - Total Dose / 24 hours: 425 mcg    Drug # 2: Bupivacaine (Marcaine)  - Conc: 25 mg/mL - Total Dose / 24 hours: 17 mg    Drug # 3: morphine - Conc: 0.1 mg/mL - total dose / 24 hours: 0.068 mg    Complex continuous infusion of above    Flex Bolus dosing: 3 scheduled bolus doses per day containing fentanyl 39.94 mcg, bupivacaine 1.598 mg, and morphine 0.97982 mg at 1300, 1700 and 2100 daily.     Outside Clinic & Provider: [facility] [phone]  Last Refill Date: 2/26/25  Alarm Date: 3/24/25  Next Refill Date: 3/20/2025     Pump : Medtronic Synchromed II pump     Please consider consulting the pain team if the patient is admitted with an IT pump.   metFORMIN (GLUCOPHAGE) 500 MG tablet   Yes No   Sig: Take 1,000 mg by mouth 2 times daily.   mirtazapine (REMERON) 7.5 MG tablet   Yes No   Sig: Take 7.5 mg by mouth at bedtime.   omeprazole (PRILOSEC) 40 MG DR capsule   Yes No   Sig: Take 40 mg by mouth every morning. At least 30-60 minutes before a meal   ondansetron (ZOFRAN ODT) 4 MG ODT tab   Yes No   Sig: Take 4 mg by mouth every 8 hours as needed for nausea.   psyllium (METAMUCIL/KONSYL) Packet   Yes No   Sig: Take 1 packet by mouth daily.   sennosides (SENOKOT) 8.6 MG tablet   No No   Sig: Take 1 tablet by mouth daily.   Patient taking differently: Take 1 tablet by mouth 2 times daily.   sertraline (ZOLOFT) 100 MG tablet  Self Yes No   Sig: Take 100 mg by mouth every morning      Facility-Administered Medications: None        Review of Systems    The 10 point Review of Systems is negative other than noted in the HPI or here.     Social History   I have reviewed this patient's social history and updated it with pertinent information if needed.  Social History     Tobacco Use    Smoking  status: Former     Types: Pipe     Quit date: 6/1/2021     Years since quitting: 3.9   Substance Use Topics    Alcohol use: Yes     Alcohol/week: 0.0 standard drinks of alcohol     Comment: 7 glasses wine per week    Drug use: Never         Family History   No significant family history reported      Allergies   Allergies   Allergen Reactions    Hydrocodone Itching     Constipation    Aspirin Itching    Contrast Dye      LW CM1: >>> NO CONTRAST ADVERSE REACTION <<<     Reaction :    Food      LW FI1: nka LW FI2: nka    Lisinopril Other (See Comments) and GI Disturbance     constipation    Oxycodone Itching    Percodan [Oxycodone-Aspirin] Rash        Physical Exam   Vital Signs: Temp: 98.5  F (36.9  C) Temp src: Oral BP: (!) 169/73 Pulse: 78   Resp: 22 SpO2: 100 % O2 Device: None (Room air)    Weight: 180 lbs 0 oz    Constitutional: awake, alert, and cooperative, chronically ill appearing  Respiratory: ronchi diffusely, no wheezing, not on oxygen, no distress  Cardiovascular: regular rate and rhythm and no murmur noted  GI: normal bowel sounds, soft, and non-distended  Skin: no bruising or bleeding  Neurologic: alert, interactive, no focal deficits    Medical Decision Making       65 MINUTES SPENT BY ME on the date of service doing chart review, history, exam, documentation & further activities per the note.      Data   ------------------------- PAST 24 HR DATA REVIEWED -----------------------------------------------    I have personally reviewed the following data over the past 24 hrs:    10.2  \   10.4 (L)   / 173     138 102 37.9 (H) /  158 (H)   4.0 20 (L) 0.95 \     ALT: 5 AST: 17 AP: 109 TBILI: 0.3   ALB: 4.2 TOT PROTEIN: 7.0 LIPASE: N/A     Trop: 26 (H) BNP: 655     Procal: 0.27 CRP: N/A Lactic Acid: 6.1 (HH)         Imaging results reviewed over the past 24 hrs:   Recent Results (from the past 24 hours)   XR Chest 2 Views    Narrative    EXAM: XR CHEST 2 VIEWS  LOCATION: [facility]  HOSPITAL  DATE: 5/3/2025    INDICATION: Shortness of breath  COMPARISON: CT pulmonary angiogram 2/28/2025      Impression    IMPRESSION:     Cardiac silhouette is normal in size. Previous median sternotomy and coronary revascularization. Unchanged atheromatous calcifications of the thoracic aorta but normal vascular pedicle.    There is a angular band of atelectasis in the lateral right base and mild elevation of the lateral left hemidiaphragm. The right lung is well-expanded and clear. No peribronchial fluid cuffs around the alma or peripheral septal thickening.    No pleural effusion or pneumothorax.    Disc space narrowing and diffuse thoracic spine degenerative osteophytes. Reverse right shoulder arthroplasty.

## 2025-05-05 LAB
ATRIAL RATE - MUSE: 84 BPM
DIASTOLIC BLOOD PRESSURE - MUSE: NORMAL MMHG
GLUCOSE BLDC GLUCOMTR-MCNC: 112 MG/DL (ref 70–99)
GLUCOSE BLDC GLUCOMTR-MCNC: 134 MG/DL (ref 70–99)
GLUCOSE BLDC GLUCOMTR-MCNC: 153 MG/DL (ref 70–99)
GLUCOSE BLDC GLUCOMTR-MCNC: 186 MG/DL (ref 70–99)
GLUCOSE BLDC GLUCOMTR-MCNC: 189 MG/DL (ref 70–99)
INTERPRETATION ECG - MUSE: NORMAL
P AXIS - MUSE: 34 DEGREES
PR INTERVAL - MUSE: 198 MS
QRS DURATION - MUSE: 110 MS
QT - MUSE: 398 MS
QTC - MUSE: 470 MS
R AXIS - MUSE: 5 DEGREES
SYSTOLIC BLOOD PRESSURE - MUSE: NORMAL MMHG
T AXIS - MUSE: 70 DEGREES
VENTRICULAR RATE- MUSE: 84 BPM

## 2025-05-05 PROCEDURE — 250N000013 HC RX MED GY IP 250 OP 250 PS 637: Performed by: HOSPITALIST

## 2025-05-05 PROCEDURE — 258N000003 HC RX IP 258 OP 636: Performed by: INTERNAL MEDICINE

## 2025-05-05 PROCEDURE — 62367 ANALYZE SPINE INFUS PUMP: CPT | Performed by: PHYSICIAN ASSISTANT

## 2025-05-05 PROCEDURE — 120N000001 HC R&B MED SURG/OB

## 2025-05-05 PROCEDURE — 250N000011 HC RX IP 250 OP 636: Performed by: HOSPITALIST

## 2025-05-05 PROCEDURE — 99222 1ST HOSP IP/OBS MODERATE 55: CPT | Mod: 25 | Performed by: PHYSICIAN ASSISTANT

## 2025-05-05 PROCEDURE — 258N000003 HC RX IP 258 OP 636: Performed by: HOSPITALIST

## 2025-05-05 PROCEDURE — 250N000013 HC RX MED GY IP 250 OP 250 PS 637: Performed by: INTERNAL MEDICINE

## 2025-05-05 PROCEDURE — 250N000012 HC RX MED GY IP 250 OP 636 PS 637: Performed by: HOSPITALIST

## 2025-05-05 RX ORDER — SERTRALINE HYDROCHLORIDE 20 MG/ML
100 SOLUTION ORAL DAILY
Status: DISCONTINUED | OUTPATIENT
Start: 2025-05-06 | End: 2025-05-07

## 2025-05-05 RX ORDER — NALOXONE HYDROCHLORIDE 0.4 MG/ML
0.4 INJECTION, SOLUTION INTRAMUSCULAR; INTRAVENOUS; SUBCUTANEOUS
Status: DISCONTINUED | OUTPATIENT
Start: 2025-05-05 | End: 2025-05-07 | Stop reason: HOSPADM

## 2025-05-05 RX ORDER — GABAPENTIN 250 MG/5ML
600 SOLUTION ORAL EVERY MORNING
Status: DISCONTINUED | OUTPATIENT
Start: 2025-05-06 | End: 2025-05-07

## 2025-05-05 RX ORDER — BISACODYL 10 MG
10 SUPPOSITORY, RECTAL RECTAL ONCE
Status: COMPLETED | OUTPATIENT
Start: 2025-05-05 | End: 2025-05-05

## 2025-05-05 RX ORDER — ACETAMINOPHEN 325 MG/10.15ML
1000 LIQUID ORAL 3 TIMES DAILY PRN
Status: DISCONTINUED | OUTPATIENT
Start: 2025-05-05 | End: 2025-05-07 | Stop reason: HOSPADM

## 2025-05-05 RX ORDER — GABAPENTIN 250 MG/5ML
1200 SOLUTION ORAL EVERY EVENING
Status: DISCONTINUED | OUTPATIENT
Start: 2025-05-05 | End: 2025-05-07

## 2025-05-05 RX ORDER — NALOXONE HYDROCHLORIDE 0.4 MG/ML
0.2 INJECTION, SOLUTION INTRAMUSCULAR; INTRAVENOUS; SUBCUTANEOUS
Status: DISCONTINUED | OUTPATIENT
Start: 2025-05-05 | End: 2025-05-07 | Stop reason: HOSPADM

## 2025-05-05 RX ADMIN — LACTULOSE 20 G: 20 SOLUTION ORAL at 20:18

## 2025-05-05 RX ADMIN — Medication 40 MG: at 16:57

## 2025-05-05 RX ADMIN — GABAPENTIN 600 MG: 300 CAPSULE ORAL at 08:36

## 2025-05-05 RX ADMIN — LACTULOSE 20 G: 20 SOLUTION ORAL at 08:37

## 2025-05-05 RX ADMIN — CARBIDOPA AND LEVODOPA 2 TABLET: 25; 100 TABLET ORAL at 20:14

## 2025-05-05 RX ADMIN — MIRTAZAPINE 7.5 MG: 7.5 TABLET, FILM COATED ORAL at 22:15

## 2025-05-05 RX ADMIN — BISACODYL 10 MG: 10 SUPPOSITORY RECTAL at 15:25

## 2025-05-05 RX ADMIN — ATORVASTATIN CALCIUM 10 MG: 10 TABLET, FILM COATED ORAL at 20:18

## 2025-05-05 RX ADMIN — SODIUM CHLORIDE: 0.9 INJECTION, SOLUTION INTRAVENOUS at 22:25

## 2025-05-05 RX ADMIN — PANTOPRAZOLE SODIUM 40 MG: 40 TABLET, DELAYED RELEASE ORAL at 08:36

## 2025-05-05 RX ADMIN — DULOXETINE 60 MG: 30 CAPSULE, DELAYED RELEASE ORAL at 08:36

## 2025-05-05 RX ADMIN — DICLOFENAC SODIUM 4 G: 10 GEL TOPICAL at 20:23

## 2025-05-05 RX ADMIN — AMLODIPINE BESYLATE 5 MG: 5 TABLET ORAL at 08:37

## 2025-05-05 RX ADMIN — LIDOCAINE 1 PATCH: 4 PATCH TOPICAL at 11:54

## 2025-05-05 RX ADMIN — SODIUM CHLORIDE: 0.9 INJECTION, SOLUTION INTRAVENOUS at 10:07

## 2025-05-05 RX ADMIN — CARBIDOPA AND LEVODOPA 2 TABLET: 25; 100 TABLET ORAL at 14:37

## 2025-05-05 RX ADMIN — AZITHROMYCIN MONOHYDRATE 250 MG: 500 INJECTION, POWDER, LYOPHILIZED, FOR SOLUTION INTRAVENOUS at 00:10

## 2025-05-05 RX ADMIN — SENNOSIDES 5 ML: 8.8 LIQUID ORAL at 20:19

## 2025-05-05 RX ADMIN — CARBOXYMETHYLCELLULOSE SODIUM 1 DROP: 5 SOLUTION/ DROPS OPHTHALMIC at 14:37

## 2025-05-05 RX ADMIN — DICLOFENAC SODIUM 4 G: 10 GEL TOPICAL at 14:37

## 2025-05-05 RX ADMIN — CARBIDOPA AND LEVODOPA 2 TABLET: 25; 100 TABLET ORAL at 11:54

## 2025-05-05 RX ADMIN — CARBOXYMETHYLCELLULOSE SODIUM 1 DROP: 5 SOLUTION/ DROPS OPHTHALMIC at 08:37

## 2025-05-05 RX ADMIN — CARBOXYMETHYLCELLULOSE SODIUM 1 DROP: 5 SOLUTION/ DROPS OPHTHALMIC at 20:19

## 2025-05-05 RX ADMIN — PSYLLIUM HUSK 1 PACKET: 3.4 POWDER ORAL at 08:38

## 2025-05-05 RX ADMIN — SERTRALINE HYDROCHLORIDE 100 MG: 100 TABLET ORAL at 08:40

## 2025-05-05 RX ADMIN — SENNOSIDES 1 TABLET: 8.6 TABLET, FILM COATED ORAL at 08:36

## 2025-05-05 RX ADMIN — LINACLOTIDE 72 MCG: 72 CAPSULE, GELATIN COATED ORAL at 08:37

## 2025-05-05 RX ADMIN — DICLOFENAC SODIUM 4 G: 10 GEL TOPICAL at 08:44

## 2025-05-05 RX ADMIN — INSULIN ASPART 1 UNITS: 100 INJECTION, SOLUTION INTRAVENOUS; SUBCUTANEOUS at 18:35

## 2025-05-05 ASSESSMENT — ACTIVITIES OF DAILY LIVING (ADL)
ADLS_ACUITY_SCORE: 64

## 2025-05-05 NOTE — PLAN OF CARE
"Aox4. VSS on RA. Clear liquid diet. A1gbw. New RPIV placed, NS running at 75. Denies nausea. LE numbness, baseline per pt. Zithro and rocephin given. Tolerated PO meds one at a time. Nutrition and pain consult on. GI following.         Goal Outcome Evaluation:      Plan of Care Reviewed With: patient    Overall Patient Progress: no changeOverall Patient Progress: no change    Outcome Evaluation: VSS. tolerated PO meds, denies nausea      Problem: Adult Inpatient Plan of Care  Goal: Plan of Care Review  Description: The Plan of Care Review/Shift note should be completed every shift.  The Outcome Evaluation is a brief statement about your assessment that the patient is improving, declining, or no change.  This information will be displayed automatically on your shiftnote.  5/5/2025 0541 by Arpita Krueger RN  Outcome: Progressing  Flowsheets (Taken 5/5/2025 0541)  Outcome Evaluation: VSS. tolerated PO meds, denies nausea  Plan of Care Reviewed With: patient  Overall Patient Progress: no change  5/5/2025 0540 by Arpita Krueger RN  Reactivated  Goal: Patient-Specific Goal (Individualized)  Description: You can add care plan individualizations to a care plan. Examples of Individualization might be:  \"Parent requests to be called daily at 9am for status\", \"I have a hard time hearing out of my right ear\", or \"Do not touch me to wake me up as it startlesme\".  5/5/2025 0541 by Arpita Krueger RN  Outcome: Progressing  5/5/2025 0540 by Arpita Krueger RN  Reactivated  Goal: Absence of Hospital-Acquired Illness or Injury  5/5/2025 0541 by Arpita Krueger RN  Outcome: Progressing  5/5/2025 0540 by Arpita Krueger RN  Reactivated  Intervention: Identify and Manage Fall Risk  Recent Flowsheet Documentation  Taken 5/5/2025 0011 by Arpita Krueger RN  Safety Promotion/Fall Prevention: safety round/check completed  Intervention: Prevent Skin Injury  Recent Flowsheet Documentation  Taken 5/5/2025 0011 by " Arpita Krueger RN  Body Position: position changed independently  Intervention: Prevent and Manage VTE (Venous Thromboembolism) Risk  Recent Flowsheet Documentation  Taken 5/5/2025 0011 by Arpita Krueger RN  VTE Prevention/Management: (ambulatory) SCDs off (sequential compression devices)  Intervention: Prevent Infection  Recent Flowsheet Documentation  Taken 5/5/2025 0011 by Arpita Krueger RN  Infection Prevention: rest/sleep promoted  Goal: Optimal Comfort and Wellbeing  5/5/2025 0541 by Arpita Krueger RN  Outcome: Progressing  5/5/2025 0540 by Arpita Krueger RN  Reactivated  Goal: Readiness for Transition of Care  5/5/2025 0541 by Arpita Krueger RN  Outcome: Progressing  5/5/2025 0540 by Arpita Krueger RN  Reactivated     Problem: Delirium  Goal: Optimal Coping  5/5/2025 0541 by Arpita Krueger RN  Outcome: Progressing  5/5/2025 0540 by Arpita Krueger RN  Reactivated  Goal: Improved Behavioral Control  5/5/2025 0541 by Arpita Krueger RN  Outcome: Progressing  5/5/2025 0540 by Arpita Krueger RN  Reactivated  Goal: Improved Attention and Thought Clarity  5/5/2025 0541 by Arpita Krueger RN  Outcome: Progressing  5/5/2025 0540 by Arpita Krueger RN  Reactivated  Goal: Improved Sleep  5/5/2025 0541 by Arpita Krueger RN  Outcome: Progressing  5/5/2025 0540 by Arpita Krueger RN  Reactivated     Problem: Fall Injury Risk  Goal: Absence of Fall and Fall-Related Injury  5/5/2025 0541 by Arpita Krueger RN  Outcome: Progressing  5/5/2025 0540 by Arpita Krueger RN  Reactivated  Intervention: Promote Injury-Free Environment  Recent Flowsheet Documentation  Taken 5/5/2025 0011 by Arpita Krueger RN  Safety Promotion/Fall Prevention: safety round/check completed     Problem: Comorbidity Management  Goal: Blood Glucose Levels Within Targeted Range  5/5/2025 0541 by Evans, Arpita S, RN  Outcome: Progressing  5/5/2025 0540 by Arpita Krueger  ARTEM DOYLE  Reactivated  Goal: Blood Pressure in Desired Range  5/5/2025 0541 by Arpita Krueger RN  Outcome: Progressing  5/5/2025 0540 by Arpita Krueger RN  Reactivated     Problem: Infection  Goal: Absence of Infection Signs and Symptoms  5/5/2025 0541 by Arpita Krueger RN  Outcome: Progressing  5/5/2025 0540 by Arpita Krueger RN  Reactivated     Problem: Gas Exchange Impaired  Goal: Optimal Gas Exchange  5/5/2025 0541 by Arpita Krueger RN  Outcome: Progressing  5/5/2025 0540 by Arpita Krueger RN  Reactivated  Intervention: Optimize Oxygenation and Ventilation  Recent Flowsheet Documentation  Taken 5/5/2025 0011 by Arpita Krueger RN  Head of Bed (HOB) Positioning: HOB at 20-30 degrees

## 2025-05-05 NOTE — CONSULTS
[facility] ACUTE INPATIENT PAIN SERVICE    [facility], [facility], [facility], [facility], [place]   PAIN CONSULT          ASSESSMENT/ PLAN:    Chronic pain syndrome with presence of implanted intrathecal pain pump.    IT pain pump interrogated today. Current settings:  Fentanyl SCR: 186.48mcg/day  Bupivacaine SCR: 7.459mg/day  Morphine SCR: 0.08071gu/day  PTMs disabled  Low [place] Alarm Date: 05/19/25      Multimodal Medication Therapy:   Adjuvants:   - APAP 1000mg tid prn  - Gabapentin 600mg morning, 1200mg at bedtime  - Cymbalta 60mg daily  - Topical lidocaine prn  Opioids:   - Intrathecal pain pump. See above  Non-medication interventions- Ice, PT  Constipation Prophylaxis- Scheduled metamucil and senokot  Follow up /Discharge Recommendations - We recommend prescribing the following at the time of discharge:  No opiates. He has a follow up with [facility] for his pump fill scheduled prior to 5/19/25      Subjective:  Cresencio is seen today in his bed alert and oriented in no acute distress. Reports his chronic back pain and leg pain is currently a 0/10 and controlled. Reports his IT pain pump provides good relief of his back pain and neuropathy.      Denies nausea, vomiting, constipation.      Reviewed continuing with current plan, all questions answered. No other pain concerns.     HPI:  Cresencio Peguero is a 89 year old male who was admitted on 5/3/2025.  I was asked by Dr. Hang Castañeda to see the patient for management of his chronic pain with presence of an implanted intrathecal pain pump. Admitted for [facility]. History of CAD s/p stent placement and CABG, Parkinson disease, type 2 diabetes mellitus, neuropathy, chronic pain syndrome with intrathecal pump placement, hypertension, dyslipidemia, 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     PDMP RESULTS: This indicates regular refills of Gabapentin 300mg capsules    History   Drug Use Unknown          "Tobacco Use      Smoking status: Former        Types: Pipe        Quit date: 6/1/2021        Years since quitting: 3.9      Smokeless tobacco: None        Objective:  Vital signs in last 24 hours:  B/P: 127/55, T: 98.1, P: 63, R: 21   Blood pressure 127/55, pulse 63, temperature 98.1  F (36.7  C), temperature source Oral, resp. rate 21, height 1.803 m (5' 11\"), weight 82.1 kg (180 lb 14.4 oz), SpO2 92%.        Review of Systems:   As per subjective, all others negative.    Physical Exam    General: in no apparent distress and non-toxic   HEENT: Head normocephalic atraumatic, oral mucosa moist. Sclerae anicteric  CV: Regular rhythm, normal rate, no murmurs  Resp: No wheezes, no rales or rhonchi, no focal consolidations  GI: Non-tender; +Bs  Skin: No rashes or lesions  Extremities: No peripheral edema  Psych: Normal affect, mood euthymic  Neuro: Grossly normal          Imaging:  Personally Reviewed.    Results for orders placed or performed during the hospital encounter of 05/03/25   XR Chest 2 Views    Impression    IMPRESSION:     Cardiac silhouette is normal in size. Previous median sternotomy and coronary revascularization. Unchanged atheromatous calcifications of the thoracic aorta but normal vascular pedicle.    There is a angular band of atelectasis in the lateral right base and mild elevation of the lateral left hemidiaphragm. The right lung is well-expanded and clear. No peribronchial fluid cuffs around the alma or peripheral septal thickening.    No pleural effusion or pneumothorax.    Disc space narrowing and diffuse thoracic spine degenerative osteophytes. Reverse right shoulder arthroplasty.   CT Abdomen Pelvis w/o Contrast    Impression    IMPRESSION:   1.  Left lower lobe pneumonia.  2.  Trabeculation of the urinary bladder wall without significant change. No hydronephrosis.  3.  No bowel obstruction or inflammation.          Lab Results:  Personally Reviewed.   Last Comprehensive Metabolic " "Panel:  Sodium   Date Value Ref Range Status   05/04/2025 139 135 - 145 mmol/L Final   06/30/2021 139 133 - 144 mmol/L Final     Potassium   Date Value Ref Range Status   05/04/2025 3.8 3.4 - 5.3 mmol/L Final   06/30/2021 3.9 3.4 - 5.3 mmol/L Final     Chloride   Date Value Ref Range Status   05/04/2025 105 98 - 107 mmol/L Final   06/30/2021 108 94 - 109 mmol/L Final     Carbon Dioxide   Date Value Ref Range Status   06/30/2021 29 20 - 32 mmol/L Final     Carbon Dioxide (CO2)   Date Value Ref Range Status   05/04/2025 24 22 - 29 mmol/L Final     Anion Gap   Date Value Ref Range Status   05/04/2025 10 7 - 15 mmol/L Final   06/30/2021 2 (L) 3 - 14 mmol/L Final     Glucose   Date Value Ref Range Status   06/30/2021 86 70 - 99 mg/dL Final     GLUCOSE BY METER POCT   Date Value Ref Range Status   05/05/2025 134 (H) 70 - 99 mg/dL Final     Urea Nitrogen   Date Value Ref Range Status   05/04/2025 33.2 (H) 8.0 - 23.0 mg/dL Final   06/30/2021 20 7 - 30 mg/dL Final     Creatinine   Date Value Ref Range Status   05/04/2025 0.91 0.67 - 1.17 mg/dL Final   06/30/2021 0.88 0.66 - 1.25 mg/dL Final     GFR Estimate   Date Value Ref Range Status   05/04/2025 81 >60 mL/min/1.73m2 Final     Comment:     eGFR calculated using 2021 CKD-EPI equation.   06/30/2021 78 >60 mL/min/[1.73_m2] Final     Comment:     Non  GFR Calc  Starting 12/18/2018, serum creatinine based estimated GFR (eGFR) will be   calculated using the Chronic Kidney Disease Epidemiology Collaboration   (CKD-EPI) equation.       Calcium   Date Value Ref Range Status   05/04/2025 8.9 8.8 - 10.4 mg/dL Final   06/30/2021 8.4 (L) 8.5 - 10.1 mg/dL Final        UA: No results found for: \"UAMP\", \"UBARB\", \"BENZODIAZEUR\", \"UCANN\", \"UCOC\", \"OPIT\", \"UPCP\"           Please see A&P for additional details of medical decision making.      Feliciano Barnes PA-C  Acute Care Pain Management  Team  Hours of pain coverage Mon-Fri 8-1600, afterhours please call the primary team   [facility] ([place], [place], [place], [facility])   Page via [employer] web console -Click for [employer] [facility]

## 2025-05-05 NOTE — PROCEDURES
Procedure Note - Intrathecal Pump Interrogation       Procedure:  Pump Check     Pre-Operative Diagnosis: chronic pain & presence of intrathecal pain pump     Post-Operative Diagnosis: Same     Indication: Chronic pain syndrome     Procedure Details: The pump was interrogated.      Findings: The patient's pump is filled with   Fentanyl concentration is 625mcg/ML and is delivering 186.48mcg/day   Bupivacaine with a concentration of 25mg/ML delivering 7.459mg/day  bupivacaine concentration is 0.1mg/mL and delivering 0.21496dg/day.    The patient is not due to have an alarm run off until 05/19/25.    Pain Clinic notified: [facility]        Patient tolerated the procedure well.    Ronnie Barnes PA-C  Acute Care Pain Management Program   Hours of pain coverage Mon-Fri 8-1600, afterhours please call the house officer   [facility] ([facility], [place], [place], [facility])   Page via [employer] web console -Click for [employer]

## 2025-05-05 NOTE — PROGRESS NOTES
Ordering provider notified that pt's son called with and concerns about EGD procedure that is scheduled tomorrow. Son expressed frustration as he did not think pt needed the procedure based on previous tests with speech therapy. Son's name and number left for provider to contact.

## 2025-05-05 NOTE — PLAN OF CARE
"Pt admitted on 5/3 for LLL PNA accompanied with SOB. GI discussed performing an EGD tomorrow to which pt agreed. Son called expressing concerns, states he wants updates when possible. Transitioned to full liquid diet, will not advance further without EGD. PT has DM2 and was given 1 unit of insulin with supper for BG of 153. VSS, ambulating to bathroom Ax1 GBW. Lungs coarse throughout with expiratory wheezes noted.    Problem: Adult Inpatient Plan of Care  Goal: Plan of Care Review  Description: The Plan of Care Review/Shift note should be completed every shift.  The Outcome Evaluation is a brief statement about your assessment that the patient is improving, declining, or no change.  This information will be displayed automatically on your shiftnote.  Outcome: Progressing  Flowsheets (Taken 5/5/2025 1854)  Plan of Care Reviewed With: patient  Goal: Patient-Specific Goal (Individualized)  Description: You can add care plan individualizations to a care plan. Examples of Individualization might be:  \"Parent requests to be called daily at 9am for status\", \"I have a hard time hearing out of my right ear\", or \"Do not touch me to wake me up as it startlesme\".  Outcome: Progressing  Flowsheets (Taken 5/5/2025 1854)  Individualized Care Needs: None  Anxieties, Fears or Concerns: None  Patient/Family-Specific Goals (Include Timeframe): None  Goal: Absence of Hospital-Acquired Illness or Injury  Outcome: Progressing  Intervention: Identify and Manage Fall Risk  Recent Flowsheet Documentation  Taken 5/5/2025 1653 by Rk Whitmore RN  Safety Promotion/Fall Prevention: activity supervised  Taken 5/5/2025 0820 by Rk Whitmore RN  Safety Promotion/Fall Prevention: activity supervised  Intervention: Prevent and Manage VTE (Venous Thromboembolism) Risk  Recent Flowsheet Documentation  Taken 5/5/2025 0820 by Rk Whitmore RN  VTE Prevention/Management: SCDs on (sequential compression devices)  Intervention: Prevent " Infection  Recent Flowsheet Documentation  Taken 5/5/2025 0820 by Rk Whitmore RN  Infection Prevention:   equipment surfaces disinfected   hand hygiene promoted   personal protective equipment utilized   rest/sleep promoted  Goal: Optimal Comfort and Wellbeing  Outcome: Progressing  Goal: Readiness for Transition of Care  Outcome: Progressing  Flowsheets (Taken 5/5/2025 1854)  Concerns to be Addressed: discharge planning  Intervention: Mutually Develop Transition Plan  Recent Flowsheet Documentation  Taken 5/5/2025 1854 by Rk Whitmore RN  Concerns to be Addressed: discharge planning     Problem: Delirium  Goal: Optimal Coping  Outcome: Progressing  Goal: Improved Behavioral Control  Outcome: Progressing  Intervention: Minimize Safety Risk  Recent Flowsheet Documentation  Taken 5/5/2025 1653 by Rk Whitmore RN  Enhanced Safety Measures: room near unit station  Taken 5/5/2025 0820 by Rk Whitmore RN  Enhanced Safety Measures: room near unit station  Goal: Improved Attention and Thought Clarity  Outcome: Progressing  Goal: Improved Sleep  Outcome: Progressing     Problem: Fall Injury Risk  Goal: Absence of Fall and Fall-Related Injury  Outcome: Progressing  Intervention: Identify and Manage Contributors  Recent Flowsheet Documentation  Taken 5/5/2025 1653 by Rk Whitmore RN  Medication Review/Management: medications reviewed  Taken 5/5/2025 0820 by Rk Whitmore RN  Medication Review/Management: medications reviewed  Intervention: Promote Injury-Free Environment  Recent Flowsheet Documentation  Taken 5/5/2025 1653 by Rk Whitmore RN  Safety Promotion/Fall Prevention: activity supervised  Taken 5/5/2025 0820 by Rk Whitmore RN  Safety Promotion/Fall Prevention: activity supervised     Problem: Comorbidity Management  Goal: Blood Glucose Levels Within Targeted Range  Outcome: Progressing  Intervention: Monitor and Manage Glycemia  Recent Flowsheet Documentation  Taken 5/5/2025 1653 by  Rk Whitmore RN  Medication Review/Management: medications reviewed  Taken 5/5/2025 0820 by Rk Whitmore RN  Medication Review/Management: medications reviewed  Goal: Blood Pressure in Desired Range  Outcome: Progressing  Intervention: Maintain Blood Pressure Management  Recent Flowsheet Documentation  Taken 5/5/2025 1653 by Rk Whitmore RN  Medication Review/Management: medications reviewed  Taken 5/5/2025 0820 by Rk Whitmore RN  Medication Review/Management: medications reviewed     Problem: Infection  Goal: Absence of Infection Signs and Symptoms  Outcome: Progressing     Problem: Gas Exchange Impaired  Goal: Optimal Gas Exchange  Outcome: Progressing   Goal Outcome Evaluation:      Plan of Care Reviewed With: patient

## 2025-05-05 NOTE — PHARMACY
Pt with difficulty swallowing pills.  Pharmacy consult to change meds to liquid form as able.  The following medications were changed to liquid:    Tylenol  Gabapentin  Protonix  Zoloft  Senna    The following are not available as liquid, but may be crushed (admin note added to medication):  Sinemet  Norvasc  Lipitor  Mirtazapine    Also added administration instructions to Linzess (admin with water or applesauce).    Change duloxetine from capsule to sprinkle caps so this can be opened for administration.

## 2025-05-05 NOTE — PROGRESS NOTES
[facility]    Medicine Progress Note - Hospitalist Service    Date of Admission:  5/3/2025    Assessment & Plan   Cresencio Peguero is an 89-year-old male with medical history significant for CAD s/p stent placement and CABG, Parkinson disease, type 2 diabetes mellitus, neuropathy, chronic pain syndrome with intrathecal pump placement, hypertension, dyslipidemia, 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  He presented to the ED with complaints of cough, dysphagia, and shortness of breath.  He is admitted for probable aspiration pneumonia.     Pneumonia, CAP vs aspiration pneumonia:  Patient with history of esophageal stricture/web with dysphagia.  He underwent esophageal dilatation and follow-up 2024.  Was able to tolerate pills and solid meals.  However, reports that he has been having difficulties with medications and solid foods over the past few weeks.  Has been only consuming liquid foods for quite some time now.  Underwent speech therapy evaluation on 4/23/2025 and they recommended EGD.  -On ceftriaxone and zithromax.  Suspect some of inflammation seen if aspiration more chemical than bacterial.  See below concerning dysphagia plan.    Dysphagia, multifactorial with anterior osteophyte/oropharyngeal dysphagia and lower esophageal stricture:  -GI Consult appreciated.  Unclear what dominant source of dysphagia is.  Savary dilation an option but not able to do here.   Discussed with GI and they plan to do EGD tomorrow to see if web/need for lower esophageal dilation.  Long term Savary an option also.  Patient does not want a G-Tube.  Trial of full liquids today, would not try solids before EGD.    Transient Lactic acidosis:   Patient with elevated lactic acid of 6.1 on presentation.  Likely multifactorial with dehydration and pneumonia contributing, though not felt likely sepsis.  Felt more likely dehydration.  Has improved to 1.1  "with IV hydration.     Chronic pain syndrome:  Neuropathy:  -Has intrathecal pump in place, pain team consult appreciated.  Continue gabapentin.     Mood disorder:  -Continue Cymbalta and mirtazapine     Parkinson's disease:  -Continue Sinemet     CAD s/p stent placement and CABG  Hypertension  Hyperlipidemia:  -Continue Norvasc and Lipitor     GERD:  -Continue PPI     Type 2 diabetes mellitus:  -Continue correctional scale insulin  -Hypoglycemia protocol    Constipation:  -Lactulose, linzess to continue.      PPE Used:  Mask          Diet: Full Liquid Diet  Snacks/Supplements Adult: Ensure Enlive; With Meals    DVT Prophylaxis: Pneumatic Compression Devices  Henderson Catheter: Not present  Lines: None     Cardiac Monitoring: None  Code Status: No CPR- Do NOT Intubate      Clinically Significant Risk Factors                             # DMII: A1C = 8.3 % (Ref range: <5.7 %) within past 6 months, PRESENT ON ADMISSION  # Overweight: Estimated body mass index is 25.23 kg/m  as calculated from the following:    Height as of this encounter: 1.803 m (5' 11\").    Weight as of this encounter: 82.1 kg (180 lb 14.4 oz)., PRESENT ON ADMISSION     # Financial/Environmental Concerns: none         Disposition Plan     Medically Ready for Discharge: Anticipated in 2-4 Days             Himanshu Nava DO  Hospitalist Service  [facility]  Securely message with "[employer]" (more info)  Text page via [email or web address] Paging/Directory   ______________________________________________________________________    Interval History   Assumed care, history reviewed.  Mr. Peguero feels better.  No new pain, worsening SOB.  Feels clear liquid swallowing went well.    Physical Exam   Vital Signs: Temp: 98  F (36.7  C) Temp src: Oral BP: (!) 152/74 Pulse: 59   Resp: 20 SpO2: 94 % O2 Device: None (Room air)    Weight: 180 lbs 14.4 oz    GEN:  Alert, oriented, appears ill but comfortable.  HEENT:  Normocephalic/atraumatic, no scleral " icterus, no nasal discharge, mouth moist.  CV:  Regular rate and rhythm, distant.  LUNGS:  Clear upper, coarse lower lungs.  No wheezes/retractions.  Symmetric chest rise on inhalation noted.  ABD:  Active bowel sounds, soft, non-tender/non-distended.  No guarding/rigidity.  EXT:  Trace edema.  No cyanosis.  No new joint synovitis noted.  SKIN:  Dry to touch, no new exanthems noted in the visualized areas.    Medical Decision Making       42 MINUTES SPENT BY ME on the date of service doing chart review, history, exam, documentation & further activities per the note.      Data   Medications   Current Facility-Administered Medications   Medication Dose Route Frequency Provider Last Rate Last Admin    medication given by implanted intrathecal pump   Does not apply Continuous Ronnie Barnes PA-C        sodium chloride 0.9 % infusion   Intravenous Continuous Hang Castañeda MD 75 mL/hr at 05/05/25 1007 New Bag at 05/05/25 1007     Current Facility-Administered Medications   Medication Dose Route Frequency Provider Last Rate Last Admin    amLODIPine (NORVASC) tablet 5 mg  5 mg Oral Daily Ekaterina, Tong A, DO   5 mg at 05/05/25 0837    atorvastatin (LIPITOR) tablet 10 mg  10 mg Oral QPM Ekaterina, Tong A, DO   10 mg at 05/04/25 2045    azithromycin (ZITHROMAX) 250 mg in  mL intermittent infusion  250 mg Intravenous Q24H Ekaterina, Tong A, DO   250 mg at 05/05/25 0010    carbidopa-levodopa (SINEMET)  MG per tablet 2 tablet  2 tablet Oral TID Ekaterina, Tong A, DO   2 tablet at 05/05/25 1437    carboxymethylcellulose PF (REFRESH PLUS) 0.5 % ophthalmic solution 1 drop  1 drop Both Eyes TID Ekaterina, Tong A, DO   1 drop at 05/05/25 1437    cefTRIAXone (ROCEPHIN) 1 g vial to attach to  mL bag for ADULTS or NS 50 mL bag for PEDS  1 g Intravenous Q24H Ekaterina, Tong A, DO   1 g at 05/04/25 2329    diclofenac (VOLTAREN) 1 % topical gel 4 g  4 g Topical TID Ekaterina, Tong A, DO   4 g at 05/05/25 1437    [START ON  5/6/2025] DULoxetine HCl CSDR 60 mg  60 mg Oral Daily Himanshu Nava DO        gabapentin (NEURONTIN) solution 1,200 mg  1,200 mg Oral QPM Himanshu Nava DO        [START ON 5/6/2025] gabapentin (NEURONTIN) solution 600 mg  600 mg Oral QAM Himanshu Nava,         insulin aspart (NovoLOG) injection (RAPID ACTING)  1-3 Units Subcutaneous TID AC Tong Tobar DO        insulin aspart (NovoLOG) injection (RAPID ACTING)  1-3 Units Subcutaneous At Bedtime EkaterinaEstephania warrennt A, DO        lactulose (CHRONULAC) solution 20 g  20 g Oral BID Ekaterina, Tong A, DO   20 g at 05/05/25 0837    Lidocaine (LIDOCARE) 4 % Patch 1 patch  1 patch Transdermal Q24H EkaterinaEstephania warrennt A, DO   1 patch at 05/05/25 1154    linaclotide (LINZESS) capsule 72 mcg  72 mcg Oral QAM Tong Tobar A, DO   72 mcg at 05/05/25 0837    mirtazapine (REMERON) tablet TABS 7.5 mg  7.5 mg Oral At Bedtime Tong Tobar A, DO   7.5 mg at 05/04/25 2220    pantoprazole (PROTONIX) 2 mg/mL suspension 40 mg  40 mg Oral BID AC Himanshu Nava DO        psyllium (METAMUCIL/KONSYL) Packet 1 packet  1 packet Oral Daily Tong Tobar A, DO   1 packet at 05/05/25 0838    sennosides (SENOKOT) syrup 5 mL  5 mL Oral BID Himanshu Nava DO        [START ON 5/6/2025] sertraline (ZOLOFT) 20 MG/ML (HIGH CONC) solution 100 mg  100 mg Oral Daily Himanshu Nava DO        sodium chloride (PF) 0.9% PF flush 3 mL  3 mL Intracatheter Q8H HUMPHREY Ekaterina, Tong A, DO   3 mL at 05/04/25 2236     Labs and Imaging results below reviewed today.  Recent Labs   Lab 05/04/25  0736 05/03/25 2035   WBC 6.5 10.2   HGB 9.4* 10.4*   HCT 28.7* 31.9*   MCV 75* 76*   * 173     7-Day Micro Results       Collected Updated Procedure Result Status      05/03/2025 2355 05/05/2025 0231 Blood Culture Peripheral Blood [[id number]]   Peripheral Blood    Preliminary result Component Value   Culture No growth after 1 day  [P]                05/03/2025 2254 05/05/2025 0031 Blood Culture Peripheral Blood  [[id number]]   Peripheral Blood    Preliminary result Component Value   Culture No growth after 1 day  [P]                05/03/2025 2032 05/03/2025 2117 Influenza A/B, RSV and SARS-CoV2 PCR (COVID-19) Nasopharyngeal [[id number]]    Swab from Nasopharyngeal    Final result Component Value   Influenza A PCR Negative   Influenza B PCR Negative   RSV PCR Negative   SARS CoV2 PCR Negative   NEGATIVE: SARS-CoV-2 (COVID-19) RNA not detected, presumed negative.                  Recent Labs   Lab 05/05/25  1159 05/05/25  0734 05/05/25  0211 05/04/25  0759 05/04/25  0736 05/04/25  0205 05/03/25 2035   NA  --   --   --   --  139  --  138   POTASSIUM  --   --   --   --  3.8  --  4.0   CHLORIDE  --   --   --   --  105  --  102   CO2  --   --   --   --  24  --  20*   ANIONGAP  --   --   --   --  10  --  16*   * 112* 134*   < > 114*   < > 158*   BUN  --   --   --   --  33.2*  --  37.9*   CR  --   --   --   --  0.91  --  0.95   GFRESTIMATED  --   --   --   --  81  --  77   ISIS  --   --   --   --  8.9  --  9.6   PROTTOTAL  --   --   --   --   --   --  7.0   ALBUMIN  --   --   --   --   --   --  4.2   BILITOTAL  --   --   --   --   --   --  0.3   ALKPHOS  --   --   --   --   --   --  109   AST  --   --   --   --   --   --  17   ALT  --   --   --   --   --   --  5    < > = values in this interval not displayed.     No results found for this or any previous visit (from the past 24 hours).

## 2025-05-05 NOTE — CONSULTS
"CLINICAL NUTRITION SERVICES - ASSESSMENT NOTE    RECOMMENDATIONS FOR MDs/PROVIDERS TO ORDER:  None at this time    Registered Dietitian Interventions:  Will send Ensure Plus HP w/ breakfast and dinner trays    Future/Additional Recommendations:  Oral intake encouragement appreciated  Monitor diet order/recs, po intake adequacy, weight trending, nutrition-related labs/meds     REASON FOR ASSESSMENT  Positive admission nutrition risk screen and Provider order - \"Inadequate oral intake.\"    Nutrition screen:   Have you recently lost weight without trying - \"Yes, [how much] 2-13#\"  Have you been eating poorly because of a decreased appetite - \"Yes\"    Per chart, pt presented with cough, dysphagia and suspected to have aspiration, and possible pneumonia. Patient has been drinking mostly liquids for nutrition due to dysphagia.     PMH: anxiety, CAD s/p stent and CABG, parkinsons disease, DMT2, neuropathy, chronic pain with intrathecal pump, HTN, DLD, esophageal strictures/webbs requiring dilatation, and dysphagia with recurrent aspiration pneumonia    INFORMATION OBTAINED  Patient not available on attempts x3 to visit today.     NUTRITION HISTORY  Deferred at this time    CURRENT NUTRITION ORDERS  Diet: Full Liquid    CURRENT INTAKE/TOLERANCE:  5/4: 75% x1 intake, ordered 3 meal trays totaling 827 kcal and 0 g protein    LABS  Nutrition-relevant labs:  5/4: BUN 33.2 (H) -174    MEDICATIONS  Nutrition-relevant medications:  Carbidopa-levodopa TID, LSSI, Lactulose BID, Remeron, Protonix BID, Senokot BID, IV NS @ 75 mL/hr, dulcolax    ANTHROPOMETRICS  Height: 180.3 cm (5' 11\")  Admission Weight: 81.6 kg (180 lb) (05/03/25 2007)   Most Recent Weight: 82.1 kg (180 lb 14.4 oz) (05/04/25 0044)  IBW: 78.1 kg  BMI: Body mass index is 25.23 kg/m .   Weight History: No significant weight loss   Wt Readings from Last 10 Encounters:   05/04/25 82.1 kg (180 lb 14.4 oz)   03/10/25 84.4 kg (186 lb)   03/04/25 85.1 kg (187 lb 9.6 " oz)   01/16/25 85.4 kg (188 lb 3.2 oz)   11/02/24 88.4 kg (194 lb 14.2 oz)   06/05/24 84.7 kg (186 lb 12.8 oz)   05/30/24 84.7 kg (186 lb 12.8 oz)   05/28/24 85.7 kg (189 lb)   05/19/24 90.6 kg (199 lb 11.8 oz)   01/24/24 87.5 kg (193 lb)     Dosing Weight: 82.1 kg, based on actual wt    ASSESSED NUTRITION NEEDS  Estimated Energy Needs: 8166-8921 kcals/day (25 - 30 kcals/kg)  Justification: Maintenance  Estimated Protein Needs: 82-98 grams protein/day (1 - 1.2 grams of pro/kg)  Justification:  Preservation of LBM  Estimated Fluid Needs: 0678-9198 mL/day (1 mL/kcal)  Justification: Maintenance    SYSTEM AND PHYSICAL FINDINGS    GI symptoms:  Reviewed. Last BM 5/3.  Skin/wounds:  Reviewed. No pressure injuries indicated.    MALNUTRITION  % Intake: Unable to assess  % Weight Loss: Weight loss does not meet criteria   Subcutaneous Fat Loss: Unable to assess  Muscle Loss: Unable to assess  Fluid Accumulation/Edema: None noted  Malnutrition Diagnosis: Unable to determine due to lack of NFPE, nutrition hx  Malnutrition Present on Admission: Unable to assess    NUTRITION DIAGNOSIS  Predicted inadequate nutrient intake (protein, energy)  related to dysphagia as evidenced by +MST and H&P report    INTERVENTIONS  See nutrition interventions above    GOALS  Patient to consume % of nutritionally adequate meal trays TID, or the equivalent with supplements/snacks.     MONITORING/EVALUATION  Progress toward goals will be monitored and evaluated per policy.    Taylor El RD, LD  Available on [employer]

## 2025-05-06 ENCOUNTER — ANESTHESIA EVENT (OUTPATIENT)
Dept: SURGERY | Facility: CLINIC | Age: 89
DRG: 178 | End: 2025-05-06
Payer: MEDICARE

## 2025-05-06 ENCOUNTER — ANESTHESIA (OUTPATIENT)
Dept: SURGERY | Facility: CLINIC | Age: 89
DRG: 178 | End: 2025-05-06
Payer: MEDICARE

## 2025-05-06 LAB
ANION GAP SERPL CALCULATED.3IONS-SCNC: 10 MMOL/L (ref 7–15)
BUN SERPL-MCNC: 13.5 MG/DL (ref 8–23)
CALCIUM SERPL-MCNC: 8.9 MG/DL (ref 8.8–10.4)
CHLORIDE SERPL-SCNC: 108 MMOL/L (ref 98–107)
CREAT SERPL-MCNC: 0.79 MG/DL (ref 0.67–1.17)
EGFRCR SERPLBLD CKD-EPI 2021: 85 ML/MIN/1.73M2
GLUCOSE BLDC GLUCOMTR-MCNC: 109 MG/DL (ref 70–99)
GLUCOSE BLDC GLUCOMTR-MCNC: 125 MG/DL (ref 70–99)
GLUCOSE BLDC GLUCOMTR-MCNC: 152 MG/DL (ref 70–99)
GLUCOSE BLDC GLUCOMTR-MCNC: 158 MG/DL (ref 70–99)
GLUCOSE BLDC GLUCOMTR-MCNC: 159 MG/DL (ref 70–99)
GLUCOSE BLDC GLUCOMTR-MCNC: 162 MG/DL (ref 70–99)
GLUCOSE SERPL-MCNC: 116 MG/DL (ref 70–99)
HCO3 SERPL-SCNC: 19 MMOL/L (ref 22–29)
POTASSIUM SERPL-SCNC: 4 MMOL/L (ref 3.4–5.3)
SODIUM SERPL-SCNC: 137 MMOL/L (ref 135–145)
UPPER GI ENDOSCOPY: NORMAL

## 2025-05-06 PROCEDURE — 250N000011 HC RX IP 250 OP 636: Performed by: NURSE ANESTHETIST, CERTIFIED REGISTERED

## 2025-05-06 PROCEDURE — 0D718ZZ DILATION OF UPPER ESOPHAGUS, VIA NATURAL OR ARTIFICIAL OPENING ENDOSCOPIC: ICD-10-PCS | Performed by: INTERNAL MEDICINE

## 2025-05-06 PROCEDURE — 710N000012 HC RECOVERY PHASE 2, PER MINUTE: Performed by: INTERNAL MEDICINE

## 2025-05-06 PROCEDURE — 250N000011 HC RX IP 250 OP 636: Performed by: INTERNAL MEDICINE

## 2025-05-06 PROCEDURE — 80048 BASIC METABOLIC PNL TOTAL CA: CPT | Performed by: INTERNAL MEDICINE

## 2025-05-06 PROCEDURE — 250N000013 HC RX MED GY IP 250 OP 250 PS 637: Performed by: INTERNAL MEDICINE

## 2025-05-06 PROCEDURE — 999N000141 HC STATISTIC PRE-PROCEDURE NURSING ASSESSMENT: Performed by: INTERNAL MEDICINE

## 2025-05-06 PROCEDURE — 370N000017 HC ANESTHESIA TECHNICAL FEE, PER MIN: Performed by: INTERNAL MEDICINE

## 2025-05-06 PROCEDURE — 36415 COLL VENOUS BLD VENIPUNCTURE: CPT | Performed by: INTERNAL MEDICINE

## 2025-05-06 PROCEDURE — 258N000003 HC RX IP 258 OP 636: Performed by: HOSPITALIST

## 2025-05-06 PROCEDURE — 250N000011 HC RX IP 250 OP 636: Performed by: HOSPITALIST

## 2025-05-06 PROCEDURE — 0D738ZZ DILATION OF LOWER ESOPHAGUS, VIA NATURAL OR ARTIFICIAL OPENING ENDOSCOPIC: ICD-10-PCS | Performed by: INTERNAL MEDICINE

## 2025-05-06 PROCEDURE — 250N000009 HC RX 250: Performed by: NURSE ANESTHETIST, CERTIFIED REGISTERED

## 2025-05-06 PROCEDURE — 360N000075 HC SURGERY LEVEL 2, PER MIN: Performed by: INTERNAL MEDICINE

## 2025-05-06 PROCEDURE — 250N000013 HC RX MED GY IP 250 OP 250 PS 637: Performed by: HOSPITALIST

## 2025-05-06 PROCEDURE — 99232 SBSQ HOSP IP/OBS MODERATE 35: CPT | Performed by: PHYSICIAN ASSISTANT

## 2025-05-06 PROCEDURE — 120N000001 HC R&B MED SURG/OB

## 2025-05-06 PROCEDURE — 272N000001 HC OR GENERAL SUPPLY STERILE: Performed by: INTERNAL MEDICINE

## 2025-05-06 PROCEDURE — C1726 CATH, BAL DIL, NON-VASCULAR: HCPCS | Performed by: INTERNAL MEDICINE

## 2025-05-06 RX ORDER — ONDANSETRON 2 MG/ML
4 INJECTION INTRAMUSCULAR; INTRAVENOUS EVERY 6 HOURS PRN
Status: DISCONTINUED | OUTPATIENT
Start: 2025-05-06 | End: 2025-05-07 | Stop reason: HOSPADM

## 2025-05-06 RX ORDER — LIDOCAINE 40 MG/G
CREAM TOPICAL
Status: DISCONTINUED | OUTPATIENT
Start: 2025-05-06 | End: 2025-05-06 | Stop reason: HOSPADM

## 2025-05-06 RX ORDER — FENTANYL CITRATE 50 UG/ML
50 INJECTION, SOLUTION INTRAMUSCULAR; INTRAVENOUS EVERY 5 MIN PRN
Status: DISCONTINUED | OUTPATIENT
Start: 2025-05-06 | End: 2025-05-06 | Stop reason: HOSPADM

## 2025-05-06 RX ORDER — HYDROMORPHONE HCL IN WATER/PF 6 MG/30 ML
0.4 PATIENT CONTROLLED ANALGESIA SYRINGE INTRAVENOUS EVERY 5 MIN PRN
Status: DISCONTINUED | OUTPATIENT
Start: 2025-05-06 | End: 2025-05-06 | Stop reason: HOSPADM

## 2025-05-06 RX ORDER — ONDANSETRON 2 MG/ML
4 INJECTION INTRAMUSCULAR; INTRAVENOUS EVERY 30 MIN PRN
Status: DISCONTINUED | OUTPATIENT
Start: 2025-05-06 | End: 2025-05-06 | Stop reason: HOSPADM

## 2025-05-06 RX ORDER — SODIUM CHLORIDE, SODIUM LACTATE, POTASSIUM CHLORIDE, CALCIUM CHLORIDE 600; 310; 30; 20 MG/100ML; MG/100ML; MG/100ML; MG/100ML
INJECTION, SOLUTION INTRAVENOUS CONTINUOUS
Status: DISCONTINUED | OUTPATIENT
Start: 2025-05-06 | End: 2025-05-06 | Stop reason: HOSPADM

## 2025-05-06 RX ORDER — MEPERIDINE HYDROCHLORIDE 25 MG/ML
12.5 INJECTION INTRAMUSCULAR; INTRAVENOUS; SUBCUTANEOUS EVERY 5 MIN PRN
Status: DISCONTINUED | OUTPATIENT
Start: 2025-05-06 | End: 2025-05-06 | Stop reason: HOSPADM

## 2025-05-06 RX ORDER — FLUMAZENIL 0.1 MG/ML
0.2 INJECTION, SOLUTION INTRAVENOUS
Status: ACTIVE | OUTPATIENT
Start: 2025-05-06 | End: 2025-05-07

## 2025-05-06 RX ORDER — HYDRALAZINE HYDROCHLORIDE 20 MG/ML
2.5-5 INJECTION INTRAMUSCULAR; INTRAVENOUS EVERY 10 MIN PRN
Status: DISCONTINUED | OUTPATIENT
Start: 2025-05-06 | End: 2025-05-06 | Stop reason: HOSPADM

## 2025-05-06 RX ORDER — NALOXONE HYDROCHLORIDE 0.4 MG/ML
0.1 INJECTION, SOLUTION INTRAMUSCULAR; INTRAVENOUS; SUBCUTANEOUS
Status: DISCONTINUED | OUTPATIENT
Start: 2025-05-06 | End: 2025-05-06 | Stop reason: HOSPADM

## 2025-05-06 RX ORDER — OMEPRAZOLE
20 KIT
Status: DISCONTINUED | OUTPATIENT
Start: 2025-05-07 | End: 2025-05-06

## 2025-05-06 RX ORDER — CEFTRIAXONE 1 G/1
1 INJECTION, POWDER, FOR SOLUTION INTRAMUSCULAR; INTRAVENOUS EVERY 24 HOURS
Status: DISCONTINUED | OUTPATIENT
Start: 2025-05-06 | End: 2025-05-07 | Stop reason: HOSPADM

## 2025-05-06 RX ORDER — OMEPRAZOLE
20 KIT 2 TIMES DAILY
Status: DISCONTINUED | OUTPATIENT
Start: 2025-05-06 | End: 2025-05-07

## 2025-05-06 RX ORDER — ONDANSETRON 4 MG/1
4 TABLET, ORALLY DISINTEGRATING ORAL EVERY 30 MIN PRN
Status: DISCONTINUED | OUTPATIENT
Start: 2025-05-06 | End: 2025-05-06 | Stop reason: HOSPADM

## 2025-05-06 RX ORDER — LABETALOL HYDROCHLORIDE 5 MG/ML
10 INJECTION, SOLUTION INTRAVENOUS
Status: DISCONTINUED | OUTPATIENT
Start: 2025-05-06 | End: 2025-05-06 | Stop reason: HOSPADM

## 2025-05-06 RX ORDER — PROPOFOL 10 MG/ML
INJECTION, EMULSION INTRAVENOUS PRN
Status: DISCONTINUED | OUTPATIENT
Start: 2025-05-06 | End: 2025-05-06

## 2025-05-06 RX ORDER — HYDROMORPHONE HCL IN WATER/PF 6 MG/30 ML
0.2 PATIENT CONTROLLED ANALGESIA SYRINGE INTRAVENOUS EVERY 5 MIN PRN
Status: DISCONTINUED | OUTPATIENT
Start: 2025-05-06 | End: 2025-05-06 | Stop reason: HOSPADM

## 2025-05-06 RX ORDER — PROPOFOL 10 MG/ML
INJECTION, EMULSION INTRAVENOUS CONTINUOUS PRN
Status: DISCONTINUED | OUTPATIENT
Start: 2025-05-06 | End: 2025-05-06

## 2025-05-06 RX ORDER — FENTANYL CITRATE 50 UG/ML
25 INJECTION, SOLUTION INTRAMUSCULAR; INTRAVENOUS EVERY 5 MIN PRN
Status: DISCONTINUED | OUTPATIENT
Start: 2025-05-06 | End: 2025-05-06 | Stop reason: HOSPADM

## 2025-05-06 RX ORDER — LIDOCAINE HYDROCHLORIDE 20 MG/ML
INJECTION, SOLUTION INFILTRATION; PERINEURAL PRN
Status: DISCONTINUED | OUTPATIENT
Start: 2025-05-06 | End: 2025-05-06

## 2025-05-06 RX ORDER — ALBUTEROL SULFATE 0.83 MG/ML
2.5 SOLUTION RESPIRATORY (INHALATION) EVERY 4 HOURS PRN
Status: DISCONTINUED | OUTPATIENT
Start: 2025-05-06 | End: 2025-05-06 | Stop reason: HOSPADM

## 2025-05-06 RX ORDER — DEXAMETHASONE SODIUM PHOSPHATE 4 MG/ML
4 INJECTION, SOLUTION INTRA-ARTICULAR; INTRALESIONAL; INTRAMUSCULAR; INTRAVENOUS; SOFT TISSUE
Status: DISCONTINUED | OUTPATIENT
Start: 2025-05-06 | End: 2025-05-06 | Stop reason: HOSPADM

## 2025-05-06 RX ADMIN — DICLOFENAC SODIUM 4 G: 10 GEL TOPICAL at 09:50

## 2025-05-06 RX ADMIN — DULOXETINE 60 MG: 30 CAPSULE, DELAYED RELEASE ORAL at 10:01

## 2025-05-06 RX ADMIN — INSULIN ASPART 1 UNITS: 100 INJECTION, SOLUTION INTRAVENOUS; SUBCUTANEOUS at 17:48

## 2025-05-06 RX ADMIN — CARBOXYMETHYLCELLULOSE SODIUM 1 DROP: 5 SOLUTION/ DROPS OPHTHALMIC at 20:27

## 2025-05-06 RX ADMIN — CARBIDOPA AND LEVODOPA 2 TABLET: 25; 100 TABLET ORAL at 20:27

## 2025-05-06 RX ADMIN — ATORVASTATIN CALCIUM 10 MG: 10 TABLET, FILM COATED ORAL at 20:27

## 2025-05-06 RX ADMIN — PROPOFOL 40 MG: 10 INJECTION, EMULSION INTRAVENOUS at 12:40

## 2025-05-06 RX ADMIN — MIRTAZAPINE 7.5 MG: 7.5 TABLET, FILM COATED ORAL at 22:36

## 2025-05-06 RX ADMIN — CEFTRIAXONE 1 G: 1 INJECTION, POWDER, FOR SOLUTION INTRAMUSCULAR; INTRAVENOUS at 22:37

## 2025-05-06 RX ADMIN — CARBIDOPA AND LEVODOPA 2 TABLET: 25; 100 TABLET ORAL at 15:37

## 2025-05-06 RX ADMIN — CEFTRIAXONE 1 G: 1 INJECTION, POWDER, FOR SOLUTION INTRAMUSCULAR; INTRAVENOUS at 00:16

## 2025-05-06 RX ADMIN — AZITHROMYCIN MONOHYDRATE 250 MG: 500 INJECTION, POWDER, LYOPHILIZED, FOR SOLUTION INTRAVENOUS at 01:07

## 2025-05-06 RX ADMIN — ONDANSETRON 4 MG: 2 INJECTION, SOLUTION INTRAMUSCULAR; INTRAVENOUS at 10:45

## 2025-05-06 RX ADMIN — LACTULOSE 20 G: 20 SOLUTION ORAL at 09:48

## 2025-05-06 RX ADMIN — LIDOCAINE HYDROCHLORIDE 20 MG: 20 INJECTION, SOLUTION INFILTRATION; PERINEURAL at 12:40

## 2025-05-06 RX ADMIN — PROPOFOL 100 MCG/KG/MIN: 10 INJECTION, EMULSION INTRAVENOUS at 12:40

## 2025-05-06 RX ADMIN — AMLODIPINE BESYLATE 5 MG: 5 TABLET ORAL at 09:48

## 2025-05-06 RX ADMIN — DICLOFENAC SODIUM 4 G: 10 GEL TOPICAL at 20:35

## 2025-05-06 RX ADMIN — LIDOCAINE 1 PATCH: 4 PATCH TOPICAL at 15:36

## 2025-05-06 RX ADMIN — CARBOXYMETHYLCELLULOSE SODIUM 1 DROP: 5 SOLUTION/ DROPS OPHTHALMIC at 09:48

## 2025-05-06 RX ADMIN — SENNOSIDES 5 ML: 8.8 LIQUID ORAL at 20:27

## 2025-05-06 RX ADMIN — LINACLOTIDE 72 MCG: 72 CAPSULE, GELATIN COATED ORAL at 10:01

## 2025-05-06 RX ADMIN — SERTRALINE HYDROCHLORIDE 100 MG: 20 SOLUTION ORAL at 10:02

## 2025-05-06 RX ADMIN — SENNOSIDES 5 ML: 8.8 LIQUID ORAL at 09:48

## 2025-05-06 ASSESSMENT — ACTIVITIES OF DAILY LIVING (ADL)
ADLS_ACUITY_SCORE: 67

## 2025-05-06 NOTE — PROGRESS NOTES
"GASTROENTEROLOGY PROGRESS NOTE     SUBJECTIVE:  Swallowing clears without issue. No abdominal pain. Endorses constipation.      OBJECTIVE:  BP (!) 171/68 (BP Location: Left arm)   Pulse 72   Temp 99.2  F (37.3  C) (Oral)   Resp 18   Ht 1.803 m (5' 11\")   Wt 82.1 kg (180 lb 14.4 oz)   SpO2 96%   BMI 25.23 kg/m    Temp (24hrs), Av.4  F (36.9  C), Min:97.4  F (36.3  C), Max:99.2  F (37.3  C)    Patient Vitals for the past 72 hrs:   Weight   25 82.1 kg (180 lb 14.4 oz)   25 81.6 kg (180 lb)       Intake/Output Summary (Last 24 hours) at 2025 0756  Last data filed at 2025 0756  Gross per 24 hour   Intake 1745 ml   Output 2720 ml   Net -975 ml        PHYSICAL EXAM  GENERAL: No obvious distress  EYES: No scleral icterus  ABDOMEN: Non-distended. Positive bowel sounds. Soft. Non-tender.  SKIN: No jaundice  NEUROLOGIC: Alert and oriented. No asterixis.   PSYCHIATRIC: Normal affect     Additional Comments:  ROS, FH, SH: See initial GI consult for details.     I have reviewed the patient's new clinical lab results:     Recent Labs   Lab Test 2536 25  1111   WBC 6.5 10.2 7.6   HGB 9.4* 10.4* 11.2*   MCV 75* 76* 79   * 173 177     Recent Labs   Lab Test 2519 2536 25   POTASSIUM 4.0 3.8 4.0   CHLORIDE 108* 105 102   CO2 19* 24 20*   BUN 13.5 33.2* 37.9*   ANIONGAP 10 10 16*     Recent Labs   Lab Test 25  1107 25  1101 24  1600 24  1543 24  1931 24  1816 17  0504 17  0456   ALBUMIN 4.2  --  4.2  --   --   --  3.8  --  4.2   BILITOTAL 0.3  --  0.4  --   --   --  0.3  --  0.5   ALT 5  --  20  --   --   --  <5  --  22   AST 17  --  36  --   --   --  19  --  20   PROTEIN  --  10*  --  20* Negative   < >  --    < >  --    LIPASE  --   --   --   --   --   --   --   --  77    < > = values in this interval not displayed.       IMAGING:   XR VIDEO SWALLOW STUDY " 4/22/2025  Impression:  1. Laryngeal penetration with liquid barium. No aspiration with any  consistency.   2. Focal narrowing of the esophagus just inferior to a large C5-6  osteophyte, suspicious for an esophageal web. Barium tablet was unable  to pass through this stenotic area. Consider endoscopy for further  evaluation.  3. Please see the speech pathologist report for further details.    ENDOSCOPIC EVALUATION:  EGD 1/16/2025  Findings:       Esophagogastric landmarks were identified: the Z-line was found at 40 cm        from the incisors.        Multiple benign-appearing, intrinsic mild (non-circumferential scarring)        stenoses were found in the distal esophagus. The stenoses were        traversed. A TTS dilator was passed through the scope. Dilation with a        15-16.5-18 mm balloon dilator was performed to 18 mm. The dilation site        was examined and showed moderate mucosal disruption and no perforation.        Biopsies were taken with a cold forceps for histology from mid and        distal esophagus. Verification of patient identification for the        specimen was done by the physician and nurse. Estimated blood loss was        minimal.        Diffuse moderately erythematous mucosa without bleeding was found in the        entire examined stomach. Biopsies were taken with a cold forceps for        Helicobacter pylori testing. Verification of patient identification for        the specimen was done by the physician and nurse. Estimated blood loss        was minimal.        The examined duodenum was normal.                                                                                    Impression:               1. Multiple benign-appearing widely patent                             esophageal stenoses in distal esophagus. Balloon                             dilation to 18 mm. Biopsied from mid and distal                             esophagus.                             2. Diffuse erythematous  mucosa in the stomach.                             Biopsied.                             3. Normal examined duodenum.     ASSESSMENT:  89 year old male with PMH CAD s/p stent, CABG, parkinson's disease, T2DM, neuropathy, chronic pain with intrathecal pump, HTN, who is admitted with pneumonia and diffuse pain. GI consulted regarding dysphagia/aspiration pneumonia     Patient has anterior osteophyte as well as oropharyngeal dysphagia as source of his symptoms. Previous VSS focal narrowing of the esophagus just inferior to a large C5-6  osteophyte, suspicious for an esophageal web. Barium tablet was unable  to pass through this stenotic area. Will plan for EGD tomorrow to see if there is an esophageal web or if lower esophageal dilation could be done.     PLAN:   Dysphagia  Lower esophageal stenosis   - EGD tomorrow  - NPO at midnight   - Continue PPI    Discussed patient findings and plan with Dr. Campos.     Total time: 25 minutes of total time was spent providing patient care, including patient evaluation, reviewing documentation/test results, and .     Arlette Perez PA-C  [facility] ([facility])

## 2025-05-06 NOTE — PLAN OF CARE
"Goal Outcome Evaluation:      Plan of Care Reviewed With: patient    Overall Patient Progress: improvingOverall Patient Progress: improving    Outcome Evaluation: VSS on RA, A/Ox4 but forgetful, Ax1 with a walker. Using urinal at bedside. Has chronic consitpation - no BM today. Was very nauseous after morning meds. Gave zofran 1x. Had EGD/balloon. BG ACHS. NS 75/hr. Has chronic low back pain.    Problem: Adult Inpatient Plan of Care  Goal: Plan of Care Review  Description: The Plan of Care Review/Shift note should be completed every shift.  The Outcome Evaluation is a brief statement about your assessment that the patient is improving, declining, or no change.  This information will be displayed automatically on your shiftnote.  Outcome: Progressing  Flowsheets (Taken 5/6/2025 1658)  Outcome Evaluation: VSS on RA, A/Ox4 but forgetful, Ax1 with a walker. Using urinal at bedside. Has chronic consitpation - no BM today. Was very nauseous after morning meds. Gave zofran 1x. Had EGD/balloon. BG ACHS. NS 75/hr. Has chronic low back pain.  Plan of Care Reviewed With: patient  Overall Patient Progress: improving  Goal: Patient-Specific Goal (Individualized)  Description: You can add care plan individualizations to a care plan. Examples of Individualization might be:  \"Parent requests to be called daily at 9am for status\", \"I have a hard time hearing out of my right ear\", or \"Do not touch me to wake me up as it startlesme\".  Outcome: Progressing  Goal: Absence of Hospital-Acquired Illness or Injury  Outcome: Progressing  Intervention: Identify and Manage Fall Risk  Recent Flowsheet Documentation  Taken 5/6/2025 0900 by Paulina Casillas RN  Safety Promotion/Fall Prevention: safety round/check completed  Intervention: Prevent Skin Injury  Recent Flowsheet Documentation  Taken 5/6/2025 0900 by Paulina Casillas RN  Body Position: position changed independently  Intervention: Prevent Infection  Recent Flowsheet " Documentation  Taken 5/6/2025 0900 by Paulina Casillas RN  Infection Prevention:   equipment surfaces disinfected   hand hygiene promoted   personal protective equipment utilized   rest/sleep promoted  Goal: Optimal Comfort and Wellbeing  Outcome: Progressing  Goal: Readiness for Transition of Care  Outcome: Progressing     Problem: Delirium  Goal: Optimal Coping  Outcome: Progressing  Goal: Improved Behavioral Control  Outcome: Progressing  Intervention: Minimize Safety Risk  Recent Flowsheet Documentation  Taken 5/6/2025 0900 by Paulina Casillas RN  Enhanced Safety Measures: room near unit station  Goal: Improved Attention and Thought Clarity  Outcome: Progressing  Goal: Improved Sleep  Outcome: Progressing     Problem: Fall Injury Risk  Goal: Absence of Fall and Fall-Related Injury  Outcome: Progressing  Intervention: Identify and Manage Contributors  Recent Flowsheet Documentation  Taken 5/6/2025 0900 by Paulina Casillas RN  Medication Review/Management: medications reviewed  Intervention: Promote Injury-Free Environment  Recent Flowsheet Documentation  Taken 5/6/2025 0900 by Paulina Casillas RN  Safety Promotion/Fall Prevention: safety round/check completed     Problem: Comorbidity Management  Goal: Blood Glucose Levels Within Targeted Range  Outcome: Progressing  Intervention: Monitor and Manage Glycemia  Recent Flowsheet Documentation  Taken 5/6/2025 0900 by Paulina Casillas RN  Medication Review/Management: medications reviewed  Goal: Blood Pressure in Desired Range  Outcome: Progressing  Intervention: Maintain Blood Pressure Management  Recent Flowsheet Documentation  Taken 5/6/2025 0900 by Paulina Casillas RN  Medication Review/Management: medications reviewed     Problem: Infection  Goal: Absence of Infection Signs and Symptoms  Outcome: Progressing     Problem: Gas Exchange Impaired  Goal: Optimal Gas Exchange  Outcome: Progressing  Intervention: Optimize Oxygenation and Ventilation  Recent  Flowsheet Documentation  Taken 5/6/2025 0900 by Paulina Casillas RN  Head of Bed (HOB) Positioning: HOB at 20-30 degrees

## 2025-05-06 NOTE — PROGRESS NOTES
Patient with nausea/emesis after AM meds today.  He already was NPO for a proposed EGD today.  Will have on some IVF and continue NPO.  I spoke with his son who arrived and he wanted to further discuss the procedure with Dr. Campos.   I contacted Dr. Campos and he will be touching base with the patients son.  Patients nausea now starting to improve.

## 2025-05-06 NOTE — ANESTHESIA CARE TRANSFER NOTE
Patient: Cresencio Peguero    Procedure: Procedure(s):  ESOPHAGOGASTRODUODENOSCOPY       Diagnosis: Dysphagia, unspecified type [R13.10]  Diagnosis Additional Information: No value filed.    Anesthesia Type:   MAC     Note:    Oropharynx: oropharynx clear of all foreign objects and spontaneously breathing  Level of Consciousness: drowsy  Oxygen Supplementation: face mask  Level of Supplemental Oxygen (L/min / FiO2): 6  Independent Airway: airway patency satisfactory and stable  Dentition: dentition unchanged  Vital Signs Stable: post-procedure vital signs reviewed and stable  Report to RN Given: handoff report given  Patient transferred to: PACU    Handoff Report: Identifed the Patient, Identified the Reponsible Provider, Reviewed the pertinent medical history, Discussed the surgical course, Reviewed Intra-OP anesthesia mangement and issues during anesthesia, Set expectations for post-procedure period and Allowed opportunity for questions and acknowledgement of understanding      Vitals:  Vitals Value Taken Time   /79 05/06/25 1300   Temp     Pulse 77 05/06/25 1300   Resp     SpO2 100 % 05/06/25 1301   Vitals shown include unfiled device data.    Electronically Signed By: MAKAYLA Gutierrez CRNA  May 6, 2025  1:03 PM

## 2025-05-06 NOTE — ANESTHESIA PREPROCEDURE EVALUATION
Anesthesia Pre-Procedure Evaluation    Patient: Cresencio Peguero   MRN: [id number] : 1935        Procedure : Procedure(s):  ESOPHAGOGASTRODUODENOSCOPY          Past Medical History:   Diagnosis Date    Anxiety     Arthritis     CAD (coronary artery disease)     HX of stent,  CABG x2    Diabetes (H)     Hearing loss     Hyperlipidemia     Hypertension     Neuropathy     Sensory ataxia     Stented coronary artery       Past Surgical History:   Procedure Laterality Date    BACK SURGERY      cervical laminectomy    BACK SURGERY      lumbar laminectomy    CARDIAC SURGERY      Stent,  CABGx2    ESOPHAGOSCOPY, GASTROSCOPY, DUODENOSCOPY (EGD), COMBINED N/A 2023    Procedure: Esophagoscopy, gastroscopy, duodenoscopy (EGD), combined;  Surgeon: Jeovanny Rizo MD;  Location:  GI    ESOPHAGOSCOPY, GASTROSCOPY, DUODENOSCOPY (EGD), DILATATION, COMBINED N/A 2025    Procedure: ESOPHAGOGASTRODUODENOSCOPY DILATATION and biopsies;  Surgeon: Benedict Patricia MD;  Location:  OR    EYE SURGERY      cataract    EYE SURGERY      corneal surgery    HERNIORRHAPHY INGUINAL Right 8/15/2016    Procedure: HERNIORRHAPHY INGUINAL;  Surgeon: Wu Adam MD;  Location:  OR    LAPAROSCOPIC LYSIS ADHESIONS N/A 2017    Procedure: LAPAROSCOPIC LYSIS ADHESIONS;;  Surgeon: Sumeet Joiner MD;  Location:  OR    LAPAROSCOPY DIAGNOSTIC (GENERAL) N/A 2017    Procedure: LAPAROSCOPY DIAGNOSTIC (GENERAL);  DIAGNOSTIC LAPAROSCOPY WITH LYSIS OF ADHESIONS ;  Surgeon: Suemet Joiner MD;  Location:  OR    ORTHOPEDIC SURGERY      rotator cuff repair,shoulder arthroplasty    REVERSE ARTHROPLASTY SHOULDER Right 2020    Procedure: RIGHT REVERSE TOTAL SHOULDER ARTHROPLASTY;  Surgeon: Adarsh Mello MD;  Location:  OR    TONSILLECTOMY        Allergies   Allergen Reactions    Hydrocodone Itching     Constipation    Aspirin Itching    Contrast Dye      LW CM1: >>> NO CONTRAST ADVERSE REACTION  <<<     Reaction :    Food      LW FI1: nka LW FI2: nka    Lisinopril Other (See Comments) and GI Disturbance     constipation    Oxycodone Itching    Percodan [Oxycodone-Aspirin] Rash      Social History     Tobacco Use    Smoking status: Former     Types: Pipe     Quit date: 6/1/2021     Years since quitting: 3.9    Smokeless tobacco: Not on file   Substance Use Topics    Alcohol use: Yes     Alcohol/week: 0.0 standard drinks of alcohol     Comment: 7 glasses wine per week      Wt Readings from Last 1 Encounters:   05/04/25 82.1 kg (180 lb 14.4 oz)        Anesthesia Evaluation   Pt has had prior anesthetic.     No history of anesthetic complications       ROS/MED HX  ENT/Pulmonary: Comment: Dysphagia   (-) sleep apnea   Neurologic:     (+)                 Parkinson's disease,            (-) no CVA   Cardiovascular:     (+) Dyslipidemia hypertension- -  CAD -  - -                                      METS/Exercise Tolerance:     Hematologic:       Musculoskeletal:       GI/Hepatic:    (-) GERD   Renal/Genitourinary:     (+)        BPH,      Endo:     (+)  type II DM,                    Psychiatric/Substance Use:     (+) psychiatric history depression       Infectious Disease:       Malignancy:       Other: Comment: Pain pump           Physical Exam    Airway        Mallampati: II   TM distance: > 3 FB   Neck ROM: full   Mouth opening: > 3 cm    Respiratory Devices and Support         Dental  no notable dental history     (+) Modest Abnormalities - crowns, retainers, 1 or 2 missing teeth      Cardiovascular   cardiovascular exam normal          Pulmonary   pulmonary exam normal                OUTSIDE LABS:  CBC:   Lab Results   Component Value Date    WBC 6.5 05/04/2025    WBC 10.2 05/03/2025    HGB 9.4 (L) 05/04/2025    HGB 10.4 (L) 05/03/2025    HCT 28.7 (L) 05/04/2025    HCT 31.9 (L) 05/03/2025     (L) 05/04/2025     05/03/2025     BMP:   Lab Results   Component Value Date     05/06/2025    NA  "139 05/04/2025    POTASSIUM 4.0 05/06/2025    POTASSIUM 3.8 05/04/2025    CHLORIDE 108 (H) 05/06/2025    CHLORIDE 105 05/04/2025    CO2 19 (L) 05/06/2025    CO2 24 05/04/2025    BUN 13.5 05/06/2025    BUN 33.2 (H) 05/04/2025    CR 0.79 05/06/2025    CR 0.91 05/04/2025     (H) 05/06/2025     (H) 05/06/2025     COAGS:   Lab Results   Component Value Date    INR 1.08 08/14/2016     POC:   Lab Results   Component Value Date    BGM 92 06/30/2021     HEPATIC:   Lab Results   Component Value Date    ALBUMIN 4.2 05/03/2025    PROTTOTAL 7.0 05/03/2025    ALT 5 05/03/2025    AST 17 05/03/2025    ALKPHOS 109 05/03/2025    BILITOTAL 0.3 05/03/2025    TIAN 36 05/22/2024     OTHER:   Lab Results   Component Value Date    LACT 1.1 05/04/2025    A1C 8.3 (H) 03/07/2025    ISIS 8.9 05/06/2025    LIPASE 77 07/07/2017       Anesthesia Plan    ASA Status:  3    NPO Status:  NPO Appropriate    Anesthesia Type: MAC.     - Reason for MAC: straight local not clinically adequate              Consents    Anesthesia Plan(s) and associated risks, benefits, and realistic alternatives discussed. Questions answered and patient/representative(s) expressed understanding.     - Discussed:     - Discussed with:  Patient            Postoperative Care    Pain management: Multi-modal analgesia.   PONV prophylaxis: Ondansetron (or other 5HT-3), Background Propofol Infusion     Comments:               Yelena Matos MD, MD    Clinically Significant Risk Factors          # Hyperchloremia: Highest Cl = 108 mmol/L in last 2 days, will monitor as appropriate                        # DMII: A1C = 8.3 % (Ref range: <5.7 %) within past 6 months, PRESENT ON ADMISSION  # Overweight: Estimated body mass index is 25.23 kg/m  as calculated from the following:    Height as of this encounter: 1.803 m (5' 11\").    Weight as of this encounter: 82.1 kg (180 lb 14.4 oz)., PRESENT ON ADMISSION     # Financial/Environmental Concerns: none           "

## 2025-05-06 NOTE — PROGRESS NOTES
[facility]    Medicine Progress Note - Hospitalist Service    Date of Admission:  5/3/2025    Assessment & Plan   Cresencio Peguero is an 89-year-old male with medical history significant for CAD s/p stent placement and CABG, Parkinson disease, type 2 diabetes mellitus, neuropathy, chronic pain syndrome with intrathecal pump placement, hypertension, dyslipidemia, 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  He presented to the ED with complaints of cough, dysphagia, and shortness of breath.  He is admitted for probable aspiration pneumonia.     Pneumonia, CAP vs aspiration pneumonia:  Patient with history of esophageal stricture/web with dysphagia.  He underwent esophageal dilatation and follow-up 2024.  Was able to tolerate pills and solid meals.  However, reports that he has been having difficulties with medications and solid foods over the past few weeks.  Has been only consuming liquid foods for quite some time now.  Underwent speech therapy evaluation on 4/23/2025 and they recommended EGD.  -On ceftriaxone and zithromax.  Suspect some of inflammation seen if aspiration more chemical than bacterial.  See below concerning dysphagia plan.  Would plan 5 days zithromax and 7 days cephalosporin.    Recurrent Dysphagia with episodic vomiting, multifactorial with anterior osteophyte/oropharyngeal dysphagia and lower esophageal stricture:  -GI Consult appreciated.  Unclear what dominant source of dysphagia is, though his osteophyte and upper region dysphagia appears most concerning recently.  Patient having EGD with dilation this afternoon after GI met with him and his son this AM to further discuss.    -This AM more N/V after AM meds.  In particular lactulose he takes may have exacerbated.  Will hold this tonight.  Consider resuming in AM depending on EGD outcome/diet.    ADDENDUM:  Dilation performed.  No major reported complications.   "Liquid diet recommended and started for this afternoon.    Transient Lactic acidosis:   Patient with elevated lactic acid of 6.1 on presentation.  Likely multifactorial with dehydration and pneumonia contributing, though not felt likely sepsis.  Felt more likely dehydration.  Has improved to 1.1 with IV hydration.     Chronic pain syndrome:  Neuropathy:  -Has intrathecal pump in place, pain team consult appreciated.  Continue gabapentin.     Mood disorder:  -Continue Cymbalta and mirtazapine     Parkinson's disease:  -Continue Sinemet     CAD s/p stent placement and CABG  Hypertension  Hyperlipidemia:  -Continue Norvasc and Lipitor     GERD:  -Continue PPI     Type 2 diabetes mellitus:  -Continue correctional scale insulin  -Hypoglycemia protocol    Constipation complicated by dysphagia/reduced intake and chronic opioid use:  -Lactulose (holding for moment), linzess to continue.  No BM yesterday/today but intake quite poor lately with dysphagia.  If no BM by tomorrow consider further escalating meds.    Family Update:  Son at bedside.  PPE Used:  Mask          Diet: Snacks/Supplements Adult: Ensure Enlive; With Meals  Full Liquid Diet    DVT Prophylaxis: Pneumatic Compression Devices  Henderson Catheter: Not present  Lines: None     Cardiac Monitoring: None  Code Status: No CPR- Do NOT Intubate      Clinically Significant Risk Factors          # Hyperchloremia: Highest Cl = 108 mmol/L in last 2 days, will monitor as appropriate                        # DMII: A1C = 8.3 % (Ref range: <5.7 %) within past 6 months, PRESENT ON ADMISSION  # Overweight: Estimated body mass index is 25.23 kg/m  as calculated from the following:    Height as of this encounter: 1.803 m (5' 11\").    Weight as of this encounter: 82.1 kg (180 lb 14.4 oz)., PRESENT ON ADMISSION       # Financial/Environmental Concerns: none         Disposition Plan     Medically Ready for Discharge: Anticipated in 2-4 Days             Himanshu Nava DO  Hospitalist " Service  [facility]  Securely message with Melinda (more info)  Text page via [employer] Paging/Directory   ______________________________________________________________________    Interval History   Mr. Peguero with more N/V this AM.  He had burning all the way up his esophagus sensations after emesis.   Now feeling better.  Denies other new complaints this AM.      Physical Exam   Vital Signs: Temp: 99.1  F (37.3  C) Temp src: Oral BP: (!) 176/76 Pulse: 82   Resp: 15 SpO2: 97 % O2 Device: Nasal cannula Oxygen Delivery: 6 LPM  Weight: 180 lbs 14.4 oz    GEN:  Alert, oriented, appears ill but comfortable.  HEENT:  Normocephalic/atraumatic, no scleral icterus, no nasal discharge, mouth moist.  CV:  Regular rate and rhythm, distant.  LUNGS:  Clear upper, coarse lower lungs.  No wheezes/retractions.  Symmetric chest rise on inhalation noted.  ABD:  Active bowel sounds, soft, non-tender/non-distended.  No guarding/rigidity.  EXT:  Trace edema.  No cyanosis.  No new joint synovitis noted.  SKIN:  Dry to touch, no new exanthems noted in the visualized areas.    Medical Decision Making       Over 50 MINUTES SPENT BY ME on the date of service doing chart review, history, exam, documentation & further activities per the note.      Data   Medications   Current Facility-Administered Medications   Medication Dose Route Frequency Provider Last Rate Last Admin    medication given by implanted intrathecal pump   Does not apply Continuous Ronnie Barnes PA-C        sodium chloride 0.9 % infusion   Intravenous Continuous Hang Castañeda MD 75 mL/hr at 05/05/25 2225 New Bag at 05/05/25 2225     Current Facility-Administered Medications   Medication Dose Route Frequency Provider Last Rate Last Admin    amLODIPine (NORVASC) tablet 5 mg  5 mg Oral Daily Ekaterina, Tong A, DO   5 mg at 05/06/25 0948    atorvastatin (LIPITOR) tablet 10 mg  10 mg Oral QPM Ekaterina, Tong A, DO   10 mg at 05/05/25 2018    azithromycin  (ZITHROMAX) 250 mg in  mL intermittent infusion  250 mg Intravenous Q24H Ekaterina, Tong A, DO   250 mg at 05/06/25 0107    carbidopa-levodopa (SINEMET)  MG per tablet 2 tablet  2 tablet Oral TID Ekaterina, Tong A, DO   2 tablet at 05/06/25 1537    carboxymethylcellulose PF (REFRESH PLUS) 0.5 % ophthalmic solution 1 drop  1 drop Both Eyes TID Ekaterina, Tong A, DO   1 drop at 05/06/25 0948    cefTRIAXone (ROCEPHIN) 1 g vial to attach to  mL bag for ADULTS or NS 50 mL bag for PEDS  1 g Intravenous Q24H Ekaterina, Tong A, DO   1 g at 05/06/25 0016    diclofenac (VOLTAREN) 1 % topical gel 4 g  4 g Topical TID Ekaterina, Tong A, DO   4 g at 05/06/25 0950    DULoxetine HCl CSDR 60 mg  60 mg Oral Daily Himanshu Nava, DO   60 mg at 05/06/25 1001    gabapentin (NEURONTIN) solution 1,200 mg  1,200 mg Oral QPM Himanshu Nava P, DO        gabapentin (NEURONTIN) solution 600 mg  600 mg Oral QAM Himanshu Nava P, DO        insulin aspart (NovoLOG) injection (RAPID ACTING)  1-3 Units Subcutaneous TID AC Ekaterina, Tong A, DO   1 Units at 05/05/25 1835    insulin aspart (NovoLOG) injection (RAPID ACTING)  1-3 Units Subcutaneous At Bedtime Ekaterina, Tong A, DO        [Held by provider] lactulose (CHRONULAC) solution 20 g  20 g Oral BID Himanshu Nava P, DO   20 g at 05/06/25 0948    Lidocaine (LIDOCARE) 4 % Patch 1 patch  1 patch Transdermal Q24H Ekaterina, Tong A, DO   1 patch at 05/06/25 1536    linaclotide (LINZESS) capsule 72 mcg  72 mcg Oral QAM Ekaterina, Tong A, DO   72 mcg at 05/06/25 1001    mirtazapine (REMERON) tablet TABS 7.5 mg  7.5 mg Oral At Bedtime Ekaterina, Tong A, DO   7.5 mg at 05/05/25 2215    pantoprazole (PROTONIX) 2 mg/mL suspension 40 mg  40 mg Oral BID AC Himanshu Nava DO   40 mg at 05/05/25 1657    psyllium (METAMUCIL/KONSYL) Packet 1 packet  1 packet Oral Daily Tong Tobar DO   1 packet at 05/05/25 0838    sennosides (SENOKOT) syrup 5 mL  5 mL Oral BID Himanshu Nava DO   5 mL at 05/06/25 0982     sertraline (ZOLOFT) 20 MG/ML (HIGH CONC) solution 100 mg  100 mg Oral Daily Himanshu Nava, DO   100 mg at 05/06/25 1002    sodium chloride (PF) 0.9% PF flush 3 mL  3 mL Intracatheter Q8H HUMPHREY Ekaterina, Tong A, DO   3 mL at 05/04/25 2236     Labs and Imaging results below reviewed today.  Recent Labs   Lab 05/04/25  0736 05/03/25 2035   WBC 6.5 10.2   HGB 9.4* 10.4*   HCT 28.7* 31.9*   MCV 75* 76*   * 173     7-Day Micro Results       Collected Updated Procedure Result Status      05/03/2025 2355 05/06/2025 0231 Blood Culture Peripheral Blood [[id number]]   Peripheral Blood    Preliminary result Component Value   Culture No growth after 2 days  [P]                05/03/2025 2254 05/06/2025 0031 Blood Culture Peripheral Blood [[id number]]   Peripheral Blood    Preliminary result Component Value   Culture No growth after 2 days  [P]                05/03/2025 2032 05/03/2025 2117 Influenza A/B, RSV and SARS-CoV2 PCR (COVID-19) Nasopharyngeal [[id number]]    Swab from Nasopharyngeal    Final result Component Value   Influenza A PCR Negative   Influenza B PCR Negative   RSV PCR Negative   SARS CoV2 PCR Negative   NEGATIVE: SARS-CoV-2 (COVID-19) RNA not detected, presumed negative.                  Recent Labs   Lab 05/06/25  1309 05/06/25  1138 05/06/25  0759 05/06/25  0719 05/04/25  0759 05/04/25  0736 05/04/25  0205 05/03/25 2035   NA  --   --   --  137  --  139  --  138   POTASSIUM  --   --   --  4.0  --  3.8  --  4.0   CHLORIDE  --   --   --  108*  --  105  --  102   CO2  --   --   --  19*  --  24  --  20*   ANIONGAP  --   --   --  10  --  10  --  16*   * 125* 109* 116*   < > 114*   < > 158*   BUN  --   --   --  13.5  --  33.2*  --  37.9*   CR  --   --   --  0.79  --  0.91  --  0.95   GFRESTIMATED  --   --   --  85  --  81  --  77   ISIS  --   --   --  8.9  --  8.9  --  9.6   PROTTOTAL  --   --   --   --   --   --   --  7.0   ALBUMIN  --   --   --   --   --   --   --  4.2   BILITOTAL  --   --   --    --   --   --   --  0.3   ALKPHOS  --   --   --   --   --   --   --  109   AST  --   --   --   --   --   --   --  17   ALT  --   --   --   --   --   --   --  5    < > = values in this interval not displayed.     No results found for this or any previous visit (from the past 24 hours).

## 2025-05-06 NOTE — ANESTHESIA POSTPROCEDURE EVALUATION
Patient: Cresencio Peguero    Procedure: Procedure(s):  ESOPHAGOGASTRODUODENOSCOPY       Anesthesia Type:  MAC    Note:     Postop Pain Control: Uneventful            Sign Out: Well controlled pain   PONV: No   Neuro/Psych: Uneventful            Sign Out: Acceptable/Baseline neuro status   Airway/Respiratory: Uneventful            Sign Out: Acceptable/Baseline resp. status   CV/Hemodynamics: Uneventful            Sign Out: Acceptable CV status; No obvious hypovolemia; No obvious fluid overload   Other NRE:    DID A NON-ROUTINE EVENT OCCUR? No           Last vitals:  Vitals Value Taken Time   /81 05/06/25 1317   Temp 96.8  F (36  C) 05/06/25 1300   Pulse 76 05/06/25 1310   Resp     SpO2 96 % 05/06/25 1325   Vitals shown include unfiled device data.    Electronically Signed By: Yelena Matos MD, MD  May 6, 2025  2:28 PM

## 2025-05-06 NOTE — PROGRESS NOTES
[facility] ACUTE INPATIENT PAIN SERVICE    [facility], [facility], [facility], [facility], [place]   PAIN PROGRESS          ASSESSMENT/ PLAN:    Chronic pain syndrome with presence of implanted intrathecal pain pump.     IT pain pump interrogated 5/05. Current settings:  Fentanyl SCR: 186.48mcg/day  Bupivacaine SCR: 7.459mg/day  Morphine SCR: 0.37883ul/day  PTMs disabled  Low [place] Alarm Date: 05/19/25    Pain is controlled. Pain team will sign off. Please reconsult if needed      Multimodal Medication Therapy:   Adjuvants:   - APAP 1000mg tid prn  - Gabapentin 600mg morning, 1200mg at bedtime  - Cymbalta 60mg daily  - Topical lidocaine prn  Opioids:   - Intrathecal pain pump. See above  Non-medication interventions- Ice, PT  Constipation Prophylaxis- Scheduled metamucil and senokot  Follow up /Discharge Recommendations - We recommend prescribing the following at the time of discharge:  No opiates. He has a follow up with Gregorio for his pump fill scheduled prior to 5/19/25        Subjective:  Cresencio is seen today in his bed alert and oriented in no acute distress. Reports his pump is effective and denies any uncontrolled back or leg pain.     HPI:  Cresencio Peguero is a 89 year old male who was admitted on 5/3/2025.  I was asked by Dr. Hang Castañeda to see the patient for management of his chronic pain with presence of an implanted intrathecal pain pump. Admitted for [facility]. History of CAD s/p stent placement and CABG, Parkinson disease, type 2 diabetes mellitus, neuropathy, chronic pain syndrome with intrathecal pump placement, hypertension, dyslipidemia, 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     PDMP RESULTS: This indicates regular refills of Gabapentin 300mg capsules    History   Drug Use Unknown         Tobacco Use      Smoking status: Former        Types: Pipe        Quit date: 6/1/2021        Years since quitting: 3.9      Smokeless  "tobacco: None        Objective:  Vital signs in last 24 hours:  B/P: 171/68, T: 99.2, P: 72, R: 18   Blood pressure (!) 171/68, pulse 72, temperature 99.2  F (37.3  C), temperature source Oral, resp. rate 18, height 1.803 m (5' 11\"), weight 82.1 kg (180 lb 14.4 oz), SpO2 96%.        Review of Systems:   As per subjective, all others negative.    Physical Exam    General: in no apparent distress and non-toxic   HEENT: Head normocephalic atraumatic, oral mucosa moist. Sclerae anicteric  CV: Regular rhythm, normal rate, no murmurs  Resp: No wheezes, no rales or rhonchi, no focal consolidations  GI: Non-tender; +Bs  Skin: No rashes or lesions  Extremities: No peripheral edema  Psych: Normal affect, mood euthymic  Neuro: Grossly normal          Imaging:  Personally Reviewed.    Results for orders placed or performed during the hospital encounter of 05/03/25   XR Chest 2 Views    Impression    IMPRESSION:     Cardiac silhouette is normal in size. Previous median sternotomy and coronary revascularization. Unchanged atheromatous calcifications of the thoracic aorta but normal vascular pedicle.    There is a angular band of atelectasis in the lateral right base and mild elevation of the lateral left hemidiaphragm. The right lung is well-expanded and clear. No peribronchial fluid cuffs around the alma or peripheral septal thickening.    No pleural effusion or pneumothorax.    Disc space narrowing and diffuse thoracic spine degenerative osteophytes. Reverse right shoulder arthroplasty.   CT Abdomen Pelvis w/o Contrast    Impression    IMPRESSION:   1.  Left lower lobe pneumonia.  2.  Trabeculation of the urinary bladder wall without significant change. No hydronephrosis.  3.  No bowel obstruction or inflammation.          Lab Results:  Personally Reviewed.   Last Comprehensive Metabolic Panel:  Sodium   Date Value Ref Range Status   05/06/2025 137 135 - 145 mmol/L Final   06/30/2021 139 133 - 144 mmol/L Final     Potassium " "  Date Value Ref Range Status   05/06/2025 4.0 3.4 - 5.3 mmol/L Final   06/30/2021 3.9 3.4 - 5.3 mmol/L Final     Chloride   Date Value Ref Range Status   05/06/2025 108 (H) 98 - 107 mmol/L Final   06/30/2021 108 94 - 109 mmol/L Final     Carbon Dioxide   Date Value Ref Range Status   06/30/2021 29 20 - 32 mmol/L Final     Carbon Dioxide (CO2)   Date Value Ref Range Status   05/06/2025 19 (L) 22 - 29 mmol/L Final     Anion Gap   Date Value Ref Range Status   05/06/2025 10 7 - 15 mmol/L Final   06/30/2021 2 (L) 3 - 14 mmol/L Final     Glucose   Date Value Ref Range Status   05/06/2025 116 (H) 70 - 99 mg/dL Final   06/30/2021 86 70 - 99 mg/dL Final     GLUCOSE BY METER POCT   Date Value Ref Range Status   05/06/2025 109 (H) 70 - 99 mg/dL Final     Comment:     Dr/RN Notified     Urea Nitrogen   Date Value Ref Range Status   05/06/2025 13.5 8.0 - 23.0 mg/dL Final   06/30/2021 20 7 - 30 mg/dL Final     Creatinine   Date Value Ref Range Status   05/06/2025 0.79 0.67 - 1.17 mg/dL Final   06/30/2021 0.88 0.66 - 1.25 mg/dL Final     GFR Estimate   Date Value Ref Range Status   05/06/2025 85 >60 mL/min/1.73m2 Final     Comment:     eGFR calculated using 2021 CKD-EPI equation.   06/30/2021 78 >60 mL/min/[1.73_m2] Final     Comment:     Non  GFR Calc  Starting 12/18/2018, serum creatinine based estimated GFR (eGFR) will be   calculated using the Chronic Kidney Disease Epidemiology Collaboration   (CKD-EPI) equation.       Calcium   Date Value Ref Range Status   05/06/2025 8.9 8.8 - 10.4 mg/dL Final   06/30/2021 8.4 (L) 8.5 - 10.1 mg/dL Final        UA: No results found for: \"UAMP\", \"UBARB\", \"BENZODIAZEUR\", \"UCANN\", \"UCOC\", \"OPIT\", \"UPCP\"           Please see A&P for additional details of medical decision making.      Feliciano Barnes PA-C  Acute Care Pain Management  Team  Hours of pain coverage Mon-Fri 8-1600, afterhours please call the primary team   [facility] (VINI, Carlotta, [place], RH)   Page via [employer] web " console -Click for [employer] (acquired by [employer]era

## 2025-05-06 NOTE — PLAN OF CARE
"  Blood pressure (!) 168/73, pulse 67, temperature 98.5  F (36.9  C), temperature source Oral, resp. rate 18, height 1.803 m (5' 11\"), weight 82.1 kg (180 lb 14.4 oz), SpO2 94%.     Goal Outcome Evaluation:      Plan of Care Reviewed With: patient    Overall Patient Progress: improving  Outcome Evaluation:     Alert and oriented x 4 ,RA  Afebrile . Elevated SBP Reported intermittent dry cough but denies SOB ,chest pain  or palpitation . Clear breath sound .Takes oral medication one at a time and tolerates well .  Uses urinal at the bed side. Aspiration precaution maintained . Abx : Azithromycin and ceftriaxone provided. BS : 189 and 158     Problem: Adult Inpatient Plan of Care  Goal: Plan of Care Review  Description: The Plan of Care Review/Shift note should be completed every shift.  The Outcome Evaluation is a brief statement about your assessment that the patient is improving, declining, or no change.  This information will be displayed automatically on your shiftnote.  Outcome: Progressing  Flowsheets (Taken 5/6/2025 0528)  Outcome Evaluation: VS  Plan of Care Reviewed With: patient  Overall Patient Progress: improving  Goal: Patient-Specific Goal (Individualized)  Description: You can add care plan individualizations to a care plan. Examples of Individualization might be:  \"Parent requests to be called daily at 9am for status\", \"I have a hard time hearing out of my right ear\", or \"Do not touch me to wake me up as it startlesme\".  Outcome: Progressing  Goal: Absence of Hospital-Acquired Illness or Injury  Outcome: Progressing  Intervention: Identify and Manage Fall Risk  Recent Flowsheet Documentation  Taken 5/5/2025 2229 by Radha Hicks, RN  Safety Promotion/Fall Prevention: safety round/check completed  Taken 5/5/2025 2031 by Radha Hicks, RN  Safety Promotion/Fall Prevention: safety round/check completed  Taken 5/5/2025 1959 by Radha Hicks, RN  Safety Promotion/Fall Prevention: safety round/check " completed  Taken 5/5/2025 1931 by Radha Hicks RN  Safety Promotion/Fall Prevention: activity supervised  Intervention: Prevent Skin Injury  Recent Flowsheet Documentation  Taken 5/6/2025 0048 by Radha Hicks RN  Body Position: position changed independently  Taken 5/5/2025 1931 by Radha Hicks RN  Body Position: position changed independently  Intervention: Prevent and Manage VTE (Venous Thromboembolism) Risk  Recent Flowsheet Documentation  Taken 5/5/2025 1931 by Radha Hicks RN  VTE Prevention/Management: SCDs on (sequential compression devices)  Intervention: Prevent Infection  Recent Flowsheet Documentation  Taken 5/5/2025 1931 by Radha Hicks RN  Infection Prevention:   equipment surfaces disinfected   hand hygiene promoted   personal protective equipment utilized   rest/sleep promoted  Goal: Optimal Comfort and Wellbeing  Outcome: Progressing  Intervention: Provide Person-Centered Care  Recent Flowsheet Documentation  Taken 5/5/2025 1931 by Radha Hicks RN  Trust Relationship/Rapport:   care explained   choices provided   questions answered   questions encouraged   reassurance provided   thoughts/feelings acknowledged  Goal: Readiness for Transition of Care  Outcome: Progressing

## 2025-05-07 VITALS
SYSTOLIC BLOOD PRESSURE: 132 MMHG | OXYGEN SATURATION: 95 % | BODY MASS INDEX: 25.32 KG/M2 | DIASTOLIC BLOOD PRESSURE: 61 MMHG | HEIGHT: 71 IN | RESPIRATION RATE: 16 BRPM | WEIGHT: 180.9 LBS | HEART RATE: 60 BPM | TEMPERATURE: 98.6 F

## 2025-05-07 LAB
ANION GAP SERPL CALCULATED.3IONS-SCNC: 11 MMOL/L (ref 7–15)
BUN SERPL-MCNC: 19.9 MG/DL (ref 8–23)
CALCIUM SERPL-MCNC: 8.6 MG/DL (ref 8.8–10.4)
CHLORIDE SERPL-SCNC: 107 MMOL/L (ref 98–107)
CREAT SERPL-MCNC: 0.87 MG/DL (ref 0.67–1.17)
EGFRCR SERPLBLD CKD-EPI 2021: 82 ML/MIN/1.73M2
ERYTHROCYTE [DISTWIDTH] IN BLOOD BY AUTOMATED COUNT: 16.9 % (ref 10–15)
GLUCOSE BLDC GLUCOMTR-MCNC: 129 MG/DL (ref 70–99)
GLUCOSE BLDC GLUCOMTR-MCNC: 198 MG/DL (ref 70–99)
GLUCOSE SERPL-MCNC: 127 MG/DL (ref 70–99)
HCO3 SERPL-SCNC: 23 MMOL/L (ref 22–29)
HCT VFR BLD AUTO: 31.1 % (ref 40–53)
HGB BLD-MCNC: 9.9 G/DL (ref 13.3–17.7)
MCH RBC QN AUTO: 24.4 PG (ref 26.5–33)
MCHC RBC AUTO-ENTMCNC: 31.8 G/DL (ref 31.5–36.5)
MCV RBC AUTO: 77 FL (ref 78–100)
PLATELET # BLD AUTO: 181 10E3/UL (ref 150–450)
POTASSIUM SERPL-SCNC: 3.6 MMOL/L (ref 3.4–5.3)
RBC # BLD AUTO: 4.06 10E6/UL (ref 4.4–5.9)
SODIUM SERPL-SCNC: 141 MMOL/L (ref 135–145)
WBC # BLD AUTO: 4.9 10E3/UL (ref 4–11)

## 2025-05-07 PROCEDURE — 36415 COLL VENOUS BLD VENIPUNCTURE: CPT | Performed by: INTERNAL MEDICINE

## 2025-05-07 PROCEDURE — 250N000013 HC RX MED GY IP 250 OP 250 PS 637: Performed by: HOSPITALIST

## 2025-05-07 PROCEDURE — 250N000013 HC RX MED GY IP 250 OP 250 PS 637: Performed by: INTERNAL MEDICINE

## 2025-05-07 PROCEDURE — 97530 THERAPEUTIC ACTIVITIES: CPT | Mod: GP | Performed by: PHYSICAL THERAPIST

## 2025-05-07 PROCEDURE — 97161 PT EVAL LOW COMPLEX 20 MIN: CPT | Mod: GP | Performed by: PHYSICAL THERAPIST

## 2025-05-07 PROCEDURE — 80048 BASIC METABOLIC PNL TOTAL CA: CPT | Performed by: INTERNAL MEDICINE

## 2025-05-07 PROCEDURE — 85018 HEMOGLOBIN: CPT | Performed by: INTERNAL MEDICINE

## 2025-05-07 PROCEDURE — 258N000003 HC RX IP 258 OP 636: Performed by: INTERNAL MEDICINE

## 2025-05-07 PROCEDURE — 258N000003 HC RX IP 258 OP 636: Performed by: HOSPITALIST

## 2025-05-07 PROCEDURE — 99239 HOSP IP/OBS DSCHRG MGMT >30: CPT | Performed by: INTERNAL MEDICINE

## 2025-05-07 PROCEDURE — 250N000011 HC RX IP 250 OP 636: Performed by: HOSPITALIST

## 2025-05-07 RX ORDER — AZITHROMYCIN 250 MG/1
250 TABLET, FILM COATED ORAL DAILY
Qty: 1 TABLET | Refills: 0 | Status: SHIPPED | OUTPATIENT
Start: 2025-05-07 | End: 2025-05-08

## 2025-05-07 RX ORDER — GABAPENTIN 300 MG/1
600 CAPSULE ORAL EVERY MORNING
Status: DISCONTINUED | OUTPATIENT
Start: 2025-05-07 | End: 2025-05-07 | Stop reason: HOSPADM

## 2025-05-07 RX ORDER — PANTOPRAZOLE SODIUM 40 MG/1
40 TABLET, DELAYED RELEASE ORAL
Qty: 60 TABLET | Refills: 0 | Status: SHIPPED | OUTPATIENT
Start: 2025-05-07 | End: 2025-05-07

## 2025-05-07 RX ORDER — PANTOPRAZOLE SODIUM 40 MG/1
40 TABLET, DELAYED RELEASE ORAL
Status: DISCONTINUED | OUTPATIENT
Start: 2025-05-07 | End: 2025-05-07 | Stop reason: HOSPADM

## 2025-05-07 RX ORDER — CEFDINIR 300 MG/1
300 CAPSULE ORAL 2 TIMES DAILY
Qty: 6 CAPSULE | Refills: 0 | Status: SHIPPED | OUTPATIENT
Start: 2025-05-07 | End: 2025-05-10

## 2025-05-07 RX ORDER — SERTRALINE HYDROCHLORIDE 100 MG/1
100 TABLET, FILM COATED ORAL EVERY MORNING
Status: DISCONTINUED | OUTPATIENT
Start: 2025-05-07 | End: 2025-05-07 | Stop reason: HOSPADM

## 2025-05-07 RX ORDER — GABAPENTIN 400 MG/1
1200 CAPSULE ORAL EVERY EVENING
Status: DISCONTINUED | OUTPATIENT
Start: 2025-05-07 | End: 2025-05-07 | Stop reason: HOSPADM

## 2025-05-07 RX ADMIN — AMLODIPINE BESYLATE 5 MG: 5 TABLET ORAL at 08:51

## 2025-05-07 RX ADMIN — SERTRALINE 100 MG: 100 TABLET, FILM COATED ORAL at 11:07

## 2025-05-07 RX ADMIN — CARBOXYMETHYLCELLULOSE SODIUM 1 DROP: 5 SOLUTION/ DROPS OPHTHALMIC at 08:51

## 2025-05-07 RX ADMIN — LIDOCAINE 1 PATCH: 4 PATCH TOPICAL at 11:29

## 2025-05-07 RX ADMIN — PANTOPRAZOLE SODIUM 40 MG: 40 TABLET, DELAYED RELEASE ORAL at 11:08

## 2025-05-07 RX ADMIN — DICLOFENAC SODIUM 4 G: 10 GEL TOPICAL at 09:06

## 2025-05-07 RX ADMIN — SODIUM CHLORIDE: 0.9 INJECTION, SOLUTION INTRAVENOUS at 06:09

## 2025-05-07 RX ADMIN — LINACLOTIDE 72 MCG: 72 CAPSULE, GELATIN COATED ORAL at 08:50

## 2025-05-07 RX ADMIN — GABAPENTIN 600 MG: 300 CAPSULE ORAL at 11:08

## 2025-05-07 RX ADMIN — SENNOSIDES 5 ML: 8.8 LIQUID ORAL at 08:51

## 2025-05-07 RX ADMIN — CARBIDOPA AND LEVODOPA 2 TABLET: 25; 100 TABLET ORAL at 11:07

## 2025-05-07 RX ADMIN — AZITHROMYCIN MONOHYDRATE 250 MG: 500 INJECTION, POWDER, LYOPHILIZED, FOR SOLUTION INTRAVENOUS at 00:00

## 2025-05-07 ASSESSMENT — ACTIVITIES OF DAILY LIVING (ADL)
ADLS_ACUITY_SCORE: 67

## 2025-05-07 NOTE — PROGRESS NOTES
Care Management Follow Up    Length of Stay (days): 4    Expected Discharge Date: 05/08/2025     Concerns to be Addressed: discharge planning     Patient plan of care discussed at interdisciplinary rounds: Yes    Anticipated Discharge Disposition:                Anticipated Discharge Services:    Anticipated Discharge DME:      Patient/family educated on Medicare website which has current facility and service quality ratings:    Education Provided on the Discharge Plan:    Patient/Family in Agreement with the Plan:      Referrals Placed by CM/SW:    Private pay costs discussed: Not applicable    Discussed  Partnership in Safe Discharge Planning  document with patient/family: No     Handoff Completed: No, handoff not indicated or clinically appropriate    Additional Information:  SW received a phone call from Marissa at [facility]. Pt receives weekly support from [facility] for PT. At time of discharge orders to be faxed to [facility] ([phone]).     Next Steps: [facility] to follow for discharge.     KWADWO Vasquez, LGSW  [facility] Care Coordinator/ Med Surg [address] [place] [address]  [phone]

## 2025-05-07 NOTE — PLAN OF CARE
"A&O. VSS. C/o mild back pain- pt stated relief with scheduled Voltaren gel. Denies nausea. LS clear.  and 198. NS infusing. Up with assist x1/walker. Using urinal. Continue IV antibiotics.     Goal Outcome Evaluation:      Plan of Care Reviewed With: patient    Overall Patient Progress: no changeOverall Patient Progress: no change    Outcome Evaluation: continue IV antibiotics and IVF's    Problem: Adult Inpatient Plan of Care  Goal: Plan of Care Review  Description: The Plan of Care Review/Shift note should be completed every shift.  The Outcome Evaluation is a brief statement about your assessment that the patient is improving, declining, or no change.  This information will be displayed automatically on your shiftnote.  Outcome: Progressing  Flowsheets (Taken 5/7/2025 0440)  Outcome Evaluation: continue IV antibiotics and IVF's  Plan of Care Reviewed With: patient  Overall Patient Progress: no change  Goal: Patient-Specific Goal (Individualized)  Description: You can add care plan individualizations to a care plan. Examples of Individualization might be:  \"Parent requests to be called daily at 9am for status\", \"I have a hard time hearing out of my right ear\", or \"Do not touch me to wake me up as it startlesme\".  Outcome: Progressing  Goal: Absence of Hospital-Acquired Illness or Injury  Outcome: Progressing  Intervention: Identify and Manage Fall Risk  Recent Flowsheet Documentation  Taken 5/7/2025 0224 by Tatiana Fernández, RN  Safety Promotion/Fall Prevention: safety round/check completed  Taken 5/7/2025 0000 by Tatiana Fernández, RN  Safety Promotion/Fall Prevention: safety round/check completed  Taken 5/6/2025 2240 by Tatiana Fernández, RN  Safety Promotion/Fall Prevention: safety round/check completed  Taken 5/6/2025 2037 by Tatiana Fernández, RN  Safety Promotion/Fall Prevention:   activity supervised   clutter free environment maintained   nonskid shoes/slippers when out of bed   safety round/check " completed  Intervention: Prevent Skin Injury  Recent Flowsheet Documentation  Taken 5/6/2025 2036 by Tatiana Fernández, RN  Body Position:   position changed independently   weight shifting  Intervention: Prevent and Manage VTE (Venous Thromboembolism) Risk  Recent Flowsheet Documentation  Taken 5/6/2025 2037 by Tatiana Fernández, RN  VTE Prevention/Management: SCDs off (sequential compression devices)  Intervention: Prevent Infection  Recent Flowsheet Documentation  Taken 5/6/2025 2037 by Tatiana Fernández RN  Infection Prevention:   rest/sleep promoted   single patient room provided  Goal: Optimal Comfort and Wellbeing  Outcome: Progressing  Intervention: Monitor Pain and Promote Comfort  Recent Flowsheet Documentation  Taken 5/6/2025 2036 by Tatiana Fernández, RN  Pain Management Interventions: medication (see MAR)  Goal: Readiness for Transition of Care  Outcome: Progressing

## 2025-05-07 NOTE — DISCHARGE SUMMARY
"[facility]  Hospitalist Discharge Summary      Date of Admission:  5/3/2025  Date of Discharge:  5/7/2025  Discharging Provider: Milagro Negrete DO  Discharge Service: Hospitalist Service    Discharge Diagnoses   Pneumonia  Aspiration  Recurrent dysphagia  Lower esophageal stricture  Lactic acidosis  Chronic pain    Clinically Significant Risk Factors     # DMII: A1C = 8.3 % (Ref range: <5.7 %) within past 6 months  # Overweight: Estimated body mass index is 25.23 kg/m  as calculated from the following:    Height as of this encounter: 1.803 m (5' 11\").    Weight as of this encounter: 82.1 kg (180 lb 14.4 oz).       Follow-ups Needed After Discharge   Follow-up Appointments       Hospital Follow-up with Existing Primary Care Provider (PCP)          Schedule Primary Care visit within: 7 Days               Unresulted Labs Ordered in the Past 30 Days of this Admission       Date and Time Order Name Status Description    5/3/2025 11:54 PM Blood Culture Peripheral Blood Preliminary     5/3/2025 10:48 PM Blood Culture Peripheral Blood Preliminary         These results will be followed up by hospital medicine service    Discharge Disposition   Discharged to assisted living  Condition at discharge: Stable    Hospital Course   Patient is an 89-year-old male who presented with pneumonia secondary to aspiration related to dysphagia and esophageal stricture.  Was treated with appropriate antibiotics for pneumonia.  Underwent dilation per GI this admission.  Resumed normal diet prior to discharge.  Was found stable and was discharged back to his assisted living facility.  Stable    Consultations This Hospital Stay   GASTROENTEROLOGY IP CONSULT  CARE MANAGEMENT / SOCIAL WORK IP CONSULT  PAIN MANAGEMENT ADULT IP CONSULT  NUTRITION SERVICES ADULT IP CONSULT  PHARMACY IP CONSULT  PHYSICAL THERAPY ADULT IP CONSULT  OCCUPATIONAL THERAPY ADULT IP CONSULT    Code Status   No CPR- Do NOT Intubate    Time " Spent on this Encounter   I, Milagro Negrete DO, personally saw the patient today and spent greater than 30 minutes discharging this patient.       Milagro Negrete DO  [place] [address] MEDICAL SURGICAL  [address]  [facility] [address]  Phone: [phone]  Fax: [phone]  ______________________________________________________________________    Physical Exam   Vital Signs: Temp: 98.6  F (37  C) Temp src: Oral BP: 132/61 Pulse: 60   Resp: 16 SpO2: 95 % O2 Device: None (Room air)    Weight: 180 lbs 14.4 oz         Primary Care Physician   [facility]    Discharge Orders      Med Therapy Management Referral      Home Care Referral      Reason for your hospital stay    Aspiration PNA, esophageal stricture     Activity    Your activity upon discharge: activity as tolerated     Diet    Follow this diet upon discharge: Regular     Hospital Follow-up with Existing Primary Care Provider (PCP)            Significant Results and Procedures   Most Recent 3 CBC's:  Recent Labs   Lab Test 05/07/25  0805 05/04/25  0736 05/03/25  2035   WBC 4.9 6.5 10.2   HGB 9.9* 9.4* 10.4*   MCV 77* 75* 76*    139* 173     Most Recent 3 BMP's:  Recent Labs   Lab Test 05/07/25  0833 05/07/25  0805 05/07/25  0226 05/06/25 0759 05/06/25  0719 05/04/25  0759 05/04/25  0736   NA  --  141  --   --  137  --  139   POTASSIUM  --  3.6  --   --  4.0  --  3.8   CHLORIDE  --  107  --   --  108*  --  105   CO2  --  23  --   --  19*  --  24   BUN  --  19.9  --   --  13.5  --  33.2*   CR  --  0.87  --   --  0.79  --  0.91   ANIONGAP  --  11  --   --  10  --  10   ISIS  --  8.6*  --   --  8.9  --  8.9   * 127* 198*   < > 116*   < > 114*    < > = values in this interval not displayed.     Most Recent 2 LFT's:  Recent Labs   Lab Test 05/03/25 2035 02/28/25  1101   AST 17 36   ALT 5 20   ALKPHOS 109 125   BILITOTAL 0.3 0.4   ,   Results for orders placed or performed during the hospital  encounter of 05/03/25   XR Chest 2 Views    Narrative    EXAM: XR CHEST 2 VIEWS  LOCATION: [facility]  DATE: 5/3/2025    INDICATION: Shortness of breath  COMPARISON: CT pulmonary angiogram 2/28/2025      Impression    IMPRESSION:     Cardiac silhouette is normal in size. Previous median sternotomy and coronary revascularization. Unchanged atheromatous calcifications of the thoracic aorta but normal vascular pedicle.    There is a angular band of atelectasis in the lateral right base and mild elevation of the lateral left hemidiaphragm. The right lung is well-expanded and clear. No peribronchial fluid cuffs around the alam or peripheral septal thickening.    No pleural effusion or pneumothorax.    Disc space narrowing and diffuse thoracic spine degenerative osteophytes. Reverse right shoulder arthroplasty.   CT Abdomen Pelvis w/o Contrast    Narrative    EXAM: CT ABDOMEN PELVIS W/O CONTRAST  LOCATION: [facility]  DATE: 5/3/2025    INDICATION: elev lactate left el diaphragm elevation generally pain everywhere  COMPARISON: 6/23/2024.  TECHNIQUE: CT scan of the abdomen and pelvis was performed without IV contrast. Multiplanar reformats were obtained. Dose reduction techniques were used.  CONTRAST: None.    FINDINGS:   LOWER CHEST: Left lower lobe infiltrate. Mild bilateral lower lobe bronchiectasis.    HEPATOBILIARY: Normal.    PANCREAS: Stable calcification in the uncinate process of the pancreas. The pancreas is otherwise unremarkable.    SPLEEN: Normal.    ADRENAL GLANDS: Normal.    KIDNEYS/BLADDER: There is no urinary stone or hydronephrosis. Mild right renal atrophy. Left renal cyst. Urinary bladder is distended and there is trabeculation of the wall.    BOWEL: There is no bowel obstruction or inflammation. The appendix is normal. No free intraperitoneal gas or fluid.    LYMPH NODES: Normal.    VASCULATURE: Atherosclerotic calcification of the aorta and its branches.  No aneurysm.    PELVIC ORGANS: Normal.    MUSCULOSKELETAL: Degenerative disease throughout the spine and in the hips. Spinal stimulator. Postoperative change in the lumbar spine.      Impression    IMPRESSION:   1.  Left lower lobe pneumonia.  2.  Trabeculation of the urinary bladder wall without significant change. No hydronephrosis.  3.  No bowel obstruction or inflammation.         Discharge Medications   Current Discharge Medication List        START taking these medications    Details   azithromycin (ZITHROMAX) 250 MG tablet Take 1 tablet (250 mg) by mouth daily for 1 day.  Qty: 1 tablet, Refills: 0    Associated Diagnoses: Pneumonia due to infectious organism, unspecified laterality, unspecified part of lung      cefdinir (OMNICEF) 300 MG capsule Take 1 capsule (300 mg) by mouth 2 times daily for 3 days.  Qty: 6 capsule, Refills: 0    Associated Diagnoses: Pneumonia due to infectious organism, unspecified laterality, unspecified part of lung           CONTINUE these medications which have NOT CHANGED    Details   acetaminophen (TYLENOL) 500 MG tablet Take 1,000 mg by mouth 3 times daily as needed for pain.      amLODIPine (NORVASC) 5 MG tablet Take 1 tablet (5 mg) by mouth daily.  Qty: 30 tablet, Refills: 0    Comments: Hold if systolic blood pressure less than 100 mm of hg  Associated Diagnoses: Primary hypertension      artificial saliva (BIOTENE MT) AERS spray Take 1 spray by mouth every 2 hours as needed for dry mouth    Associated Diagnoses: Dysphagia, unspecified type      atorvastatin (LIPITOR) 10 MG tablet Take 10 mg by mouth every evening.      carbidopa-levodopa (SINEMET)  MG tablet Take 2 tablets by mouth 3 times daily. 11:20 AM, 3:20 PM, and 7:20 PM      carboxymethylcellulose (CARBOXYMETHYLCELLULOSE SODIUM) 0.5 % SOLN ophthalmic solution Place 1 drop into both eyes 3 times daily.      diclofenac (VOLTAREN) 1 % topical gel Apply 4 g topically 3 times daily.      DULoxetine (CYMBALTA) 60 MG  capsule Take 60 mg by mouth daily. Every morning after a meal      !! gabapentin (NEURONTIN) 300 MG capsule Take 600 mg by mouth every morning.      !! gabapentin (NEURONTIN) 300 MG capsule Take 1,200 mg by mouth every evening. At 7:30 PM      JARDIANCE 25 MG TABS tablet Take 25 mg by mouth daily.      !! lactulose (CHRONULAC) 10 GM/15ML solution Take 20 g by mouth 2 times daily.      !! lactulose (CHRONULAC) 10 GM/15ML solution Take 30 mLs by mouth daily as needed for constipation.      Lidocaine (LIDOCARE) 4 % Patch Place 1 patch onto the skin every 24 hours. Leave on for 8 hours (placed at 11:20 AM)    To prevent lidocaine toxicity, patient should be patch free for 12 hrs daily.      linaclotide (LINZESS) 72 MCG capsule Take 72 mcg by mouth every morning.      magnesium hydroxide (MILK OF MAGNESIA) 400 MG/5ML suspension Take 30 mLs by mouth daily as needed for constipation.      metFORMIN (GLUCOPHAGE) 500 MG tablet Take 1,000 mg by mouth 2 times daily.      omeprazole (PRILOSEC) 40 MG DR capsule Take 40 mg by mouth every morning. At least 30-60 minutes before a meal      ondansetron (ZOFRAN ODT) 4 MG ODT tab Take 4 mg by mouth every 8 hours as needed for nausea.      psyllium (METAMUCIL/KONSYL) Packet Take 1 packet by mouth daily.      sennosides (SENOKOT) 8.6 MG tablet Take 1 tablet by mouth 2 times daily.      sertraline (ZOLOFT) 100 MG tablet Take 100 mg by mouth every morning      medication given by implanted intrathecal pump continuously.   Drug # 1: Fentanyl (Sublimaze) - Conc: 625 mcg/mL - Total Dose / 24 hours: 186.48 mcg mcg    Drug # 2: Bupivacaine (Marcaine)  - Conc: 25 mg/mL - Total Dose / 24 hours: 7.459 mg    Drug # 3: morphine - Conc: 0.1 mg/mL - total dose / 24 hours: 0.80376 mg    Complex continuous infusion of above    Outside Clinic & Provider: [facility] [phone]  Last Refill Date: 3/20/25  Alarm Date: 5/19/25  Next Refill Date: 5/15/2025     Pump : Wireless Seismic  II pump     Please consider consulting the pain team if the patient is admitted with an IT pump.      mirtazapine (REMERON) 7.5 MG tablet Take 7.5 mg by mouth at bedtime.       !! - Potential duplicate medications found. Please discuss with provider.        Allergies   Allergies   Allergen Reactions    Hydrocodone Itching     Constipation    Aspirin Itching    Contrast Dye      LW CM1: >>> NO CONTRAST ADVERSE REACTION <<<     Reaction : as of 5/6/25 pt states he is not sure of this allergy.    Food      LW FI1: nka LW FI2: nka    Lisinopril Other (See Comments) and GI Disturbance     constipation    Oxycodone Itching    Percodan [Oxycodone-Aspirin] Rash

## 2025-05-07 NOTE — PROGRESS NOTES
Care Management Discharge Note    Discharge Date: 05/07/2025       Discharge Disposition:      Discharge Services:      Discharge DME:      Discharge Transportation: family or friend will provide    Private pay costs discussed: Not applicable    Does the patient's insurance plan have a 3 day qualifying hospital stay waiver?  No    PAS Confirmation Code:    Patient/family educated on Medicare website which has current facility and service quality ratings:      Education Provided on the Discharge Plan:  yes  Persons Notified of Discharge Plans: pt and facility  Patient/Family in Agreement with the Plan:  yes    Handoff Referral Completed: No, handoff not indicated or clinically appropriate    Additional Information:  Pt is medically ready for discharge. Discharge orders faxed to [facility] ([phone]) and [facility]. [facility] additionally spoke with facility RN, Alyssa ([phone]) to update her on pt's return.     Nelda Holden, KWADWO, LGSW  [facility] Care Coordinator/ Med Surg [address] [place] [address]  [phone]

## 2025-05-07 NOTE — PROGRESS NOTES
"GASTROENTEROLOGY PROGRESS NOTE     SUBJECTIVE:  Sleeping. Has not had the opportunity to try liquids yet today so does not know if dysphagia is improved. Denies abdominal pain, nausea, or vomiting.      OBJECTIVE:  /61 (BP Location: Right arm)   Pulse 60   Temp 98.6  F (37  C) (Oral)   Resp 16   Ht 1.803 m (5' 11\")   Wt 82.1 kg (180 lb 14.4 oz)   SpO2 95%   BMI 25.23 kg/m    Temp (24hrs), Av.4  F (36.9  C), Min:96.8  F (36  C), Max:99.4  F (37.4  C)    No data found.    Intake/Output Summary (Last 24 hours) at 2025 0841  Last data filed at 2025 0609  Gross per 24 hour   Intake 1042 ml   Output 1225 ml   Net -183 ml        PHYSICAL EXAM  GENERAL: No obvious distress  EYES: No scleral icterus  ABDOMEN: Non-distended.   NEUROLOGIC: Alert and oriented. No asterixis.   PSYCHIATRIC: Normal affect     Additional Comments:  ROS, FH, SH: See initial GI consult for details.     I have reviewed the patient's new clinical lab results:     Recent Labs   Lab Test 25  0805 25  0736 25   WBC 4.9 6.5 10.2   HGB 9.9* 9.4* 10.4*   MCV 77* 75* 76*    139* 173     Recent Labs   Lab Test 25  0719 25  0736 25   POTASSIUM 4.0 3.8 4.0   CHLORIDE 108* 105 102   CO2 19* 24 20*   BUN 13.5 33.2* 37.9*   ANIONGAP 10 10 16*     Recent Labs   Lab Test 25  20325  1107 25  1101 24  1600 24  1543 24  1931 24  1816 17  0504 17  0456   ALBUMIN 4.2  --  4.2  --   --   --  3.8  --  4.2   BILITOTAL 0.3  --  0.4  --   --   --  0.3  --  0.5   ALT 5  --  20  --   --   --  <5  --  22   AST 17  --  36  --   --   --  19  --  20   PROTEIN  --  10*  --  20* Negative   < >  --    < >  --    LIPASE  --   --   --   --   --   --   --   --  77    < > = values in this interval not displayed.       IMAGING:   XR VIDEO SWALLOW STUDY 2025  Impression:  1. Laryngeal penetration with liquid barium. No aspiration with any  consistency. "   2. Focal narrowing of the esophagus just inferior to a large C5-6  osteophyte, suspicious for an esophageal web. Barium tablet was unable  to pass through this stenotic area. Consider endoscopy for further  evaluation.  3. Please see the speech pathologist report for further details.    ENDOSCOPIC EVALUATION:  EGD 5/6/2025  Findings:       One benign-appearing, intrinsic moderate stenosis was found 17 cm from        the incisors. The stenosis was traversed. A TTS dilator was passed        through the scope. Dilation with a 17 mm balloon dilator was performed.        One benign-appearing, intrinsic mild stenosis was found in the distal        esophagus. The stenosis was traversed. A TTS dilator was passed through        the scope. Dilation with an 18 mm balloon dilator was performed. The        dilation site was examined and showed mild mucosal disruption.        He had some additional non-obstructing stenosis in the distal esophagus        not dilated.        The stomach was normal.        The examined duodenum was normal.                                                                                    Impression:          - Benign-appearing esophageal stenosis. Dilated.                             - Benign-appearing esophageal stenosis. Dilated.                             - Normal stomach.                             - Normal examined duodenum.                             - No specimens collected.     ASSESSMENT:  Dysphagia  Esophageal stenosis   Anterior osteophyte   89 year old male with PMH CAD s/p stent, CABG, parkinson's disease, T2DM, neuropathy, chronic pain with intrathecal pump, HTN, who is admitted with pneumonia and diffuse pain. GI consulted regarding dysphagia and aspiration pneumonia      Patient has anterior osteophyte as well as oropharyngeal dysphagia as source of his symptoms. Previous VSS focal narrowing of the esophagus just inferior to a large C5-6 osteophyte, suspicious for an esophageal  web. Barium tablet was unable to pass through this stenotic area.     EGD 5/6 with benign appearing stenosis in the esophagus. Esophagus was dilated in two spots.     PLAN:   - Advance diet as tolerated   - Continue PPI     Discussed patient findings and plan with Dr. Campos.     Total time: 25 minutes of total time was spent providing patient care, including patient evaluation, reviewing documentation/test results, and .     Arlette Perez PA-C  [facility] ([facility])

## 2025-05-07 NOTE — PROGRESS NOTES
05/07/25 1301   Appointment Info   Signing Clinician's Name / Credentials (PT) Severo Garcia DPT   Living Environment   Living Environment Comments Pt reports from [facility], use of 4ww with all mobility at baseline. Gets med assistance as well as some dressing per pt report.   Self-Care   Usual Activity Tolerance moderate   Current Activity Tolerance moderate   Equipment Currently Used at Home walker, rolling;wheelchair, manual   Fall history within last six months no   General Information   Onset of Illness/Injury or Date of Surgery 05/03/25   Referring Physician Milagro Negrete, DO   Patient/Family Therapy Goals Statement (PT) To improve mobility, return home   Pertinent History of Current Problem (include personal factors and/or comorbidities that impact the POC) Per H&P, pt is an 89 year old male with medical history significant for CAD s/p stent placement and CABG, Parkinson disease, type 2 diabetes mellitus, neuropathy, chronic pain syndrome with intrathecal pump placement, hypertension, dyslipidemia, 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  He presented to the ED with complaints of cough, dysphagia, and shortness of breath.  He is admitted for probable aspiration pneumonia.   Cognition   Affect/Mental Status (Cognition) WFL   Orientation Status (Cognition) oriented x 4   Follows Commands (Cognition) WFL   Pain Assessment   Patient Currently in Pain No   Posture    Posture Forward head position   Range of Motion (ROM)   ROM Comment B LE WFL   Strength (Manual Muscle Testing)   Strength (Manual Muscle Testing) Able to perform R SLR;Able to perform L SLR   Strength Comments decreased activity tolerance   Bed Mobility   Comment, (Bed Mobility) SBA supine <> sit   Transfers   Comment, (Transfers) SBA sit <> stand with 4ww   Gait/Stairs (Locomotion)   Distance in Feet (Gait) 15   Pattern (Gait) step-through   Deviations/Abnormal Patterns  (Gait) base of support, wide;gait speed decreased;stride length decreased   Comment, (Gait/Stairs) SBA with 4ww   Balance   Balance Comments good sitting; good- standing with 4ww   Clinical Impression   Criteria for Skilled Therapeutic Intervention Yes, treatment indicated   PT Diagnosis (PT) decreased functional mobility   Influenced by the following impairments decreased balance, activity tolerance   Functional limitations due to impairments impaired bed mobility, transfers, ambulation   Clinical Presentation (PT Evaluation Complexity) stable   Clinical Presentation Rationale Functional status   Clinical Decision Making (Complexity) low complexity   Planned Therapy Interventions (PT) balance training;bed mobility training;gait training;patient/family education;strengthening;progressive activity/exercise;transfer training;home exercise program;postural re-education   Risk & Benefits of therapy have been explained evaluation/treatment results reviewed;care plan/treatment goals reviewed;risks/benefits reviewed;current/potential barriers reviewed;participants voiced agreement with care plan;participants included;patient   PT Total Evaluation Time   PT Eval, Low Complexity Minutes (83323) 12   Physical Therapy Goals   PT Frequency Daily   PT Predicted Duration/Target Date for Goal Attainment 05/09/25   PT Goals Bed Mobility;Transfers;Gait   PT: Bed Mobility Supine to/from sit;Independent   PT: Transfers Modified independent;Sit to/from stand;Assistive device   PT: Gait Modified independent;Assistive device;Greater than 200 feet   PT Discharge Planning   PT Plan progress ind with transfers, ambulation   PT Discharge Recommendation (DC Rec) home with home care physical therapy   PT Rationale for DC Rec anticipate pt to return back home with use of 4ww for all activity and HHPT to continue progression of activity independence.   PT Brief overview of current status SBA with walker   PT Total Distance Amb During Session  (feet) 455   Physical Therapy Time and Intention   Timed Code Treatment Minutes 28   Total Session Time (sum of timed and untimed services) 40

## 2025-05-08 ENCOUNTER — PATIENT OUTREACH (OUTPATIENT)
Dept: CARE COORDINATION | Facility: CLINIC | Age: 89
End: 2025-05-08
Payer: MEDICARE

## 2025-05-08 LAB — BACTERIA BLD CULT: NORMAL

## 2025-05-08 NOTE — PROGRESS NOTES
[facility]: [facility]    Background: Transitional Care Management program identified per system criteria and reviewed by [facility] team for possible outreach.    Assessment: Upon chart review, CCRC Team member will not proceed with patient outreach related to this episode of Transitional Care Management program due to reason below:    Non-[facility] TCU: CCRC team member noted patient discharged to TCU/ARU/LTACH. Patient is not established with a [facility] currently supported by Primary Care-Care Coordination therefore handoff to Primary Care-Care Coordination is not appropriate at this time.    Plan: Transitional Care Management episode addressed appropriately per reason noted above.      SEEMA Capps  [facility] [place], [facility]    *Connected Care Resource Team does NOT follow patient ongoing. Referrals are identified based on internal discharge reports and the outreach is to ensure patient has an understanding of their discharge instructions.

## 2025-05-08 NOTE — PLAN OF CARE
VSS. Denies CP, SOB, and Pain.  Assisted living facility transporting patient back with wife.  Reviewed discharge paperwork with patient, and belongings returned to patient.  Medications discussed with patient.     At 1630 discussed that patient should not be on pantoprazole.  Pt is on omeprazole.      Goal Outcome Evaluation:     Plan of Care Reviewed With: patient     Overall Patient Progress: Adequate for care transition.      Outcome Evaluation: Pt  A&Ox4 and tolerating full liquid meals.  States he doesn't eat solid food, but was able to tolerate swallowing pills.      Problem: Adult Inpatient Plan of Care  Goal: Absence of Hospital-Acquired Illness or Injury  Intervention: Identify and Manage Fall Risk  Recent Flowsheet Documentation  Taken 5/7/2025 1140 by Modesto Martinez RN  Safety Promotion/Fall Prevention: safety round/check completed  Taken 5/7/2025 0920 by Modesto Martinez RN  Safety Promotion/Fall Prevention: safety round/check completed  Taken 5/7/2025 0716 by Modesto Martinez RN  Safety Promotion/Fall Prevention: safety round/check completed  Intervention: Prevent Skin Injury  Recent Flowsheet Documentation  Taken 5/7/2025 1140 by Modesto Martinez RN  Body Position: position changed independently  Taken 5/7/2025 0920 by Modesto Martinez RN  Skin Protection: adhesive use limited  Intervention: Prevent and Manage VTE (Venous Thromboembolism) Risk  Recent Flowsheet Documentation  Taken 5/7/2025 0920 by Modesto Martinez RN  VTE Prevention/Management: SCDs off (sequential compression devices)  Intervention: Prevent Infection  Recent Flowsheet Documentation  Taken 5/7/2025 0920 by Modesto Martinez RN  Infection Prevention: rest/sleep promoted  Goal: Optimal Comfort and Wellbeing  Intervention: Provide Person-Centered Care  Recent Flowsheet Documentation  Taken 5/7/2025 0920 by Modesto Martinez RN  Trust Relationship/Rapport: care explained     Problem: Delirium  Goal: Optimal Coping  Intervention: Optimize Psychosocial  Adjustment to Delirium  Recent Flowsheet Documentation  Taken 5/7/2025 0920 by Modesto Martinez RN  Family/Support System Care: self-care encouraged  Goal: Improved Behavioral Control  Intervention: Prevent and Manage Agitation  Recent Flowsheet Documentation  Taken 5/7/2025 0920 by Modesto Martinez RN  Environment Familiarity/Consistency: daily routine followed  Intervention: Minimize Safety Risk  Recent Flowsheet Documentation  Taken 5/7/2025 0920 by Modesto Martinez RN  Communication Support Strategies: active listening utilized  Enhanced Safety Measures: assistive devices when indicated  Trust Relationship/Rapport: care explained  Goal: Improved Attention and Thought Clarity  Intervention: Maximize Cognitive Function  Recent Flowsheet Documentation  Taken 5/7/2025 0920 by Modesto Martinez RN  Sensory Stimulation Regulation:   care clustered   television on  Reorientation Measures: glasses use encouraged     Problem: Fall Injury Risk  Goal: Absence of Fall and Fall-Related Injury  Intervention: Identify and Manage Contributors  Recent Flowsheet Documentation  Taken 5/7/2025 0920 by Modesto Martinez RN  Medication Review/Management: medications reviewed  Intervention: Promote Injury-Free Environment  Recent Flowsheet Documentation  Taken 5/7/2025 1140 by Modesto Martinez RN  Safety Promotion/Fall Prevention: safety round/check completed  Taken 5/7/2025 0920 by Modesto Martinez RN  Safety Promotion/Fall Prevention: safety round/check completed  Taken 5/7/2025 0716 by Modesto Martinez RN  Safety Promotion/Fall Prevention: safety round/check completed     Problem: Comorbidity Management  Goal: Blood Glucose Levels Within Targeted Range  Intervention: Monitor and Manage Glycemia  Recent Flowsheet Documentation  Taken 5/7/2025 0920 by Modesto Martinez RN  Medication Review/Management: medications reviewed  Goal: Blood Pressure in Desired Range  Intervention: Maintain Blood Pressure Management  Recent Flowsheet Documentation  Taken  5/7/2025 0920 by Modesto Martinez RN  Medication Review/Management: medications reviewed

## 2025-05-08 NOTE — PLAN OF CARE
Physical Therapy Discharge Summary    Reason for therapy discharge:    Discharged to home with home therapy.    Progress towards therapy goal(s). See goals on Care Plan in [facility] electronic health record for goal details.  Goals not met.  Barriers to achieving goals:   discharge from facility.    Therapy recommendation(s):    Continued therapy is recommended.  Rationale/Recommendations:  anticipate pt to return back home with use of 4ww for all activity and HHPT to continue progression of activity independence.

## 2025-06-24 ENCOUNTER — LAB REQUISITION (OUTPATIENT)
Dept: LAB | Facility: CLINIC | Age: 89
End: 2025-06-24
Payer: MEDICARE

## 2025-06-24 DIAGNOSIS — R13.10 DYSPHAGIA, UNSPECIFIED: ICD-10-CM

## 2025-06-24 DIAGNOSIS — E11.40 TYPE 2 DIABETES MELLITUS WITH DIABETIC NEUROPATHY, UNSPECIFIED (H): ICD-10-CM

## 2025-06-24 DIAGNOSIS — E11.9 TYPE 2 DIABETES MELLITUS WITHOUT COMPLICATIONS (H): ICD-10-CM

## 2025-06-24 DIAGNOSIS — K59.00 CONSTIPATION, UNSPECIFIED: ICD-10-CM

## 2025-06-30 LAB
EST. AVERAGE GLUCOSE BLD GHB EST-MCNC: 171 MG/DL
HBA1C MFR BLD: 7.6 %

## 2025-06-30 PROCEDURE — P9603 ONE-WAY ALLOW PRORATED MILES: HCPCS | Mod: ORL

## 2025-06-30 PROCEDURE — 36415 COLL VENOUS BLD VENIPUNCTURE: CPT | Mod: ORL

## 2025-06-30 PROCEDURE — 83036 HEMOGLOBIN GLYCOSYLATED A1C: CPT | Mod: ORL

## (undated) DEVICE — BLADE KNIFE SURG 10 371110

## (undated) DEVICE — BONE CLEANING TIP INTERPULSE  0210-010-000

## (undated) DEVICE — CATH TRAY FOLEY COUDE SURESTEP 16FR W/DRN BAG LATEX A304416A

## (undated) DEVICE — LINEN HALF SHEET 5512

## (undated) DEVICE — LINEN TOWEL PACK X5 5464

## (undated) DEVICE — PREP DURAPREP 26ML APL 8630

## (undated) DEVICE — KIT PROCEDURE W/CLEAN-A-SCOPE LINERS V2 200800

## (undated) DEVICE — Device

## (undated) DEVICE — DRILL BIT

## (undated) DEVICE — DRSG KERLIX 4 1/2"X4YDS ROLL 6715

## (undated) DEVICE — SYR 50ML LL W/O NDL 309653

## (undated) DEVICE — SPECIMEN CULTURETTE DBL SWAB 220109

## (undated) DEVICE — SPONGE LAP 18X18" X8435

## (undated) DEVICE — GLOVE PROTEXIS POWDER FREE SMT 7.5  2D72PT75X

## (undated) DEVICE — INFLATION DEVICE BIG 60 ENDO-AN6012

## (undated) DEVICE — SPONGE RAY-TEC 4X8" 7318

## (undated) DEVICE — ENDO SNARE HEXAGONAL ISNARE 2.5X4.0CM W/25GA NDL

## (undated) DEVICE — DRAPE LAP W/ARMBOARD 29410

## (undated) DEVICE — BAG CLEAR TRASH 1.3M 39X33" P4040C

## (undated) DEVICE — LINEN FULL SHEET 5511

## (undated) DEVICE — GLOVE PROTEXIS W/NEU-THERA 8.5  2D73TE85

## (undated) DEVICE — GLOVE PROTEXIS BLUE W/NEU-THERA 7.5  2D73EB75

## (undated) DEVICE — PACK OPEN SHOULDER SOP15OCFSC

## (undated) DEVICE — ENDO FORCEP ENDOJAW BIOPSY 2.8MMX160CM FB-220K

## (undated) DEVICE — SOL WATER IRRIG 1000ML BOTTLE 2F7114

## (undated) DEVICE — SU VICRYL 0 CT-1 CR 8X18" J740D

## (undated) DEVICE — DRSG TEGADERM 2 1/2X 2 3/4"

## (undated) DEVICE — SUCTION IRR SYSTEM W/O TIP INTERPULSE HANDPIECE 0210-100-000

## (undated) DEVICE — GOWN XXLG REINFORCED 9071EL

## (undated) DEVICE — SU PROLENE 3-0 PS-2 18" 8687H

## (undated) DEVICE — GOWN IMPERVIOUS ZONED XLG 9041

## (undated) DEVICE — SU VICRYL 0 TIE 12X18" J906G

## (undated) DEVICE — ESU GROUND PAD UNIVERSAL W/O CORD

## (undated) DEVICE — ESU PENCIL W/HOLSTER E2350H

## (undated) DEVICE — SU VICRYL 3-0 SH 27" UND J416H

## (undated) DEVICE — TUBING SUCTION MEDI-VAC SOFT 3/16"X20' N520A

## (undated) DEVICE — GLOVE PROTEXIS POWDER FREE 8.0 ORTHOPEDIC 2D73ET80

## (undated) DEVICE — DRSG STERI STRIP 1/2X4" R1547

## (undated) DEVICE — DRAPE IOBAN INCISE 23X17" 6650EZ

## (undated) DEVICE — SOL NACL 0.9% IRRIG 3000ML BAG 2B7477

## (undated) DEVICE — PREP CHLORAPREP 26ML TINTED ORANGE  260815

## (undated) DEVICE — BUR ROUND 4MM XLONG 5093-228

## (undated) DEVICE — SOL NACL 0.9% IRRIG 1000ML BOTTLE 2F7124

## (undated) DEVICE — GLOVE PROTEXIS BLUE W/NEU-THERA 8.5  2D73EB85

## (undated) DEVICE — ESU GROUND PAD ADULT W/CORD E7507

## (undated) DEVICE — DRAIN ROUND W/RESERV KIT JACKSON PRATT 10FR 400ML SU130-402D

## (undated) DEVICE — KIT ENDO TURNOVER/PROCEDURE W/CLEAN A SCOPE LINERS 103888

## (undated) DEVICE — LINEN TOWEL PACK X10 5473

## (undated) DEVICE — SU MONOCRYL 3-0 PS-2 27" Y427H

## (undated) DEVICE — SOLUTION WATER 1000ML R5000-01

## (undated) DEVICE — GLOVE PROTEXIS W/NEU-THERA 7.5  2D73TE75

## (undated) DEVICE — MANIFOLD NEPTUNE 4 PORT 700-20

## (undated) DEVICE — SYR 03ML LL W/O NDL 309657

## (undated) DEVICE — DRAPE CONVERTORS U-DRAPE 60X72" 8476

## (undated) DEVICE — DRSG ADAPTIC 3X8" 6113

## (undated) DEVICE — SU VICRYL 4-0 PS-2 18" UND J496H

## (undated) DEVICE — SUCTION MANIFOLD NEPTUNE 2 SYS 4 PORT 0702-020-000

## (undated) DEVICE — BLADE SAW SAGITTAL STRK 25X89.5X0.96MM 4/2000 2108-393-005

## (undated) DEVICE — ENDO CANNULA 05MM VERSAONE UNIVERSAL UNVCA5STF

## (undated) DEVICE — SPONGE PACK VAGINAL 2X36"

## (undated) DEVICE — PAD CHUX UNDERPAD 30X36" P3036C

## (undated) DEVICE — ENDO TROCAR BLUNT 100MM W/THRD ANCHOR BLUNTPORT BPT12STS

## (undated) DEVICE — ESU ELEC BLADE 2.75" COATED/INSULATED E1455

## (undated) DEVICE — ESU CLEANER TIP 31142717

## (undated) DEVICE — WRAP MCCONNELL ARM SUPPORT LG 12-401

## (undated) DEVICE — SUCTION IRR STRYKERFLOW II W/TIP 250-070-520

## (undated) DEVICE — SU ETHIBOND 2 V-37 4X30" MX69G

## (undated) DEVICE — NDL 20GA 1.5"

## (undated) DEVICE — SUCTION TIP POOLE K770

## (undated) DEVICE — SUCTION CANISTER MEDIVAC LINER 3000ML W/LID 65651-530

## (undated) DEVICE — TUBE CULTURE ANAEROBIC PORT-A-CUL 11ML

## (undated) DEVICE — BALLOON ELATION 240CML 14-15-16.5-18-19MM 5.5CM EPCX15

## (undated) DEVICE — ESU CORD MONOPOLAR 10'  E0510

## (undated) DEVICE — ESU ELEC NDL 1" E1552

## (undated) DEVICE — DRAPE STERI U 1015

## (undated) DEVICE — LINEN POUCH DBL 5427

## (undated) DEVICE — SOL NACL 0.9% IRRIG 1000ML BOTTLE 07138-09

## (undated) DEVICE — ENDO TROCAR 05MM VERSAONE BLADELESS W/STD FIX CAN NONB5STF

## (undated) DEVICE — SU VICRYL 2-0 TIE 12X18" J905T

## (undated) DEVICE — BLADE CLIPPER SGL USE 9680

## (undated) DEVICE — PACK UNIVERSAL SPLIT 29131

## (undated) DEVICE — BLADE KNIFE SURG 15 371115

## (undated) DEVICE — DRAPE STERI TOWEL LG 1010

## (undated) DEVICE — CATH BALLOON MERIT ESOPH FIVE-STAGE 17X21MMX180CM EX18

## (undated) DEVICE — GOWN XLG DISP 9545

## (undated) RX ORDER — FENTANYL CITRATE 50 UG/ML
INJECTION, SOLUTION INTRAMUSCULAR; INTRAVENOUS
Status: DISPENSED
Start: 2017-07-07

## (undated) RX ORDER — PIPERACILLIN SODIUM, TAZOBACTAM SODIUM 3; .375 G/15ML; G/15ML
INJECTION, POWDER, LYOPHILIZED, FOR SOLUTION INTRAVENOUS
Status: DISPENSED
Start: 2017-07-07

## (undated) RX ORDER — LABETALOL HYDROCHLORIDE 5 MG/ML
INJECTION, SOLUTION INTRAVENOUS
Status: DISPENSED
Start: 2017-07-07

## (undated) RX ORDER — LIDOCAINE HYDROCHLORIDE 20 MG/ML
INJECTION, SOLUTION EPIDURAL; INFILTRATION; INTRACAUDAL; PERINEURAL
Status: DISPENSED
Start: 2020-08-12

## (undated) RX ORDER — PROPOFOL 10 MG/ML
INJECTION, EMULSION INTRAVENOUS
Status: DISPENSED
Start: 2020-08-12

## (undated) RX ORDER — DEXAMETHASONE SODIUM PHOSPHATE 4 MG/ML
INJECTION, SOLUTION INTRA-ARTICULAR; INTRALESIONAL; INTRAMUSCULAR; INTRAVENOUS; SOFT TISSUE
Status: DISPENSED
Start: 2020-08-12

## (undated) RX ORDER — ONDANSETRON 2 MG/ML
INJECTION INTRAMUSCULAR; INTRAVENOUS
Status: DISPENSED
Start: 2020-08-12

## (undated) RX ORDER — VANCOMYCIN HYDROCHLORIDE 1 G/20ML
INJECTION, POWDER, LYOPHILIZED, FOR SOLUTION INTRAVENOUS
Status: DISPENSED
Start: 2020-08-12

## (undated) RX ORDER — FENTANYL CITRATE 50 UG/ML
INJECTION, SOLUTION INTRAMUSCULAR; INTRAVENOUS
Status: DISPENSED
Start: 2020-08-12

## (undated) RX ORDER — HYDROMORPHONE HYDROCHLORIDE 1 MG/ML
INJECTION, SOLUTION INTRAMUSCULAR; INTRAVENOUS; SUBCUTANEOUS
Status: DISPENSED
Start: 2020-08-12

## (undated) RX ORDER — CEFAZOLIN SODIUM 2 G/100ML
INJECTION, SOLUTION INTRAVENOUS
Status: DISPENSED
Start: 2020-08-12

## (undated) RX ORDER — GLYCOPYRROLATE 0.2 MG/ML
INJECTION, SOLUTION INTRAMUSCULAR; INTRAVENOUS
Status: DISPENSED
Start: 2020-08-12

## (undated) RX ORDER — LIDOCAINE HYDROCHLORIDE 10 MG/ML
INJECTION, SOLUTION EPIDURAL; INFILTRATION; INTRACAUDAL; PERINEURAL
Status: DISPENSED
Start: 2020-08-12

## (undated) RX ORDER — EPINEPHRINE 1 MG/ML
INJECTION, SOLUTION INTRAMUSCULAR; SUBCUTANEOUS
Status: DISPENSED
Start: 2020-08-12